# Patient Record
Sex: MALE | Race: WHITE
[De-identification: names, ages, dates, MRNs, and addresses within clinical notes are randomized per-mention and may not be internally consistent; named-entity substitution may affect disease eponyms.]

---

## 2017-08-21 ENCOUNTER — RX ONLY (OUTPATIENT)
Age: 77
Setting detail: RX ONLY
End: 2017-08-21

## 2017-08-21 PROBLEM — Z85.820 PERSONAL HISTORY OF MALIGNANT MELANOMA OF SKIN: Status: ACTIVE | Noted: 2017-08-21

## 2017-08-21 PROBLEM — D04.62 CARCINOMA IN SITU OF SKIN OF LEFT UPPER LIMB, INCLUDING SHOULDER: Status: ACTIVE | Noted: 2017-08-21

## 2017-08-21 PROBLEM — D22.61 MELANOCYTIC NEVI OF RIGHT UPPER LIMB, INCLUDING SHOULDER: Status: ACTIVE | Noted: 2017-08-21

## 2017-09-03 ENCOUNTER — APPOINTMENT (OUTPATIENT)
Dept: RADIOLOGY | Facility: MEDICAL CENTER | Age: 77
End: 2017-09-03
Attending: STUDENT IN AN ORGANIZED HEALTH CARE EDUCATION/TRAINING PROGRAM
Payer: MEDICARE

## 2017-09-03 ENCOUNTER — RESOLUTE PROFESSIONAL BILLING HOSPITAL PROF FEE (OUTPATIENT)
Dept: HOSPITALIST | Facility: MEDICAL CENTER | Age: 77
End: 2017-09-03
Payer: MEDICARE

## 2017-09-03 ENCOUNTER — HOSPITAL ENCOUNTER (OUTPATIENT)
Facility: MEDICAL CENTER | Age: 77
End: 2017-09-04
Attending: EMERGENCY MEDICINE | Admitting: INTERNAL MEDICINE
Payer: MEDICARE

## 2017-09-03 ENCOUNTER — APPOINTMENT (OUTPATIENT)
Dept: RADIOLOGY | Facility: MEDICAL CENTER | Age: 77
End: 2017-09-03
Attending: EMERGENCY MEDICINE
Payer: MEDICARE

## 2017-09-03 DIAGNOSIS — R07.9 CHEST PAIN, UNSPECIFIED TYPE: ICD-10-CM

## 2017-09-03 PROBLEM — R07.89 ATYPICAL CHEST PAIN: Status: ACTIVE | Noted: 2017-09-03

## 2017-09-03 PROBLEM — R71.8 ELEVATED HEMATOCRIT: Status: ACTIVE | Noted: 2017-09-03

## 2017-09-03 PROBLEM — E11.9 DIABETES (HCC): Status: ACTIVE | Noted: 2017-09-03

## 2017-09-03 PROBLEM — E78.5 DYSLIPIDEMIA: Status: ACTIVE | Noted: 2017-09-03

## 2017-09-03 LAB
ALBUMIN SERPL BCP-MCNC: 4.1 G/DL (ref 3.2–4.9)
ALBUMIN/GLOB SERPL: 1.2 G/DL
ALP SERPL-CCNC: 66 U/L (ref 30–99)
ALT SERPL-CCNC: 22 U/L (ref 2–50)
AMPHET UR QL SCN: NEGATIVE
ANION GAP SERPL CALC-SCNC: 7 MMOL/L (ref 0–11.9)
AST SERPL-CCNC: 19 U/L (ref 12–45)
BARBITURATES UR QL SCN: NEGATIVE
BASOPHILS # BLD AUTO: 0.9 % (ref 0–1.8)
BASOPHILS # BLD: 0.07 K/UL (ref 0–0.12)
BENZODIAZ UR QL SCN: NEGATIVE
BILIRUB SERPL-MCNC: 0.9 MG/DL (ref 0.1–1.5)
BNP SERPL-MCNC: 20 PG/ML (ref 0–100)
BUN SERPL-MCNC: 19 MG/DL (ref 8–22)
BZE UR QL SCN: NEGATIVE
CALCIUM SERPL-MCNC: 9.7 MG/DL (ref 8.5–10.5)
CANNABINOIDS UR QL SCN: NEGATIVE
CHLORIDE SERPL-SCNC: 101 MMOL/L (ref 96–112)
CO2 SERPL-SCNC: 25 MMOL/L (ref 20–33)
COMMENT 1642: NORMAL
CREAT SERPL-MCNC: 0.95 MG/DL (ref 0.5–1.4)
EKG IMPRESSION: NORMAL
EOSINOPHIL # BLD AUTO: 0.33 K/UL (ref 0–0.51)
EOSINOPHIL NFR BLD: 4 % (ref 0–6.9)
ERYTHROCYTE [DISTWIDTH] IN BLOOD BY AUTOMATED COUNT: 38.7 FL (ref 35.9–50)
FERRITIN SERPL-MCNC: 53.5 NG/ML (ref 22–322)
GFR SERPL CREATININE-BSD FRML MDRD: >60 ML/MIN/1.73 M 2
GLOBULIN SER CALC-MCNC: 3.4 G/DL (ref 1.9–3.5)
GLUCOSE BLD-MCNC: 156 MG/DL (ref 65–99)
GLUCOSE BLD-MCNC: 250 MG/DL (ref 65–99)
GLUCOSE SERPL-MCNC: 337 MG/DL (ref 65–99)
HCT VFR BLD AUTO: 52.8 % (ref 42–52)
HGB BLD-MCNC: 17.8 G/DL (ref 14–18)
IMM GRANULOCYTES # BLD AUTO: 0.13 K/UL (ref 0–0.11)
IMM GRANULOCYTES NFR BLD AUTO: 1.6 % (ref 0–0.9)
INR PPP: 0.88 (ref 0.87–1.13)
IRON SERPL-MCNC: 80 UG/DL (ref 50–180)
LIPASE SERPL-CCNC: 21 U/L (ref 11–82)
LYMPHOCYTES # BLD AUTO: 1.55 K/UL (ref 1–4.8)
LYMPHOCYTES NFR BLD: 19 % (ref 22–41)
MCH RBC QN AUTO: 28.5 PG (ref 27–33)
MCHC RBC AUTO-ENTMCNC: 33.7 G/DL (ref 33.7–35.3)
MCV RBC AUTO: 84.5 FL (ref 81.4–97.8)
METHADONE UR QL SCN: NEGATIVE
MONOCYTES # BLD AUTO: 0.83 K/UL (ref 0–0.85)
MONOCYTES NFR BLD AUTO: 10.2 % (ref 0–13.4)
MORPHOLOGY BLD-IMP: NORMAL
NEUTROPHILS # BLD AUTO: 5.24 K/UL (ref 1.82–7.42)
NEUTROPHILS NFR BLD: 64.3 % (ref 44–72)
NRBC # BLD AUTO: 0 K/UL
NRBC BLD AUTO-RTO: 0 /100 WBC
OPIATES UR QL SCN: NEGATIVE
OXYCODONE UR QL SCN: NEGATIVE
PCP UR QL SCN: NEGATIVE
PLATELET # BLD AUTO: 259 K/UL (ref 164–446)
PMV BLD AUTO: 11.2 FL (ref 9–12.9)
POTASSIUM SERPL-SCNC: 4.6 MMOL/L (ref 3.6–5.5)
PROPOXYPH UR QL SCN: NEGATIVE
PROT SERPL-MCNC: 7.5 G/DL (ref 6–8.2)
PROTHROMBIN TIME: 12.2 SEC (ref 12–14.6)
RBC # BLD AUTO: 6.25 M/UL (ref 4.7–6.1)
SODIUM SERPL-SCNC: 133 MMOL/L (ref 135–145)
TROPONIN I SERPL-MCNC: <0.01 NG/ML (ref 0–0.04)
TROPONIN I SERPL-MCNC: <0.01 NG/ML (ref 0–0.04)
TSH SERPL DL<=0.005 MIU/L-ACNC: 1.76 UIU/ML (ref 0.3–3.7)
WBC # BLD AUTO: 8.2 K/UL (ref 4.8–10.8)

## 2017-09-03 PROCEDURE — 99285 EMERGENCY DEPT VISIT HI MDM: CPT

## 2017-09-03 PROCEDURE — 36415 COLL VENOUS BLD VENIPUNCTURE: CPT

## 2017-09-03 PROCEDURE — 700111 HCHG RX REV CODE 636 W/ 250 OVERRIDE (IP): Performed by: STUDENT IN AN ORGANIZED HEALTH CARE EDUCATION/TRAINING PROGRAM

## 2017-09-03 PROCEDURE — 83690 ASSAY OF LIPASE: CPT

## 2017-09-03 PROCEDURE — 85025 COMPLETE CBC W/AUTO DIFF WBC: CPT | Mod: 91

## 2017-09-03 PROCEDURE — 80307 DRUG TEST PRSMV CHEM ANLYZR: CPT

## 2017-09-03 PROCEDURE — A9270 NON-COVERED ITEM OR SERVICE: HCPCS | Performed by: STUDENT IN AN ORGANIZED HEALTH CARE EDUCATION/TRAINING PROGRAM

## 2017-09-03 PROCEDURE — G0378 HOSPITAL OBSERVATION PER HR: HCPCS

## 2017-09-03 PROCEDURE — 83880 ASSAY OF NATRIURETIC PEPTIDE: CPT

## 2017-09-03 PROCEDURE — 80053 COMPREHEN METABOLIC PANEL: CPT | Mod: 91

## 2017-09-03 PROCEDURE — 93005 ELECTROCARDIOGRAM TRACING: CPT | Performed by: STUDENT IN AN ORGANIZED HEALTH CARE EDUCATION/TRAINING PROGRAM

## 2017-09-03 PROCEDURE — 83540 ASSAY OF IRON: CPT

## 2017-09-03 PROCEDURE — 82962 GLUCOSE BLOOD TEST: CPT

## 2017-09-03 PROCEDURE — 80061 LIPID PANEL: CPT

## 2017-09-03 PROCEDURE — 84484 ASSAY OF TROPONIN QUANT: CPT | Mod: 91

## 2017-09-03 PROCEDURE — 700111 HCHG RX REV CODE 636 W/ 250 OVERRIDE (IP): Performed by: INTERNAL MEDICINE

## 2017-09-03 PROCEDURE — 84443 ASSAY THYROID STIM HORMONE: CPT

## 2017-09-03 PROCEDURE — 94760 N-INVAS EAR/PLS OXIMETRY 1: CPT

## 2017-09-03 PROCEDURE — 93005 ELECTROCARDIOGRAM TRACING: CPT

## 2017-09-03 PROCEDURE — 93010 ELECTROCARDIOGRAM REPORT: CPT | Performed by: INTERNAL MEDICINE

## 2017-09-03 PROCEDURE — 93005 ELECTROCARDIOGRAM TRACING: CPT | Performed by: EMERGENCY MEDICINE

## 2017-09-03 PROCEDURE — 700102 HCHG RX REV CODE 250 W/ 637 OVERRIDE(OP): Performed by: STUDENT IN AN ORGANIZED HEALTH CARE EDUCATION/TRAINING PROGRAM

## 2017-09-03 PROCEDURE — 90471 IMMUNIZATION ADMIN: CPT

## 2017-09-03 PROCEDURE — 90662 IIV NO PRSV INCREASED AG IM: CPT | Performed by: INTERNAL MEDICINE

## 2017-09-03 PROCEDURE — 71010 DX-CHEST-PORTABLE (1 VIEW): CPT

## 2017-09-03 PROCEDURE — 85610 PROTHROMBIN TIME: CPT

## 2017-09-03 PROCEDURE — 82728 ASSAY OF FERRITIN: CPT

## 2017-09-03 PROCEDURE — 85379 FIBRIN DEGRADATION QUANT: CPT

## 2017-09-03 RX ORDER — ATORVASTATIN CALCIUM 80 MG/1
80 TABLET, FILM COATED ORAL
Status: COMPLETED | OUTPATIENT
Start: 2017-09-03 | End: 2017-09-03

## 2017-09-03 RX ORDER — OMEPRAZOLE 20 MG/1
20 CAPSULE, DELAYED RELEASE ORAL DAILY
Status: DISCONTINUED | OUTPATIENT
Start: 2017-09-03 | End: 2017-09-04 | Stop reason: HOSPADM

## 2017-09-03 RX ORDER — ATORVASTATIN CALCIUM 40 MG/1
40 TABLET, FILM COATED ORAL
Status: DISCONTINUED | OUTPATIENT
Start: 2017-09-04 | End: 2017-09-04 | Stop reason: HOSPADM

## 2017-09-03 RX ORDER — INSULIN GLARGINE 100 [IU]/ML
30 INJECTION, SOLUTION SUBCUTANEOUS EVERY EVENING
Status: DISCONTINUED | OUTPATIENT
Start: 2017-09-03 | End: 2017-09-04 | Stop reason: HOSPADM

## 2017-09-03 RX ORDER — ASPIRIN 81 MG/1
81 TABLET, CHEWABLE ORAL DAILY
Status: DISCONTINUED | OUTPATIENT
Start: 2017-09-04 | End: 2017-09-04 | Stop reason: HOSPADM

## 2017-09-03 RX ORDER — AMOXICILLIN 250 MG
2 CAPSULE ORAL 2 TIMES DAILY
Status: DISCONTINUED | OUTPATIENT
Start: 2017-09-03 | End: 2017-09-04 | Stop reason: HOSPADM

## 2017-09-03 RX ORDER — ASPIRIN 325 MG
325 TABLET ORAL ONCE
Status: COMPLETED | OUTPATIENT
Start: 2017-09-03 | End: 2017-09-03

## 2017-09-03 RX ORDER — METFORMIN HYDROCHLORIDE EXTENDED-RELEASE TABLETS 1000 MG/1
1 TABLET, FILM COATED, EXTENDED RELEASE ORAL 2 TIMES DAILY
COMMUNITY
End: 2017-10-09 | Stop reason: SDUPTHER

## 2017-09-03 RX ORDER — MORPHINE SULFATE 4 MG/ML
4 INJECTION, SOLUTION INTRAMUSCULAR; INTRAVENOUS ONCE
Status: DISCONTINUED | OUTPATIENT
Start: 2017-09-03 | End: 2017-09-04

## 2017-09-03 RX ORDER — BISACODYL 10 MG
10 SUPPOSITORY, RECTAL RECTAL
Status: DISCONTINUED | OUTPATIENT
Start: 2017-09-03 | End: 2017-09-04 | Stop reason: HOSPADM

## 2017-09-03 RX ORDER — ONDANSETRON 2 MG/ML
4 INJECTION INTRAMUSCULAR; INTRAVENOUS ONCE
Status: ACTIVE | OUTPATIENT
Start: 2017-09-03 | End: 2017-09-04

## 2017-09-03 RX ORDER — LABETALOL HYDROCHLORIDE 5 MG/ML
10 INJECTION, SOLUTION INTRAVENOUS EVERY 4 HOURS PRN
Status: DISCONTINUED | OUTPATIENT
Start: 2017-09-03 | End: 2017-09-04 | Stop reason: HOSPADM

## 2017-09-03 RX ORDER — DEXTROSE MONOHYDRATE 25 G/50ML
25 INJECTION, SOLUTION INTRAVENOUS
Status: DISCONTINUED | OUTPATIENT
Start: 2017-09-03 | End: 2017-09-04 | Stop reason: HOSPADM

## 2017-09-03 RX ORDER — LISINOPRIL 10 MG/1
5 TABLET ORAL
Status: DISCONTINUED | OUTPATIENT
Start: 2017-09-03 | End: 2017-09-04 | Stop reason: HOSPADM

## 2017-09-03 RX ORDER — POLYETHYLENE GLYCOL 3350 17 G/17G
1 POWDER, FOR SOLUTION ORAL
Status: DISCONTINUED | OUTPATIENT
Start: 2017-09-03 | End: 2017-09-04 | Stop reason: HOSPADM

## 2017-09-03 RX ORDER — ASPIRIN 325 MG
325 TABLET ORAL DAILY
Status: DISCONTINUED | OUTPATIENT
Start: 2017-09-03 | End: 2017-09-03

## 2017-09-03 RX ADMIN — INFLUENZA A VIRUSA/MICHIGAN/45/2015 X-275 (H1N1) ANTIGEN (FORMALDEHYDE INACTIVATED), INFLUENZA A VIRUS A/HONG KONG/4801/2014 X-263B (H3N2) ANTIGEN (FORMALDEHYDE INACTIVATED), AND INFLUENZA B VIRUS B/BRISBANE/60/2008 ANTIGEN (FORMALDEHYDE INACTIVATED) 0.5 ML: 60; 60; 60 INJECTION, SUSPENSION INTRAMUSCULAR at 17:31

## 2017-09-03 RX ADMIN — ATORVASTATIN CALCIUM 80 MG: 80 TABLET, FILM COATED ORAL at 18:35

## 2017-09-03 RX ADMIN — ENOXAPARIN SODIUM 40 MG: 100 INJECTION SUBCUTANEOUS at 14:53

## 2017-09-03 RX ADMIN — INSULIN LISPRO 2 UNITS: 100 INJECTION, SOLUTION INTRAVENOUS; SUBCUTANEOUS at 20:52

## 2017-09-03 RX ADMIN — ASPIRIN 325 MG: 325 TABLET, COATED ORAL at 14:52

## 2017-09-03 RX ADMIN — OMEPRAZOLE 20 MG: 20 CAPSULE, DELAYED RELEASE ORAL at 14:52

## 2017-09-03 RX ADMIN — INSULIN LISPRO 3 UNITS: 100 INJECTION, SOLUTION INTRAVENOUS; SUBCUTANEOUS at 17:14

## 2017-09-03 RX ADMIN — LISINOPRIL 5 MG: 10 TABLET ORAL at 17:30

## 2017-09-03 ASSESSMENT — ENCOUNTER SYMPTOMS
WEAKNESS: 0
PALPITATIONS: 0
SORE THROAT: 0
CHILLS: 0
BACK PAIN: 1
FEVER: 0
BLURRED VISION: 0
NAUSEA: 0
DEPRESSION: 0
ABDOMINAL PAIN: 0
HEADACHES: 1
DIZZINESS: 0
DIAPHORESIS: 0
BRUISES/BLEEDS EASILY: 0
SHORTNESS OF BREATH: 0
BACK PAIN: 0
VOMITING: 0
COUGH: 0
HEADACHES: 0
DOUBLE VISION: 0
CLAUDICATION: 0

## 2017-09-03 ASSESSMENT — COGNITIVE AND FUNCTIONAL STATUS - GENERAL
SUGGESTED CMS G CODE MODIFIER DAILY ACTIVITY: CH
MOBILITY SCORE: 24
DAILY ACTIVITIY SCORE: 24
SUGGESTED CMS G CODE MODIFIER MOBILITY: CH

## 2017-09-03 ASSESSMENT — PAIN SCALES - GENERAL
PAINLEVEL_OUTOF10: 0

## 2017-09-03 ASSESSMENT — LIFESTYLE VARIABLES
EVER_SMOKED: NEVER
EVER_SMOKED: NEVER
ALCOHOL_USE: NO

## 2017-09-03 ASSESSMENT — COPD QUESTIONNAIRES
HAVE YOU SMOKED AT LEAST 100 CIGARETTES IN YOUR ENTIRE LIFE: NO/DON'T KNOW
DURING THE PAST 4 WEEKS HOW MUCH DID YOU FEEL SHORT OF BREATH: NONE/LITTLE OF THE TIME
COPD SCREENING SCORE: 2
DO YOU EVER COUGH UP ANY MUCUS OR PHLEGM?: NO/ONLY WITH OCCASIONAL COLDS OR INFECTIONS

## 2017-09-03 NOTE — NON-PROVIDER
"      Internal Medicine Medical Student Admitting History and Physical    Name Manolo Puri     1940   Age/Sex 76 y.o. male   MRN [id number]   Code Status Full     After 5PM or if no immediate response to page, please call for cross-coverage  Attending/Team: Dr. Cole See Patient List for primary contact information  Call [phone] to page after hours   1st Call - Day Intern (R1):   Dr. Haro  2nd Call - Day Sr. Resident (R2/R3):   Dr. Santos        Chief Complaint:  Chest pain     HPI: pt c/o 3 day h/o right sided chest pain, which he describes as 3/10 sharp \"pulsing pain\" that lasts for a few seconds but occurs about every 15-30 seconds. He states it is just beneath his right breast, and does not radiate anywhere. He denies any associated symptoms, including weakness, nausea, SOB. It is not aggravated by anything, including exertion, sitting forward, deep inspiration, movement of the arm/shoulder, or pressing on the area. He has tried rest and hot baths to relieve the pain, and believes the hot baths helped a little. He has a remote history of pericarditis but states this is nothing like that           Review of Systems   Constitutional: Negative for chills, fever and malaise/fatigue.   HENT: Negative for sore throat.    Eyes: Negative for blurred vision and double vision.   Respiratory: Negative for cough and shortness of breath.    Cardiovascular: Positive for chest pain. Negative for palpitations, claudication and leg swelling.        See HPI   Gastrointestinal: Negative for abdominal pain, nausea and vomiting.   Musculoskeletal: Negative for back pain.   Neurological: Negative for weakness and headaches.             Past Medical History  And current medications (link outpatient medications  with diagnosis)    No current facility-administered medications on file prior to encounter.      No current outpatient prescriptions on file prior to encounter.   Melanoma  Squamous cell " carcinoma  Diabetes mellitus poorly controlled    Home meds:  Metformin 1000mg qd  Levimir 36U qd     Past Surgical History:  No past surgical history on file.  Melanoma removed from right upper back  SCC removed from left FA    Medication Allergy/Sensitivities:  No Known Allergies      Family History:  Father - DM    Social History:  Smoking: Never  Alcohol: Denies  Illictis: Denies  Living situation: Lives in [place] at home with wife. Travelled to [place] by car 1 month ago, otherwise no travel or sick contact   Has not had shingles or pneumonia vaccine           Physical Exam     Vitals:    09/03/17 1007 09/03/17 1016   BP: 131/63    Pulse: 96    Resp: 18    Temp: 36.6 °C (97.8 °F)    SpO2: 96%    Weight:  93.8 kg (206 lb 12.7 oz)   Height: 1.829 m (6')      Body mass index is 28.05 kg/m².  /63   Pulse 96   Temp 36.6 °C (97.8 °F)   Resp 18   Ht 1.829 m (6')   Wt 93.8 kg (206 lb 12.7 oz)   SpO2 96%   BMI 28.05 kg/m²   O2 therapy: Pulse Oximetry: 96 %, O2 Delivery: None (Room Air)    Physical Exam   Constitutional: He is oriented to person, place, and time and well-developed, well-nourished, and in no distress.   HENT:   Head: Normocephalic and atraumatic.   Eyes: EOM are normal. Pupils are equal, round, and reactive to light.   Cardiovascular: Normal rate, regular rhythm and normal heart sounds.  Exam reveals no gallop and no friction rub.    No murmur heard.  Pulmonary/Chest: Effort normal and breath sounds normal.   Abdominal: Soft. He exhibits no distension. There is no tenderness. There is negative Stinson's sign.   Neurological: He is alert and oriented to person, place, and time.   Skin: Skin is warm and dry.   Healing scar on dorsal left forearm pt states is from squamous cell skin cancer removal by dermatologist    Psychiatric: Mood and affect normal.     Labs: trop I <0.01, BNP 20.   CXR  Heart size is within normal limits.  No focal infiltrates or consolidations are identified in the lungs.  No  "pleural fluid collections are identified.  No pneumothorax is appreciated.    EKG suggests 1st degree heard     Assessment/Plan     76 year old male presenting with 3 day history of sharp right lower chest pain     1. Chest pain   - most likely MSK in nature given normal cardiac enzymes and exam, though this is an atypical presentation  - will monitor, likely to resolve on its own  - pt has uncontrolled DM, elevated Hct and high normal Hgb with no obvious cause of hypoxia - possible an underlying hypoxic process such as TEJAL that could contribute to a cardiac process, also diabetic patients need a high threshold of suspicion for cardiac issues given their atypical presentation  - admit to hospital, tele, repeat EKG and trops, give 325mg ASA, start atorvastatin 80mg 1 po qd, start lisinopril 5mg 1 po qd given his diabetes, will hold beta blocker at this time 2/2 normal rate and 1st degree heart block   - must monitor for cardiac etiology    2. Diabetes mellitus   - glucose on admission 337  - pt states his glucose is poorly controlled at home on levimir and metformin, states he checks it \"a few times a week\" and it is in the high 100s-mid 200s  - cont home meds and monitor glucose, pt is counseled on the link between diabetes and heart disease   "

## 2017-09-03 NOTE — ASSESSMENT & PLAN NOTE
Non-exertional 15 sec episodes of sharp right sided non-radiating 2/10 chest pain without associated symptoms, not consistent with ACS. No SOB or acute back pain, vitals stable, appears clinically well. History of uncontrolled DM, untreated HLD. Troponin negative, no acute ischemic changes on EKG. No acute processes on CXR. Wells score 1.0 (squamous cell removed recently). Due to patient's age and risk factors for ACS, continued inpatient workup is warranted to more definitively exclude ACS etiology.  - Trend troponin  - Serial EKGs  - NPO at midnight for possible stress test  - ASA, statin

## 2017-09-03 NOTE — PROGRESS NOTES
Pt admitted to T836-2.  Tele box on and monitor room notified.  Pt walked to bed from [facility].  No complaints of pain at this time.  Updated about plan of care.  Educated on how to use call light.

## 2017-09-03 NOTE — ASSESSMENT & PLAN NOTE
Endorses being a never smoker, no obvious causes of chronic hypoxia. Hct 52.8.   - Trend CBC  - Iron studies

## 2017-09-03 NOTE — H&P
Internal Medicine Admitting History and Physical    Name Manolo Puri     1940   Age/Sex 76 y.o. male   MRN [id number]   Code Status FULL CODE     After 5PM or if no immediate response to page, please call for cross-coverage  Attending/Team: Kelsey/Paco See Patient List for primary contact information  Call [phone] to page    1st Call - Day Intern (R1):   Garry Haro 2nd Call - Day Sr. Resident (R2/R3):   Humberto Santos       Chief Complaint:  Chest pain    HPI:  Mr. 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 The patient states he has 15-20 second pulses of right sided sharp 2/10 chest pain constantly throughout the day that is non-radiating. Nothing makes the pain better or worse, it is non-exertional. He states he has never had this type of pain before. No medications taken for pain, but did take aspirin for a headache. Drove to [place] 1 month ago. Denies diaphoresis, dizziness, LOC, SOB, arm/neck/jaw pain, chest tightness, recent illness, fever, chills, acute back pain, nausea, or vomiting.    Review of Systems   Constitutional: Negative for diaphoresis and malaise/fatigue.   Eyes: Negative for blurred vision and double vision.   Respiratory: Negative for shortness of breath.    Cardiovascular: Positive for chest pain.   Gastrointestinal: Negative for abdominal pain, nausea and vomiting.   Genitourinary: Negative for dysuria, frequency and urgency.   Musculoskeletal: Positive for back pain.   Neurological: Positive for headaches. Negative for dizziness.   Endo/Heme/Allergies: Does not bruise/bleed easily.   Psychiatric/Behavioral: Negative for depression and suicidal ideas.             Past Medical History:   Past Medical History:   Diagnosis Date   • Arthritis     • Diabetes (CMS-HCC)    • Squamous cell cancer of skin of forearm        Past Surgical History:  Past Surgical History:   Procedure Laterality Date   • OTHER      Derm removal of SCC       Current Outpatient Medications:  Home Medications     Reviewed by Aneudy Silvestre (Pharmacy Tech) on 09/03/17 at 1253  Med List Status: Complete   Medication Last Dose Status   insulin detemir (LEVEMIR FLEXTOUCH) 100 UNIT/ML Solution Pen-injector injection 9/2/2017 Active   Metformin HCl 1000 MG TABLET SR 24 HR 1week ago Active                Medication Allergy/Sensitivities:  No Known Allergies      Family History:  Family History   Problem Relation Age of Onset   • Diabetes Father        Social History:  Social History     Social History   • Marital status: Unknown     Spouse name: N/A   • Number of children: N/A   • Years of education: N/A     Occupational History   • Not on file.     Social History Main Topics   • Smoking status: Never Smoker   • Smokeless tobacco: Never Used   • Alcohol use No   • Drug use: No   • Sexual activity: Not on file     Other Topics Concern   • Not on file     Social History Narrative   • No narrative on file       Physical Exam     Vitals:    09/03/17 1007 09/03/17 1016   BP: 131/63    Pulse: 96    Resp: 18    Temp: 36.6 °C (97.8 °F)    SpO2: 96%    Weight:  93.8 kg (206 lb 12.7 oz)   Height: 1.829 m (6')      Body mass index is 28.05 kg/m².  /63   Pulse 96   Temp 36.6 °C (97.8 °F)   Resp 18   Ht 1.829 m (6')   Wt 93.8 kg (206 lb 12.7 oz)   SpO2 96%   BMI 28.05 kg/m²   O2 therapy: Pulse Oximetry: 96 %, O2 Delivery: None (Room Air)    Physical Exam   Constitutional: He is oriented to person, place, and time and well-developed, well-nourished, and in no distress. No distress.   HENT:   Head: Normocephalic and atraumatic.   Eyes: EOM are normal. Pupils are equal, round, and reactive to light.   Neck: Normal range of motion. Neck supple. No JVD present. No tracheal deviation present.    Cardiovascular: Normal rate, regular rhythm, normal heart sounds and intact distal pulses.  Exam reveals no gallop and no friction rub.    No murmur heard.  Pulmonary/Chest: Effort normal and breath sounds normal. No stridor. No respiratory distress. He has no wheezes. He has no rales. He exhibits no tenderness.   Abdominal: Soft. Bowel sounds are normal. He exhibits no distension. There is no tenderness. There is no rebound and no guarding.   Musculoskeletal: He exhibits no edema.   Neurological: He is alert and oriented to person, place, and time.   Skin: Skin is warm and dry. No rash noted. He is not diaphoretic. No erythema.   Psychiatric: Mood, memory, affect and judgment normal.   Nursing note and vitals reviewed.            Data Review       Old Records Request:   Completed  Current Records review and summary: Completed    Lab Data Review:  Recent Results (from the past 24 hour(s))   EKG (NOW)    Collection Time: 17 10:11 AM   Result Value Ref Range    Report       [facility] Emergency Dept.    Test Date:  2017  Pt Name:    INDIRA FLORES              Department: ER  MRN:        [id number]                      Room:  Gender:     M                            Technician: EDSFHR  :        1940                   Requested By:ER TRIAGE PROTOCOL  Order #:    [id number]                    Reading MD:    Measurements  Intervals                                Axis  Rate:       87                           P:          120  GA:         224                          QRS:        -4  QRSD:       90                           T:          111  QT:         372  QTc:        448    Interpretive Statements  SINUS RHYTHM  FIRST DEGREE AV BLOCK  LOW VOLTAGE IN FRONTAL LEADS  NONSPECIFIC T ABNORMALITIES, LATERAL LEADS  No previous ECG available for comparison     CBC with Differential    Collection Time: 17 11:30 AM   Result Value Ref Range    WBC 8.2 4.8 - 10.8 K/uL    RBC 6.25 (H) 4.70  - 6.10 M/uL    Hemoglobin 17.8 14.0 - 18.0 g/dL    Hematocrit 52.8 (H) 42.0 - 52.0 %    MCV 84.5 81.4 - 97.8 fL    MCH 28.5 27.0 - 33.0 pg    MCHC 33.7 33.7 - 35.3 g/dL    RDW 38.7 35.9 - 50.0 fL    Platelet Count 259 164 - 446 K/uL    MPV 11.2 9.0 - 12.9 fL    Neutrophils-Polys 64.30 44.00 - 72.00 %    Lymphocytes 19.00 (L) 22.00 - 41.00 %    Monocytes 10.20 0.00 - 13.40 %    Eosinophils 4.00 0.00 - 6.90 %    Basophils 0.90 0.00 - 1.80 %    Immature Granulocytes 1.60 (H) 0.00 - 0.90 %    Nucleated RBC 0.00 /100 WBC    Neutrophils (Absolute) 5.24 1.82 - 7.42 K/uL    Lymphs (Absolute) 1.55 1.00 - 4.80 K/uL    Monos (Absolute) 0.83 0.00 - 0.85 K/uL    Eos (Absolute) 0.33 0.00 - 0.51 K/uL    Baso (Absolute) 0.07 0.00 - 0.12 K/uL    Immature Granulocytes (abs) 0.13 (H) 0.00 - 0.11 K/uL    NRBC (Absolute) 0.00 K/uL   Complete Metabolic Panel (CMP)    Collection Time: 09/03/17 11:30 AM   Result Value Ref Range    Sodium 133 (L) 135 - 145 mmol/L    Potassium 4.6 3.6 - 5.5 mmol/L    Chloride 101 96 - 112 mmol/L    Co2 25 20 - 33 mmol/L    Anion Gap 7.0 0.0 - 11.9    Glucose 337 (H) 65 - 99 mg/dL    Bun 19 8 - 22 mg/dL    Creatinine 0.95 0.50 - 1.40 mg/dL    Calcium 9.7 8.5 - 10.5 mg/dL    AST(SGOT) 19 12 - 45 U/L    ALT(SGPT) 22 2 - 50 U/L    Alkaline Phosphatase 66 30 - 99 U/L    Total Bilirubin 0.9 0.1 - 1.5 mg/dL    Albumin 4.1 3.2 - 4.9 g/dL    Total Protein 7.5 6.0 - 8.2 g/dL    Globulin 3.4 1.9 - 3.5 g/dL    A-G Ratio 1.2 g/dL   Btype Natriuretic Peptide (BNP)    Collection Time: 09/03/17 11:30 AM   Result Value Ref Range    B Natriuretic Peptide 20 0 - 100 pg/mL   Prothrombin Time (PT/INR)    Collection Time: 09/03/17 11:30 AM   Result Value Ref Range    PT 12.2 12.0 - 14.6 sec    INR 0.88 0.87 - 1.13   Lipase    Collection Time: 09/03/17 11:30 AM   Result Value Ref Range    Lipase 21 11 - 82 U/L   Troponin STAT    Collection Time: 09/03/17 11:30 AM   Result Value Ref Range    Troponin I <0.01 0.00 - 0.04 ng/mL    ESTIMATED GFR    Collection Time: 09/03/17 11:30 AM   Result Value Ref Range    GFR If African American >60 >60 mL/min/1.73 m 2    GFR If Non African American >60 >60 mL/min/1.73 m 2   PERIPHERAL SMEAR REVIEW    Collection Time: 09/03/17 11:30 AM   Result Value Ref Range    Peripheral Smear Review see below    DIFFERENTIAL COMMENT    Collection Time: 09/03/17 11:30 AM   Result Value Ref Range    Comments-Diff see below        Imaging/Procedures Review:    ndependant Imaging Review: Completed  DX-CHEST-PORTABLE (1 VIEW)   Final Result         No acute cardiac or pulmonary abnormality is identified.           EKG:   EKG Independant Review: Completed  QTc:440, HR: 87, Normal Sinus Rhythm, no acute ST/T changes    Records reviewed and summarized in current documentation              Assessment/Plan     * Atypical chest pain- (present on admission)   Assessment & Plan    Non-exertional 15 sec episodes of sharp right sided non-radiating 2/10 chest pain without associated symptoms, not consistent with ACS. No SOB or acute back pain, vitals stable, appears clinically well. History of uncontrolled DM, untreated HLD. Troponin negative, no acute ischemic changes on EKG. No acute processes on CXR. Wells score 1.0 (squamous cell removed recently). Due to patient's age and risk factors for ACS, continued inpatient workup is warranted to more definitively exclude ACS etiology.  - Trend troponin  - Serial EKGs  - NPO at midnight for possible stress test  - ASA, statin        Dyslipidemia- (present on admission)   Assessment & Plan    7/16 HDL 35, total 194. Not on statin or other med.  - Atorvastatin 40 mg daily  - Lipid panel        Uncontrolled diabetes mellitus (CMS-HCC)- (present on admission)   Assessment & Plan    HgA1c 12.4 7/16. Home -250, checks twice a week. On metformin 1000 mg BID and Levemir 36 units daily at home. Prior UA with 50+ protein, BUN/Cr are baseline.  - Diabetic diet  - Basal 30 units with  prandial  - ISS, hypoglycemia protocol  - Lisinopril 5 mg  - HgA1c        Elevated hematocrit- (present on admission)   Assessment & Plan    Endorses being a never smoker, no obvious causes of chronic hypoxia. Hct 52.8.   - Trend CBC  - Iron studies            Anticipated Hospital stay: Observation admit        Quality Measures    Reviewed items::  Labs reviewed, Medications reviewed, Radiology images reviewed and EKG reviewed  Cochran catheter::  No Cochran  DVT prophylaxis pharmacological::  Enoxaparin (Lovenox)  Ulcer Prophylaxis::  Not indicated

## 2017-09-03 NOTE — ASSESSMENT & PLAN NOTE
HgA1c 12.4 7/16. Home -250, checks twice a week. On metformin 1000 mg BID and Levemir 36 units daily at home. Prior UA with 50+ protein, BUN/Cr are baseline.  - Diabetic diet  - Basal 30 units with prandial  - ISS, hypoglycemia protocol  - Lisinopril 5 mg  - HgA1c

## 2017-09-03 NOTE — CARE PLAN
Problem: Communication  Goal: The ability to communicate needs accurately and effectively will improve  Pt able to communicate all needs.    Problem: Infection  Goal: Will remain free from infection  No signs of infection at this time.

## 2017-09-03 NOTE — SENIOR ADMIT NOTE
Atypical chest pain: Rule out ACS    Uncontrolled diabetes mellitus  Untreated dyslipidemia/hypertriglyceridemia  Undiagnosed polycythemia?        76-year-old gentleman with past medical history is significant for uncontrolled diabetes mellitus on Levemir 36 units and metformin, nonsmoker, no hypertension, un treated dyslipidemia came in with 2-3 days history of episodic intermittent sharp right-sided chest pain and substernal pain. Pain is nonexertional and would last only for seconds for the most time but it lasted for a minute or so couple of times. The pain is nonradiating and is not associated with any shortness of breath. He denied any palpitation dizziness sweating nausea vomiting. There is no ankle swelling. He did not try nitroglycerin. He took aspirin with the last one week for his headache. He denies any family history of ischemic heart disease. His diabetes is not well controlled.     He is in normal sinus rhythm on examination   no murmur or gallop  WBC 8.2, hemoglobin 17.8, hematocrit 52.8: He tends to have high hematocrit without evidence of hypoxia  Sodium 133, potassium 4.6 glucose 337, BUN 19, creatinine 0.95, liver enzymes within normal limit, PT 12.2, INR 0.88, BNP 20  Troponin less than 0.01, lipase 21     His last lipid panel showed HDL 35, triglyceride 355, LDL 88 in 2016  His last glycohemoglobin was in 2016 which was 12.4 and previously it was 14    His EKG showed mild left left axis deviation, normal sinus rhythm, WI interval 224, no acute ST segment T wave changes,   Chest x-ray negative for any acute pathology      Assessment and plan  Admit on observation status for chest pain rule out given history of uncontrolled diabetes, age and use of aspirin in last one week. He has untreated dyslipidemia with low HDL. He tends to have high hematocrit without evidence of hypoxia.    Lipitor 80, aspirin 325, hold off on to beta blockers and lisinopril for now given decent rate and blood  pressure  Will check glycohemoglobin, TSH, lipid panel  Hold metformin, Lantus 30, sliding-scale hypOrglycemia protocol and diabetic diet  Replete electrolytes  Serial EKGs and troponins  Nothing by mouth at midnight for likely stress test in the morning  Get serum iron and ferritin given his high hematocrit which is unexplained

## 2017-09-03 NOTE — ED PROVIDER NOTES
"ED Provider Note    CHIEF COMPLAINT  Chief Complaint   Patient presents with   • Chest Pain       HPI  Manolo Puri is a 76 y.o. male Here for evaluation of right-sided and substernal chest pain. The patient states his chest pain is intermittent, and \"comes and goes.\" He states that last for a minute or so and then is completely gone. It does not radiate to the back, and is not associated with shortness of breath or nausea. He denies any history of the same, but does state that he had a history of pericarditis was in his 60s.    PAST MEDICAL HISTORY   has a past medical history of Diabetes (CMS-[facility]).    SOCIAL HISTORY  Social History     Social History Main Topics   • Smoking status: Never Smoker   • Smokeless tobacco: Never Used   • Alcohol use No   • Drug use: No   • Sexual activity: Not on file       SURGICAL HISTORY  patient denies any surgical history    CURRENT MEDICATIONS  Home Medications     Reviewed by Cintia Bonner R.N. (Registered Nurse) on 09/03/17 at 1128  Med List Status: Partial   Medication Last Dose Status   metformin (GLUCOPHAGE) 1000 MG tablet 8/31/2017 Active                ALLERGIES  No Known Allergies    REVIEW OF SYSTEMS  See HPI for further details. Review of systems as above, otherwise all other systems are negative.     PHYSICAL EXAM  VITAL SIGNS: /63   Pulse 96   Temp 36.6 °C (97.8 °F)   Resp 18   Ht 1.829 m (6')   Wt 93.8 kg (206 lb 12.7 oz)   SpO2 96%   BMI 28.05 kg/m²     Constitutional: Well appearing patient.  Not toxic, nor ill in appearance.  HEENT: NC/AT.  Extra Ocular Muscles Intact. Posterior pharynx clear, and without exudate. No uvula edema.  Neck: Full range of motion; non tender.   Cardiovascular: Regular heart rate and rhythm.  No murmurs, rubs, nor gallop appreciated.   Back:  Non tender midline.  No obvious step off or deformity.  Thorax & Lungs: Lungs are clear to auscultation with good air movement bilaterally.  No wheeze, rhonchi, nor rales. "   Abdomen: Soft, with no tenderness, rebound nor guarding.  No mass, pulsatile mass, nor hepatosplenomegaly appreciated.  Skin: No purpura nor petechia noted.  No rash.  Extremities/Musculoskeletal: No sign of trauma.    Musculoskeletal: Range of motion is intact in all major joints.  Neurologic: Alert & oriented x 3.  CN II-XII grossly intact.  Clear speech, appropriate, cooperative.  Psychiatric: Normal affect appropriate for the clinical situation.    Results for orders placed or performed during the hospital encounter of 09/03/17   CBC with Differential   Result Value Ref Range    WBC 8.2 4.8 - 10.8 K/uL    RBC 6.25 (H) 4.70 - 6.10 M/uL    Hemoglobin 17.8 14.0 - 18.0 g/dL    Hematocrit 52.8 (H) 42.0 - 52.0 %    MCV 84.5 81.4 - 97.8 fL    MCH 28.5 27.0 - 33.0 pg    MCHC 33.7 33.7 - 35.3 g/dL    RDW 38.7 35.9 - 50.0 fL    Platelet Count 259 164 - 446 K/uL    MPV 11.2 9.0 - 12.9 fL    Neutrophils-Polys 64.30 44.00 - 72.00 %    Lymphocytes 19.00 (L) 22.00 - 41.00 %    Monocytes 10.20 0.00 - 13.40 %    Eosinophils 4.00 0.00 - 6.90 %    Basophils 0.90 0.00 - 1.80 %    Immature Granulocytes 1.60 (H) 0.00 - 0.90 %    Nucleated RBC 0.00 /100 WBC    Neutrophils (Absolute) 5.24 1.82 - 7.42 K/uL    Lymphs (Absolute) 1.55 1.00 - 4.80 K/uL    Monos (Absolute) 0.83 0.00 - 0.85 K/uL    Eos (Absolute) 0.33 0.00 - 0.51 K/uL    Baso (Absolute) 0.07 0.00 - 0.12 K/uL    Immature Granulocytes (abs) 0.13 (H) 0.00 - 0.11 K/uL    NRBC (Absolute) 0.00 K/uL   Complete Metabolic Panel (CMP)   Result Value Ref Range    Sodium 133 (L) 135 - 145 mmol/L    Potassium 4.6 3.6 - 5.5 mmol/L    Chloride 101 96 - 112 mmol/L    Co2 25 20 - 33 mmol/L    Anion Gap 7.0 0.0 - 11.9    Glucose 337 (H) 65 - 99 mg/dL    Bun 19 8 - 22 mg/dL    Creatinine 0.95 0.50 - 1.40 mg/dL    Calcium 9.7 8.5 - 10.5 mg/dL    AST(SGOT) 19 12 - 45 U/L    ALT(SGPT) 22 2 - 50 U/L    Alkaline Phosphatase 66 30 - 99 U/L    Total Bilirubin 0.9 0.1 - 1.5 mg/dL    Albumin 4.1 3.2 -  4.9 g/dL    Total Protein 7.5 6.0 - 8.2 g/dL    Globulin 3.4 1.9 - 3.5 g/dL    A-G Ratio 1.2 g/dL   Btype Natriuretic Peptide (BNP)   Result Value Ref Range    B Natriuretic Peptide 20 0 - 100 pg/mL   Prothrombin Time (PT/INR)   Result Value Ref Range    PT 12.2 12.0 - 14.6 sec    INR 0.88 0.87 - 1.13   Lipase   Result Value Ref Range    Lipase 21 11 - 82 U/L   Troponin STAT   Result Value Ref Range    Troponin I <0.01 0.00 - 0.04 ng/mL   ESTIMATED GFR   Result Value Ref Range    GFR If African American >60 >60 mL/min/1.73 m 2    GFR If Non African American >60 >60 mL/min/1.73 m 2   PERIPHERAL SMEAR REVIEW   Result Value Ref Range    Peripheral Smear Review see below    DIFFERENTIAL COMMENT   Result Value Ref Range    Comments-Diff see below    EKG (NOW)   Result Value Ref Range    Report       [facility] Emergency Dept.    Test Date:  2017  Pt Name:    INDIRA FLORES              Department: ER  MRN:        [id number]                      Room:  Gender:     M                            Technician: [id number]  :        [id number]                   Requested By:ER TRIAGE PROTOCOL  Order #:    [id number]                    Reading MD:    Measurements  Intervals                                Axis  Rate:       87                           P:          120  LA:         224                          QRS:        -4  QRSD:       90                           T:          111  QT:         372  QTc:        448    Interpretive Statements  SINUS RHYTHM  FIRST DEGREE AV BLOCK  LOW VOLTAGE IN FRONTAL LEADS  NONSPECIFIC T ABNORMALITIES, LATERAL LEADS  No previous ECG available for comparison     .  DX-CHEST-PORTABLE (1 VIEW)   Final Result         No acute cardiac or pulmonary abnormality is identified.            12:39 PM  The pt will be admitted by unr.      PROCEDURES     MEDICAL RECORD  I have reviewed patient's medical record and pertinent results are listed above.    COURSE & MEDICAL DECISION MAKING  I  have reviewed any medical record information, laboratory studies and radiographic results as noted above.    The pt is comfortable, nontoxic appearing, and has no current pain.       FINAL IMPRESSION  1. Chest pain, unspecified type      Electronically signed by: Magan Chavez, 9/3/2017 12:35 PM

## 2017-09-03 NOTE — ED NOTES
Pt to triage c/o non radiating right sided chest pain x 2 days with worsening this morning. (+) Nausea. (-) SOB.  Pain comes and goes.  Pt advised to return to triage nurse for any changes or concerns.

## 2017-09-04 ENCOUNTER — APPOINTMENT (OUTPATIENT)
Dept: RADIOLOGY | Facility: MEDICAL CENTER | Age: 77
End: 2017-09-04
Attending: STUDENT IN AN ORGANIZED HEALTH CARE EDUCATION/TRAINING PROGRAM
Payer: MEDICARE

## 2017-09-04 VITALS
HEIGHT: 72 IN | RESPIRATION RATE: 16 BRPM | DIASTOLIC BLOOD PRESSURE: 75 MMHG | TEMPERATURE: 96.9 F | BODY MASS INDEX: 27.98 KG/M2 | HEART RATE: 85 BPM | SYSTOLIC BLOOD PRESSURE: 110 MMHG | OXYGEN SATURATION: 93 % | WEIGHT: 206.57 LBS

## 2017-09-04 DIAGNOSIS — R07.9 CHEST PAIN, UNSPECIFIED TYPE: ICD-10-CM

## 2017-09-04 PROBLEM — D49.2 NEOPLASM OF UNSPECIFIED BEHAVIOR OF BONE, SOFT TISSUE, AND SKIN: Status: RESOLVED | Noted: 2017-08-21 | Resolved: 2017-09-04

## 2017-09-04 LAB
ALBUMIN SERPL BCP-MCNC: 3.7 G/DL (ref 3.2–4.9)
ALBUMIN SERPL BCP-MCNC: 4.1 G/DL (ref 3.2–4.9)
ALBUMIN/GLOB SERPL: 1.2 G/DL
ALBUMIN/GLOB SERPL: 1.3 G/DL
ALP SERPL-CCNC: 52 U/L (ref 30–99)
ALP SERPL-CCNC: 55 U/L (ref 30–99)
ALT SERPL-CCNC: 18 U/L (ref 2–50)
ALT SERPL-CCNC: 20 U/L (ref 2–50)
ANION GAP SERPL CALC-SCNC: 7 MMOL/L (ref 0–11.9)
ANION GAP SERPL CALC-SCNC: 8 MMOL/L (ref 0–11.9)
AST SERPL-CCNC: 15 U/L (ref 12–45)
AST SERPL-CCNC: 17 U/L (ref 12–45)
BASOPHILS # BLD AUTO: 0.8 % (ref 0–1.8)
BASOPHILS # BLD AUTO: 1.1 % (ref 0–1.8)
BASOPHILS # BLD: 0.09 K/UL (ref 0–0.12)
BASOPHILS # BLD: 0.12 K/UL (ref 0–0.12)
BILIRUB SERPL-MCNC: 0.6 MG/DL (ref 0.1–1.5)
BILIRUB SERPL-MCNC: 1.2 MG/DL (ref 0.1–1.5)
BUN SERPL-MCNC: 19 MG/DL (ref 8–22)
BUN SERPL-MCNC: 19 MG/DL (ref 8–22)
CALCIUM SERPL-MCNC: 9.4 MG/DL (ref 8.5–10.5)
CALCIUM SERPL-MCNC: 9.7 MG/DL (ref 8.5–10.5)
CHLORIDE SERPL-SCNC: 102 MMOL/L (ref 96–112)
CHLORIDE SERPL-SCNC: 105 MMOL/L (ref 96–112)
CHOLEST SERPL-MCNC: 159 MG/DL (ref 100–199)
CO2 SERPL-SCNC: 26 MMOL/L (ref 20–33)
CO2 SERPL-SCNC: 26 MMOL/L (ref 20–33)
CREAT SERPL-MCNC: 1.05 MG/DL (ref 0.5–1.4)
CREAT SERPL-MCNC: 1.12 MG/DL (ref 0.5–1.4)
DEPRECATED D DIMER PPP IA-ACNC: <200 NG/ML(D-DU)
EKG IMPRESSION: NORMAL
EOSINOPHIL # BLD AUTO: 0.36 K/UL (ref 0–0.51)
EOSINOPHIL # BLD AUTO: 0.62 K/UL (ref 0–0.51)
EOSINOPHIL NFR BLD: 3.3 % (ref 0–6.9)
EOSINOPHIL NFR BLD: 5.7 % (ref 0–6.9)
ERYTHROCYTE [DISTWIDTH] IN BLOOD BY AUTOMATED COUNT: 38.3 FL (ref 35.9–50)
ERYTHROCYTE [DISTWIDTH] IN BLOOD BY AUTOMATED COUNT: 39.5 FL (ref 35.9–50)
EST. AVERAGE GLUCOSE BLD GHB EST-MCNC: 298 MG/DL
GFR SERPL CREATININE-BSD FRML MDRD: >60 ML/MIN/1.73 M 2
GFR SERPL CREATININE-BSD FRML MDRD: >60 ML/MIN/1.73 M 2
GLOBULIN SER CALC-MCNC: 2.9 G/DL (ref 1.9–3.5)
GLOBULIN SER CALC-MCNC: 3.4 G/DL (ref 1.9–3.5)
GLUCOSE BLD-MCNC: 203 MG/DL (ref 65–99)
GLUCOSE BLD-MCNC: 214 MG/DL (ref 65–99)
GLUCOSE SERPL-MCNC: 137 MG/DL (ref 65–99)
GLUCOSE SERPL-MCNC: 236 MG/DL (ref 65–99)
HBA1C MFR BLD: 12 % (ref 0–5.6)
HCT VFR BLD AUTO: 50.2 % (ref 42–52)
HCT VFR BLD AUTO: 53.3 % (ref 42–52)
HDLC SERPL-MCNC: 30 MG/DL
HGB BLD-MCNC: 16.7 G/DL (ref 14–18)
HGB BLD-MCNC: 17.5 G/DL (ref 14–18)
IMM GRANULOCYTES # BLD AUTO: 0.14 K/UL (ref 0–0.11)
IMM GRANULOCYTES # BLD AUTO: 0.17 K/UL (ref 0–0.11)
IMM GRANULOCYTES NFR BLD AUTO: 1.3 % (ref 0–0.9)
IMM GRANULOCYTES NFR BLD AUTO: 1.5 % (ref 0–0.9)
LDLC SERPL CALC-MCNC: 86 MG/DL
LV EJECT FRACT MOD 2C 99903: 64.75
LV EJECT FRACT MOD 4C 99902: 67.95
LV EJECT FRACT MOD BP 99901: 66.84
LYMPHOCYTES # BLD AUTO: 1.82 K/UL (ref 1–4.8)
LYMPHOCYTES # BLD AUTO: 2.86 K/UL (ref 1–4.8)
LYMPHOCYTES NFR BLD: 16.6 % (ref 22–41)
LYMPHOCYTES NFR BLD: 26.5 % (ref 22–41)
MAGNESIUM SERPL-MCNC: 2.1 MG/DL (ref 1.5–2.5)
MCH RBC QN AUTO: 27.9 PG (ref 27–33)
MCH RBC QN AUTO: 28 PG (ref 27–33)
MCHC RBC AUTO-ENTMCNC: 32.8 G/DL (ref 33.7–35.3)
MCHC RBC AUTO-ENTMCNC: 33.3 G/DL (ref 33.7–35.3)
MCV RBC AUTO: 83.9 FL (ref 81.4–97.8)
MCV RBC AUTO: 85.4 FL (ref 81.4–97.8)
MONOCYTES # BLD AUTO: 0.88 K/UL (ref 0–0.85)
MONOCYTES # BLD AUTO: 1.09 K/UL (ref 0–0.85)
MONOCYTES NFR BLD AUTO: 10.1 % (ref 0–13.4)
MONOCYTES NFR BLD AUTO: 8 % (ref 0–13.4)
NEUTROPHILS # BLD AUTO: 5.96 K/UL (ref 1.82–7.42)
NEUTROPHILS # BLD AUTO: 7.66 K/UL (ref 1.82–7.42)
NEUTROPHILS NFR BLD: 55.3 % (ref 44–72)
NEUTROPHILS NFR BLD: 69.8 % (ref 44–72)
NRBC # BLD AUTO: 0 K/UL
NRBC # BLD AUTO: 0 K/UL
NRBC BLD AUTO-RTO: 0 /100 WBC
NRBC BLD AUTO-RTO: 0 /100 WBC
PLATELET # BLD AUTO: 264 K/UL (ref 164–446)
PLATELET # BLD AUTO: 283 K/UL (ref 164–446)
PMV BLD AUTO: 11 FL (ref 9–12.9)
PMV BLD AUTO: 11.4 FL (ref 9–12.9)
POTASSIUM SERPL-SCNC: 3.6 MMOL/L (ref 3.6–5.5)
POTASSIUM SERPL-SCNC: 3.8 MMOL/L (ref 3.6–5.5)
PROT SERPL-MCNC: 6.6 G/DL (ref 6–8.2)
PROT SERPL-MCNC: 7.5 G/DL (ref 6–8.2)
RBC # BLD AUTO: 5.98 M/UL (ref 4.7–6.1)
RBC # BLD AUTO: 6.24 M/UL (ref 4.7–6.1)
SODIUM SERPL-SCNC: 136 MMOL/L (ref 135–145)
SODIUM SERPL-SCNC: 138 MMOL/L (ref 135–145)
TRIGL SERPL-MCNC: 216 MG/DL (ref 0–149)
TROPONIN I SERPL-MCNC: <0.01 NG/ML (ref 0–0.04)
WBC # BLD AUTO: 10.8 K/UL (ref 4.8–10.8)
WBC # BLD AUTO: 11 K/UL (ref 4.8–10.8)

## 2017-09-04 PROCEDURE — 71111 X-RAY EXAM RIBS/CHEST4/> VWS: CPT

## 2017-09-04 PROCEDURE — G0378 HOSPITAL OBSERVATION PER HR: HCPCS

## 2017-09-04 PROCEDURE — 83036 HEMOGLOBIN GLYCOSYLATED A1C: CPT

## 2017-09-04 PROCEDURE — 99220 PR INITIAL OBSERVATION CARE,LEVL III: CPT | Mod: GC | Performed by: INTERNAL MEDICINE

## 2017-09-04 PROCEDURE — 80053 COMPREHEN METABOLIC PANEL: CPT

## 2017-09-04 PROCEDURE — A9270 NON-COVERED ITEM OR SERVICE: HCPCS | Performed by: STUDENT IN AN ORGANIZED HEALTH CARE EDUCATION/TRAINING PROGRAM

## 2017-09-04 PROCEDURE — 82962 GLUCOSE BLOOD TEST: CPT

## 2017-09-04 PROCEDURE — 93010 ELECTROCARDIOGRAM REPORT: CPT | Performed by: INTERNAL MEDICINE

## 2017-09-04 PROCEDURE — 36415 COLL VENOUS BLD VENIPUNCTURE: CPT

## 2017-09-04 PROCEDURE — 700111 HCHG RX REV CODE 636 W/ 250 OVERRIDE (IP): Performed by: INTERNAL MEDICINE

## 2017-09-04 PROCEDURE — 83735 ASSAY OF MAGNESIUM: CPT

## 2017-09-04 PROCEDURE — 700102 HCHG RX REV CODE 250 W/ 637 OVERRIDE(OP): Performed by: STUDENT IN AN ORGANIZED HEALTH CARE EDUCATION/TRAINING PROGRAM

## 2017-09-04 PROCEDURE — 93306 TTE W/DOPPLER COMPLETE: CPT

## 2017-09-04 PROCEDURE — 90471 IMMUNIZATION ADMIN: CPT

## 2017-09-04 PROCEDURE — 93306 TTE W/DOPPLER COMPLETE: CPT | Mod: 26 | Performed by: INTERNAL MEDICINE

## 2017-09-04 PROCEDURE — 93005 ELECTROCARDIOGRAM TRACING: CPT | Performed by: INTERNAL MEDICINE

## 2017-09-04 PROCEDURE — 700111 HCHG RX REV CODE 636 W/ 250 OVERRIDE (IP): Performed by: STUDENT IN AN ORGANIZED HEALTH CARE EDUCATION/TRAINING PROGRAM

## 2017-09-04 PROCEDURE — 90670 PCV13 VACCINE IM: CPT | Performed by: INTERNAL MEDICINE

## 2017-09-04 PROCEDURE — 85025 COMPLETE CBC W/AUTO DIFF WBC: CPT

## 2017-09-04 RX ORDER — GABAPENTIN 100 MG/1
100 CAPSULE ORAL EVERY EVENING
Qty: 30 CAP | Refills: 1 | Status: SHIPPED | OUTPATIENT
Start: 2017-09-04 | End: 2017-10-09

## 2017-09-04 RX ORDER — IBUPROFEN 200 MG
200 TABLET ORAL EVERY 8 HOURS PRN
Qty: 15 TAB | Refills: 0 | Status: SHIPPED | OUTPATIENT
Start: 2017-09-04 | End: 2017-10-09

## 2017-09-04 RX ORDER — ATORVASTATIN CALCIUM 40 MG/1
40 TABLET, FILM COATED ORAL
Qty: 30 TAB | Refills: 1 | Status: SHIPPED | OUTPATIENT
Start: 2017-09-04 | End: 2017-10-09 | Stop reason: SDUPTHER

## 2017-09-04 RX ORDER — ASPIRIN 81 MG/1
81 TABLET, CHEWABLE ORAL DAILY
Qty: 100 TAB | Refills: 0 | Status: SHIPPED | OUTPATIENT
Start: 2017-09-04 | End: 2017-10-09

## 2017-09-04 RX ORDER — GABAPENTIN 100 MG/1
100 CAPSULE ORAL EVERY EVENING
Status: DISCONTINUED | OUTPATIENT
Start: 2017-09-04 | End: 2017-09-04 | Stop reason: HOSPADM

## 2017-09-04 RX ORDER — IBUPROFEN 200 MG
200 TABLET ORAL EVERY 6 HOURS PRN
Status: DISCONTINUED | OUTPATIENT
Start: 2017-09-04 | End: 2017-09-04 | Stop reason: HOSPADM

## 2017-09-04 RX ADMIN — ASPIRIN 81 MG: 81 TABLET, CHEWABLE ORAL at 09:18

## 2017-09-04 RX ADMIN — LISINOPRIL 5 MG: 10 TABLET ORAL at 09:18

## 2017-09-04 RX ADMIN — IBUPROFEN 200 MG: 200 TABLET, FILM COATED ORAL at 10:47

## 2017-09-04 RX ADMIN — PNEUMOCOCCAL 13-VALENT CONJUGATE VACCINE 0.5 ML: 2.2; 2.2; 2.2; 2.2; 2.2; 4.4; 2.2; 2.2; 2.2; 2.2; 2.2; 2.2; 2.2 INJECTION, SUSPENSION INTRAMUSCULAR at 09:19

## 2017-09-04 RX ADMIN — INSULIN LISPRO 3 UNITS: 100 INJECTION, SOLUTION INTRAVENOUS; SUBCUTANEOUS at 06:06

## 2017-09-04 RX ADMIN — INSULIN LISPRO 3 UNITS: 100 INJECTION, SOLUTION INTRAVENOUS; SUBCUTANEOUS at 12:23

## 2017-09-04 RX ADMIN — OMEPRAZOLE 20 MG: 20 CAPSULE, DELAYED RELEASE ORAL at 09:18

## 2017-09-04 ASSESSMENT — ENCOUNTER SYMPTOMS
DIZZINESS: 0
TINGLING: 0
PALPITATIONS: 0
HEADACHES: 0
COUGH: 0
FEVER: 0
BLURRED VISION: 0
SORE THROAT: 0
FOCAL WEAKNESS: 0
SHORTNESS OF BREATH: 0
VOMITING: 0
DOUBLE VISION: 0
HEARTBURN: 0
NAUSEA: 0
CHILLS: 0
ABDOMINAL PAIN: 0
MYALGIAS: 0

## 2017-09-04 ASSESSMENT — PAIN SCALES - GENERAL
PAINLEVEL_OUTOF10: 2
PAINLEVEL_OUTOF10: 0
PAINLEVEL_OUTOF10: 0

## 2017-09-04 NOTE — PROGRESS NOTES
Assumed care of pt.  Assessment complete.  No signs of distress.  Pt denies any pain.  Discussed plan of care.  Call light within reach.  Bed alarm off.  Treaded socks on pt.  Will continue to monitor.

## 2017-09-04 NOTE — DISCHARGE INSTRUCTIONS
Discharge Instructions    Discharged to home by car with relative. Discharged via wheelchair, hospital escort: Yes.  Special equipment needed: Not Applicable    Be sure to schedule a follow-up appointment with your primary care doctor or any specialists as instructed.     Discharge Plan:   Diet Plan: Discussed  Activity Level: Discussed  Confirmed Follow up Appointment: Appointment Scheduled  Confirmed Symptoms Management: Discussed  Medication Reconciliation Updated: Yes  Pneumococcal Vaccine Given - only chart on this line when given: Given (See MAR)  Influenza Vaccine Indication: Indicated: 65 years and older  Influenza Vaccine Given - only chart on this line when given: Influenza Vaccine Given (See MAR)    I understand that a diet low in cholesterol, fat, and sodium is recommended for good health. Unless I have been given specific instructions below for another diet, I accept this instruction as my diet prescription.   Other diet: Cardiac    Special Instructions: None    · Is patient discharged on Warfarin / Coumadin?   No     · Is patient Post Blood Transfusion?  No    Depression / Suicide Risk    As you are discharged from this [facility] facility, it is important to learn how to keep safe from harming yourself.    Recognize the warning signs:  · Abrupt changes in personality, positive or negative- including increase in energy   · Giving away possessions  · Change in eating patterns- significant weight changes-  positive or negative  · Change in sleeping patterns- unable to sleep or sleeping all the time   · Unwillingness or inability to communicate  · Depression  · Unusual sadness, discouragement and loneliness  · Talk of wanting to die  · Neglect of personal appearance   · Rebelliousness- reckless behavior  · Withdrawal from people/activities they love  · Confusion- inability to concentrate     If you or a loved one observes any of these behaviors or has concerns about self-harm, here's what you can  do:  · Talk about it- your feelings and reasons for harming yourself  · Remove any means that you might use to hurt yourself (examples: pills, rope, extension cords, firearm)  · Get professional help from the community (Mental Health, Substance Abuse, psychological counseling)  · Do not be alone:Call your Safe Contact- someone whom you trust who will be there for you.  · Call your local CRISIS HOTLINE 207-6696 or 221-986-9863  · Call your local Children's Mobile Crisis Response Team [place] (892) 676-5290 or wwwOpicos  · Call the toll free National Suicide Prevention Hotlines   · National Suicide Prevention Lifeline 883-572-JXPZ (0004)  · National Hope Line Network 800-SUICIDE (939-1213)          Chest Pain, Nonspecific  It is often hard to give a specific diagnosis for the cause of chest pain. There is always a chance that your pain could be related to something serious, like a heart attack or a blood clot in the lungs. You need to follow up with your caregiver for further evaluation. More lab tests or other studies such as X-rays, electrocardiography, stress testing, or cardiac imaging may be needed to find the cause of your pain.  Most of the time, nonspecific chest pain improves within 2 to 3 days with rest and mild pain medicine. For the next few days, avoid physical exertion or activities that bring on pain. Do not smoke. Avoid drinking alcohol. Call your caregiver for routine follow-up as advised.   SEEK IMMEDIATE MEDICAL CARE IF:  · You develop increased chest pain or pain that radiates to the arm, neck, jaw, back, or abdomen.   · You develop shortness of breath, increased coughing, or you start coughing up blood.   · You have severe back or abdominal pain, nausea, or vomiting.   · You develop severe weakness, fainting, fever, or chills.   Document Released: 12/18/2006 Document Revised: 03/11/2013 Document Reviewed: 06/06/2008  ExitCare® Patient Information ©2013 [employer], [employer].      Exercise  Stress Electrocardiogram  An exercise stress electrocardiogram is a test that is done to evaluate the blood supply to your heart. This test may also be called exercise stress electrocardiography. The test is done while you are walking on a treadmill. The goal of this test is to raise your heart rate. This test is done to find areas of poor blood flow to the heart by determining the extent of coronary artery disease (CAD).    CAD is defined as narrowing in one or more heart (coronary) arteries of more than 70%. If you have an abnormal test result, this may mean that you are not getting adequate blood flow to your heart during exercise. Additional testing may be needed to understand why your test was abnormal.  LET YOUR HEALTH CARE PROVIDER KNOW ABOUT:   · Any allergies you have.  · All medicines you are taking, including vitamins, herbs, eye drops, creams, and over-the-counter medicines.  · Previous problems you or members of your family have had with the use of anesthetics.  · Any blood disorders you have.  · Previous surgeries you have had.  · Medical conditions you have.  · Possibility of pregnancy, if this applies.  RISKS AND COMPLICATIONS  Generally, this is a safe procedure. However, as with any procedure, complications can occur. Possible complications can include:  · Pain or pressure in the following areas:  ¨ Chest.  ¨ Jaw or neck.  ¨ Between your shoulder blades.  ¨ Radiating down your left arm.  · Dizziness or light-headedness.  · Shortness of breath.  · Increased or irregular heartbeats.  · Nausea or vomiting.  · Heart attack (rare).  BEFORE THE PROCEDURE  · Avoid all forms of caffeine 24 hours before your test or as directed by your health care provider. This includes coffee, tea (even decaffeinated tea), caffeinated sodas, chocolate, cocoa, and certain pain medicines.  · Follow your health care provider's instructions regarding eating and drinking before the test.  · Take your medicines as directed at  regular times with water unless instructed otherwise. Exceptions may include:  ¨ If you have diabetes, ask how you are to take your insulin or pills. It is common to adjust insulin dosing the morning of the test.  ¨ If you are taking beta-blocker medicines, it is important to talk to your health care provider about these medicines well before the date of your test. Taking beta-blocker medicines may interfere with the test. In some cases, these medicines need to be changed or stopped 24 hours or more before the test.  ¨ If you wear a nitroglycerin patch, it may need to be removed prior to the test. Ask your health care provider if the patch should be removed before the test.  · If you use an inhaler for any breathing condition, bring it with you to the test.  · If you are an outpatient, bring a snack so you can eat right after the stress phase of the test.  · Do not smoke for 4 hours prior to the test or as directed by your health care provider.  · Do not apply lotions, powders, creams, or oils on your chest prior to the test.  · Wear loose-fitting clothes and comfortable shoes for the test. This test involves walking on a treadmill.  PROCEDURE  · Multiple patches (electrodes) will be put on your chest. If needed, small areas of your chest may have to be shaved to get better contact with the electrodes. Once the electrodes are attached to your body, multiple wires will be attached to the electrodes and your heart rate will be monitored.  · Your heart will be monitored both at rest and while exercising.  · You will walk on a treadmill. The treadmill will be started at a slow pace. The treadmill speed and incline will gradually be increased to raise your heart rate.  AFTER THE PROCEDURE  · Your heart rate and blood pressure will be monitored after the test.  · You may return to your normal schedule including diet, activities, and medicines, unless your health care provider tells you otherwise.     This information is  not intended to replace advice given to you by your health care provider. Make sure you discuss any questions you have with your health care provider.     Document Released: 12/15/2001 Document Revised: 12/23/2014 Document Reviewed: 08/25/2014  [employer]vier Interactive Patient Education ©2016 [employer]

## 2017-09-04 NOTE — DISCHARGE SUMMARY
[facility] Internal Medicine Discharge Summary      Admit Date:  9/3/2017       Discharge Date:  09/04/2017    Service:   [facility] Internal Medicine Red Team  Attending Physician(s):   MD Kelsey      Senior Resident(s):   Danielle  Trever Resident(s):   Shiraz      Primary Diagnosis:     Atypical chest pain: Neuropathic versus musculoskeletal: Negative EKG, troponin, echocardiogram: Outpatient stress test scheduled      Secondary Diagnoses:                  Uncontrolled diabetes mellitus: Levimer 36 and metformin: Glycohemoglobin 12  Untreated dyslipidemia/hypertriglyceridemia  Undiagnosed polycythemia?       Hospital Summary (Brief Narrative):         76-year-old gentleman with past medical history is significant for uncontrolled diabetes mellitus on Levemir 36 units and metformin was admitted with atypical right-sided chest pain which was reproducible at times and also had some dermatomal distribution pain which corresponded to his previous shingles. However given his history of uncontrolled diabetes, age, untreated dyslipidemia he was admitted for ACS rule out. He received aspirin 325, lisinopril 5, Lipitor 80 and was monitored on telemetry. His EKG and troponins remained normal. Echocardiogram did not show any wall motion abnormalities with normal ejection fraction. He was offered a in-hospital stress test but he preferred outpatient which was scheduled. He was started on gabapentin 100 mg for neuropathic pain. Given his risk factors, he was started on aspirin 81 mg and Lipitor 40 mg daily on discharge. Due to intermittent reproducible pain, x-ray with rib series was done which did not show any rib fracture. His electrolyte of probabilities were corrected. His glycohemoglobin was 12. Levemir 36 and metformin was resumed on discharge.      He will require follow-up with PCP for annual eye examination, diabetic neuropathy and consideration for starting ace inhibitors based on his blood pressure  He will require  outpatient stress test which is scheduled  Outpatient follow-up was scheduled within one week and then he will establish with a [facility] doctor on 10/02.          Patient /Hospital Summary (Details -- Problem Oriented) :          Atypical chest pain: Neuropathic versus musculoskeletal: Negative EKG, troponin, echocardiogram: Outpatient stress test scheduled     Presented with atypical chest pain which is in dermatomal distribution and at times reproducible  EKG, troponin and echocardiogram unremarkable  Patient declined for inpatient stress test and is scheduled for outpatient stress test  Started on gabapentin 100 mg, aspirin and statin on discharge, continue metformin and Levemir 36 follow-up with PCP  Rib series negative for any rib fracture            Uncontrolled diabetes mellitus: Levimer 36 and metformin: Glycohemoglobin 12  On Levemir 36 and metformin, glycohemoglobin 12  Patient has supplies to check sugar at home: He does not have a doctor  He will require follow-up with PCP for annual eye examination, diabetic neuropathy and consideration for starting ace inhibitors based on his blood pressure  Outpatient follow-up was established in post-discharge clinic in one week and then to establish with a [facility] doctor on 10/02  Continue current regime on discharge as he was not taking the meds: and reassess/adjsut by PCP      Untreated dyslipidemia/hypertriglyceridemia  Started on aspirin 81, Lipitor 40  Follow-up with PCP    Undiagnosed polycythemia?/Elevated hematocrit  He has been having high hematocrit 52s in multiple CBCs  No evidence of hypoxia  Serum iron level normal, ferritin 53 within normal limits, TSH 1.76  Follow-up with PCP with subsequent CBCs as an outpatient      Consultants:     None    Procedures:          WBC 8.2, hemoglobin 17.8, hematocrit 52.8: He tends to have high hematocrit without evidence of hypoxia  Sodium 133, potassium 4.6 glucose 337, BUN 19, creatinine 0.95, liver enzymes within normal  limit, PT 12.2, INR 0.88, BNP 20  Troponin less than 0.01 x3, lipase 21   Lipid panel cholesterol 159, triglycerides 216, HDL 30, LDL 86,  Glycohemoglobin 12      Imaging/ Testing:        CONCLUSIONS  No prior study is available for comparison.   Left ventricular ejection fraction is visually estimated to be 65%.   Normal regional wall motion. Normal diastolic function.  No evidence of valvular abnormality based on Doppler evaluation.   Right ventricular systolic pressure is estimated to be 20 mmHg.    FINDINGS:  No fractures or acute bony changes are noted.  There is no evidence of a hemo or pneumothorax.    Discharge Medications:         Medication Reconciliation: @[email or web address]@       Medication List      START taking these medications      Instructions   aspirin 81 MG Chew chewable tablet  Commonly known as:  ASA   Take 1 Tab by mouth every day.  Dose:  81 mg     atorvastatin 40 MG Tabs  Commonly known as:  LIPITOR   Take 1 Tab by mouth every bedtime.  Dose:  40 mg     gabapentin 100 MG Caps  Commonly known as:  NEURONTIN   Take 1 Cap by mouth every evening.  Dose:  100 mg     ibuprofen 200 MG Tabs  Commonly known as:  MOTRIN   Take 1 Tab by mouth every 8 hours as needed.  Dose:  200 mg        CONTINUE taking these medications      Instructions   LEVEMIR FLEXTOUCH 100 UNIT/ML Sopn injection  Generic drug:  insulin detemir   Inject 36 Units as instructed every evening.  Dose:  36 Units     Metformin HCl 1000 MG Tb24   Take 1 Tab by mouth 2 Times a Day.  Dose:  1 Tab          Disposition:Home    Diet: Diabetic diet    Activity: As tolerated    Instructions:          Primary Care Provider:    Establish with a new PCP on 10/02: Dr. Mariano Weldon  Discharge summary faxed to primary care provider:  @[email or web address]  Copy of discharge summary given to the patient: @[email or web address]@    Follow up appointment details :        He will require follow-up with PCP for annual eye examination, diabetic neuropathy and consideration  for starting ace inhibitors based on his blood pressure  He will require outpatient stress test which is scheduled  Outpatient follow-up was scheduled within one week and then he will establish with a [facility] doctor on 10/02.       Pending Studies:        None    Time spent on discharge day patient visit, preparing discharge paperwork and arranging for patient follow up.    Summary of follow up issues:     He will require follow-up with PCP for annual eye examination, diabetic neuropathy and consideration for starting ace inhibitors based on his blood pressure  He will require outpatient stress test which is scheduled  Outpatient follow-up was scheduled within one week and then he will establish with a [facility] doctor on 10/02.       Discharge Time (Minutes) :  45 minutes      Condition on Discharge      ______________________________________________________________________    Interval history/exam for day of discharge:      General: Feels better  GI: No nausea, no vomiting, diarrhea or black-colored stool  Genitourinary: No dysuria or hematuria or flank pain  Musculoskeletal: No joint pain or tenderness: Gets intermittent chest discomfort in dermatomal distribution on the right side  Neuro: No numbness or tingling  Psychiatric: No suicidal or homicidal ideation  Lungs: No hemoptysis, no cough   Cardiac: No exertional dyspnea orthopnea or PND      Vitals:    09/03/17 2344 09/04/17 0356 09/04/17 0722 09/04/17 1213   BP: 112/79 118/78 104/75 110/75   Pulse: 65 77 71 85   Resp: 16 16 16 16   Temp: 36.7 °C (98.1 °F) 36.6 °C (97.9 °F) 36.7 °C (98 °F) 36.1 °C (96.9 °F)   SpO2: 94% 92% 97% 93%   Weight:       Height:         Weight/BMI: Body mass index is 28.02 kg/m².  Pulse Oximetry: 93 %, O2 (LPM): 0, O2 Delivery: None (Room Air)    General: Afebrile  GI: No abdominal tenderness, bowel sounds audible, abdomen soft  Genitourinary: No CVA tenderness, no suprapubic tenderness  Musculoskeletal: No swelling  Neuro: Sensory no sensory  or motor deficit  Psychiatric: No delusion or hallucination  Lungs: No crackles,or wheez  Cardiac: Normal heart sounds, no murmur or gallop    Most Recent Labs:    Lab Results   Component Value Date/Time    WBC 11.0 (H) 09/04/2017 08:58 AM    RBC 6.24 (H) 09/04/2017 08:58 AM    HEMOGLOBIN 17.5 09/04/2017 08:58 AM    HEMATOCRIT 53.3 (H) 09/04/2017 08:58 AM    MCV 85.4 09/04/2017 08:58 AM    MCH 28.0 09/04/2017 08:58 AM    MCHC 32.8 (L) 09/04/2017 08:58 AM    MPV 11.4 09/04/2017 08:58 AM    NEUTSPOLYS 69.80 09/04/2017 08:58 AM    LYMPHOCYTES 16.60 (L) 09/04/2017 08:58 AM    MONOCYTES 8.00 09/04/2017 08:58 AM    EOSINOPHILS 3.30 09/04/2017 08:58 AM    BASOPHILS 0.80 09/04/2017 08:58 AM      Lab Results   Component Value Date/Time    SODIUM 136 09/04/2017 08:58 AM    POTASSIUM 3.6 09/04/2017 08:58 AM    CHLORIDE 102 09/04/2017 08:58 AM    CO2 26 09/04/2017 08:58 AM    GLUCOSE 236 (H) 09/04/2017 08:58 AM    BUN 19 09/04/2017 08:58 AM    CREATININE 1.12 09/04/2017 08:58 AM      Lab Results   Component Value Date/Time    ALTSGPT 20 09/04/2017 08:58 AM    ASTSGOT 15 09/04/2017 08:58 AM    ALKPHOSPHAT 52 09/04/2017 08:58 AM    TBILIRUBIN 1.2 09/04/2017 08:58 AM    LIPASE 21 09/03/2017 11:30 AM    ALBUMIN 4.1 09/04/2017 08:58 AM    GLOBULIN 3.4 09/04/2017 08:58 AM    INR 0.88 09/03/2017 11:30 AM     Lab Results   Component Value Date/Time    PROTHROMBTM 12.2 09/03/2017 11:30 AM    INR 0.88 09/03/2017 11:30 AM

## 2017-09-04 NOTE — CARE PLAN
Problem: Safety  Goal: Will remain free from falls  Appropriate fall interventions in place.    Problem: Discharge Barriers/Planning  Goal: Patient's continuum of care needs will be met  Pt discharging home today.  Follow up appt scheduled along with stress test and pt agrees to plan.

## 2017-09-04 NOTE — CARE PLAN
Xiomara Bourgeois Fall Risk Assessment:     Last Known Fall: No falls  Mobility: No limitations  Medications: Cardiovascular or central nervous system meds  Mental Status/LOC/Awareness: Awake, alert, and oriented to date, place, and person  Toileting Needs: No needs  Volume/Electrolyte Status: Low blood sugar/electrolyte imbalances  Communication/Sensory: Visual (Glasses)/hearing deficit  Behavior: Appropriate behavior  Xiomara Bourgeois Fall Risk Total: 9  Fall Risk Level: LOW RISK    Universal Fall Precautions:  call light/belongings in reach, bed in low position and locked, wheelchairs and assistive devices out of sight, use non-slip footwear, siderails up x 2, adequate lighting, clutter free and spill free environment, educate on level of risk, educate to call for assistance    Fall Risk Level Interventions:          Patient Specific Interventions:     Medication: review medications with patient and family  Mental Status/LOC/Awareness: not applicable  Toileting: not applicable  Volume/Electrolyte Status: monitor blood sugars and maintain appropriate blood sugar levels if diabetic  Communication/Sensory: update plan of care on whiteboard  Behavioral: not applicable  Mobility: ensure bed is locked and in lowest position

## 2017-09-04 NOTE — NON-PROVIDER
"       Internal Medicine Medical Student Note    Name Manolo Puri     1940   Age/Sex 76 y.o. male   MRN [id number]   Code Status Full     After 5PM or if no immediate response to page, please call for cross-coverage  Attending/Team: Dr. Cole See Patient List for primary contact information  Call [phone] to page after hours   1st Call - Day Intern (R1):   Dr. Fernandez  2nd Call - Day Sr. Resident (R2/R3):   Dr. Santos         Reason for interval visit  (Principal Problem)   Atypical chest pain    Interval Problem Daily Status Update  (24 hours)   Doing well overnight. States he has noticed that pain is triggered by certain arm/shoulder movements, and if he presses on a certain spot on his chest wall. States the pain is unchanged since yesterday. Denies any new symptoms.    Review of Systems   Constitutional: Negative for chills and fever.   HENT: Negative for ear pain and sore throat.    Eyes: Negative for blurred vision and double vision.   Respiratory: Negative for cough and shortness of breath.    Cardiovascular: Positive for chest pain. Negative for palpitations and leg swelling.        3/10 sharp \"pulsing pain\" that lasts for a few seconds but occurs about every 15-30 seconds just beneath his right breast   Gastrointestinal: Negative for abdominal pain, heartburn, nausea and vomiting.   Genitourinary: Negative for dysuria.   Musculoskeletal: Negative for myalgias.   Neurological: Negative for dizziness, tingling, focal weakness and headaches.               Physical Exam       Vitals:    17 1957 17 2344 17 0356 17 0722   BP: 118/84 112/79 118/78 104/75   Pulse: 90 65 77 71   Resp:    Temp: 36.8 °C (98.2 °F) 36.7 °C (98.1 °F) 36.6 °C (97.9 °F) 36.7 °C (98 °F)   SpO2: 92% 94% 92% 97%   Weight: 93.7 kg (206 lb 9.1 oz)      Height:         Body mass index is 28.02 kg/m². Weight: 93.7 kg (206 lb 9.1 oz)  Oxygen Therapy:  Pulse Oximetry: 97 %, O2 (LPM): 0, O2 " Delivery: None (Room Air)    Physical Exam   Constitutional: He is oriented to person, place, and time and well-developed, well-nourished, and in no distress.   HENT:   Head: Normocephalic and atraumatic.   Eyes: EOM are normal.   Neck: No JVD present. No tracheal deviation present.   Cardiovascular: Normal rate, regular rhythm and normal heart sounds.  Exam reveals no gallop and no friction rub.    No murmur heard.  Pulmonary/Chest: Effort normal and breath sounds normal.   Abdominal: Soft. He exhibits no distension. There is no tenderness.   Musculoskeletal: He exhibits no edema or tenderness.   Neurological: He is alert and oriented to person, place, and time.   Skin: Skin is warm and dry.   Psychiatric: Mood and affect normal.       POC glucose 203  Trop < 0.01 BNP 20  Chol 159    HDL 30  LDL 86      Assessment/Plan     76 year old male presenting with 4 day history of sharp right lower chest pain     1. Atypical Chest Pain  - most likely postherpetic neuralgia given normal cardiac enzymes, non-changing EKG, dermatone distribution in the same area affected by previous shingles outbreak, and character of the pain   - likely to resolve, may have recurrence, HEART score currently 3, with low risk of MACE   - pt has uncontrolled DM, elevated Hct and high normal Hgb with no obvious cause of hypoxia - possible an underlying hypoxic process such as TEJAL that could contribute to a cardiac process, also diabetic patients need a high threshold of suspicion for cardiac issues given their atypical presentation, must also consider MSK origin given possible aggravation with movement and palpation  - start gabapentin for pain 300mg 1 po qhs and titrate up to 300mg tid until symptoms resolution, order right rib x-ray to assess point tenderness, pt refuses a cardiac stress test at this time, pt encouraged to set up with PCP for further workup, discharge today, send home with ASA 81mg qd    - if pain does not resolve with  "treatment must reevaluate for different etiology     2. Diabetes mellitus   - glucose on admission 337, currently 203  - pt states his glucose is poorly controlled at home on levimir and metformin, states he checks it \"a few times a week\" and it is in the high 100s-mid 200s  - cont home meds and monitor glucose, pt is counseled on the link between diabetes and heart disease, pt encouraged to f/u with PCP to adjust regimen for greater glucose control      3. Dyslipidemia   - total chol 159, , HDL 30, LDL 86 on Lipitor 40 qhs  - ASCVD at 20.4% on current regimen   - pt counseled on diet change, already on high potency statin - continue at home, f/u with PCP   "

## 2017-09-12 ENCOUNTER — APPOINTMENT (OUTPATIENT)
Dept: RADIOLOGY | Facility: MEDICAL CENTER | Age: 77
End: 2017-09-12
Payer: COMMERCIAL

## 2017-09-13 ENCOUNTER — HOSPITAL ENCOUNTER (EMERGENCY)
Facility: MEDICAL CENTER | Age: 77
End: 2017-09-13
Attending: EMERGENCY MEDICINE
Payer: MEDICARE

## 2017-09-13 VITALS
HEIGHT: 72 IN | BODY MASS INDEX: 27.8 KG/M2 | SYSTOLIC BLOOD PRESSURE: 120 MMHG | DIASTOLIC BLOOD PRESSURE: 76 MMHG | WEIGHT: 205.25 LBS | TEMPERATURE: 97.3 F | RESPIRATION RATE: 18 BRPM | OXYGEN SATURATION: 95 % | HEART RATE: 85 BPM

## 2017-09-13 DIAGNOSIS — B02.9 HERPES ZOSTER WITHOUT COMPLICATION: ICD-10-CM

## 2017-09-13 PROCEDURE — 99283 EMERGENCY DEPT VISIT LOW MDM: CPT

## 2017-09-13 RX ORDER — VALACYCLOVIR HYDROCHLORIDE 1 G/1
1000 TABLET, FILM COATED ORAL 2 TIMES DAILY
Qty: 20 TAB | Refills: 0 | Status: SHIPPED | OUTPATIENT
Start: 2017-09-13 | End: 2017-10-09

## 2017-09-13 RX ORDER — HYDROCODONE BITARTRATE AND ACETAMINOPHEN 5; 325 MG/1; MG/1
1-2 TABLET ORAL EVERY 4 HOURS PRN
Qty: 20 TAB | Refills: 0 | Status: SHIPPED | OUTPATIENT
Start: 2017-09-13 | End: 2017-10-09

## 2017-09-13 ASSESSMENT — LIFESTYLE VARIABLES: DO YOU DRINK ALCOHOL: NO

## 2017-09-13 NOTE — ED NOTES
Ambulates to triage  Chief Complaint   Patient presents with   • Herpes Zoster     shingles to [facility] that he noticed yesterday morning

## 2017-09-13 NOTE — ED PROVIDER NOTES
ED Provider Note    CHIEF COMPLAINT  Chief Complaint   Patient presents with   • Herpes Zoster     shingles to [facility] that he noticed yesterday morning       HPI  Manolo Puri is a 76 y.o. male who presentsFor evaluation of painful blistering rash on the right lateral and anterior flank. Patient has a history of diabetes. He thought he was having chest pain last week and came in for evaluation and all of his cardiac workup was negative. He then subsequently developed a painful blistering rash. He has never had shingles before but did have chickenpox as a child. No other symptoms reported. Pain is currently 7 of 10 worse in by his shirt rubbing against that nothing seems to make it better.     REVIEW OF SYSTEMS  See HPI for further details. No numbness tingling weakness fevers or chills All other systems are negative.     PAST MEDICAL HISTORY  Past Medical History:   Diagnosis Date   • Arthritis    • Diabetes (CMS-HCC)    • Squamous cell cancer of skin of forearm        FAMILY HISTORY  No history of bleeding disorder    SOCIAL HISTORY  Social History     Social History   • Marital status: Unknown     Spouse name: N/A   • Number of children: N/A   • Years of education: N/A     Social History Main Topics   • Smoking status: Never Smoker   • Smokeless tobacco: Never Used   • Alcohol use No   • Drug use: No   • Sexual activity: Not on file     Other Topics Concern   • Not on file     Social History Narrative   • No narrative on file   No drugs or alcohol    SURGICAL HISTORY  Past Surgical History:   Procedure Laterality Date   • OTHER      Derm removal of SCC       CURRENT MEDICATIONS  No current facility-administered medications for this encounter.     Current Outpatient Prescriptions:   •  valacyclovir (VALTREX) 1 GM Tab, Take 1 Tab by mouth 2 times a day., Disp: 20 Tab, Rfl: 0  •  hydrocodone-acetaminophen (NORCO) 5-325 MG Tab per tablet, Take 1-2 Tabs by mouth every four hours as needed., Disp: 20 Tab, Rfl: 0  •   aspirin (ASA) 81 MG Chew Tab chewable tablet, Take 1 Tab by mouth every day., Disp: 100 Tab, Rfl: 0  •  gabapentin (NEURONTIN) 100 MG Cap, Take 1 Cap by mouth every evening., Disp: 30 Cap, Rfl: 1  •  atorvastatin (LIPITOR) 40 MG Tab, Take 1 Tab by mouth every bedtime., Disp: 30 Tab, Rfl: 1  •  ibuprofen (MOTRIN) 200 MG Tab, Take 1 Tab by mouth every 8 hours as needed., Disp: 15 Tab, Rfl: 0  •  Metformin HCl 1000 MG TABLET SR 24 HR, Take 1 Tab by mouth 2 Times a Day., Disp: , Rfl:   •  insulin detemir (LEVEMIR FLEXTOUCH) 100 UNIT/ML Solution Pen-injector injection, Inject 36 Units as instructed every evening., Disp: , Rfl:       ALLERGIES  No Known Allergies    PHYSICAL EXAM  VITAL SIGNS: /74   Pulse 91   Temp 36.3 °C (97.3 °F)   Resp 18   Ht 1.829 m (6')   Wt 93.1 kg (205 lb 4 oz)   SpO2 95%   BMI 27.84 kg/m²       Constitutional: Well developed, Well nourished, No acute distress, Non-toxic appearance.   HENT: Normocephalic, Atraumatic, Bilateral external ears normal, Oropharynx moist, No oral exudates, Nose normal.   Eyes: PERRLA, EOMI, Conjunctiva normal, No discharge.   Neck: Normal range of motion, No tenderness, Supple, No stridor.   Cardiovascular: Normal heart rate, Normal rhythm, No murmurs, No rubs, No gallops.   Thorax & Lungs: Normal breath sounds, No respiratory distress, No wheezing, No chest tenderness.   Abdomen: Bowel sounds normal, Soft, No tenderness, No masses, No pulsatile masses.   Skin: There is a vesicular rash noted at approximately T6 dermatome on the right anterolateral chest wall no evidence of bacterial superinfection  Extremities: Intact distal pulses, No edema, No tenderness, No cyanosis, No clubbing.   Musculoskeletal: Good range of motion in all major joints. No tenderness to palpation or major deformities noted.   Neurologic: Alert & oriented x 3, Normal motor function, Normal sensory function, No focal deficits noted.   Psychiatric: Anxious      COURSE & MEDICAL  DECISION MAKING  Pertinent Labs & Imaging studies reviewed. (See chart for details)  Patient was actually requesting shingles vaccine. I consulted her pharmacist indicated that during outbreak is actually contraindicated. We'll start him on high-dose Valtrex and I will provide a prescription of Norco for breakthrough pain. Follow up with PCP once outbreak resolves for shingles vaccine    FINAL IMPRESSION  1.   1. Herpes zoster without complication               Electronically signed by: Roman Richmond, 9/13/2017 9:47 AM

## 2017-09-13 NOTE — DISCHARGE INSTRUCTIONS
Shingles  Shingles is an infection that causes a painful skin rash and fluid-filled blisters. Shingles is caused by the same virus that causes chickenpox.  Shingles only develops in people who:  · Have had chickenpox.  · Have gotten the chickenpox vaccine. (This is rare.)  The first symptoms of shingles may be itching, tingling, or pain in an area on your skin. A rash will follow in a few days or weeks. The rash is usually on one side of the body in a bandlike or beltlike pattern. Over time, the rash turns into fluid-filled blisters that break open, scab over, and dry up. Medicines may:  · Help you manage pain.  · Help you recover more quickly.  · Help to prevent long-term problems.  HOME CARE  Medicines   · Take medicines only as told by your doctor.  · Apply an anti-itch or numbing cream to the affected area as told by your doctor.  Blister and Rash Care   · Take a cool bath or put cool compresses on the area of the rash or blisters as told by your doctor. This may help with pain and itching.  · Keep your rash covered with a loose bandage (dressing). Wear loose-fitting clothing.  · Keep your rash and blisters clean with mild soap and cool water or as told by your doctor.  · Check your rash every day for signs of infection. These include redness, swelling, and pain that lasts or gets worse.  · Do not pick your blisters.  · Do not scratch your rash.  General Instructions   · Rest as told by your doctor.  · Keep all follow-up visits as told by your doctor. This is important.  · Until your blisters scab over, your infection can cause chickenpox in people who have never had it or been vaccinated against it. To prevent this from happening, avoid touching other people or being around other people, especially:  ¨ Babies.  ¨ Pregnant women.  ¨ Children who have eczema.  ¨ Elderly people who have transplants.  ¨ People who have chronic illnesses, such as leukemia or AIDS.  GET HELP IF:  · Your pain does not get better with  medicine.  · Your pain does not get better after the rash heals.  · Your rash looks infected. Signs of infection include:  ¨ Redness.  ¨ Swelling.  ¨ Pain that lasts or gets worse.  GET HELP RIGHT AWAY IF:  · The rash is on your face or nose.  · You have pain in your face, pain around your eye area, or loss of feeling on one side of your face.  · You have ear pain or you have ringing in your ear.  · You have loss of taste.  · Your condition gets worse.     This information is not intended to replace advice given to you by your health care provider. Make sure you discuss any questions you have with your health care provider.     Document Released: 06/05/2009 Document Revised: 01/08/2016 Document Reviewed: 09/29/2015  [employer] Interactive Patient Education ©2016 [employer]

## 2017-10-09 ENCOUNTER — OFFICE VISIT (OUTPATIENT)
Dept: INTERNAL MEDICINE | Facility: MEDICAL CENTER | Age: 77
End: 2017-10-09
Payer: MEDICARE

## 2017-10-09 VITALS
BODY MASS INDEX: 27.5 KG/M2 | SYSTOLIC BLOOD PRESSURE: 124 MMHG | TEMPERATURE: 98 F | DIASTOLIC BLOOD PRESSURE: 85 MMHG | HEART RATE: 94 BPM | WEIGHT: 203 LBS | HEIGHT: 72 IN | OXYGEN SATURATION: 99 %

## 2017-10-09 DIAGNOSIS — Z12.11 ENCOUNTER FOR SCREENING COLONOSCOPY: ICD-10-CM

## 2017-10-09 DIAGNOSIS — J20.9 ACUTE BRONCHITIS, UNSPECIFIED ORGANISM: ICD-10-CM

## 2017-10-09 DIAGNOSIS — E66.3 OVERWEIGHT (BMI 25.0-29.9): ICD-10-CM

## 2017-10-09 DIAGNOSIS — Z00.00 ENCOUNTER FOR PREVENTIVE HEALTH EXAMINATION: ICD-10-CM

## 2017-10-09 PROCEDURE — 99204 OFFICE O/P NEW MOD 45 MIN: CPT | Mod: GC | Performed by: INTERNAL MEDICINE

## 2017-10-09 RX ORDER — AZITHROMYCIN 250 MG/1
TABLET, FILM COATED ORAL
Qty: 6 TAB | Refills: 0 | Status: SHIPPED | OUTPATIENT
Start: 2017-10-09 | End: 2019-10-17

## 2017-10-09 RX ORDER — GUAIFENESIN 600 MG/1
600 TABLET, EXTENDED RELEASE ORAL EVERY 12 HOURS
Qty: 10 TAB | Refills: 0 | Status: SHIPPED | OUTPATIENT
Start: 2017-10-09 | End: 2019-10-17

## 2017-10-09 RX ORDER — ATORVASTATIN CALCIUM 40 MG/1
40 TABLET, FILM COATED ORAL
Qty: 30 TAB | Refills: 5 | Status: SHIPPED | OUTPATIENT
Start: 2017-10-09 | End: 2019-10-17

## 2017-10-09 RX ORDER — METFORMIN HYDROCHLORIDE EXTENDED-RELEASE TABLETS 1000 MG/1
1 TABLET, FILM COATED, EXTENDED RELEASE ORAL 2 TIMES DAILY
Qty: 30 TAB | Refills: 6 | Status: SHIPPED | OUTPATIENT
Start: 2017-10-09 | End: 2020-04-01 | Stop reason: SDUPTHER

## 2017-10-09 ASSESSMENT — PATIENT HEALTH QUESTIONNAIRE - PHQ9: CLINICAL INTERPRETATION OF PHQ2 SCORE: 0

## 2017-10-09 NOTE — PROGRESS NOTES
New Patient to Establish    Reason to establish: medication refills and his PCP moved out of town.    CC: cough with congestion    HPI: 77 yr old male patient with PMHx of type 2 diabetes uncontrolled comes in to establish care and evaluation of ongoing symptoms of cough and congestion.    Cough and congestion : Patient states he caught some infection from his grand children who have similar symptoms. He started with runny nose,sneezing,watery eyes around 6 days back and subsequently associated with cough productive with yellow colored sputum. For the past few days he noticed there is chest heaviness and SOB whenever he coughs. Denies malaise, fever ,chills, SOB on exertion. He is trying OTC medications like Nyquil and cough medications. Symptoms have been improving according to the patient. But notices loss of apetite since 2 days.    Uncontrolled diabetes : Patient was diagnosed with type 2 diabetes almost more than 10 years ago and was on insulin detemir and metformin but his PCP moved out of town around 3 months back and he ran out of medications -insulin,metformin,statin.Reports of increased urination and increased thirst. Denies tingling,numbness, visual disturbances, early satiety. Patient states he stopped taking aspirin last year it caused abdominal discomfort with upset. He also was taking statin until 3 months back when he ran out of medications and he did not have PCP to refill his medications. His last HbA1c is 12.0 in September 2017 when he went to ED for herpes zoster infection (shingles ).     Recent shingles : Patient was seen in ED in September first week for herpes zoster infection and was prescribed valacyclovir. Patient completed the course of medications.He denies related pain today but complains of scar due to infections.    Denies fever/chills/chest pain/abdominal pain/numbness/tingling/diarrhea/constipation/headache/loss of consciousness.    Patient Active Problem List    Diagnosis Date  Noted   • Atypical chest pain 09/03/2017     Priority: High   • Uncontrolled diabetes mellitus (CMS-HCC) 09/03/2017     Priority: Medium   • Dyslipidemia 09/03/2017     Priority: Medium   • Elevated hematocrit 09/03/2017     Priority: Low       Past Medical History:   Diagnosis Date   • Arthritis    • Diabetes (CMS-[facility])    • Squamous cell cancer of skin of forearm        Current Outpatient Prescriptions   Medication Sig Dispense Refill   • atorvastatin (LIPITOR) 40 MG Tab Take 1 Tab by mouth every bedtime. 30 Tab 5   • insulin detemir (LEVEMIR FLEXTOUCH) 100 UNIT/ML Solution Pen-injector injection Inject 40 Units as instructed every evening. 1 PEN 6   • Metformin HCl 1000 MG TABLET SR 24 HR Take 1 Tab by mouth 2 Times a Day. 30 Tab 6   • guaifenesin LA (MUCINEX) 600 MG TABLET SR 12 HR Take 1 Tab by mouth every 12 hours. 10 Tab 0   • azithromycin (ZITHROMAX) 250 MG Tab Take 2 tablets on day 1 and one tablet rest 4 days.total duration of 5 days. 6 Tab 0     No current facility-administered medications for this visit.        Allergies as of 10/09/2017   • (No Known Allergies)       Social History     Social History   • Marital status: Single     Spouse name: N/A   • Number of children: N/A   • Years of education: N/A     Occupational History   • Not on file.     Social History Main Topics   • Smoking status: Never Smoker   • Smokeless tobacco: Never Used   • Alcohol use No   • Drug use: No   • Sexual activity: Not on file     Other Topics Concern   • Not on file     Social History Narrative   • No narrative on file       Family History   Problem Relation Age of Onset   • Diabetes Father    • Diabetes Maternal Grandfather        Past Surgical History:   Procedure Laterality Date   • OTHER      Derm removal of SCC       ROS: As per HPI. Additional pertinent symptoms as noted below.    Constitutional: Denies fever/chills/weight changes.   Eyes: Denies changes/pain in vision  ENT: Denies sore throat/ear ache. Positive for  congestion.  Cardiovascular: Denies chest pain /edema/palpitations.   Respiratory: Denies SOB/PND/orthopnea. Positive for cough with yellow colored sputum.  Abdomen: Denies nausea/vomiting/abdominal pain/ difficulty swallowing/diarrhea/constipation/dark stools.  Genitourinary: Normal urinary habits.   Musculo-skeletal: normal ambulation.Denies joint or muscle pain.  Skin: Denies rash/lesions.  Neurological: Denies weakness/tingling/numbness.   Psychological: good mood and cooperative. Denies depression/anxiety    /85   Pulse 94   Temp 36.7 °C (98 °F)   Ht 1.829 m (6')   Wt 92.1 kg (203 lb)   SpO2 99%   BMI 27.53 kg/m²     Physical Exam  General:  Alert and oriented, No apparent distress.    Eyes: Pupils equal and reactive. No scleral icterus.    Throat: Clear no erythema or exudates noted.    Neck: Supple. No lymphadenopathy noted. Thyroid not enlarged.    Lungs: Bilateral coarse bronchial breath sounds with diffuse rhonchi noted.     Cardiovascular: Regular rate and rhythm. No murmurs, rubs or gallops.    Abdomen:  Benign. No rebound or guarding noted.    Extremities: No clubbing, cyanosis, edema.    Skin: Clear. No rash or suspicious skin lesions noted.    Neurological: Oriented to time, place, and person .Cranial nerves intact. No motor/sensory deficits.Reflexes were normal and symmetrical in both upper and lower extremities     Musculoskeletal : NROM of all extremities. No tenderness or deformity noted.    Assessment and Plan    1. Uncontrolled diabetes mellitus type 2 without complications, unspecified long term insulin use status (CMS-HCC)  - diagnosed many years ago and was on insulin detemir 40 units daily at night and metformin 1000 BID  - patient is out of medications 3 months ago and his PCP moved out of town so he didn't get any refills and not taking medications. Last glycohemoglobin is 12.0 in sept 2017.  - refilled detemir and metformin today  - advised to continue detemir at current  dosage and metformin. Encouraged to maintain log of recorded blood glucose levels for this week and will adjust the dosage in 2 weeks  - patient states he gets abdominal discomfort and upset if he takes aspirin 81mg so stopped almost a yr ago  - patient is on statin and not taking for the past 3 months because he ran out of medications 3 months ago. Last lipid profile in sept showed LDL 68 and refilled his lipitor 40 mg PO once daily.  - will continue to follow up in 8 weeks with repeat CMP,lipid profile, HbA1c    2. Acute bronchitis, unspecified organism  - patient states his grand children were sick and he sustained from them   - symptoms have been improving  - ordered Z jessica and advised to take if symptoms worsen/does not improve in 2 more weeks.  - advised to take mucinex to help with his symptoms and encouraged to use humidifier.    3. Overweight (BMI 25.0-29.9)  -advised exercise regularly and eat healthy DASH diet    4. Encounter for preventive health examination  - patient had flu vaccination on 9/3/17  and pneumococcal vaccination -prevnar/PCV 13 on 9/4/17  - patient is referred to GI for screening colonoscopy as he never had colonoscopy in the past  - patient never smoked in the past    5. Encounter for screening colonoscopy  - patient never had screening colonoscopy in the past  - ordered referral to gastroenterologist for screening colonoscopy    Risk Assessment (discuss potential complications a function of chronic problems): spent 35 min's discussing and educating patient regarding his current condition and related complications    Complexity (discuss number of co-morbidities): discussed overweight,diabetes, acute bronchitis    Signed by: La Nena Walls M.D.

## 2017-10-09 NOTE — PATIENT INSTRUCTIONS
Exercising to Lose Weight  Exercising can help you to lose weight. In order to lose weight through exercise, you need to do vigorous-intensity exercise. You can tell that you are exercising with vigorous intensity if you are breathing very hard and fast and cannot hold a conversation while exercising.  Moderate-intensity exercise helps to maintain your current weight. You can tell that you are exercising at a moderate level if you have a higher heart rate and faster breathing, but you are still able to hold a conversation.  HOW OFTEN SHOULD I EXERCISE?  Choose an activity that you enjoy and set realistic goals. Your health care provider can help you to make an activity plan that works for you. Exercise regularly as directed by your health care provider. This may include:  · Doing resistance training twice each week, such as:  ¨ Push-ups.  ¨ Sit-ups.  ¨ Lifting weights.  ¨ Using resistance bands.  · Doing a given intensity of exercise for a given amount of time. Choose from these options:  ¨ 150 minutes of moderate-intensity exercise every week.  ¨ 75 minutes of vigorous-intensity exercise every week.  ¨ A mix of moderate-intensity and vigorous-intensity exercise every week.  Children, pregnant women, people who are out of shape, people who are overweight, and older adults may need to consult a health care provider for individual recommendations. If you have any sort of medical condition, be sure to consult your health care provider before starting a new exercise program.  WHAT ARE SOME ACTIVITIES THAT CAN HELP ME TO LOSE WEIGHT?   · Walking at a rate of at least 4.5 miles an hour.  · Jogging or running at a rate of 5 miles per hour.  · Biking at a rate of at least 10 miles per hour.  · Lap swimming.  · Roller-skating or in-line skating.  · Cross-country skiing.  · Vigorous competitive sports, such as football, basketball, and soccer.  · Jumping rope.  · Aerobic dancing.  HOW CAN I BE MORE ACTIVE IN MY DAY-TO-DAY  ACTIVITIES?  · Use the stairs instead of the elevator.  · Take a walk during your lunch break.  · If you drive, park your car farther away from work or school.  · If you take public transportation, get off one stop early and walk the rest of the way.  · Make all of your phone calls while standing up and walking around.  · Get up, stretch, and walk around every 30 minutes throughout the day.  WHAT GUIDELINES SHOULD I FOLLOW WHILE EXERCISING?  · Do not exercise so much that you hurt yourself, feel dizzy, or get very short of breath.  · Consult your health care provider prior to starting a new exercise program.  · Wear comfortable clothes and shoes with good support.  · Drink plenty of water while you exercise to prevent dehydration or heat stroke. Body water is lost during exercise and must be replaced.  · Work out until you breathe faster and your heart beats faster.     This information is not intended to replace advice given to you by your health care provider. Make sure you discuss any questions you have with your health care provider.     Document Released: 01/20/2012 Document Revised: 01/08/2016 Document Reviewed: 05/21/2015  [employer] Interactive Patient Education ©2016 [employer]  Insulin Treatment for Diabetes  Diabetes is a disease that does not go away (chronic). It occurs when the body does not properly use the sugar (glucose) that is released from food after it is digested. Glucose levels are controlled by a hormone called insulin, which is made by your pancreas. Depending on the type of diabetes you have, either of the following will apply:   · The pancreas does not make any insulin (type 1 diabetes).  · The pancreas makes too little insulin, and the body cannot respond normally to the insulin that is made (type 2 diabetes).  Without insulin, death can occur. However, with the addition of insulin, blood sugar monitoring, and treatment, someone with diabetes can live a full and productive life. This  document will discuss the role of insulin in your treatment and provide information about its use.   HOW IS INSULIN GIVEN?  Insulin is a medicine that can only be given by injection. Taking it by mouth makes it inactive because of the acid in your stomach. Insulin is injected under the skin by a syringe and needle, an insulin pen, a pump, or a jet injector. Your dose will be determined by your health care provider based on your individual needs. You will also be given guidance on which method of giving insulin is right for you. Remember that if you give insulin with a needle and syringe, you must do so using only a special insulin syringe made for this purpose.  WHERE ON THE BODY SHOULD INSULIN BE INJECTED?  Insulin is injected into the fatty layer of tissue just under your skin. Good places to inject insulin include the upper arm, the front and outer area of the thigh, the hips, and the abdomen. Giving your insulin in the abdomen is preferred because this provides the most rapid and consistent absorption. Avoid the area 2 inches (5 cm) around the navel and avoid injecting into areas on your body with scar tissue. In addition, it is important to rotate your injection sites with every shot to prevent irritation and improve absorption.   WHAT ARE THE DIFFERENT TYPES OF INSULIN?   If you have type 1 diabetes, you must take insulin to stay alive. Your body does not produce it. If you have type 2 diabetes, you might require insulin in addition to, or instead of, other medicines. In either case, proper use of insulin is critical to control your diabetes.   There are a number of different types of insulin. Usually, you will give yourself injections, though others can be trained to give them to you. Some people have an insulin pump that delivers insulin continuously through a tube (cannula) that is placed under the skin.  Using insulin requires that you check your blood sugar several times a day. The exact number of times  and time of day to check will vary depending on your type of diabetes, your type of insulin, and treatment goals. Your health care provider will direct you.   Generally, different insulins have different properties. The following is a general guide. Specifics will vary by product, and new products are introduced periodically.   · Rapid-acting insulin starts working quickly (in as little as 5 minutes) and wears off in 4 to 6 hours (sometimes longer). This type of insulin works well when taken just before a meal to bring your blood sugar quickly back to normal.    · Short-acting insulin starts working in about 30 minutes and can last 6 to 10 hours. This type of insulin should be taken about 30 minutes before you start eating a meal.  · Intermediate-acting insulin starts working in 1-2 hours and wears off after about 10 to 18 hours. This insulin will lower your blood sugar for a longer period of time, but it will not be as effective in lowering your blood sugar right after a meal.    · Long-acting insulin mimics the small amount of insulin that your pancreas usually produces throughout the day. You need to have some insulin present at all times. It is crucial to the metabolism of brain cells and other cells. Long-acting insulin is meant to be used either once or twice a day. It is usually used in combination with other types of insulin, or in combination with other diabetes medicines.    Discuss the type of insulin you are taking with your health care provider or pharmacist. You will then be aware of when the insulin can be expected to peak and when it will wear off. This is important to know so you can plan for meal times and periods of exercise.   Your health care provider will usually have a strategy in mind when treating you with insulin. This will vary with your type of diabetes, your diabetes treatment goals, and your health history. It is important that you understand this strategy so you can be an active  partner in treating your diabetes. Here are some terms you might hear:   · Basal insulin. This refers to the small amount of insulin that needs to be present in your blood at all times. Sometimes oral medicines will be enough. For other people, and especially for people with type 1 diabetes, insulin is needed. Usually, intermediate-acting or long-acting insulin is used once or twice a day to accomplish this.    · Prandial (meal-related) insulin. Your blood sugar will rise rapidly after a meal. Rapid-acting or short-acting insulin can be used right before the meal to bring your blood sugar back to normal quickly. You might be instructed to adjust the amount of insulin depending on how much carbohydrate (starch) is in your meal.    · Corrective insulin. You might be instructed to check your blood sugar at certain times of the day. You then might use a small amount of rapid-acting or short-acting insulin to bring the blood sugar down to normal if it is elevated.    · Tight control (also called intensive therapy). Tight control means keeping your blood sugar as close to your target as possible and keeping it from going too high after meals. People with tight control of their diabetes are shown to have fewer long-term problems from their diabetes.    · Glycohemoglobin (also called glyco, glycosylated hemoglobin, hemoglobin A1c, or A1c) level. This measures how well your blood sugar has been controlled during the past 1 to 3 months. It helps your health care provider see how effective your treatment is and decide if any changes are needed. Your health care provider will discuss your target glycohemoglobin level with you.    Insulin treatment requires your careful attention. While you are being treated with insulin, you should check your blood glucose at least two times each day. Treatment plans will be different for different people. Some people do well with a simple program. Others require more complicated programs, with  multiple insulin injections daily. You will work with your health care provider to develop the best program for you. Regardless of your insulin treatment plan, you must also do your best on weight control, diet and food choices, exercise, blood pressure control, cholesterol control, and stress levels.   WHAT ARE THE SIDE EFFECTS OF INSULIN?  Although insulin treatment is important, it does have some side effects, such as:   · Insulin can cause your blood sugar to go too low (hypoglycemia).    · Weight gain can occur.    · Improper injection technique can cause hypoglycemia, blood sugar to go too high (hyperglycemia), skin injury or irritation, or other problems. You must learn to inject insulin properly.     This information is not intended to replace advice given to you by your health care provider. Make sure you discuss any questions you have with your health care provider.     Document Released: 03/16/2010 Document Revised: 01/08/2016 Document Reviewed: 06/01/2014  [employer] Interactive Patient Education ©2016 [employer]

## 2017-10-11 ENCOUNTER — TELEPHONE (OUTPATIENT)
Dept: INTERNAL MEDICINE | Facility: MEDICAL CENTER | Age: 77
End: 2017-10-11

## 2017-10-11 NOTE — TELEPHONE ENCOUNTER
1. Caller Name: Manolo F Swanson      Call Back Number: [phone] (home)         Patient approves a detailed voicemail message: N\A    Medication menagement    Patient called and left message stating if we can please give him a different medication for the Levemir.  He stated he has to pay more than 300 for the prescription and cannot afford that.  Please send something else into the [facility] on [address].    Patient also stated if we don't send in the new medication he will not be coming into his appointment that he has tomorrow with the cardiologist.

## 2017-10-24 RX ORDER — INSULIN GLARGINE 100 [IU]/ML
40 INJECTION, SOLUTION SUBCUTANEOUS EVERY EVENING
Qty: 10 ML | Refills: 6 | Status: SHIPPED | OUTPATIENT
Start: 2017-10-24 | End: 2019-10-17

## 2017-10-24 NOTE — TELEPHONE ENCOUNTER
Called the patient spoke to him and discontinued levemir as it is costing patient around 400 dollars.   Started on lantus every evening 40 units once daily sc. Refilled metformin.  Will follow up with patient as scheduled

## 2017-12-07 ENCOUNTER — HOSPITAL ENCOUNTER (OUTPATIENT)
Dept: RADIOLOGY | Facility: MEDICAL CENTER | Age: 77
End: 2017-12-07
Attending: PHYSICIAN ASSISTANT
Payer: MEDICARE

## 2017-12-07 DIAGNOSIS — B02.29 POST HERPETIC NEURALGIA: ICD-10-CM

## 2017-12-07 DIAGNOSIS — R10.11 ABDOMINAL PAIN, RIGHT UPPER QUADRANT: ICD-10-CM

## 2017-12-07 DIAGNOSIS — Z12.11 SPECIAL SCREENING FOR MALIGNANT NEOPLASMS, COLON: ICD-10-CM

## 2017-12-07 PROCEDURE — 76705 ECHO EXAM OF ABDOMEN: CPT

## 2018-05-17 ENCOUNTER — APPOINTMENT (RX ONLY)
Dept: URBAN - METROPOLITAN AREA CLINIC 20 | Facility: CLINIC | Age: 78
Setting detail: DERMATOLOGY
End: 2018-05-17

## 2018-05-17 DIAGNOSIS — L81.4 OTHER MELANIN HYPERPIGMENTATION: ICD-10-CM

## 2018-05-17 DIAGNOSIS — D485 NEOPLASM OF UNCERTAIN BEHAVIOR OF SKIN: ICD-10-CM

## 2018-05-17 DIAGNOSIS — Z85.820 PERSONAL HISTORY OF MALIGNANT MELANOMA OF SKIN: ICD-10-CM

## 2018-05-17 DIAGNOSIS — Z85.828 PERSONAL HISTORY OF OTHER MALIGNANT NEOPLASM OF SKIN: ICD-10-CM

## 2018-05-17 DIAGNOSIS — Z86.007 PERSONAL HISTORY OF IN-SITU NEOPLASM OF SKIN: ICD-10-CM

## 2018-05-17 DIAGNOSIS — Z87.2 PERSONAL HISTORY OF DISEASES OF THE SKIN AND SUBCUTANEOUS TISSUE: ICD-10-CM

## 2018-05-17 DIAGNOSIS — L57.0 ACTINIC KERATOSIS: ICD-10-CM

## 2018-05-17 DIAGNOSIS — L82.1 OTHER SEBORRHEIC KERATOSIS: ICD-10-CM

## 2018-05-17 DIAGNOSIS — L82.0 INFLAMED SEBORRHEIC KERATOSIS: ICD-10-CM

## 2018-05-17 DIAGNOSIS — Z12.83 ENCOUNTER FOR SCREENING FOR MALIGNANT NEOPLASM OF SKIN: ICD-10-CM

## 2018-05-17 PROBLEM — D48.5 NEOPLASM OF UNCERTAIN BEHAVIOR OF SKIN: Status: ACTIVE | Noted: 2018-05-17

## 2018-05-17 PROCEDURE — ? LIQUID NITROGEN

## 2018-05-17 PROCEDURE — 11100: CPT | Mod: 59

## 2018-05-17 PROCEDURE — ? COUNSELING

## 2018-05-17 PROCEDURE — 99214 OFFICE O/P EST MOD 30 MIN: CPT | Mod: 25

## 2018-05-17 PROCEDURE — ? BIOPSY BY SHAVE METHOD

## 2018-05-17 PROCEDURE — 17004 DESTROY PREMAL LESIONS 15/>: CPT

## 2018-05-17 PROCEDURE — 11101: CPT

## 2018-05-17 ASSESSMENT — LOCATION ZONE DERM
LOCATION ZONE: FACE
LOCATION ZONE: LEG
LOCATION ZONE: SCALP
LOCATION ZONE: EAR
LOCATION ZONE: TRUNK
LOCATION ZONE: ARM

## 2018-05-17 ASSESSMENT — LOCATION SIMPLE DESCRIPTION DERM
LOCATION SIMPLE: RIGHT POSTERIOR THIGH
LOCATION SIMPLE: LEFT UPPER BACK
LOCATION SIMPLE: LEFT SHOULDER
LOCATION SIMPLE: LEFT FOREHEAD
LOCATION SIMPLE: CHEST
LOCATION SIMPLE: LEFT CLAVICULAR SKIN
LOCATION SIMPLE: RIGHT FOREHEAD
LOCATION SIMPLE: POSTERIOR SCALP
LOCATION SIMPLE: RIGHT CHEEK
LOCATION SIMPLE: LEFT CHEEK
LOCATION SIMPLE: LEFT TEMPLE
LOCATION SIMPLE: LOWER BACK
LOCATION SIMPLE: LEFT FOREARM
LOCATION SIMPLE: RIGHT EAR

## 2018-05-17 ASSESSMENT — LOCATION DETAILED DESCRIPTION DERM
LOCATION DETAILED: LEFT INFERIOR LATERAL FOREHEAD
LOCATION DETAILED: INFERIOR LUMBAR SPINE
LOCATION DETAILED: RIGHT INFERIOR CENTRAL MALAR CHEEK
LOCATION DETAILED: LEFT SUPERIOR POSTERIOR PARIETAL SCALP
LOCATION DETAILED: RIGHT SUPERIOR POSTERIOR PARIETAL SCALP
LOCATION DETAILED: LEFT SUPERIOR MEDIAL FOREHEAD
LOCATION DETAILED: POSTERIOR MID-PARIETAL SCALP
LOCATION DETAILED: LEFT INFERIOR FOREHEAD
LOCATION DETAILED: RIGHT PROXIMAL POSTERIOR THIGH
LOCATION DETAILED: LEFT PROXIMAL DORSAL FOREARM
LOCATION DETAILED: LEFT CENTRAL MALAR CHEEK
LOCATION DETAILED: MID-OCCIPITAL SCALP
LOCATION DETAILED: LEFT POSTERIOR SHOULDER
LOCATION DETAILED: RIGHT SUPERIOR CRUS OF ANTIHELIX
LOCATION DETAILED: STERNUM
LOCATION DETAILED: RIGHT FOREHEAD
LOCATION DETAILED: LEFT CLAVICULAR SKIN
LOCATION DETAILED: LEFT FOREHEAD
LOCATION DETAILED: LEFT INFERIOR MEDIAL FOREHEAD
LOCATION DETAILED: RIGHT SUPERIOR HELIX
LOCATION DETAILED: RIGHT MEDIAL FOREHEAD
LOCATION DETAILED: LEFT SUPERIOR UPPER BACK
LOCATION DETAILED: RIGHT INFERIOR FOREHEAD
LOCATION DETAILED: LEFT MID TEMPLE

## 2018-05-17 NOTE — PROCEDURE: BIOPSY BY SHAVE METHOD
Additional Anesthesia Volume In Cc (Will Not Render If 0): 0
Bill [id number] For Specimen Handling/Conveyance To Laboratory?: no
Lab Facility: 
Post-Care Instructions: Keep the biopsy site dry overnight, then keep the site clean by washing with soap and water twice daily then covering with Vaseline/Aquaphor and a Band-Aid until healed.
Dressing: Band-Aid
Electrodesiccation And Curettage Text: The wound bed was treated with electrodesiccation and curettage after the biopsy was performed.
Type Of Destruction Used: Curettage
Render Post-Care Instructions In Note?: yes
Size Of Lesion In Cm: 1.1
Biopsy Type: H and E
Anesthesia Type: 1% lidocaine with 1:200,000 epinephrine and a 1:10 solution of 8.4% sodium bicarbonate and 408mcg clindamycin/ml
Lab: [id number]
Billing Type: Third-Party Bill
Silver Nitrate Text: The wound bed was treated with silver nitrate after the biopsy was performed.
Notification Instructions: Patient will be notified of biopsy results; however, patient is instructed to call the office if not contacted within 2 weeks.
X Size Of Lesion In Cm: 1
Curettage Text: The wound bed was treated with curettage after the biopsy was performed.
Electrodesiccation Text: The wound bed was treated with electrodesiccation after the biopsy was performed.
Cryotherapy Text: The wound bed was treated with cryotherapy after the biopsy was performed.
Wound Care: Aquaphor
Consent: Written and verbal consent were obtained and risks were reviewed including but not limited to scarring, infection, bleeding, scabbing, incomplete removal, nerve damage and allergy to anesthesia.
Hemostasis: Drysol and Electrocautery
Detail Level: Detailed
Anesthesia Volume In Cc: 0.5
Biopsy Method: Personna blade
X Size Of Lesion In Cm: 0.4

## 2018-05-17 NOTE — PROCEDURE: LIQUID NITROGEN
Duration Of Freeze Thaw-Cycle (Seconds): 0
Render Post-Care Instructions In Note?: yes
Post-Care Instructions: I reviewed with the patient in detail post-care instructions. Patient is to wear sun protection and avoid picking at any of the treated lesions. Pt may apply Vaseline to crusted or scabbing areas.
Consent: The patient's consent was obtained including but not limited to risks of crusting, scabbing, blistering, scarring, darker or lighter pigmentary change, recurrence, incomplete removal and infection.
Detail Level: Detailed

## 2018-05-21 ENCOUNTER — HOSPITAL ENCOUNTER (OUTPATIENT)
Dept: RADIOLOGY | Facility: MEDICAL CENTER | Age: 78
End: 2018-05-21
Attending: FAMILY MEDICINE
Payer: MEDICARE

## 2018-05-21 DIAGNOSIS — M25.552 LEFT HIP PAIN: ICD-10-CM

## 2018-05-21 PROCEDURE — 73502 X-RAY EXAM HIP UNI 2-3 VIEWS: CPT | Mod: LT

## 2018-05-23 ENCOUNTER — HOSPITAL ENCOUNTER (OUTPATIENT)
Dept: LAB | Facility: MEDICAL CENTER | Age: 78
End: 2018-05-23
Attending: FAMILY MEDICINE
Payer: MEDICARE

## 2018-05-23 LAB
ALBUMIN SERPL BCP-MCNC: 4 G/DL (ref 3.2–4.9)
ALBUMIN/GLOB SERPL: 1.2 G/DL
ALP SERPL-CCNC: 73 U/L (ref 30–99)
ALT SERPL-CCNC: 22 U/L (ref 2–50)
ANION GAP SERPL CALC-SCNC: 10 MMOL/L (ref 0–11.9)
AST SERPL-CCNC: 16 U/L (ref 12–45)
BASOPHILS # BLD AUTO: 1 % (ref 0–1.8)
BASOPHILS # BLD: 0.1 K/UL (ref 0–0.12)
BILIRUB SERPL-MCNC: 1.1 MG/DL (ref 0.1–1.5)
BUN SERPL-MCNC: 18 MG/DL (ref 8–22)
CALCIUM SERPL-MCNC: 9.3 MG/DL (ref 8.5–10.5)
CHLORIDE SERPL-SCNC: 101 MMOL/L (ref 96–112)
CHOLEST SERPL-MCNC: 196 MG/DL (ref 100–199)
CO2 SERPL-SCNC: 22 MMOL/L (ref 20–33)
CREAT SERPL-MCNC: 1 MG/DL (ref 0.5–1.4)
CREAT UR-MCNC: 69.5 MG/DL
EOSINOPHIL # BLD AUTO: 0.19 K/UL (ref 0–0.51)
EOSINOPHIL NFR BLD: 1.8 % (ref 0–6.9)
ERYTHROCYTE [DISTWIDTH] IN BLOOD BY AUTOMATED COUNT: 39.1 FL (ref 35.9–50)
EST. AVERAGE GLUCOSE BLD GHB EST-MCNC: 361 MG/DL
GLOBULIN SER CALC-MCNC: 3.3 G/DL (ref 1.9–3.5)
GLUCOSE SERPL-MCNC: 389 MG/DL (ref 65–99)
HBA1C MFR BLD: 14.2 % (ref 0–5.6)
HCT VFR BLD AUTO: 52.6 % (ref 42–52)
HDLC SERPL-MCNC: 40 MG/DL
HGB BLD-MCNC: 17.1 G/DL (ref 14–18)
IMM GRANULOCYTES # BLD AUTO: 0.15 K/UL (ref 0–0.11)
IMM GRANULOCYTES NFR BLD AUTO: 1.5 % (ref 0–0.9)
LDLC SERPL CALC-MCNC: 126 MG/DL
LYMPHOCYTES # BLD AUTO: 1.64 K/UL (ref 1–4.8)
LYMPHOCYTES NFR BLD: 15.9 % (ref 22–41)
MCH RBC QN AUTO: 28.1 PG (ref 27–33)
MCHC RBC AUTO-ENTMCNC: 32.5 G/DL (ref 33.7–35.3)
MCV RBC AUTO: 86.5 FL (ref 81.4–97.8)
MICROALBUMIN UR-MCNC: 5.6 MG/DL
MICROALBUMIN/CREAT UR: 81 MG/G (ref 0–30)
MONOCYTES # BLD AUTO: 0.94 K/UL (ref 0–0.85)
MONOCYTES NFR BLD AUTO: 9.1 % (ref 0–13.4)
NEUTROPHILS # BLD AUTO: 7.3 K/UL (ref 1.82–7.42)
NEUTROPHILS NFR BLD: 70.7 % (ref 44–72)
NRBC # BLD AUTO: 0 K/UL
NRBC BLD-RTO: 0 /100 WBC
PLATELET # BLD AUTO: 276 K/UL (ref 164–446)
PMV BLD AUTO: 11.6 FL (ref 9–12.9)
POTASSIUM SERPL-SCNC: 4.3 MMOL/L (ref 3.6–5.5)
PROT SERPL-MCNC: 7.3 G/DL (ref 6–8.2)
PSA SERPL-MCNC: 0.66 NG/ML (ref 0–4)
RBC # BLD AUTO: 6.08 M/UL (ref 4.7–6.1)
SODIUM SERPL-SCNC: 133 MMOL/L (ref 135–145)
TRIGL SERPL-MCNC: 148 MG/DL (ref 0–149)
WBC # BLD AUTO: 10.3 K/UL (ref 4.8–10.8)

## 2018-05-23 PROCEDURE — 82043 UR ALBUMIN QUANTITATIVE: CPT

## 2018-05-23 PROCEDURE — 36415 COLL VENOUS BLD VENIPUNCTURE: CPT

## 2018-05-23 PROCEDURE — 80053 COMPREHEN METABOLIC PANEL: CPT

## 2018-05-23 PROCEDURE — 85025 COMPLETE CBC W/AUTO DIFF WBC: CPT

## 2018-05-23 PROCEDURE — 80061 LIPID PANEL: CPT

## 2018-05-23 PROCEDURE — 84153 ASSAY OF PSA TOTAL: CPT | Mod: GA

## 2018-05-23 PROCEDURE — 83036 HEMOGLOBIN GLYCOSYLATED A1C: CPT | Mod: GA

## 2018-05-23 PROCEDURE — 82570 ASSAY OF URINE CREATININE: CPT

## 2018-08-04 ENCOUNTER — PATIENT OUTREACH (OUTPATIENT)
Dept: HEALTH INFORMATION MANAGEMENT | Facility: OTHER | Age: 78
End: 2018-08-04

## 2018-08-04 NOTE — PROGRESS NOTES
Outcome: Left Message    Please transfer to Patient Outreach Team at [phone] when patient returns call.    WebIZ Checked & Epic Updated:  yes    Attempt # 1

## 2018-09-03 NOTE — PROGRESS NOTES
Outcome: Left Message    Please transfer to Patient Outreach Team at [phone] when patient returns call.    Attempt # 2

## 2018-09-20 NOTE — PROGRESS NOTES
Outcome: Left Message    Please transfer to Patient Outreach Team at [phone] when patient returns call.    Attempt # 4

## 2018-09-25 NOTE — PROGRESS NOTES
Outcome: Left Message    Please transfer to Patient Outreach Team at [phone] when patient returns call.    Attempt # 5

## 2018-10-09 ENCOUNTER — OFFICE VISIT (OUTPATIENT)
Dept: NEUROLOGY | Facility: MEDICAL CENTER | Age: 78
End: 2018-10-09
Payer: MEDICARE

## 2018-10-09 VITALS
SYSTOLIC BLOOD PRESSURE: 104 MMHG | WEIGHT: 204 LBS | HEIGHT: 72 IN | OXYGEN SATURATION: 95 % | HEART RATE: 106 BPM | BODY MASS INDEX: 27.63 KG/M2 | DIASTOLIC BLOOD PRESSURE: 60 MMHG | RESPIRATION RATE: 16 BRPM | TEMPERATURE: 97.6 F

## 2018-10-09 DIAGNOSIS — E11.65 UNCONTROLLED TYPE 2 DIABETES MELLITUS WITH HYPERGLYCEMIA (HCC): ICD-10-CM

## 2018-10-09 DIAGNOSIS — E56.9 VITAMIN DEFICIENCY: ICD-10-CM

## 2018-10-09 DIAGNOSIS — R26.81 UNSTEADY GAIT: ICD-10-CM

## 2018-10-09 PROCEDURE — 99204 OFFICE O/P NEW MOD 45 MIN: CPT | Performed by: PSYCHIATRY & NEUROLOGY

## 2018-10-09 ASSESSMENT — PATIENT HEALTH QUESTIONNAIRE - PHQ9: CLINICAL INTERPRETATION OF PHQ2 SCORE: 0

## 2018-10-09 NOTE — PATIENT INSTRUCTIONS
"    IMPRESSION:    1. Unsteadiness since 2017  2. Hx of DM, Hyperlipidemia, Weight Loss-- around 10 lb based on his memory    PLAN/RECOMMENDATIONS:      We will offer MRI of brain , Blood Tests to look for reversible cause of balance issues        Explain to the patient , the causes of unsteady gait in his case could be one of the following or mixed factors   A. Small stroke, Frontal gait disorder   B. Hydrocephalus   C. Degeneration-- early dementia or parkinson   D. Cervical spine    E .Neuromuscular weakness   F. Joint problems, Vision Problems, Vestibular Problems   G. Mixed of the above    Among these the following are more likely because of some bradykinesia, imperfect pursuit, Rombersign(+)  1. Brain-- such as hydrocephalus, small strokes, degeneration ( beginning of parkinson)  2. Spine Degeneration, mechanical or medical ( vitamin deficiency- Vit B12)  3. Peripheral nerve and muscle problem-- like DM neuropathy-- and or vit B12 deficiency    Advise the following  Better sugar control-- daily blood sugar  Could contact us one week after the MRI and blood tests  After blood tests advise the following        It is fine to try the following nutritional supplementation-- no guarantee these would help though  However, it is worth to try   ________________________________________________________________________    Fish Oil -- Omega 3 1000mg 3# daily  ________________________________________________________________________  ________________________________________________________________________    Magnesium -L-Threonate Capsules (\"Magtein\") 2000mg daily  Vit B3 500mg daily  Vitamin B1( thiamine) 250mg daily  Multiple Vitamin Daily  ________________________________________________________________________    Exercise muscle and exercise brain is important            SIGNATURE:  Della Little    CC:  Praneeth Cornell M.D.    "

## 2018-10-09 NOTE — PROGRESS NOTES
NEUROLOGY NOTE    Referring Physician  Praneeth Cornell M.D.      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     Social History Main Topics   • Smoking status: Never Smoker   • Smokeless tobacco: Never Used   • Alcohol use No   • Drug use: No   • Sexual activity: Not on file     Other Topics Concern   • Not on file     Social History Narrative   • No narrative on file     ALLERGIES:  No Known Allergies  TOBHX  History   Smoking Status   • Never Smoker   Smokeless Tobacco   • Never Used     ALCHX  History   Alcohol Use No     DRUGHX  History   Drug Use No           MEDICATIONS:  Current Outpatient Prescriptions   Medication   • SITagliptin (JANUVIA) 100 MG Tab   • Metformin HCl 1000 MG TABLET SR 24 HR   • insulin glargine (LANTUS) 100 UNIT/ML Solution   • atorvastatin (LIPITOR) 40 MG Tab   • guaifenesin LA (MUCINEX) 600 MG TABLET SR 12 HR   • azithromycin (ZITHROMAX) 250 MG Tab     No current facility-administered medications for this visit.        REVIEW OF SYSTEM:    Constitutional: Denies fevers, Denies weight changes    Eyes: Denies changes in vision, no eye pain   Ears/Nose/Throat/Mouth: Denies nasal congestion or sore throat   Cardiovascular: Denies chest pain or palpitations   Respiratory: Denies SOB.   Gastrointestinal/Hepatic: Denies abdominal pain, nausea, vomiting, diarrhea, constipation or GI bleeding   Genitourinary: Denies bladder dysfunction, dysuria or frequency   Musculoskeletal/Rheum: Denies joint pain and swelling   Skin/Breast: Denies rash, denies breast lumps or discharge   Neurological: balance issues , urgency in the night  Psychiatric: denies mood disorder   Endocrine: denies hx of diabetes or thyroid dysfunction   Heme/Oncology/Lymph Nodes: Denies enlarged lymph nodes, denies brusing or known bleeding disorder   Allergic/Immunologic: Denies hx of allergies         PHYSICAL AND NEUROLOGICAL EXMAINATIONS:  VITAL SIGNS: /60 (BP Location: Right arm, Patient Position: Sitting, BP Cuff Size: Adult)   Pulse (!) 106   Temp 36.4 °C (97.6 °F) (Temporal)   Resp 16   Ht 1.829 m (6')   Wt 92.5 kg (204 lb)   SpO2 95%   BMI 27.67 kg/m²   CURRENT WEIGHT:   BMI: Body mass index is 27.67 kg/m².  PREVIOUS WEIGHTS:  Wt Readings from Last 25 Encounters:   10/09/18 92.5 kg (204 lb)   10/09/17 92.1 kg (203 lb)   09/13/17 93.1 kg (205 lb 4 oz)   09/03/17 93.7 kg (206 lb 9.1 oz)       General appearance of patient: WDWN(+) NAD(+)    EYES  o Fundus : Papilledem(-) Exudates(-) Hemorrhage(-)  Nervous System  Orientation to time, place and person(+)  Memory normal(-)  Language: aphasia(-)  Knowledge: past(+) Current(+)  Attention(+)  Cranial Nerves  • Nerve 2: intact  • Nerve 3,4,6: intact  • Nerve 5 : intact  • Nerve 7: intact  • Nerve 8: intact  • Nerve 9 & 10: intact  • Nerve 11: intact  • Nerve 12: intact  Muscle Power and muscle tone: symmetric, normal in upper and lower  Sensory System: Pin sensation intact(+)  Reflexes: symmetric throughout  Cerebellar Function FNP normal   Gait : Steady(-) TandemGait steady(-)  Heart  "and Vascular  Peripheral Vasucular system : Edema (-) Swelling(-)  RHB, Breathing sound clear  abdomen bowel sound normoactive  Extremities freely moveable  Joints no contracture       NEUROIMAGING:       LAB:            IMPRESSION:    1. Unsteadiness since 2017  2. Hx of DM, Hyperlipidemia, Weight Loss-- around 10 lb based on his memory    PLAN/RECOMMENDATIONS:      We will offer MRI of brain , Blood Tests to look for reversible cause of balance issues        Explain to the patient , the causes of unsteady gait in his case could be one of the following or mixed factors   A. Small stroke, Frontal gait disorder   B. Hydrocephalus   C. Degeneration-- early dementia or parkinson   D. Cervical spine    E .Neuromuscular weakness   F. Joint problems, Vision Problems, Vestibular Problems   G. Mixed of the above    Among these the following are more likely because of some bradykinesia, imperfect pursuit, Rombersign(+)  1. Brain-- such as hydrocephalus, small strokes, degeneration ( beginning of parkinson)  2. Spine Degeneration, mechanical or medical ( vitamin deficiency- Vit B12)  3. Peripheral nerve and muscle problem-- like DM neuropathy-- and or vit B12 deficiency    Advise the following  Better sugar control-- daily blood sugar  Could contact us one week after the MRI and blood tests  After blood tests advise the following        It is fine to try the following nutritional supplementation-- no guarantee these would help though  However, it is worth to try   ________________________________________________________________________    Fish Oil -- Omega 3 1000mg 3# daily  ________________________________________________________________________  ________________________________________________________________________    Magnesium -L-Threonate Capsules (\"Magtein\") 2000mg daily  Vit B3 500mg daily  Vitamin B1( thiamine) 250mg daily  Multiple Vitamin " Daily  ________________________________________________________________________    Exercise muscle and exercise brain is important            SIGNATURE:  Della Little    CC:  Praneeth Cornell M.D.

## 2018-10-16 ENCOUNTER — HOSPITAL ENCOUNTER (OUTPATIENT)
Dept: RADIOLOGY | Facility: MEDICAL CENTER | Age: 78
End: 2018-10-16
Attending: PSYCHIATRY & NEUROLOGY
Payer: MEDICARE

## 2018-10-16 DIAGNOSIS — E11.65 UNCONTROLLED TYPE 2 DIABETES MELLITUS WITH HYPERGLYCEMIA (HCC): ICD-10-CM

## 2018-10-16 DIAGNOSIS — R26.81 UNSTEADY GAIT: ICD-10-CM

## 2018-10-16 DIAGNOSIS — E56.9 VITAMIN DEFICIENCY: ICD-10-CM

## 2018-10-16 PROCEDURE — 70551 MRI BRAIN STEM W/O DYE: CPT

## 2018-10-18 ENCOUNTER — TELEPHONE (OUTPATIENT)
Dept: NEUROLOGY | Facility: MEDICAL CENTER | Age: 78
End: 2018-10-18

## 2018-10-18 NOTE — TELEPHONE ENCOUNTER
MRI Brain : brain atrophy but no lesions needs surgical internvention    We could discuss more in the next visit

## 2018-10-19 NOTE — TELEPHONE ENCOUNTER
MRI Brain : brain atrophy but no lesions needs surgical internvention     We could discuss more in the next visit    Spoke to patient made appointment for 11/1/2018.

## 2018-11-01 ENCOUNTER — HOSPITAL ENCOUNTER (OUTPATIENT)
Dept: LAB | Facility: MEDICAL CENTER | Age: 78
End: 2018-11-01
Attending: PSYCHIATRY & NEUROLOGY
Payer: MEDICARE

## 2018-11-01 ENCOUNTER — OFFICE VISIT (OUTPATIENT)
Dept: NEUROLOGY | Facility: MEDICAL CENTER | Age: 78
End: 2018-11-01
Payer: MEDICARE

## 2018-11-01 VITALS
HEART RATE: 93 BPM | RESPIRATION RATE: 16 BRPM | SYSTOLIC BLOOD PRESSURE: 118 MMHG | HEIGHT: 72 IN | OXYGEN SATURATION: 97 % | TEMPERATURE: 97.5 F | WEIGHT: 202 LBS | BODY MASS INDEX: 27.36 KG/M2 | DIASTOLIC BLOOD PRESSURE: 74 MMHG

## 2018-11-01 DIAGNOSIS — E11.65 UNCONTROLLED TYPE 2 DIABETES MELLITUS WITH HYPERGLYCEMIA (HCC): ICD-10-CM

## 2018-11-01 DIAGNOSIS — E56.9 VITAMIN DEFICIENCY: ICD-10-CM

## 2018-11-01 DIAGNOSIS — R26.81 UNSTEADY GAIT: ICD-10-CM

## 2018-11-01 LAB
EST. AVERAGE GLUCOSE BLD GHB EST-MCNC: 346 MG/DL
HBA1C MFR BLD: 13.7 % (ref 0–5.6)
LACTATE BLD-SCNC: 1.6 MMOL/L (ref 0.5–2)
MAGNESIUM SERPL-MCNC: 2.1 MG/DL (ref 1.5–2.5)
VIT B12 SERPL-MCNC: 1189 PG/ML (ref 211–911)

## 2018-11-01 PROCEDURE — 36415 COLL VENOUS BLD VENIPUNCTURE: CPT

## 2018-11-01 PROCEDURE — 99214 OFFICE O/P EST MOD 30 MIN: CPT | Performed by: PSYCHIATRY & NEUROLOGY

## 2018-11-01 PROCEDURE — 83036 HEMOGLOBIN GLYCOSYLATED A1C: CPT | Mod: GA

## 2018-11-01 PROCEDURE — 86235 NUCLEAR ANTIGEN ANTIBODY: CPT | Mod: 91

## 2018-11-01 PROCEDURE — 83605 ASSAY OF LACTIC ACID: CPT

## 2018-11-01 PROCEDURE — 84207 ASSAY OF VITAMIN B-6: CPT

## 2018-11-01 PROCEDURE — 82607 VITAMIN B-12: CPT

## 2018-11-01 PROCEDURE — 86038 ANTINUCLEAR ANTIBODIES: CPT

## 2018-11-01 PROCEDURE — 83735 ASSAY OF MAGNESIUM: CPT

## 2018-11-01 PROCEDURE — 84425 ASSAY OF VITAMIN B-1: CPT

## 2018-11-01 NOTE — PROGRESS NOTES
NEUROLOGY NOTE    Referring Physician  Praneeth Cornell M.D.      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     Social History Main Topics   • Smoking status: Never Smoker   • Smokeless tobacco: Never Used   • Alcohol use No   • Drug use: No   • Sexual activity: Not on file     Other Topics Concern   • Not on file     Social History Narrative   • No narrative on file     ALLERGIES:  No Known Allergies  TOBHX  History   Smoking Status   • Never Smoker   Smokeless Tobacco   • Never Used     ALCHX  History   Alcohol Use No     DRUGHX  History   Drug Use No           MEDICATIONS:  Current Outpatient Prescriptions   Medication   • SITagliptin (JANUVIA) 100 MG Tab   • Metformin HCl 1000 MG TABLET SR 24 HR   • insulin glargine (LANTUS) 100 UNIT/ML Solution   • atorvastatin (LIPITOR) 40 MG Tab   • guaifenesin LA (MUCINEX) 600 MG TABLET SR 12 HR   • azithromycin (ZITHROMAX) 250 MG Tab     No current facility-administered medications for this visit.        REVIEW OF SYSTEM:    Constitutional: Denies fevers, Denies weight  "changes   Eyes: Denies changes in vision, no eye pain   Ears/Nose/Throat/Mouth: Denies nasal congestion or sore throat   Cardiovascular: Denies chest pain or palpitations   Respiratory: Denies SOB.   Gastrointestinal/Hepatic: Denies abdominal pain, nausea, vomiting, diarrhea, constipation or GI bleeding   Genitourinary: Denies bladder dysfunction, dysuria or frequency   Musculoskeletal/Rheum: Denies joint pain and swelling   Skin/Breast: Denies rash, denies breast lumps or discharge   Neurological: balance issues , urgency in the night  Psychiatric: denies mood disorder   Endocrine: denies hx of diabetes or thyroid dysfunction   Heme/Oncology/Lymph Nodes: Denies enlarged lymph nodes, denies brusing or known bleeding disorder   Allergic/Immunologic: Denies hx of allergies         PHYSICAL AND NEUROLOGICAL EXMAINATIONS:  VITAL SIGNS: /74 (BP Location: Right arm, Patient Position: Sitting, BP Cuff Size: Adult)   Pulse 93   Temp 36.4 °C (97.5 °F) (Temporal)   Resp 16   Ht 1.829 m (6' 0.01\")   Wt 91.6 kg (202 lb)   SpO2 97%   BMI 27.39 kg/m²   CURRENT WEIGHT:   BMI: Body mass index is 27.39 kg/m².  PREVIOUS WEIGHTS:  Wt Readings from Last 25 Encounters:   11/01/18 91.6 kg (202 lb)   10/09/18 92.5 kg (204 lb)   10/09/17 92.1 kg (203 lb)   09/13/17 93.1 kg (205 lb 4 oz)   09/03/17 93.7 kg (206 lb 9.1 oz)       General appearance of patient: WDWN(+) NAD(+)    EYES  o Fundus : Papilledem(-) Exudates(-) Hemorrhage(-)  Nervous System  Orientation to time, place and person(+)  Memory normal(-)  Language: aphasia(-)  Knowledge: past(+) Current(+)  Attention(+)  Cranial Nerves  • Nerve 2: intact  • Nerve 3,4,6: intact  • Nerve 5 : intact  • Nerve 7: intact  • Nerve 8: intact  • Nerve 9 & 10: intact  • Nerve 11: intact  • Nerve 12: intact  Muscle Power and muscle tone: symmetric, normal in upper and lower  Sensory System: Pin sensation intact(+)  Reflexes: symmetric throughout  Cerebellar Function FNP normal   Gait : " "Steady(-) TandemGait steady(-)  Heart and Vascular  Peripheral Vasucular system : Edema (-) Swelling(-)  RHB, Breathing sound clear  abdomen bowel sound normoactive  Extremities freely moveable  Joints no contracture       NEUROIMAGING:       LAB:            IMPRESSION:    1. Unsteadiness since 2017  2. Hx of DM, Hyperlipidemia, Weight Loss-- around 10 lb based on his memory    PLAN/RECOMMENDATIONS:      We will offer MRI of brain , Blood Tests to look for reversible cause of balance issues        Explain to the patient , the causes of unsteady gait in his case could be one of the following or mixed factors   A. Small stroke, X  Frontal gait disorder   B. Hydrocephalus X   C. Degeneration-- early dementia or parkinson-- right limbs mild bradykinesia   D. Cervical spine  X   E .Neuromuscular weakness X--   F. Joint problems, Vision Problems, Vestibular Problems X   G. Mixed of the above    Among these the following are more likely because of some bradykinesia, imperfect pursuit, Rombersign(+)  1. Brain-- such as hydrocephalus, small strokes, degeneration ( beginning of parkinson)  2. Spine Degeneration, mechanical or medical ( vitamin deficiency- Vit B12)  3. Peripheral nerve and muscle problem-- like DM neuropathy-- and or vit B12 deficiency    Advise the following  Better sugar control-- daily blood sugar    After blood tests       It is fine to try the following nutritional supplementation-- no guarantee these would help though  However, it is worth to try   ________________________________________________________________________    Fish Oil -- Omega 3 1000mg 3# daily  ________________________________________________________________________  ________________________________________________________________________    Magnesium -L-Threonate Capsules (\"Magtein\") 2000mg daily  Vit B3 500mg daily  Vitamin B1( thiamine) 250mg daily  Multiple Vitamin " Daily  ________________________________________________________________________    Exercise muscle and exercise brain is important            SIGNATURE:  Della Little    CC:  Praneeth Cornell M.D.

## 2018-11-01 NOTE — PATIENT INSTRUCTIONS
"  IMPRESSION:    1. Unsteadiness since 2017  2. Hx of DM, Hyperlipidemia, Weight Loss-- around 10 lb based on his memory    PLAN/RECOMMENDATIONS:      We will offer MRI of brain , Blood Tests to look for reversible cause of balance issues        Explain to the patient , the causes of unsteady gait in his case could be one of the following or mixed factors   A. Small stroke, X  Frontal gait disorder   B. Hydrocephalus X   C. Degeneration-- early dementia or parkinson-- right limbs mild bradykinesia   D. Cervical spine  X   E .Neuromuscular weakness X--   F. Joint problems, Vision Problems, Vestibular Problems X   G. Mixed of the above    Among these the following are more likely because of some bradykinesia, imperfect pursuit, Rombersign(+)  1. Brain-- such as hydrocephalus, small strokes, degeneration ( beginning of parkinson)  2. Spine Degeneration, mechanical or medical ( vitamin deficiency- Vit B12)  3. Peripheral nerve and muscle problem-- like DM neuropathy-- and or vit B12 deficiency    Advise the following  Better sugar control-- daily blood sugar    After blood tests       It is fine to try the following nutritional supplementation-- no guarantee these would help though  However, it is worth to try   ________________________________________________________________________    Fish Oil -- Omega 3 1000mg 3# daily  ________________________________________________________________________  ________________________________________________________________________    Magnesium -L-Threonate Capsules (\"Magtein\") 2000mg daily  Vit B3 500mg daily  Vitamin B1( thiamine) 250mg daily  Multiple Vitamin Daily  ________________________________________________________________________    Exercise muscle and exercise brain is important    BRAIN MRI -- atrophy             "

## 2018-11-03 LAB — NUCLEAR IGG SER QL IA: NORMAL

## 2018-11-04 LAB
ENA SS-B IGG SER IA-ACNC: 0 AU/ML (ref 0–40)
SSA52 R0ENA AB IGG Q0420: 3 AU/ML (ref 0–40)
SSA60 R0ENA AB IGG Q0419: 4 AU/ML (ref 0–40)

## 2018-11-05 LAB — VIT B6 SERPL-MCNC: 51.2 NMOL/L (ref 20–125)

## 2018-11-06 ENCOUNTER — TELEPHONE (OUTPATIENT)
Dept: NEUROLOGY | Facility: MEDICAL CENTER | Age: 78
End: 2018-11-06

## 2018-11-06 LAB — VIT B1 BLD-MCNC: 153 NMOL/L (ref 70–180)

## 2018-11-06 NOTE — TELEPHONE ENCOUNTER
Results for INDIRA FLORES (MRN [id number]) as of 11/6/2018 15:05   Ref. Range 9/4/2017 08:58 5/23/2018 07:32 11/1/2018 11:16   Glycohemoglobin Latest Ref Range: 0.0 - 5.6 % 12.0 (H) 14.2 (H) 13.7 (H)       Sugar control remains suboptimal  Please contact PCP for further DM management plan    High blood sugar would make the patient nerves damaging.....

## 2018-11-07 NOTE — TELEPHONE ENCOUNTER
Sugar control remains suboptimal  Please contact PCP for further DM management plan     High blood sugar would make the patient nerves damaging.....              Spoke to patient gave above information.He verbalized understanding.

## 2019-05-07 ENCOUNTER — APPOINTMENT (RX ONLY)
Dept: URBAN - METROPOLITAN AREA CLINIC 4 | Facility: CLINIC | Age: 79
Setting detail: DERMATOLOGY
End: 2019-05-07

## 2019-05-07 DIAGNOSIS — L81.4 OTHER MELANIN HYPERPIGMENTATION: ICD-10-CM

## 2019-05-07 DIAGNOSIS — L30.0 NUMMULAR DERMATITIS: ICD-10-CM

## 2019-05-07 DIAGNOSIS — Z85.820 PERSONAL HISTORY OF MALIGNANT MELANOMA OF SKIN: ICD-10-CM

## 2019-05-07 DIAGNOSIS — D18.0 HEMANGIOMA: ICD-10-CM

## 2019-05-07 DIAGNOSIS — L57.0 ACTINIC KERATOSIS: ICD-10-CM

## 2019-05-07 DIAGNOSIS — D22 MELANOCYTIC NEVI: ICD-10-CM

## 2019-05-07 DIAGNOSIS — L82.1 OTHER SEBORRHEIC KERATOSIS: ICD-10-CM

## 2019-05-07 PROBLEM — D22.5 MELANOCYTIC NEVI OF TRUNK: Status: ACTIVE | Noted: 2019-05-07

## 2019-05-07 PROBLEM — D22.72 MELANOCYTIC NEVI OF LEFT LOWER LIMB, INCLUDING HIP: Status: ACTIVE | Noted: 2019-05-07

## 2019-05-07 PROBLEM — D22.61 MELANOCYTIC NEVI OF RIGHT UPPER LIMB, INCLUDING SHOULDER: Status: ACTIVE | Noted: 2019-05-07

## 2019-05-07 PROBLEM — E13.9 OTHER SPECIFIED DIABETES MELLITUS WITHOUT COMPLICATIONS: Status: ACTIVE | Noted: 2019-05-07

## 2019-05-07 PROBLEM — D18.01 HEMANGIOMA OF SKIN AND SUBCUTANEOUS TISSUE: Status: ACTIVE | Noted: 2019-05-07

## 2019-05-07 PROBLEM — D22.71 MELANOCYTIC NEVI OF RIGHT LOWER LIMB, INCLUDING HIP: Status: ACTIVE | Noted: 2019-05-07

## 2019-05-07 PROBLEM — D22.62 MELANOCYTIC NEVI OF LEFT UPPER LIMB, INCLUDING SHOULDER: Status: ACTIVE | Noted: 2019-05-07

## 2019-05-07 PROCEDURE — ? COUNSELING

## 2019-05-07 PROCEDURE — 99214 OFFICE O/P EST MOD 30 MIN: CPT | Mod: 25

## 2019-05-07 PROCEDURE — 17004 DESTROY PREMAL LESIONS 15/>: CPT

## 2019-05-07 PROCEDURE — ? SUNSCREEN RECOMMENDATIONS

## 2019-05-07 PROCEDURE — ? PRESCRIPTION

## 2019-05-07 PROCEDURE — ? LIQUID NITROGEN

## 2019-05-07 RX ORDER — TRIAMCINOLONE ACETONIDE 1 MG/G
1 CREAM TOPICAL BID
Qty: 1 | Refills: 3 | Status: ERX | COMMUNITY
Start: 2019-05-07

## 2019-05-07 RX ADMIN — TRIAMCINOLONE ACETONIDE 1: 1 CREAM TOPICAL at 23:15

## 2019-05-07 ASSESSMENT — LOCATION DETAILED DESCRIPTION DERM
LOCATION DETAILED: RIGHT CENTRAL ZYGOMA
LOCATION DETAILED: LEFT POSTERIOR SHOULDER
LOCATION DETAILED: RIGHT SUPERIOR FOREHEAD
LOCATION DETAILED: LEFT INFERIOR ANTERIOR NECK
LOCATION DETAILED: RIGHT DISTAL POSTERIOR UPPER ARM
LOCATION DETAILED: RIGHT RADIAL DORSAL HAND
LOCATION DETAILED: LEFT SUPERIOR MEDIAL UPPER BACK
LOCATION DETAILED: SUPERIOR THORACIC SPINE
LOCATION DETAILED: RIGHT OCCIPITAL SCALP
LOCATION DETAILED: RIGHT MEDIAL DISTAL PRETIBIAL REGION
LOCATION DETAILED: LEFT INFERIOR LATERAL FOREHEAD
LOCATION DETAILED: PERIUMBILICAL SKIN
LOCATION DETAILED: SUPERIOR MID FOREHEAD
LOCATION DETAILED: RIGHT ANTERIOR PROXIMAL THIGH
LOCATION DETAILED: LEFT CENTRAL MALAR CHEEK
LOCATION DETAILED: LEFT ANTERIOR PROXIMAL THIGH
LOCATION DETAILED: MID-OCCIPITAL SCALP
LOCATION DETAILED: LEFT OCCIPITAL SCALP
LOCATION DETAILED: RIGHT MEDIAL FOREHEAD
LOCATION DETAILED: LEFT PROXIMAL POSTERIOR UPPER ARM
LOCATION DETAILED: RIGHT PROXIMAL DORSAL FOREARM
LOCATION DETAILED: LEFT INFERIOR CENTRAL MALAR CHEEK
LOCATION DETAILED: RIGHT CENTRAL MALAR CHEEK
LOCATION DETAILED: RIGHT SUPERIOR MEDIAL MIDBACK
LOCATION DETAILED: LEFT ULNAR DORSAL HAND
LOCATION DETAILED: LEFT INFERIOR FOREHEAD
LOCATION DETAILED: LEFT PROXIMAL DORSAL FOREARM
LOCATION DETAILED: INFERIOR MID FOREHEAD
LOCATION DETAILED: RIGHT INFERIOR FOREHEAD
LOCATION DETAILED: RIGHT RIB CAGE
LOCATION DETAILED: LEFT CENTRAL ZYGOMA
LOCATION DETAILED: LEFT LATERAL ZYGOMA

## 2019-05-07 ASSESSMENT — LOCATION ZONE DERM
LOCATION ZONE: LEG
LOCATION ZONE: NECK
LOCATION ZONE: TRUNK
LOCATION ZONE: FACE
LOCATION ZONE: ARM
LOCATION ZONE: SCALP
LOCATION ZONE: HAND

## 2019-05-07 ASSESSMENT — LOCATION SIMPLE DESCRIPTION DERM
LOCATION SIMPLE: LEFT HAND
LOCATION SIMPLE: LEFT SHOULDER
LOCATION SIMPLE: UPPER BACK
LOCATION SIMPLE: ABDOMEN
LOCATION SIMPLE: RIGHT FOREHEAD
LOCATION SIMPLE: RIGHT THIGH
LOCATION SIMPLE: POSTERIOR SCALP
LOCATION SIMPLE: INFERIOR FOREHEAD
LOCATION SIMPLE: RIGHT HAND
LOCATION SIMPLE: RIGHT FOREARM
LOCATION SIMPLE: RIGHT PRETIBIAL REGION
LOCATION SIMPLE: LEFT THIGH
LOCATION SIMPLE: LEFT FOREARM
LOCATION SIMPLE: SUPERIOR FOREHEAD
LOCATION SIMPLE: RIGHT ZYGOMA
LOCATION SIMPLE: LEFT ANTERIOR NECK
LOCATION SIMPLE: LEFT CHEEK
LOCATION SIMPLE: LEFT ZYGOMA
LOCATION SIMPLE: LEFT POSTERIOR UPPER ARM
LOCATION SIMPLE: LEFT FOREHEAD
LOCATION SIMPLE: RIGHT POSTERIOR UPPER ARM
LOCATION SIMPLE: RIGHT CHEEK
LOCATION SIMPLE: RIGHT LOWER BACK
LOCATION SIMPLE: LEFT UPPER BACK

## 2019-05-07 NOTE — PROCEDURE: COUNSELING
Detail Level: Zone
Quality 137: Melanoma: Continuity Of Care - Recall System: Recall system not utilized, reason not otherwise specified
When Should The Patient Follow-Up For Their Next Full-Body Skin Exam?: 6 Months
Detail Level: Detailed
Patient Specific Counseling (Will Not Stick From Patient To Patient): Discussed red light PDT. Patient will have this done in the fall.

## 2019-05-07 NOTE — HPI: FULL BODY SKIN EXAMINATION
How Severe Are Your Spot(S)?: moderate
What Type Of Note Output Would You Prefer (Optional)?: Standard Output
What Is The Reason For Today's Visit?: Full Body Skin Examination
What Is The Reason For Today's Visit? (Being Monitored For X): concerning skin lesions on an annual basis
Additional History: Lesion on right lower leg for two months. FBE.

## 2019-05-07 NOTE — PROCEDURE: LIQUID NITROGEN
Duration Of Freeze Thaw-Cycle (Seconds): 3
Consent: The patient's consent was obtained including but not limited to risks of crusting, scabbing, blistering, scarring, darker or lighter pigmentary change, recurrence, incomplete removal and infection.
Detail Level: Simple
Number Of Freeze-Thaw Cycles: 2 freeze-thaw cycles
Render Note In Bullet Format When Appropriate: No
Post-Care Instructions: I reviewed with the patient in detail post-care instructions. Patient is to wear sunprotection, and avoid picking at any of the treated lesions. Pt may apply Vaseline to crusted or scabbing areas.

## 2019-08-26 ENCOUNTER — HOSPITAL ENCOUNTER (OUTPATIENT)
Dept: RADIOLOGY | Facility: MEDICAL CENTER | Age: 79
End: 2019-08-26
Attending: PSYCHIATRY & NEUROLOGY
Payer: MEDICARE

## 2019-08-26 DIAGNOSIS — R26.81 UNSTEADINESS ON FEET: ICD-10-CM

## 2019-08-26 DIAGNOSIS — H49.22 SIXTH NERVE PALSY OF LEFT EYE: ICD-10-CM

## 2019-08-26 DIAGNOSIS — E11.49 DIABETES MELLITUS TYPE 2 WITH NEUROLOGICAL MANIFESTATIONS (HCC): ICD-10-CM

## 2019-08-26 PROCEDURE — 70551 MRI BRAIN STEM W/O DYE: CPT

## 2019-10-17 ENCOUNTER — APPOINTMENT (OUTPATIENT)
Dept: RADIOLOGY | Facility: MEDICAL CENTER | Age: 79
End: 2019-10-17
Attending: EMERGENCY MEDICINE
Payer: MEDICARE

## 2019-10-17 ENCOUNTER — HOSPITAL ENCOUNTER (EMERGENCY)
Facility: MEDICAL CENTER | Age: 79
End: 2019-10-17
Attending: EMERGENCY MEDICINE
Payer: MEDICARE

## 2019-10-17 VITALS
HEART RATE: 94 BPM | WEIGHT: 224.21 LBS | TEMPERATURE: 97.7 F | BODY MASS INDEX: 30.37 KG/M2 | SYSTOLIC BLOOD PRESSURE: 115 MMHG | RESPIRATION RATE: 17 BRPM | HEIGHT: 72 IN | DIASTOLIC BLOOD PRESSURE: 72 MMHG | OXYGEN SATURATION: 95 %

## 2019-10-17 DIAGNOSIS — Z79.4 DIABETES MELLITUS DUE TO UNDERLYING CONDITION WITH HYPERGLYCEMIA, WITH LONG-TERM CURRENT USE OF INSULIN (HCC): ICD-10-CM

## 2019-10-17 DIAGNOSIS — E08.65 DIABETES MELLITUS DUE TO UNDERLYING CONDITION WITH HYPERGLYCEMIA, WITH LONG-TERM CURRENT USE OF INSULIN (HCC): ICD-10-CM

## 2019-10-17 DIAGNOSIS — R07.89 CHEST WALL PAIN: ICD-10-CM

## 2019-10-17 LAB
ALBUMIN SERPL BCP-MCNC: 4.1 G/DL (ref 3.2–4.9)
ALBUMIN/GLOB SERPL: 1.2 G/DL
ALP SERPL-CCNC: 57 U/L (ref 30–99)
ALT SERPL-CCNC: 24 U/L (ref 2–50)
ANION GAP SERPL CALC-SCNC: 11 MMOL/L (ref 0–11.9)
APTT PPP: 30.7 SEC (ref 24.7–36)
AST SERPL-CCNC: 16 U/L (ref 12–45)
BASOPHILS # BLD AUTO: 1.4 % (ref 0–1.8)
BASOPHILS # BLD: 0.15 K/UL (ref 0–0.12)
BILIRUB SERPL-MCNC: 0.4 MG/DL (ref 0.1–1.5)
BUN SERPL-MCNC: 24 MG/DL (ref 8–22)
CALCIUM SERPL-MCNC: 9.5 MG/DL (ref 8.5–10.5)
CHLORIDE SERPL-SCNC: 102 MMOL/L (ref 96–112)
CO2 SERPL-SCNC: 21 MMOL/L (ref 20–33)
CREAT SERPL-MCNC: 1 MG/DL (ref 0.5–1.4)
EKG IMPRESSION: NORMAL
EOSINOPHIL # BLD AUTO: 1.01 K/UL (ref 0–0.51)
EOSINOPHIL NFR BLD: 9.1 % (ref 0–6.9)
ERYTHROCYTE [DISTWIDTH] IN BLOOD BY AUTOMATED COUNT: 42.5 FL (ref 35.9–50)
GLOBULIN SER CALC-MCNC: 3.5 G/DL (ref 1.9–3.5)
GLUCOSE SERPL-MCNC: 222 MG/DL (ref 65–99)
HCT VFR BLD AUTO: 51.1 % (ref 42–52)
HGB BLD-MCNC: 16.3 G/DL (ref 14–18)
IMM GRANULOCYTES # BLD AUTO: 0.22 K/UL (ref 0–0.11)
IMM GRANULOCYTES NFR BLD AUTO: 2 % (ref 0–0.9)
INR PPP: 0.87 (ref 0.87–1.13)
LIPASE SERPL-CCNC: 33 U/L (ref 11–82)
LYMPHOCYTES # BLD AUTO: 2.31 K/UL (ref 1–4.8)
LYMPHOCYTES NFR BLD: 20.9 % (ref 22–41)
MCH RBC QN AUTO: 27.6 PG (ref 27–33)
MCHC RBC AUTO-ENTMCNC: 31.9 G/DL (ref 33.7–35.3)
MCV RBC AUTO: 86.5 FL (ref 81.4–97.8)
MONOCYTES # BLD AUTO: 1.3 K/UL (ref 0–0.85)
MONOCYTES NFR BLD AUTO: 11.8 % (ref 0–13.4)
NEUTROPHILS # BLD AUTO: 6.05 K/UL (ref 1.82–7.42)
NEUTROPHILS NFR BLD: 54.8 % (ref 44–72)
NRBC # BLD AUTO: 0 K/UL
NRBC BLD-RTO: 0 /100 WBC
PLATELET # BLD AUTO: 270 K/UL (ref 164–446)
PMV BLD AUTO: 10.6 FL (ref 9–12.9)
POTASSIUM SERPL-SCNC: 4.3 MMOL/L (ref 3.6–5.5)
PROT SERPL-MCNC: 7.6 G/DL (ref 6–8.2)
PROTHROMBIN TIME: 12 SEC (ref 12–14.6)
RBC # BLD AUTO: 5.91 M/UL (ref 4.7–6.1)
SODIUM SERPL-SCNC: 134 MMOL/L (ref 135–145)
TROPONIN T SERPL-MCNC: 18 NG/L (ref 6–19)
TROPONIN T SERPL-MCNC: 19 NG/L (ref 6–19)
WBC # BLD AUTO: 11 K/UL (ref 4.8–10.8)

## 2019-10-17 PROCEDURE — 93005 ELECTROCARDIOGRAM TRACING: CPT | Performed by: EMERGENCY MEDICINE

## 2019-10-17 PROCEDURE — 71045 X-RAY EXAM CHEST 1 VIEW: CPT

## 2019-10-17 PROCEDURE — 85025 COMPLETE CBC W/AUTO DIFF WBC: CPT

## 2019-10-17 PROCEDURE — 84484 ASSAY OF TROPONIN QUANT: CPT

## 2019-10-17 PROCEDURE — 99284 EMERGENCY DEPT VISIT MOD MDM: CPT

## 2019-10-17 PROCEDURE — 85610 PROTHROMBIN TIME: CPT

## 2019-10-17 PROCEDURE — 85730 THROMBOPLASTIN TIME PARTIAL: CPT

## 2019-10-17 PROCEDURE — 83690 ASSAY OF LIPASE: CPT

## 2019-10-17 PROCEDURE — 80053 COMPREHEN METABOLIC PANEL: CPT

## 2019-10-17 PROCEDURE — 94760 N-INVAS EAR/PLS OXIMETRY 1: CPT

## 2019-10-17 PROCEDURE — 93005 ELECTROCARDIOGRAM TRACING: CPT

## 2019-10-17 RX ORDER — KETOROLAC TROMETHAMINE 30 MG/ML
15 INJECTION, SOLUTION INTRAMUSCULAR; INTRAVENOUS ONCE
Status: DISCONTINUED | OUTPATIENT
Start: 2019-10-17 | End: 2019-10-18 | Stop reason: HOSPADM

## 2019-10-17 ASSESSMENT — LIFESTYLE VARIABLES: DO YOU DRINK ALCOHOL: NO

## 2019-10-18 NOTE — ED NOTES
The patient has been provided with discharge education and information.  The patient was also provided with instructions on follow up care and return precautions.  The patient verbalizes understanding of discharge instructions, follow up care, and return precautions.  All questions have been answered.  NAD, A/Ox4, good color and appropriate at time of discharge.  Patient ambulated steady gait out of department.

## 2019-10-18 NOTE — DISCHARGE INSTRUCTIONS
Apply moist heat to your chest wall as needed  Take 1 aspirin daily 81 mg.  Continue your current medications  Follow-up with your primary care provider within the week if symptoms persist and return if any change or worsening in your condition.

## 2019-10-18 NOTE — ED NOTES
"Agree with triage assessment, no changes noted. Pt reports \"The pain is a lot better now that I took the ASA.\" Pt reports pain of 3 out of 10. Pt hooked to monitor. Pt denies any further needs at this time. Call light within reach. Bed in low locked position. Comfort measures provided.     "

## 2019-10-18 NOTE — ED NOTES
Med rec complete per pt at bedside   NKDA  No oral ABX within last 14 days   Pt states he is switching from Jardiance to Trulicity but has not yet started the Trulicity

## 2019-10-18 NOTE — ED TRIAGE NOTES
"Pt ambulated to triage with   Chief Complaint   Patient presents with   • Chest Pain     \"discomfort\" for the last wk and increasing today around left breast,  pt took ASA today at home.  pt reports pain improved after ASA taken; pt reports some dizziness, pt reports h/o double vision - been seeing eye doctor and is under treatment.      EKG completed.   Pt Informed regarding triage process and verbalized understanding to inform triage tech or RN for any changes in condition. Placed in lobby.       "

## 2019-10-18 NOTE — ED PROVIDER NOTES
"ED Provider Note     Scribed for Karlee Banks D.O. by Shanna Tipton. 10/17/2019, 6:59 PM.     Primary care provider: Praneeth Cornell M.D.  Means of arrival: Walk-in         History obtained from: Patient  History limited by: None    CHIEF COMPLAINT  Chief Complaint   Patient presents with   • Chest Pain     \"discomfort\" for the last wk and increasing today around left breast,  pt took ASA today at home.  pt reports pain improved after ASA taken; pt reports some dizziness, pt reports h/o double vision - been seeing eye doctor and is under treatment.        HPI  Manolo Puri is a 79 y.o. Male with a history of type 2 diabetes who presents to the emergency Department for moderate constant left sided chest pain onset a 2 weeks ago. The patient describes his pain as pressure with an occasional twinge, and rates it as a 3-4/10. The patient denies any abdominal pain, shortness of breath or nausea. The patient states that he does not have any allergies. The patient states he took aspirin 325 mg which mildly alleviated his pain. The patient reports that he has diplopia and associated dizziness which has been worked up for stroke and is currently being evaluated and treated by ophthalmology. 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  The patient does not have a history of heart problems.       REVIEW OF SYSTEMS  Pertinent positives include left sided chest pain, dizziness, and diplopia. Pertinent negatives include no abdominal pain, shortness of breath, or nausea.   See HPI for further details. All other systems are negative.    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        FAMILY HISTORY  Family History   Problem Relation Age of Onset   • Diabetes Father    • Diabetes Maternal Grandfather        SOCIAL HISTORY  Social History     Tobacco " Use   • Smoking status: Never Smoker   • Smokeless tobacco: Never Used   Substance Use Topics   • Alcohol use: No   • Drug use: No      Social History     Substance and Sexual Activity   Drug Use No       SURGICAL HISTORY  Past Surgical History:   Procedure Laterality Date   • OTHER      Derm removal of SCC       CURRENT MEDICATIONS    Current Facility-Administered Medications:   •  ketorolac (TORADOL) injection 15 mg, 15 mg, Intravenous, Once, Karlee Banks D.O.  •  metFORMIN (GLUCOPHAGE) tablet 1,000 mg, 1,000 mg, Oral, Once, Karlee Banks D.O.    Current Outpatient Medications:   •  Insulin NPH Isophane & Regular (NOVOLIN 70/30 SC), Inject 40 Units as instructed 2 Times a Day., Disp: , Rfl:   •  Dulaglutide (TRULICITY) 0.75 MG/0.5ML Solution Pen-injector, 0.75 mg by Intramuscular route every 7 days., Disp: , Rfl:   •  Metformin HCl 1000 MG TABLET SR 24 HR, Take 1 Tab by mouth 2 Times a Day., Disp: 30 Tab, Rfl: 6    ALLERGIES  No Known Allergies    PHYSICAL EXAM  VITAL SIGNS: /98   Pulse 100   Temp 36.4 °C (97.5 °F) (Temporal)   Resp 16   Ht 1.829 m (6')   Wt 101.7 kg (224 lb 3.3 oz)   SpO2 94%   BMI 30.41 kg/m²     Constitutional: Patient is well developed, well nourished. Non-toxic appearing. No acute distress.   HENT: Normocephalic, atraumatic. Nose normal with no drainage. Oropharynx moist without erythema.  Eyes: Left eye with extraocular muscle impingement when turning to the left.  Cardiovascular: Normal heart rate and Regular rhythm. No murmur, Good heart tones.  Thorax & Lungs: Clear and equal breath sounds with good excursion. No respiratory distress, no rhonchi, wheezing or rales.  Abdomen: Bowel sounds normal in all four quadrants. Soft,nontender, no rebound , guarding, palpable masses.   Extremities: Peripheral pulses 4/4 No edema, No tenderness.  Neuro: Patient is awake alert and oriented time place and person he has no focal motor or sensory deficits, deep tendon reflexes are +2/4  bilaterally.      DIAGNOSTICS/PROCEDURES    LABS  Results for orders placed or performed during the hospital encounter of 10/17/19   CBC with Differential   Result Value Ref Range    WBC 11.0 (H) 4.8 - 10.8 K/uL    RBC 5.91 4.70 - 6.10 M/uL    Hemoglobin 16.3 14.0 - 18.0 g/dL    Hematocrit 51.1 42.0 - 52.0 %    MCV 86.5 81.4 - 97.8 fL    MCH 27.6 27.0 - 33.0 pg    MCHC 31.9 (L) 33.7 - 35.3 g/dL    RDW 42.5 35.9 - 50.0 fL    Platelet Count 270 164 - 446 K/uL    MPV 10.6 9.0 - 12.9 fL    Neutrophils-Polys 54.80 44.00 - 72.00 %    Lymphocytes 20.90 (L) 22.00 - 41.00 %    Monocytes 11.80 0.00 - 13.40 %    Eosinophils 9.10 (H) 0.00 - 6.90 %    Basophils 1.40 0.00 - 1.80 %    Immature Granulocytes 2.00 (H) 0.00 - 0.90 %    Nucleated RBC 0.00 /100 WBC    Neutrophils (Absolute) 6.05 1.82 - 7.42 K/uL    Lymphs (Absolute) 2.31 1.00 - 4.80 K/uL    Monos (Absolute) 1.30 (H) 0.00 - 0.85 K/uL    Eos (Absolute) 1.01 (H) 0.00 - 0.51 K/uL    Baso (Absolute) 0.15 (H) 0.00 - 0.12 K/uL    Immature Granulocytes (abs) 0.22 (H) 0.00 - 0.11 K/uL    NRBC (Absolute) 0.00 K/uL   Complete Metabolic Panel (CMP)   Result Value Ref Range    Sodium 134 (L) 135 - 145 mmol/L    Potassium 4.3 3.6 - 5.5 mmol/L    Chloride 102 96 - 112 mmol/L    Co2 21 20 - 33 mmol/L    Anion Gap 11.0 0.0 - 11.9    Glucose 222 (H) 65 - 99 mg/dL    Bun 24 (H) 8 - 22 mg/dL    Creatinine 1.00 0.50 - 1.40 mg/dL    Calcium 9.5 8.5 - 10.5 mg/dL    AST(SGOT) 16 12 - 45 U/L    ALT(SGPT) 24 2 - 50 U/L    Alkaline Phosphatase 57 30 - 99 U/L    Total Bilirubin 0.4 0.1 - 1.5 mg/dL    Albumin 4.1 3.2 - 4.9 g/dL    Total Protein 7.6 6.0 - 8.2 g/dL    Globulin 3.5 1.9 - 3.5 g/dL    A-G Ratio 1.2 g/dL   Troponin   Result Value Ref Range    Troponin T 18 6 - 19 ng/L   PT/INR   Result Value Ref Range    PT 12.0 12.0 - 14.6 sec    INR 0.87 0.87 - 1.13   APTT   Result Value Ref Range    APTT 30.7 24.7 - 36.0 sec   LIPASE   Result Value Ref Range    Lipase 33 11 - 82 U/L   ESTIMATED GFR    Result Value Ref Range    GFR If African American >60 >60 mL/min/1.73 m 2    GFR If Non African American >60 >60 mL/min/1.73 m 2   TROPONIN   Result Value Ref Range    Troponin T 19 6 - 19 ng/L   EKG   Result Value Ref Range    Report       [facility] Emergency Dept.    Test Date:  2019-10-17  Pt Name:    INDIRA FLORES              Department: ER  MRN:        [id number]                      Room:  Gender:     Male                         Technician: OLEG  :        1940                   Requested By:ER TRIAGE PROTOCOL  Order #:    [id number]                    Reading MD:    Measurements  Intervals                                Axis  Rate:       96                           P:          45  MI:         224                          QRS:        -38  QRSD:       90                           T:          62  QT:         356  QTc:        450    Interpretive Statements  SINUS RHYTHM  FIRST DEGREE AV BLOCK  LEFT AXIS DEVIATION  BORDERLINE R WAVE PROGRESSION, ANTERIOR LEADS  Compared to ECG 2017 00:11:47  No significant changes         Labs reviewed by me    EKG  Results for orders placed or performed during the hospital encounter of 10/17/19   CBC with Differential   Result Value Ref Range    WBC 11.0 (H) 4.8 - 10.8 K/uL    RBC 5.91 4.70 - 6.10 M/uL    Hemoglobin 16.3 14.0 - 18.0 g/dL    Hematocrit 51.1 42.0 - 52.0 %    MCV 86.5 81.4 - 97.8 fL    MCH 27.6 27.0 - 33.0 pg    MCHC 31.9 (L) 33.7 - 35.3 g/dL    RDW 42.5 35.9 - 50.0 fL    Platelet Count 270 164 - 446 K/uL    MPV 10.6 9.0 - 12.9 fL    Neutrophils-Polys 54.80 44.00 - 72.00 %    Lymphocytes 20.90 (L) 22.00 - 41.00 %    Monocytes 11.80 0.00 - 13.40 %    Eosinophils 9.10 (H) 0.00 - 6.90 %    Basophils 1.40 0.00 - 1.80 %    Immature Granulocytes 2.00 (H) 0.00 - 0.90 %    Nucleated RBC 0.00 /100 WBC    Neutrophils (Absolute) 6.05 1.82 - 7.42 K/uL    Lymphs (Absolute) 2.31 1.00 - 4.80 K/uL    Monos (Absolute) 1.30 (H) 0.00 - 0.85 K/uL     Eos (Absolute) 1.01 (H) 0.00 - 0.51 K/uL    Baso (Absolute) 0.15 (H) 0.00 - 0.12 K/uL    Immature Granulocytes (abs) 0.22 (H) 0.00 - 0.11 K/uL    NRBC (Absolute) 0.00 K/uL   Complete Metabolic Panel (CMP)   Result Value Ref Range    Sodium 134 (L) 135 - 145 mmol/L    Potassium 4.3 3.6 - 5.5 mmol/L    Chloride 102 96 - 112 mmol/L    Co2 21 20 - 33 mmol/L    Anion Gap 11.0 0.0 - 11.9    Glucose 222 (H) 65 - 99 mg/dL    Bun 24 (H) 8 - 22 mg/dL    Creatinine 1.00 0.50 - 1.40 mg/dL    Calcium 9.5 8.5 - 10.5 mg/dL    AST(SGOT) 16 12 - 45 U/L    ALT(SGPT) 24 2 - 50 U/L    Alkaline Phosphatase 57 30 - 99 U/L    Total Bilirubin 0.4 0.1 - 1.5 mg/dL    Albumin 4.1 3.2 - 4.9 g/dL    Total Protein 7.6 6.0 - 8.2 g/dL    Globulin 3.5 1.9 - 3.5 g/dL    A-G Ratio 1.2 g/dL   Troponin   Result Value Ref Range    Troponin T 18 6 - 19 ng/L   PT/INR   Result Value Ref Range    PT 12.0 12.0 - 14.6 sec    INR 0.87 0.87 - 1.13   APTT   Result Value Ref Range    APTT 30.7 24.7 - 36.0 sec   LIPASE   Result Value Ref Range    Lipase 33 11 - 82 U/L   ESTIMATED GFR   Result Value Ref Range    GFR If African American >60 >60 mL/min/1.73 m 2    GFR If Non African American >60 >60 mL/min/1.73 m 2   TROPONIN   Result Value Ref Range    Troponin T 19 6 - 19 ng/L   EKG   Result Value Ref Range    Report       [facility] Emergency Dept.    Test Date:  2019-10-17  Pt Name:    INDIRA FLORES              Department: ER  MRN:        [id number]                      Room:  Gender:     Male                         Technician: OLEG  :        1940                   Requested By:ER TRIAGE PROTOCOL  Order #:    [id number]                    Vamsi MD:    Measurements  Intervals                                Axis  Rate:       96                           P:          45  SC:         224                          QRS:        -38  QRSD:       90                           T:          62  QT:         356  QTc:        450    Interpretive  Statements  SINUS RHYTHM  FIRST DEGREE AV BLOCK  LEFT AXIS DEVIATION  BORDERLINE R WAVE PROGRESSION, ANTERIOR LEADS  Compared to ECG 09/04/2017 00:11:47  No significant changes           RADIOLOGY/PROCEDURES  DX-CHEST-PORTABLE (1 VIEW)   Final Result      No acute cardiopulmonary findings.        Results and radiologist interpretation reviewed by me.     COURSE & MEDICAL DECISION MAKING  Pertinent Labs & Imaging studies reviewed. (See chart for details)    6:59 PM - Patient seen and evaluated at bedside. Ordered for PT/INR, APTT, Lipase, GFR, CBC with differential, CMP, Troponin, EKG, DX-chest to evaluate. Patient will be treated with Toradol 15 mg for his symptoms. Differential diagnoses include, but are not limited to, myocardial infarction vs chest wall pain.    8:46 PM -upon recheck the patient is feeling improved.    10:26 PM - Patient was reevaluated at bedside. Discussed lab and radiology results with the patient.  I gave him his evening dose of metformin as well as something to eat.  I did a second troponin which was negative consistent with the first 1.  He is completely asymptomatic upon recheck.  He is to continue his current medications, rest, follow-up with his primary care provider this week for recheck and return if any problems.  He is stable upon discharge.   I informed the patient that I am comfortable with him being discharged. Patient verbalizes understanding and agreement to this plan of care.     The patient is referred to a primary physician for blood pressure management, diabetic screening, and for all other preventative health concerns.    DISPOSITION:  Patient will be discharged home in stable condition.    FOLLOW UP:  Praneeth Cornell M.D.  [address]  [place] [address] [phone]    Schedule an appointment as soon as possible for a visit in 1 week  For recheck or sooner if symptoms worsen        FINAL IMPRESSION  1. Chest wall pain    2. Diabetes mellitus due to underlying  condition with hyperglycemia, with long-term current use of insulin (HCC)         IShanna (Scribe), am scribing for, and in the presence of, Karlee Banks D.O..    Electronically signed by: Shanna Tipton (Scribe), 10/17/2019    Karlee CRUM D.O. personally performed the services described in this documentation, as scribed by Shanna Tipton in my presence, and it is both accurate and complete. C.     The note accurately reflects work and decisions made by me.  Karlee Banks  10/18/2019  3:37 AM

## 2019-12-16 ENCOUNTER — HOSPITAL ENCOUNTER (OUTPATIENT)
Dept: LAB | Facility: MEDICAL CENTER | Age: 79
End: 2019-12-16
Attending: PSYCHIATRY & NEUROLOGY
Payer: MEDICARE

## 2019-12-16 LAB
ALBUMIN SERPL BCP-MCNC: 4.5 G/DL (ref 3.2–4.9)
ALBUMIN/GLOB SERPL: 1.4 G/DL
ALP SERPL-CCNC: 50 U/L (ref 30–99)
ALT SERPL-CCNC: 28 U/L (ref 2–50)
ANION GAP SERPL CALC-SCNC: 10 MMOL/L (ref 0–11.9)
AST SERPL-CCNC: 20 U/L (ref 12–45)
BASOPHILS # BLD AUTO: 1.4 % (ref 0–1.8)
BASOPHILS # BLD: 0.13 K/UL (ref 0–0.12)
BILIRUB SERPL-MCNC: 0.6 MG/DL (ref 0.1–1.5)
BUN SERPL-MCNC: 19 MG/DL (ref 8–22)
CALCIUM SERPL-MCNC: 9.4 MG/DL (ref 8.5–10.5)
CHLORIDE SERPL-SCNC: 105 MMOL/L (ref 96–112)
CO2 SERPL-SCNC: 25 MMOL/L (ref 20–33)
CREAT SERPL-MCNC: 1.06 MG/DL (ref 0.5–1.4)
EOSINOPHIL # BLD AUTO: 0.55 K/UL (ref 0–0.51)
EOSINOPHIL NFR BLD: 5.9 % (ref 0–6.9)
ERYTHROCYTE [DISTWIDTH] IN BLOOD BY AUTOMATED COUNT: 42.4 FL (ref 35.9–50)
ERYTHROCYTE [SEDIMENTATION RATE] IN BLOOD BY WESTERGREN METHOD: 31 MM/HOUR (ref 0–20)
GLOBULIN SER CALC-MCNC: 3.2 G/DL (ref 1.9–3.5)
GLUCOSE SERPL-MCNC: 156 MG/DL (ref 65–99)
HCT VFR BLD AUTO: 51.9 % (ref 42–52)
HGB BLD-MCNC: 16.9 G/DL (ref 14–18)
IMM GRANULOCYTES # BLD AUTO: 0.19 K/UL (ref 0–0.11)
IMM GRANULOCYTES NFR BLD AUTO: 2 % (ref 0–0.9)
LYMPHOCYTES # BLD AUTO: 1.96 K/UL (ref 1–4.8)
LYMPHOCYTES NFR BLD: 21.1 % (ref 22–41)
MCH RBC QN AUTO: 29 PG (ref 27–33)
MCHC RBC AUTO-ENTMCNC: 32.6 G/DL (ref 33.7–35.3)
MCV RBC AUTO: 89 FL (ref 81.4–97.8)
MONOCYTES # BLD AUTO: 1.05 K/UL (ref 0–0.85)
MONOCYTES NFR BLD AUTO: 11.3 % (ref 0–13.4)
NEUTROPHILS # BLD AUTO: 5.4 K/UL (ref 1.82–7.42)
NEUTROPHILS NFR BLD: 58.3 % (ref 44–72)
NRBC # BLD AUTO: 0 K/UL
NRBC BLD-RTO: 0 /100 WBC
PLATELET # BLD AUTO: 279 K/UL (ref 164–446)
PMV BLD AUTO: 11.1 FL (ref 9–12.9)
POTASSIUM SERPL-SCNC: 4.7 MMOL/L (ref 3.6–5.5)
PROT SERPL-MCNC: 7.7 G/DL (ref 6–8.2)
RBC # BLD AUTO: 5.83 M/UL (ref 4.7–6.1)
SODIUM SERPL-SCNC: 140 MMOL/L (ref 135–145)
WBC # BLD AUTO: 9.3 K/UL (ref 4.8–10.8)

## 2019-12-16 PROCEDURE — 85025 COMPLETE CBC W/AUTO DIFF WBC: CPT

## 2019-12-16 PROCEDURE — 82746 ASSAY OF FOLIC ACID SERUM: CPT

## 2019-12-16 PROCEDURE — 84445 ASSAY OF TSI GLOBULIN: CPT

## 2019-12-16 PROCEDURE — 82607 VITAMIN B-12: CPT

## 2019-12-16 PROCEDURE — 83519 RIA NONANTIBODY: CPT

## 2019-12-16 PROCEDURE — 86147 CARDIOLIPIN ANTIBODY EA IG: CPT

## 2019-12-16 PROCEDURE — 84443 ASSAY THYROID STIM HORMONE: CPT

## 2019-12-16 PROCEDURE — 36415 COLL VENOUS BLD VENIPUNCTURE: CPT

## 2019-12-16 PROCEDURE — 83520 IMMUNOASSAY QUANT NOS NONAB: CPT

## 2019-12-16 PROCEDURE — 83516 IMMUNOASSAY NONANTIBODY: CPT

## 2019-12-16 PROCEDURE — 80053 COMPREHEN METABOLIC PANEL: CPT

## 2019-12-16 PROCEDURE — 86780 TREPONEMA PALLIDUM: CPT | Mod: GA

## 2019-12-16 PROCEDURE — 84439 ASSAY OF FREE THYROXINE: CPT

## 2019-12-16 PROCEDURE — 85652 RBC SED RATE AUTOMATED: CPT

## 2019-12-17 LAB
FOLATE SERPL-MCNC: 22.6 NG/ML
TREPONEMA PALLIDUM IGG+IGM AB [PRESENCE] IN SERUM OR PLASMA BY IMMUNOASSAY: NON REACTIVE
TSH SERPL DL<=0.005 MIU/L-ACNC: 3.03 UIU/ML (ref 0.38–5.33)
VIT B12 SERPL-MCNC: 1335 PG/ML (ref 211–911)

## 2019-12-18 LAB
ACHR BIND AB SER-SCNC: 0 NMOL/L (ref 0–0.4)
ACHR BLOCK AB/ACHR TOTAL SFR SER: 0 % (ref 0–26)
CARDIOLIPIN IGA SER IA-ACNC: 8 APL (ref 0–11)
CARDIOLIPIN IGG SER IA-ACNC: 4 GPL (ref 0–14)
CARDIOLIPIN IGM SER IA-ACNC: 13 MPL (ref 0–12)
TSH RECEP AB SER-ACNC: <0.9 IU/L

## 2019-12-20 LAB — TSI ACT/NOR SER: 90 %

## 2019-12-21 LAB — T4 FREE SERPL DIALY-MCNC: 1.6 NG/DL (ref 1.1–2.4)

## 2020-02-20 ENCOUNTER — HOSPITAL ENCOUNTER (EMERGENCY)
Facility: MEDICAL CENTER | Age: 80
End: 2020-02-20
Attending: EMERGENCY MEDICINE
Payer: MEDICARE

## 2020-02-20 ENCOUNTER — APPOINTMENT (OUTPATIENT)
Dept: RADIOLOGY | Facility: MEDICAL CENTER | Age: 80
End: 2020-02-20
Attending: EMERGENCY MEDICINE
Payer: MEDICARE

## 2020-02-20 VITALS
BODY MASS INDEX: 30.97 KG/M2 | HEART RATE: 106 BPM | HEIGHT: 72 IN | OXYGEN SATURATION: 97 % | SYSTOLIC BLOOD PRESSURE: 154 MMHG | WEIGHT: 228.62 LBS | RESPIRATION RATE: 18 BRPM | DIASTOLIC BLOOD PRESSURE: 95 MMHG | TEMPERATURE: 97.9 F

## 2020-02-20 DIAGNOSIS — R10.31 RIGHT LOWER QUADRANT ABDOMINAL PAIN: ICD-10-CM

## 2020-02-20 DIAGNOSIS — R11.2 NON-INTRACTABLE VOMITING WITH NAUSEA, UNSPECIFIED VOMITING TYPE: ICD-10-CM

## 2020-02-20 LAB
ALBUMIN SERPL BCP-MCNC: 4.4 G/DL (ref 3.2–4.9)
ALBUMIN/GLOB SERPL: 1.1 G/DL
ALP SERPL-CCNC: 54 U/L (ref 30–99)
ALT SERPL-CCNC: 32 U/L (ref 2–50)
ANION GAP SERPL CALC-SCNC: 16 MMOL/L (ref 0–11.9)
APPEARANCE UR: CLEAR
AST SERPL-CCNC: 22 U/L (ref 12–45)
BACTERIA #/AREA URNS HPF: NEGATIVE /HPF
BASOPHILS # BLD AUTO: 0.8 % (ref 0–1.8)
BASOPHILS # BLD: 0.15 K/UL (ref 0–0.12)
BILIRUB SERPL-MCNC: 1.2 MG/DL (ref 0.1–1.5)
BILIRUB UR QL STRIP.AUTO: NEGATIVE
BUN SERPL-MCNC: 22 MG/DL (ref 8–22)
CALCIUM SERPL-MCNC: 9.5 MG/DL (ref 8.5–10.5)
CHLORIDE SERPL-SCNC: 100 MMOL/L (ref 96–112)
CO2 SERPL-SCNC: 21 MMOL/L (ref 20–33)
COLOR UR: YELLOW
CREAT SERPL-MCNC: 1.16 MG/DL (ref 0.5–1.4)
EOSINOPHIL # BLD AUTO: 0 K/UL (ref 0–0.51)
EOSINOPHIL NFR BLD: 0 % (ref 0–6.9)
EPI CELLS #/AREA URNS HPF: NEGATIVE /HPF
ERYTHROCYTE [DISTWIDTH] IN BLOOD BY AUTOMATED COUNT: 41.3 FL (ref 35.9–50)
GLOBULIN SER CALC-MCNC: 3.9 G/DL (ref 1.9–3.5)
GLUCOSE SERPL-MCNC: 385 MG/DL (ref 65–99)
GLUCOSE UR STRIP.AUTO-MCNC: >=1000 MG/DL
HCT VFR BLD AUTO: 49.1 % (ref 42–52)
HGB BLD-MCNC: 16.4 G/DL (ref 14–18)
HYALINE CASTS #/AREA URNS LPF: ABNORMAL /LPF
KETONES UR STRIP.AUTO-MCNC: 40 MG/DL
LEUKOCYTE ESTERASE UR QL STRIP.AUTO: NEGATIVE
LIPASE SERPL-CCNC: 15 U/L (ref 11–82)
LYMPHOCYTES # BLD AUTO: 0.17 K/UL (ref 1–4.8)
LYMPHOCYTES NFR BLD: 0.9 % (ref 22–41)
MANUAL DIFF BLD: NORMAL
MCH RBC QN AUTO: 28.9 PG (ref 27–33)
MCHC RBC AUTO-ENTMCNC: 33.4 G/DL (ref 33.7–35.3)
MCV RBC AUTO: 86.4 FL (ref 81.4–97.8)
METAMYELOCYTES NFR BLD MANUAL: 0.9 %
MICRO URNS: ABNORMAL
MONOCYTES # BLD AUTO: 0.65 K/UL (ref 0–0.85)
MONOCYTES NFR BLD AUTO: 3.4 % (ref 0–13.4)
MORPHOLOGY BLD-IMP: NORMAL
NEUTROPHILS # BLD AUTO: 17.86 K/UL (ref 1.82–7.42)
NEUTROPHILS NFR BLD: 93.1 % (ref 44–72)
NEUTS BAND NFR BLD MANUAL: 0.9 % (ref 0–10)
NITRITE UR QL STRIP.AUTO: NEGATIVE
NRBC # BLD AUTO: 0 K/UL
NRBC BLD-RTO: 0 /100 WBC
PH UR STRIP.AUTO: 5 [PH] (ref 5–8)
PLATELET # BLD AUTO: 268 K/UL (ref 164–446)
PLATELET BLD QL SMEAR: NORMAL
PMV BLD AUTO: 10.8 FL (ref 9–12.9)
POTASSIUM SERPL-SCNC: 4.2 MMOL/L (ref 3.6–5.5)
PROT SERPL-MCNC: 8.3 G/DL (ref 6–8.2)
PROT UR QL STRIP: 100 MG/DL
RBC # BLD AUTO: 5.68 M/UL (ref 4.7–6.1)
RBC # URNS HPF: ABNORMAL /HPF
RBC BLD AUTO: PRESENT
RBC UR QL AUTO: ABNORMAL
SODIUM SERPL-SCNC: 137 MMOL/L (ref 135–145)
SP GR UR STRIP.AUTO: 1.03
TOXIC GRANULES BLD QL SMEAR: SLIGHT
UROBILINOGEN UR STRIP.AUTO-MCNC: 0.2 MG/DL
WBC # BLD AUTO: 19 K/UL (ref 4.8–10.8)
WBC #/AREA URNS HPF: ABNORMAL /HPF

## 2020-02-20 PROCEDURE — 81001 URINALYSIS AUTO W/SCOPE: CPT

## 2020-02-20 PROCEDURE — 85007 BL SMEAR W/DIFF WBC COUNT: CPT

## 2020-02-20 PROCEDURE — 700105 HCHG RX REV CODE 258: Performed by: EMERGENCY MEDICINE

## 2020-02-20 PROCEDURE — 83690 ASSAY OF LIPASE: CPT

## 2020-02-20 PROCEDURE — 74177 CT ABD & PELVIS W/CONTRAST: CPT

## 2020-02-20 PROCEDURE — 700111 HCHG RX REV CODE 636 W/ 250 OVERRIDE (IP): Performed by: EMERGENCY MEDICINE

## 2020-02-20 PROCEDURE — 85027 COMPLETE CBC AUTOMATED: CPT

## 2020-02-20 PROCEDURE — 99285 EMERGENCY DEPT VISIT HI MDM: CPT

## 2020-02-20 PROCEDURE — 96374 THER/PROPH/DIAG INJ IV PUSH: CPT

## 2020-02-20 PROCEDURE — 80053 COMPREHEN METABOLIC PANEL: CPT

## 2020-02-20 PROCEDURE — 700117 HCHG RX CONTRAST REV CODE 255: Performed by: EMERGENCY MEDICINE

## 2020-02-20 RX ORDER — ONDANSETRON 4 MG/1
4 TABLET, ORALLY DISINTEGRATING ORAL EVERY 8 HOURS PRN
Qty: 10 TAB | Refills: 0 | Status: SHIPPED | OUTPATIENT
Start: 2020-02-20 | End: 2020-04-01

## 2020-02-20 RX ORDER — SODIUM CHLORIDE 9 MG/ML
500 INJECTION, SOLUTION INTRAVENOUS ONCE
Status: COMPLETED | OUTPATIENT
Start: 2020-02-20 | End: 2020-02-20

## 2020-02-20 RX ORDER — ONDANSETRON 2 MG/ML
4 INJECTION INTRAMUSCULAR; INTRAVENOUS ONCE
Status: COMPLETED | OUTPATIENT
Start: 2020-02-20 | End: 2020-02-20

## 2020-02-20 RX ADMIN — SODIUM CHLORIDE 500 ML: 9 INJECTION, SOLUTION INTRAVENOUS at 08:22

## 2020-02-20 RX ADMIN — ONDANSETRON 4 MG: 2 INJECTION INTRAMUSCULAR; INTRAVENOUS at 08:22

## 2020-02-20 RX ADMIN — IOHEXOL 100 ML: 350 INJECTION, SOLUTION INTRAVENOUS at 09:16

## 2020-02-20 NOTE — ED NOTES
Pt resting quietly in room with eyes closed. Respirations even and unlabored. Call light within reach.

## 2020-02-20 NOTE — ED PROVIDER NOTES
ED Provider Note    CHIEF COMPLAINT  Chief Complaint   Patient presents with   • Abdominal Pain     RLQ, started last night, N/V - x3 twice last night and once this morning; no blood in vomit       HPI  Manolo Puri is a 79 y.o. male who presents for evaluation of abdominal pain.  At approximately 7 PM last night he started having right lower quadrant abdominal pain.  He has had multiple episodes of vomiting since then.  He does not think he has had any fevers but then states that he was sweaty.  He denies any urinary symptoms.  He has no history of previous abdominal surgeries.  He is a diabetic.  He has no history of kidney stones.    REVIEW OF SYSTEMS  See HPI for further details. All other systems negative.    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        FAMILY HISTORY  Family History   Problem Relation Age of Onset   • Diabetes Father    • Diabetes Maternal Grandfather        SOCIAL HISTORY  Social History     Socioeconomic History   • Marital status: Single     Spouse name: Not on file   • Number of children: Not on file   • Years of education: Not on file   • Highest education level: Not on file   Occupational History   • Not on file   Social Needs   • Financial resource strain: Not on file   • Food insecurity     Worry: Not on file     Inability: Not on file   • Transportation needs     Medical: Not on file     Non-medical: Not on file   Tobacco Use   • Smoking status: Never Smoker   • Smokeless tobacco: Never Used   Substance and Sexual Activity   • Alcohol use: No   • Drug use: No   • Sexual activity: Not on file   Lifestyle   • Physical activity     Days per week: Not on file     Minutes per session: Not on file   • Stress: Not on file   Relationships   • Social connections     Talks on phone: Not on file     Gets together: Not on file     Attends Hoahaoism service: Not on file     Active  member of club or organization: Not on file     Attends meetings of clubs or organizations: Not on file     Relationship status: Not on file   • Intimate partner violence     Fear of current or ex partner: Not on file     Emotionally abused: Not on file     Physically abused: Not on file     Forced sexual activity: Not on file   Other Topics Concern   • Not on file   Social History Narrative   • Not on file       SURGICAL HISTORY  Past Surgical History:   Procedure Laterality Date   • OTHER      Derm removal of SCC       CURRENT MEDICATIONS  Home Medications     Reviewed by Stephanie Malone R.N. (Registered Nurse) on 02/20/20 at 0739  Med List Status: Partial   Medication Last Dose Status   Dulaglutide (TRULICITY) 0.75 MG/0.5ML Solution Pen-injector not taking Active   Insulin NPH Isophane & Regular (NOVOLIN 70/30 SC) 2/19/2020 Active   Metformin HCl 1000 MG TABLET SR 24 HR 2/19/2020 Active                ALLERGIES  No Known Allergies    PHYSICAL EXAM  VITAL SIGNS: /86   Pulse (!) 119   Temp 36.6 °C (97.9 °F) (Oral)   Resp 18   Ht 1.829 m (6')   Wt 103.7 kg (228 lb 9.9 oz)   SpO2 91%   BMI 31.01 kg/m²   Constitutional: Well developed, Well nourished, No acute distress, Non-toxic appearance.   HENT: Normocephalic, Atraumatic.  Eyes:  EOMI, Conjunctiva normal, No discharge.   Cardiovascular: Tachycardic, Normal rhythm, No murmurs, No rubs, No gallops.   Thorax & Lungs: Clear to auscultation without wheezes, rales, or rhonchi.    Abdomen: Soft, right lower quadrant tenderness.  No masses.  Skin: Warm, Dry.  Musculoskeletal: Good range of motion in all major joints.   Neurologic: Awake and alert, No focal deficits noted.           RADIOLOGY/PROCEDURES  CT-ABDOMEN-PELVIS WITH   Final Result      No acute intra-abdominal abnormality is seen.      Colonic diverticulosis.      Hepatic steatosis.      Cholelithiasis.      Atherosclerotic plaque.            COURSE & MEDICAL DECISION MAKING  Pertinent Labs &  Imaging studies reviewed. (See chart for details)  This is a 79-year-old here for evaluation of right lower quadrant abdominal pain.  The patient is a diabetic.  His symptoms started last evening.  He has had vomiting since then.  He does not believe he is had any fevers with this.  He has no history of previous abdominal surgeries.  On arrival he is afebrile.  He is slightly tachycardic.  On exam he does have some slight right lower quadrant tenderness.  Laboratories are obtained to include urine which is notable for ketones and glucose and trace blood.  Microscopic shows 0-2 WBCs, 2-5 RBCs and no bacteria.  CBC shows a white count of 19 with a differential of 93 polys.  Chemistries are notable for a glucose of 385 with a normal bicarb.  CT scan of the abdomen and pelvis is obtained and shows no acute intra-abdominal processes.  I discussed the results of the studies with the patient.  He is treated with Zofran but is received no analgesics.  He is sleeping on reevaluation but is easily arousable.  He states he is greatly improved at this time.  At this point we have discussed the possibility of early appendicitis but I think that is unlikely.  This certainly may represent a viral illness.  He will be discharged with a prescription for Zofran.  He will follow-up with his doctor as needed.  I requested that he return to the emergency department immediately for any fevers uncontrolled vomiting or pain or if he is just not well.  He is given a discharge instruction sheet on abdominal pain as well as nausea and vomiting.    FINAL IMPRESSION  1.  Abdominal pain  2.  Nausea and vomiting  3.  Hyperglycemia without acidemia in a known diabetic         Electronically signed by: Gary Bourgeois M.D., 2/20/2020 8:11 AM

## 2020-02-20 NOTE — ED NOTES
Pt verbally understands d/c instructions. 1 paper prescription given. All questions answered. Pt stable and ambulatory upon discharge.

## 2020-02-20 NOTE — ED TRIAGE NOTES
Pt ambulated to triage with   Chief Complaint   Patient presents with   • Abdominal Pain     RLQ, started last night, N/V - x3 twice last night and once this morning; no blood in vomit     Pt reports that he took his BS this morning and was 359.  Pt Informed regarding triage process and verbalized understanding to inform triage tech or RN for any changes in condition. Placed in lobby.

## 2020-04-01 ENCOUNTER — OFFICE VISIT (OUTPATIENT)
Dept: MEDICAL GROUP | Facility: MEDICAL CENTER | Age: 80
End: 2020-04-01
Payer: MEDICARE

## 2020-04-01 VITALS
HEART RATE: 96 BPM | TEMPERATURE: 97.5 F | BODY MASS INDEX: 31.11 KG/M2 | WEIGHT: 229.72 LBS | OXYGEN SATURATION: 94 % | HEIGHT: 72 IN | DIASTOLIC BLOOD PRESSURE: 62 MMHG | SYSTOLIC BLOOD PRESSURE: 126 MMHG

## 2020-04-01 DIAGNOSIS — Z86.19 HISTORY OF SHINGLES: ICD-10-CM

## 2020-04-01 DIAGNOSIS — Z86.69 HISTORY OF DIPLOPIA: ICD-10-CM

## 2020-04-01 DIAGNOSIS — E78.5 HYPERLIPIDEMIA ASSOCIATED WITH TYPE 2 DIABETES MELLITUS (HCC): ICD-10-CM

## 2020-04-01 DIAGNOSIS — Z63.6 CAREGIVER BURDEN: ICD-10-CM

## 2020-04-01 DIAGNOSIS — Z12.12 ENCOUNTER FOR SCREENING FOR COLORECTAL MALIGNANT NEOPLASM: ICD-10-CM

## 2020-04-01 DIAGNOSIS — Z86.79 HISTORY OF PERICARDITIS: ICD-10-CM

## 2020-04-01 DIAGNOSIS — Z85.820 HISTORY OF MELANOMA: ICD-10-CM

## 2020-04-01 DIAGNOSIS — Z79.4 TYPE 2 DIABETES MELLITUS WITH MICROALBUMINURIA, WITH LONG-TERM CURRENT USE OF INSULIN (HCC): ICD-10-CM

## 2020-04-01 DIAGNOSIS — E66.1 CLASS 1 DRUG-INDUCED OBESITY WITH BODY MASS INDEX (BMI) OF 31.0 TO 31.9 IN ADULT, UNSPECIFIED WHETHER SERIOUS COMORBIDITY PRESENT: ICD-10-CM

## 2020-04-01 DIAGNOSIS — R80.9 TYPE 2 DIABETES MELLITUS WITH MICROALBUMINURIA, WITH LONG-TERM CURRENT USE OF INSULIN (HCC): ICD-10-CM

## 2020-04-01 DIAGNOSIS — Z12.5 SPECIAL SCREENING FOR MALIGNANT NEOPLASM OF PROSTATE: ICD-10-CM

## 2020-04-01 DIAGNOSIS — E11.29 TYPE 2 DIABETES MELLITUS WITH MICROALBUMINURIA, WITH LONG-TERM CURRENT USE OF INSULIN (HCC): ICD-10-CM

## 2020-04-01 DIAGNOSIS — L20.89 OTHER ATOPIC DERMATITIS: ICD-10-CM

## 2020-04-01 DIAGNOSIS — Z12.11 ENCOUNTER FOR SCREENING FOR COLORECTAL MALIGNANT NEOPLASM: ICD-10-CM

## 2020-04-01 DIAGNOSIS — E11.69 HYPERLIPIDEMIA ASSOCIATED WITH TYPE 2 DIABETES MELLITUS (HCC): ICD-10-CM

## 2020-04-01 LAB
HBA1C MFR BLD: 8.7 % (ref 0–5.6)
INT CON NEG: NEGATIVE
INT CON POS: POSITIVE

## 2020-04-01 PROCEDURE — 83036 HEMOGLOBIN GLYCOSYLATED A1C: CPT | Performed by: INTERNAL MEDICINE

## 2020-04-01 PROCEDURE — 99204 OFFICE O/P NEW MOD 45 MIN: CPT | Performed by: INTERNAL MEDICINE

## 2020-04-01 RX ORDER — BROMFENAC 0.76 MG/ML
SOLUTION/ DROPS OPHTHALMIC
COMMUNITY
Start: 2020-01-31 | End: 2022-06-27

## 2020-04-01 RX ORDER — METFORMIN HYDROCHLORIDE EXTENDED-RELEASE TABLETS 1000 MG/1
1000 TABLET, FILM COATED, EXTENDED RELEASE ORAL 2 TIMES DAILY
Qty: 180 TAB | Refills: 3 | Status: SHIPPED | OUTPATIENT
Start: 2020-04-01 | End: 2021-05-13 | Stop reason: SDUPTHER

## 2020-04-01 RX ORDER — ATORVASTATIN CALCIUM 10 MG/1
10 TABLET, FILM COATED ORAL DAILY
Qty: 30 TAB | Refills: 3 | Status: SHIPPED | OUTPATIENT
Start: 2020-04-01 | End: 2020-05-13 | Stop reason: SDUPTHER

## 2020-04-01 RX ORDER — FLASH GLUCOSE SENSOR
1 KIT MISCELLANEOUS
Qty: 2 EACH | Refills: 11 | Status: SHIPPED | OUTPATIENT
Start: 2020-04-01 | End: 2021-03-15

## 2020-04-01 RX ORDER — TRIAMCINOLONE ACETONIDE 1 MG/G
1 CREAM TOPICAL 2 TIMES DAILY
Qty: 1 TUBE | Refills: 3 | Status: SHIPPED | OUTPATIENT
Start: 2020-04-01 | End: 2022-06-27

## 2020-04-01 RX ORDER — FLASH GLUCOSE SENSOR
KIT MISCELLANEOUS
COMMUNITY
Start: 2020-03-11 | End: 2020-04-01 | Stop reason: SDUPTHER

## 2020-04-01 SDOH — SOCIAL STABILITY - SOCIAL INSECURITY: DEPENDENT RELATIVE NEEDING CARE AT HOME: Z63.6

## 2020-04-01 ASSESSMENT — PATIENT HEALTH QUESTIONNAIRE - PHQ9: CLINICAL INTERPRETATION OF PHQ2 SCORE: 0

## 2020-04-01 ASSESSMENT — FIBROSIS 4 INDEX: FIB4 SCORE: 1.12

## 2020-04-01 NOTE — PROGRESS NOTES
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    HISTORY OF THE PRESENT ILLNESS: Patient is a 79 y.o. male. This pleasant patient is here today to establish care and discuss the below stated chronic medical conditions. He is accompanied by his wife, Teresa.    Problem   Type 2 Diabetes Mellitus, With Long-Term Current Use of Insulin ([facility])       Ref. Range 8/17/2015 09:37 7/15/2016 10:49 9/4/2017 08:58 5/23/2018 07:32 11/1/2018 11:16 4/1/2020 11:06   Glycohemoglobin Latest Ref Range: 0.0 - 5.6 % 14.0 (H) 12.4 (H) 12.0 (H) 14.2 (H) 13.7 (H) 8.7 (A)   Estim. Avg Glu Latest Units: mg/dL 355 309 298 361 346         Ref. Range 5/23/2018 07:32   Micro Alb Creat Ratio Latest Ref Range: 0 - 30 mg/g 81 (H)       He was diagnosed around age 62 with type 2 diabetes, not sure how long he had prediabetes beforehand. He was initially taking metfomin and has been on 70/30 insulin since age 74, currently using 50 U twice daily. His A1c's typically average in the 8's, however in 2018 he decompensated and had A1c of 13-14. He purchased a Ifrah at that time and has been using it with improved numbers. He denies retinopathy. No history of chronic kidney disease. He has microalbuminuria, not yet on ACEI/ARB as no history of hypertension. No neuropathy.     Hyperlipidemia associated with type 2 diabetes mellitus (HCC)       Ref. Range 5/23/2018 07:32   Cholesterol,Tot Latest Ref Range: 100 - 199  mg/dL 196   Triglycerides Latest Ref Range: 0 - 149 mg/dL 148   HDL Latest Ref Range: >=40 mg/dL 40   LDL Latest Ref Range: <100 mg/dL 126 (H)     He has type 2 diabetes and qualifies for statin therapy. He would be open to initiating on atorvastatin, thinks he took this on the past but stopped it for some unknown reason.     Other Atopic Dermatitis    He reports a history of otitis around the right lower extremity at the ankle level.  He has seen dermatology in the past who prescribed topical steroid cream.  He notes that this is been helpful previously.  He let the prescription ran out and since then the dermatitis has returned.  It is itchy and annoying.  He would like to get back on topical treatment.     Class 1 Drug-Induced Obesity With Body Mass Index (Bmi) of 31.0 to 31.9 in Adult    Patient has stable BMI of 31.     History of Pericarditis    Patient reports this occurred around 2014.  No clear inciting trigger.  No recurrence since that time.  Does not recall what the treatment was.     History of Melanoma    He reports remove history of melanoma on his back.  This was discovered in 2005.  This was removed and he completed treatment for 1 year on what he thinks was interleukin therapy.  No metastatic disease.     History of Diplopia    He reports history of diplopia in the Fall of 2019, this was evaluated by neuro-ophthalmology (Dr. Delacruz) and self resolved. He had a large work up completed which was reportedly stable.     History of Shingles    Occurred in October 2017 along the right anterolateral chest wall at approximately T7 dermatome.     Caregiver [place]    He is a primary caregiver for his wife who has moderate dementia.  He also has support from children and other family/friends in town.  She is living at home and they do not have any hired outside help at this time.     Atypical Chest Pain (Resolved)   Elevated Hematocrit (Resolved)        Allergies: Patient has no known allergies.    Current  Outpatient Medications Ordered in Epic   Medication Sig Dispense Refill   • atorvastatin (LIPITOR) 10 MG Tab Take 1 Tab by mouth every day. 30 Tab 3   • metFORMIN ER 1000 MG TABLET SR 24 HR Take 1 Tab by mouth 2 Times a Day. 180 Tab 3   • Continuous Blood Gluc Sensor (FREESTYLE CONNIE 14 DAY SENSOR) Misc Apply 1 Patch to skin as directed every 14 days. Dispense 2 sensors to last him 1 month 2 Each 11   • triamcinolone acetonide (KENALOG) 0.1 % Cream Apply 1 g to affected area(s) 2 times a day. 1 Tube 3   • Insulin NPH Isophane & Regular (NOVOLIN 70/30 SC) Inject 52 Units as instructed 2 Times a Day.     • BROMSITE 0.075 % Solution        No current Epic-ordered facility-administered medications on file.        Past Medical History:   Diagnosis Date   • Arthritis    • Atypical chest pain 9/3/2017   • Cataract    • Diabetes (HCC)    • Elevated hematocrit 9/3/2017   • Squamous cell cancer of skin of forearm        Past Surgical History:   Procedure Laterality Date   • OTHER      Derm removal of SCC       Social History     Tobacco Use   • Smoking status: Never Smoker   • Smokeless tobacco: Never Used   • Tobacco comment: continued abstinence   Substance Use Topics   • Alcohol use: No   • Drug use: No       Social History     Social History Narrative   • Not on file       Family History   Problem Relation Age of Onset   • Diabetes Father    • Diabetes Maternal Grandfather        Health Maintenance: Completed    ROS:   As above in the HPI All Others Reviewed and Negative  Objective:     Exam: /62 (BP Location: Left arm, Patient Position: Sitting, BP Cuff Size: Adult)   Pulse 96   Temp 36.4 °C (97.5 °F) (Temporal)   Ht 1.829 m (6')   Wt 104.2 kg (229 lb 11.5 oz)   SpO2 94%  Body mass index is 31.16 kg/m².    General: Normal appearing. No distress. Appears stated age.  EENT: Eyes conjunctiva clear lids without ptosis, extraocular movements intact, ears normal shape and contour, canals are clear bilaterally,  tympanic membranes are benign, oropharynx is without erythema, edema or exudates. Fair dentition.   Neck: Supple without JVD. Thyroid is not enlarged.  Pulmonary: Clear to ausculation.  Normal effort. No rales, ronchi, or wheezing. No cough.  Cardiovascular: Regular rate and rhythm without murmur. No lower extremity edema bilaterally.  Abdomen: Soft, nontender, nondistended. Normal bowel sounds.   Neurologic: No resting tremor, no increased tone or rigidity.  Lymph: No cervical or supraclavicular lymph nodes are palpable.  Skin: Warm and dry.  Dermatitis at right lower extremity.  Musculoskeletal: Normal gait. No extremity cyanosis, clubbing, or edema. Patient ambulates independently and without a gait aid.  Psych: Normal mood and affect. Alert and oriented x3. Judgment and insight is normal.  Diabetic Foot Exam: No ulcers/laceration/blisters present on bilateral feet, normal gross anatomy of bilateral feet without abnormal curvature or arch, no toe deformities, + toenail thickness, no ingrown toenails, no increase in skin temperature bilaterally, no skin erythema to bilateral feet, bilateral dorsalis pedis pulses 2+ and equal, Refill less than 2 seconds bilaterally, Yuki 10 g monofilament testing normal in bilateral great toes, bilateral balls of feet at medial/lateral/mid ball of foot    Assessment & Plan:   79 y.o. male with the following -    Problem List Items Addressed This Visit     Type 2 diabetes mellitus, with long-term current use of insulin (HCC)     Chronic and ongoing problem. A1c slightly worsened today, was apparently 8 at last check. Patient has never met with endocrinology, I think that would be reasonable at this juncture. Continue metformin 1000 mg twice daily. Continue use of Ifrah glucose sensor. Will also have him increase insulin 70/30 to 52 U twice daily and send me blood sugar readings in the next week. Initiate atorvastatin 10 mg daily for cholesterol management in setting of  hyperlipidemia.  Will check urine microalbumin at this time and then decide upon initiating ACE inhibitor or ARB therapy.         Relevant Medications    atorvastatin (LIPITOR) 10 MG Tab    metFORMIN ER 1000 MG TABLET SR 24 HR    Continuous Blood Gluc Sensor (FREESTYLE CONNIE 14 DAY SENSOR) Misc    Other Relevant Orders    POCT  A1C (Completed)    REFERRAL TO ENDOCRINOLOGY    MICROALBUMIN CREAT RATIO URINE    Lipid Profile    CBC WITH DIFFERENTIAL    Diabetic Monofilament Lower Extremity Exam (Completed)    Hyperlipidemia associated with type 2 diabetes mellitus (HCC)     Chronic problem that requires medication initiation. Will start atorvastatin 10 mg daily. He will let me know if he develops GI upset or myalgias, discussed possible side effects. Recheck lipid panel.         Relevant Medications    atorvastatin (LIPITOR) 10 MG Tab    metFORMIN ER 1000 MG TABLET SR 24 HR    Other Relevant Orders    Lipid Profile    Other atopic dermatitis     Previous problem, decompensated.  Will renew triamcinolone cream that he can apply twice daily for no more than 14 days in a row.  He will let me know if he needs referral back to dermatology.         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  Recommend patient try to lose weight through healthy dietary choices and increased physical activity.         Relevant Medications    metFORMIN ER 1000 MG TABLET SR 24 HR    Other Relevant Orders    Patient identified as having weight management issue.  Appropriate orders and counseling given.    History of pericarditis     Previous problem, no recurrence.  Continue to observe for recurrence if patient ever complains of pleuritic chest pain or other cardiac equivalents.         History of melanoma     Previous problem, no sign of recurrence.  Continue checking skin for new lesions and refer back to  dermatology as needed.         History of diplopia     Previous problem.  I do not have medical records available from Dr. Delacruz's office to see what the diagnosis was or what treatment was rendered.  We will request these records to review.         History of shingles     Previous problem, no recurrence.  We will plan to vaccinate him with Shingrix after COVID-19 has resolved due to the possibility of developing a flulike illness after the vaccine.  He is agreeable.         Caregiver burden     Chronic ongoing problem.  Stressed to him to let me know if he starts to develop increased stress in caring for his wife with dementia.  They do not currently have any paid assistance in the home.           Other Visit Diagnoses     Encounter for screening for colorectal malignant neoplasm        Relevant Orders    COLOGUARD (FIT DNA)    Special screening for malignant neoplasm of prostate         Relevant Orders    PROSTATE SPECIFIC AG SCREENING           Return in about 3 months (around 7/1/2020).    Please note that this dictation was created using voice recognition software. I have made every reasonable attempt to correct obvious errors, but I expect that there are errors of grammar and possibly content that I did not discover before finalizing the note.

## 2020-04-01 NOTE — LETTER
[email or web address]  Shirley Ozuna D.O.  [address]  [facility] [address]  Fax: [phone]   Authorization for Release/Disclosure of   Protected Health Information   Name: INDIRA FLORES : 1940 SSN: [id number]   Address: [address] [place] [address]  [place] [address] Phone:    [phone] (home)    I authorize the entity listed below to release/disclose the PHI below to:   [email or web address]/[email or web address] Ozuna D.O. and Shirley Ozuna D.O.   Provider or Entity Name:  [facility]    Address   City, State, Zip   Phone:      Fax:     Reason for request: continuity of care   Information to be released:    [  ] LAST COLONOSCOPY,  including any PATH REPORT and follow-up  [  ] LAST FIT/COLOGUARD RESULT [  ] LAST DEXA  [  ] LAST MAMMOGRAM  [  ] LAST PAP  [  ] LAST LABS [  ] RETINA EXAM REPORT  [  ] IMMUNIZATION RECORDS  [x] Release all info      [x] Check here and initial the line next to each item to release ALL health information INCLUDING  _____ Care and treatment for drug and / or alcohol abuse  _____ HIV testing, infection status, or AIDS  _____ Genetic Testing    DATES OF SERVICE OR TIME PERIOD TO BE DISCLOSED: _____________  I understand and acknowledge that:  * This Authorization may be revoked at any time by you in writing, except if your health information has already been used or disclosed.  * Your health information that will be used or disclosed as a result of you signing this authorization could be re-disclosed by the recipient. If this occurs, your re-disclosed health information may no longer be protected by State or Federal laws.  * You may refuse to sign this Authorization. Your refusal will not affect your ability to obtain treatment.  * This Authorization becomes effective upon signing and will  on (date) __________.      If no date is indicated, this Authorization will  one (1) year from the signature date.    Name: Indira Cantu  Khushi    Signature:   Date:     4/1/2020       PLEASE FAX REQUESTED RECORDS BACK TO: [phone]

## 2020-04-02 PROBLEM — Z86.19 HISTORY OF SHINGLES: Status: ACTIVE | Noted: 2020-04-02

## 2020-04-02 PROBLEM — Z86.79 HISTORY OF PERICARDITIS: Status: ACTIVE | Noted: 2020-04-02

## 2020-04-02 PROBLEM — Z12.5 SPECIAL SCREENING FOR MALIGNANT NEOPLASM OF PROSTATE: Status: ACTIVE | Noted: 2020-04-02

## 2020-04-02 PROBLEM — E66.811 CLASS 1 DRUG-INDUCED OBESITY WITH BODY MASS INDEX (BMI) OF 31.0 TO 31.9 IN ADULT: Status: ACTIVE | Noted: 2020-04-02

## 2020-04-02 PROBLEM — E66.1 CLASS 1 DRUG-INDUCED OBESITY WITH BODY MASS INDEX (BMI) OF 31.0 TO 31.9 IN ADULT: Status: ACTIVE | Noted: 2020-04-02

## 2020-04-02 PROBLEM — R07.89 ATYPICAL CHEST PAIN: Status: RESOLVED | Noted: 2017-09-03 | Resolved: 2020-04-02

## 2020-04-02 PROBLEM — Z79.4 TYPE 2 DIABETES MELLITUS, WITH LONG-TERM CURRENT USE OF INSULIN (HCC): Status: ACTIVE | Noted: 2017-09-03

## 2020-04-02 PROBLEM — Z63.6 CAREGIVER BURDEN: Status: ACTIVE | Noted: 2020-04-02

## 2020-04-02 PROBLEM — E11.69 HYPERLIPIDEMIA ASSOCIATED WITH TYPE 2 DIABETES MELLITUS (HCC): Status: ACTIVE | Noted: 2017-09-03

## 2020-04-02 PROBLEM — R80.9 MICROALBUMINURIA: Status: ACTIVE | Noted: 2020-04-02

## 2020-04-02 PROBLEM — L20.89 OTHER ATOPIC DERMATITIS: Status: ACTIVE | Noted: 2020-04-02

## 2020-04-02 PROBLEM — Z86.69 HISTORY OF DIPLOPIA: Status: ACTIVE | Noted: 2020-04-02

## 2020-04-02 PROBLEM — Z85.820 HISTORY OF MELANOMA: Status: ACTIVE | Noted: 2020-04-02

## 2020-04-02 PROBLEM — R71.8 ELEVATED HEMATOCRIT: Status: RESOLVED | Noted: 2017-09-03 | Resolved: 2020-04-02

## 2020-04-02 NOTE — ASSESSMENT & PLAN NOTE
Chronic and ongoing problem. A1c slightly worsened today, was apparently 8 at last check. Patient has never met with endocrinology, I think that would be reasonable at this juncture. Continue metformin 1000 mg twice daily. Continue use of Ifrah glucose sensor. Will also have him increase insulin 70/30 to 52 U twice daily and send me blood sugar readings in the next week. Initiate atorvastatin 10 mg daily for cholesterol management in setting of hyperlipidemia.  Will check urine microalbumin at this time and then decide upon initiating ACE inhibitor or ARB therapy.

## 2020-04-02 NOTE — ASSESSMENT & PLAN NOTE
Previous problem.  I do not have medical records available from Dr. Delacruz's office to see what the diagnosis was or what treatment was rendered.  We will request these records to review.

## 2020-04-02 NOTE — ASSESSMENT & PLAN NOTE
Chronic ongoing problem.  Stressed to him to let me know if he starts to develop increased stress in caring for his wife with dementia.  They do not currently have any paid assistance in the home.

## 2020-04-02 NOTE — ASSESSMENT & PLAN NOTE
Previous problem, no sign of recurrence.  Continue checking skin for new lesions and refer back to dermatology as needed.

## 2020-04-02 NOTE — ASSESSMENT & PLAN NOTE
Chronic ongoing problem.  Recommend patient try to lose weight through healthy dietary choices and increased physical activity.

## 2020-04-02 NOTE — ASSESSMENT & PLAN NOTE
Previous problem, no recurrence.  Continue to observe for recurrence if patient ever complains of pleuritic chest pain or other cardiac equivalents.

## 2020-04-02 NOTE — ASSESSMENT & PLAN NOTE
Previous problem, decompensated.  Will renew triamcinolone cream that he can apply twice daily for no more than 14 days in a row.  He will let me know if he needs referral back to dermatology.

## 2020-04-02 NOTE — ASSESSMENT & PLAN NOTE
Previous problem, no recurrence.  We will plan to vaccinate him with Shingrix after COVID-19 has resolved due to the possibility of developing a flulike illness after the vaccine.  He is agreeable.

## 2020-04-02 NOTE — ASSESSMENT & PLAN NOTE
Chronic problem that requires medication initiation. Will start atorvastatin 10 mg daily. He will let me know if he develops GI upset or myalgias, discussed possible side effects. Recheck lipid panel.

## 2020-05-13 DIAGNOSIS — E11.29 TYPE 2 DIABETES MELLITUS WITH MICROALBUMINURIA, WITH LONG-TERM CURRENT USE OF INSULIN (HCC): ICD-10-CM

## 2020-05-13 DIAGNOSIS — R80.9 TYPE 2 DIABETES MELLITUS WITH MICROALBUMINURIA, WITH LONG-TERM CURRENT USE OF INSULIN (HCC): ICD-10-CM

## 2020-05-13 DIAGNOSIS — E78.5 HYPERLIPIDEMIA ASSOCIATED WITH TYPE 2 DIABETES MELLITUS (HCC): ICD-10-CM

## 2020-05-13 DIAGNOSIS — Z79.4 TYPE 2 DIABETES MELLITUS WITH MICROALBUMINURIA, WITH LONG-TERM CURRENT USE OF INSULIN (HCC): ICD-10-CM

## 2020-05-13 DIAGNOSIS — E11.69 HYPERLIPIDEMIA ASSOCIATED WITH TYPE 2 DIABETES MELLITUS (HCC): ICD-10-CM

## 2020-05-13 RX ORDER — ATORVASTATIN CALCIUM 10 MG/1
10 TABLET, FILM COATED ORAL DAILY
Qty: 90 TAB | Refills: 3 | Status: SHIPPED | OUTPATIENT
Start: 2020-05-13 | End: 2020-06-22 | Stop reason: SDUPTHER

## 2020-05-13 NOTE — TELEPHONE ENCOUNTER
Received via [employer]    Received request via: Pharmacy    Was the patient seen in the last year in this department? Yes    Does the patient have an active prescription (recently filled or refills available) for medication(s) requested? No     Requested Prescriptions     Pending Prescriptions Disp Refills   • atorvastatin (LIPITOR) 10 MG Tab 30 Tab 3     Sig: Take 1 Tab by mouth every day.

## 2020-06-22 DIAGNOSIS — E11.69 HYPERLIPIDEMIA ASSOCIATED WITH TYPE 2 DIABETES MELLITUS (HCC): ICD-10-CM

## 2020-06-22 DIAGNOSIS — Z79.4 TYPE 2 DIABETES MELLITUS WITH MICROALBUMINURIA, WITH LONG-TERM CURRENT USE OF INSULIN (HCC): ICD-10-CM

## 2020-06-22 DIAGNOSIS — E11.29 TYPE 2 DIABETES MELLITUS WITH MICROALBUMINURIA, WITH LONG-TERM CURRENT USE OF INSULIN (HCC): ICD-10-CM

## 2020-06-22 DIAGNOSIS — R80.9 TYPE 2 DIABETES MELLITUS WITH MICROALBUMINURIA, WITH LONG-TERM CURRENT USE OF INSULIN (HCC): ICD-10-CM

## 2020-06-22 DIAGNOSIS — E78.5 HYPERLIPIDEMIA ASSOCIATED WITH TYPE 2 DIABETES MELLITUS (HCC): ICD-10-CM

## 2020-06-22 RX ORDER — ATORVASTATIN CALCIUM 10 MG/1
10 TABLET, FILM COATED ORAL DAILY
Qty: 90 TAB | Refills: 3 | Status: SHIPPED | OUTPATIENT
Start: 2020-06-22 | End: 2020-06-25 | Stop reason: SDUPTHER

## 2020-06-25 DIAGNOSIS — Z79.4 TYPE 2 DIABETES MELLITUS WITH MICROALBUMINURIA, WITH LONG-TERM CURRENT USE OF INSULIN (HCC): ICD-10-CM

## 2020-06-25 DIAGNOSIS — E11.29 TYPE 2 DIABETES MELLITUS WITH MICROALBUMINURIA, WITH LONG-TERM CURRENT USE OF INSULIN (HCC): ICD-10-CM

## 2020-06-25 DIAGNOSIS — R80.9 TYPE 2 DIABETES MELLITUS WITH MICROALBUMINURIA, WITH LONG-TERM CURRENT USE OF INSULIN (HCC): ICD-10-CM

## 2020-06-25 DIAGNOSIS — E78.5 HYPERLIPIDEMIA ASSOCIATED WITH TYPE 2 DIABETES MELLITUS (HCC): ICD-10-CM

## 2020-06-25 DIAGNOSIS — E11.69 HYPERLIPIDEMIA ASSOCIATED WITH TYPE 2 DIABETES MELLITUS (HCC): ICD-10-CM

## 2020-06-25 RX ORDER — ATORVASTATIN CALCIUM 10 MG/1
10 TABLET, FILM COATED ORAL DAILY
Qty: 90 TAB | Refills: 3 | Status: SHIPPED | OUTPATIENT
Start: 2020-06-25 | End: 2020-07-28 | Stop reason: SDUPTHER

## 2020-07-09 LAB
ALBUMIN/CREAT UR: 110 MG/G CREAT (ref 0–29)
BASOPHILS # BLD AUTO: 0.1 X10E3/UL (ref 0–0.2)
BASOPHILS NFR BLD AUTO: 2 %
CHOLEST SERPL-MCNC: 167 MG/DL (ref 100–199)
CREAT UR-MCNC: 138.7 MG/DL
EOSINOPHIL # BLD AUTO: 0.7 X10E3/UL (ref 0–0.4)
EOSINOPHIL NFR BLD AUTO: 7 %
ERYTHROCYTE [DISTWIDTH] IN BLOOD BY AUTOMATED COUNT: 14.3 % (ref 11.6–15.4)
HCT VFR BLD AUTO: 52.3 % (ref 37.5–51)
HDLC SERPL-MCNC: 37 MG/DL
HGB BLD-MCNC: 17.2 G/DL (ref 13–17.7)
IMM GRANULOCYTES # BLD AUTO: 0.2 X10E3/UL (ref 0–0.1)
IMM GRANULOCYTES NFR BLD AUTO: 2 %
IMMATURE CELLS  115398: ABNORMAL
LABORATORY COMMENT REPORT: ABNORMAL
LDLC SERPL CALC-MCNC: 89 MG/DL (ref 0–99)
LYMPHOCYTES # BLD AUTO: 2.1 X10E3/UL (ref 0.7–3.1)
LYMPHOCYTES NFR BLD AUTO: 23 %
MCH RBC QN AUTO: 27.2 PG (ref 26.6–33)
MCHC RBC AUTO-ENTMCNC: 32.9 G/DL (ref 31.5–35.7)
MCV RBC AUTO: 83 FL (ref 79–97)
MICROALBUMIN UR-MCNC: 152.1 UG/ML
MONOCYTES # BLD AUTO: 1.1 X10E3/UL (ref 0.1–0.9)
MONOCYTES NFR BLD AUTO: 12 %
MORPHOLOGY BLD-IMP: ABNORMAL
NEUTROPHILS # BLD AUTO: 5.2 X10E3/UL (ref 1.4–7)
NEUTROPHILS NFR BLD AUTO: 54 %
NRBC BLD AUTO-RTO: ABNORMAL %
PLATELET # BLD AUTO: 298 X10E3/UL (ref 150–450)
PSA SERPL-MCNC: 0.8 NG/ML (ref 0–4)
RBC # BLD AUTO: 6.32 X10E6/UL (ref 4.14–5.8)
TRIGL SERPL-MCNC: 206 MG/DL (ref 0–149)
VLDLC SERPL CALC-MCNC: 41 MG/DL (ref 5–40)
WBC # BLD AUTO: 9.5 X10E3/UL (ref 3.4–10.8)

## 2020-07-28 DIAGNOSIS — R80.9 TYPE 2 DIABETES MELLITUS WITH MICROALBUMINURIA, WITH LONG-TERM CURRENT USE OF INSULIN (HCC): ICD-10-CM

## 2020-07-28 DIAGNOSIS — Z79.4 TYPE 2 DIABETES MELLITUS WITH MICROALBUMINURIA, WITH LONG-TERM CURRENT USE OF INSULIN (HCC): ICD-10-CM

## 2020-07-28 DIAGNOSIS — E11.69 HYPERLIPIDEMIA ASSOCIATED WITH TYPE 2 DIABETES MELLITUS (HCC): ICD-10-CM

## 2020-07-28 DIAGNOSIS — E11.29 TYPE 2 DIABETES MELLITUS WITH MICROALBUMINURIA, WITH LONG-TERM CURRENT USE OF INSULIN (HCC): ICD-10-CM

## 2020-07-28 DIAGNOSIS — E78.5 HYPERLIPIDEMIA ASSOCIATED WITH TYPE 2 DIABETES MELLITUS (HCC): ICD-10-CM

## 2020-07-28 RX ORDER — ATORVASTATIN CALCIUM 10 MG/1
10 TABLET, FILM COATED ORAL DAILY
Qty: 90 TAB | Refills: 3 | Status: SHIPPED | OUTPATIENT
Start: 2020-07-28 | End: 2022-02-28

## 2021-01-11 DIAGNOSIS — Z23 NEED FOR VACCINATION: ICD-10-CM

## 2021-01-26 ENCOUNTER — TELEPHONE (OUTPATIENT)
Dept: MEDICAL GROUP | Facility: PHYSICIAN GROUP | Age: 81
End: 2021-01-26

## 2021-01-26 NOTE — TELEPHONE ENCOUNTER
Please contact patient to schedule appointment with me for an office visit or telemedicine to discuss his diabetic medications.  Thank you!

## 2021-02-01 ENCOUNTER — TELEPHONE (OUTPATIENT)
Dept: MEDICAL GROUP | Facility: PHYSICIAN GROUP | Age: 81
End: 2021-02-01

## 2021-02-01 NOTE — TELEPHONE ENCOUNTER
ESTABLISHED PATIENT PRE-VISIT PLANNING     Patient was NOT contacted to complete PVP.     Note: Patient will not be contacted if there is no indication to call.     1.  Reviewed notes from the last few office visits within the medical group: Yes    2.  If any orders were placed at last visit or intended to be done for this visit (i.e. 6 mos follow-up), do we have Results/Consult Notes?         •  Labs - Labs ordered, completed on 07/07/20 and results are in chart.  Note: If patient appointment is for lab review and patient did not complete labs, check with provider if OK to reschedule patient until labs completed.       •  Imaging - Imaging was not ordered at last office visit.       •  Referrals - Referral ordered, patient has NOT been seen.    3. Is this appointment scheduled as a Hospital Follow-Up? No    4.  Immunizations were updated in Epic using Reconcile Outside Information activity? Yes    5.  Patient is due for the following Health Maintenance Topics:   Health Maintenance Due   Topic Date Due   • Annual Wellness Visit  1940   • COVID-19 Vaccine (1 of 2) 09/24/1958   • COLONOSCOPY  09/24/1992   • IMM ZOSTER VACCINES (1 of 2) 09/24/1992   • IMM PNEUMOCOCCAL VACCINE: 65+ Years (2 of 2 - PPSV23) 09/04/2018   • IMM INFLUENZA (1) 09/01/2020   • A1C SCREENING  10/01/2020   • RETINAL SCREENING  12/16/2020   • SERUM CREATININE  02/20/2021       - Patient plans to schedule appointment for Annual Wellness Visit (AWV), Colonoscopy/FIT, Immunizations: FLU, PNEUMOVAX (PPSV23) and SHINGRIX (Shingles) and Retinal Eye Exam.    6.  AHA (Pulse8) form printed for Provider? Email sent to [facility] requesting form

## 2021-02-03 ENCOUNTER — TELEMEDICINE (OUTPATIENT)
Dept: MEDICAL GROUP | Facility: PHYSICIAN GROUP | Age: 81
End: 2021-02-03
Payer: MEDICARE

## 2021-02-03 VITALS — WEIGHT: 215 LBS | HEIGHT: 72 IN | BODY MASS INDEX: 29.12 KG/M2

## 2021-02-03 DIAGNOSIS — Z79.4 TYPE 2 DIABETES MELLITUS WITH HYPEROSMOLARITY WITHOUT COMA, WITH LONG-TERM CURRENT USE OF INSULIN (HCC): ICD-10-CM

## 2021-02-03 DIAGNOSIS — E11.00 TYPE 2 DIABETES MELLITUS WITH HYPEROSMOLARITY WITHOUT COMA, WITH LONG-TERM CURRENT USE OF INSULIN (HCC): ICD-10-CM

## 2021-02-03 PROCEDURE — 99213 OFFICE O/P EST LOW 20 MIN: CPT | Mod: 95,CR | Performed by: INTERNAL MEDICINE

## 2021-02-03 RX ORDER — METFORMIN HYDROCHLORIDE 500 MG/1
TABLET, EXTENDED RELEASE ORAL
COMMUNITY
Start: 2021-01-17 | End: 2021-05-13

## 2021-02-03 ASSESSMENT — FIBROSIS 4 INDEX: FIB4 SCORE: 1.02

## 2021-02-03 ASSESSMENT — PATIENT HEALTH QUESTIONNAIRE - PHQ9: CLINICAL INTERPRETATION OF PHQ2 SCORE: 0

## 2021-02-04 NOTE — PROGRESS NOTES
Virtual Visit: Established Patient   This visit was conducted via Zoom using secure and encrypted videoconferencing technology. The patient was in a private location in the state of [place].    The patient's identity was confirmed and verbal consent was obtained for this virtual visit.    Subjective:   CC:   Chief Complaint   Patient presents with   • Diabetes   • Medication Management       Manolo Puri is a 80 y.o. male presenting for evaluation and management of:  Patient of Dr. Ozuna      1. Type 2 diabetes mellitus with hyperosmolarity without coma, with long-term current use of insulin (HCC)    Patient with a history of type 2 diabetes is here to discuss his medications.  Patient is currently being treated with Novolin 70/30 52 units subcu twice daily.  Patient is also taking Metformin 1000 mg twice daily.  Patient using the freestyle connie system.  Patient is concerned as his blood sugars have been fluctuating recently.  Blood sugar can reach as high as 350, low of 110.  Patient has not changed anything in his diet or physical activity.  Patient also states that it is very difficult for him to fill his Novolin at his pharmacies.  Patient has tried other insulin preparations in the past but were too expensive.  Most recent hemoglobin A1c in the chart is from April 2020 which was 8.7.  Patient is requesting a referral to a diabetic specialist.    ROS   Denies any recent fevers or chills. No nausea or vomiting. No chest pains or shortness of breath.     No Known Allergies    Current medicines (including changes today)  Current Outpatient Medications   Medication Sig Dispense Refill   • metFORMIN ER (GLUCOPHAGE XR) 500 MG TABLET SR 24 HR      • Continuous Blood Gluc Sensor (Flash NetworksSTYLE CONNIE 14 DAY SENSOR) Misc Apply 1 Patch to skin as directed every 14 days. Dispense 2 sensors to last him 1 month 2 Each 11   • triamcinolone acetonide (KENALOG) 0.1 % Cream Apply 1 g to affected area(s) 2 times a day.  1 Tube 3   • Insulin NPH Isophane & Regular (NOVOLIN 70/30 SC) Inject 52 Units as instructed 2 Times a Day.     • atorvastatin (LIPITOR) 10 MG Tab Take 1 Tab by mouth every day. (Patient not taking: Reported on 2/3/2021) 90 Tab 3   • BROMSITE 0.075 % Solution      • metFORMIN ER 1000 MG TABLET SR 24 HR Take 1 Tab by mouth 2 Times a Day. 180 Tab 3     No current facility-administered medications for this visit.        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  He  has a past surgical history that includes other.       Objective:   Ht 1.829 m (6')   Wt 97.5 kg (215 lb)   BMI 29.16 kg/m²     Physical Exam: Via virtual visit  Constitutional: Alert, no distress, well-groomed.  Skin: No rashes in visible areas.  Eye: Round. Conjunctiva clear, lids normal. No icterus.   ENMT: Lips pink without lesions, good dentition, moist mucous membranes. Phonation normal.  Neck: No masses, no thyromegaly. Moves freely without pain.  Respiratory: Unlabored respiratory effort, no cough or audible wheeze  Psych: Alert and oriented x3, normal affect and mood.       Assessment and Plan:   The following  treatment plan was discussed:     1. Type 2 diabetes mellitus with hyperosmolarity without coma, with long-term current use of insulin (HCC)      - HEMOGLOBIN A1C; Future  - REFERRAL TO ENDOCRINOLOGY    Other orders  - metFORMIN ER (GLUCOPHAGE XR) 500 MG TABLET SR 24 HR        Follow-up: with

## 2021-04-07 ENCOUNTER — TELEPHONE (OUTPATIENT)
Dept: MEDICAL GROUP | Facility: PHYSICIAN GROUP | Age: 81
End: 2021-04-07

## 2021-04-07 NOTE — TELEPHONE ENCOUNTER
ESTABLISHED PATIENT PRE-VISIT PLANNING     Patient was NOT contacted to complete PVP.     Note: Patient will not be contacted if there is no indication to call.     1.  Reviewed notes from the last few office visits within the medical group: Yes    2.  If any orders were placed at last visit or intended to be done for this visit (i.e. 6 mos follow-up), do we have Results/Consult Notes?         •  Labs - Labs were not ordered at last office visit.  Note: If patient appointment is for lab review and patient did not complete labs, check with provider if OK to reschedule patient until labs completed.       •  Imaging - Imaging was not ordered at last office visit.       •  Referrals - Referral ordered, patient has NOT been seen.    3. Is this appointment scheduled as a Hospital Follow-Up? No    4.  Immunizations were updated in Epic using Reconcile Outside Information activity? Yes    5.  Patient is due for the following Health Maintenance Topics:   Health Maintenance Due   Topic Date Due   • Annual Wellness Visit  Never done   • IMM DTaP/Tdap/Td Vaccine (1 - Tdap) Never done   • COLONOSCOPY  Never done   • IMM ZOSTER VACCINES (1 of 2) Never done   • IMM PNEUMOCOCCAL VACCINE: 65+ Years (2 of 2 - PPSV23) 09/04/2018   • A1C SCREENING  10/01/2020   • RETINAL SCREENING  12/16/2020   • SERUM CREATININE  02/20/2021   • DIABETES MONOFILAMENT / LE EXAM  04/01/2021     6.  AHA (Pulse8) form printed for Provider? N/A

## 2021-04-08 ENCOUNTER — APPOINTMENT (OUTPATIENT)
Dept: MEDICAL GROUP | Facility: PHYSICIAN GROUP | Age: 81
End: 2021-04-08
Payer: MEDICARE

## 2021-04-09 NOTE — PROGRESS NOTES
[place] [address] [facility]   Telephone Visit Note      Kurt Cleveland is a 82 y.o. male evaluated via my chart telephone visit on 4/9/2021. Consent:  Pursuant to emergency declaration under the [address] in the [employer], 1135 waiver authorization in the [facility] Act, this telephone visit was completed with patient's consent, to reduce the patient's risk of exposure to COVID-19 and provide necessary medical care. He and/or health care decision maker is aware that that he may receive a bill for this telephone service, depending on his insurance coverage, and has provided verbal consent to proceed. Patient is at his residency and Provider is currently in Pulmonary Clinic at [place] [address] Pulmonary. Documentation:  Willena Dubin states he developed increasing wheezing with chest congestion, cough and thick sputum expectoration over the past 4 days. He denies any fever and chills. He has had the Wilkins Peter COVID-19 vaccination and has not had COVID-19 infection or exposure recently. He does continue on Breo daily and albuterol rescue inhaler and is required his albuterol nebulizer 3-4 times a day and was concerned about using that much and the fact that he is on beta-blockers. Willena Dubin states they are practicing social distancing, wearing a mask when out in public, washing hands frequently or using hand . Denies any fever, chills, malaise, change in sensation of taste or smell, headache or lightheadedness. Denies any known contacts with persons with respiratory infection, positive for coronavirus or under investigation for possible coronavirus exposure.       Past Medical History:   Diagnosis Date    A-fib [facility]) 3/31/14    Initial consult    Acute bronchospasm 9/20/2016    Acute exacerbation of chronic obstructive pulmonary disease (COPD) ([facility] [address] 1/16/2018    Asthma     Atrial fibrillation ([facility] [address]     Blood glucose elevated 12/17/2013 Pt discharged home.  Tele box removed.  IV DC'ed.  Discharge instructions and new scripts provided to pt and he verbalizes understanding.  Pt escorted to car with CNA to meet family.   SR=[id number]    Cancer [facility]) dx 2003    prostrate- tx surg only/ tx for skin cancer of head now    Chest pain 3/14    COPD, mild ([address] 4/65/0244    Diastolic CHF (HCC)     Edema     Family history of diabetes mellitus     Brother    Fatigue 3/14    Glaucoma     \"watching this now\" Dr Opal Montano H/O cardiovascular stress test 4/14/14 5/14EF>55% Atrial septal occluder device noted. 4/14No evidence of hemodynamically significant coronary artery disease    H/O cardiovascular stress test 1/14/2016    lexiscan-normal,EF70%    H/O echocardiogram 02/12/15    EF 60% Mildly hypertrophied LV and LV systolic function. Mild MR & TR. Atrial septal occluder device is seen with is fuctioning normally.  H/O echocardiogram 1/14/2016    EF 60% aaortic root mildly dilated with dimension of 4.3    H/O transesophageal echocardiography ([facility]) for monitoring 5/22/2014    normal septal occluder     Hiatal hernia     planning to see Dr Jessie Myers about this    History of 24 hour EKG monitoring 5/10/14    Sinus rhythm.  History of cardiovascular stress test 12/16/2013 12/2013-([facility]Probably normal Regadenoson/Exercise with Tc-99 sestamibi. No ischemia noted. Small fixed defect in anteroseptuma and a second small fixed defect in inferolateral wall, most consistent with attenuation artifact. Normal LV size. Global LVSF normal and normal wall motion;    History of echocardiogram 11/11/2013 11/2013-([facility])-Interatrial septal occluder device is visualized. Normal biventricular systolic function; LVEF - 60-65%. Mild degenerative valvular changes; Mild AI, Mild MR, trace TR, trace PI. No pericardial effusion;    History of heart surgery 11/13    PFO closure by Amplatzer 25mm Cribriform device to intra-atrial septum--> [employer])    History of kidney stones     tx with surgery for this in the past    History of nuclear stress test 2/16/15    EF 70%.  WNL    Hx of Doppler his asthmatic exacerbation of his chronic mild persistent asthma and mild COPD. I will make no change in his current bronchodilator therapy. If he fails to improve he will call our office and I will get x-rays next week. I will continue to follow him. We have discussed COVID-19 vaccination. I have strongly encouraged them to receive vaccination when it becomes available for their age group. They have been instructed that if they have a [employer] account they will receive a message through their [employer] as to schedule for the COVID-19 immunization. We have discussed the need to maintain yearly flu immunization, pneumococcal vaccination. We have discussed Coronavirus precaution including handwashing practice, wiping items touched in public such as gas pumps, door handles, shopping carts, etc. Self monitoring for infection - fever, chills, cough, SOB. Should he develop symptoms he should call office for further instructions. I affirm this is a Patient initiated episode with an established patient who has not had a related appointment within my department in the past 7 days or scheduled within the next 24 hours. I have spent 20 minutes reviewing previous notes, test results and communicating with patient discussing the diagnosis and importance of treatment plan.     Note: not billable if this call serves to triage the patient into an appointment for the relevant concern      Chang Cutler

## 2021-04-11 DIAGNOSIS — Z79.4 TYPE 2 DIABETES MELLITUS WITH MICROALBUMINURIA, WITH LONG-TERM CURRENT USE OF INSULIN (HCC): ICD-10-CM

## 2021-04-11 DIAGNOSIS — R80.9 TYPE 2 DIABETES MELLITUS WITH MICROALBUMINURIA, WITH LONG-TERM CURRENT USE OF INSULIN (HCC): ICD-10-CM

## 2021-04-11 DIAGNOSIS — E11.29 TYPE 2 DIABETES MELLITUS WITH MICROALBUMINURIA, WITH LONG-TERM CURRENT USE OF INSULIN (HCC): ICD-10-CM

## 2021-04-12 DIAGNOSIS — R80.9 TYPE 2 DIABETES MELLITUS WITH MICROALBUMINURIA, WITH LONG-TERM CURRENT USE OF INSULIN (HCC): ICD-10-CM

## 2021-04-12 DIAGNOSIS — E11.29 TYPE 2 DIABETES MELLITUS WITH MICROALBUMINURIA, WITH LONG-TERM CURRENT USE OF INSULIN (HCC): ICD-10-CM

## 2021-04-12 DIAGNOSIS — Z79.4 TYPE 2 DIABETES MELLITUS WITH MICROALBUMINURIA, WITH LONG-TERM CURRENT USE OF INSULIN (HCC): ICD-10-CM

## 2021-04-12 RX ORDER — FLASH GLUCOSE SENSOR
KIT MISCELLANEOUS
Qty: 2 EACH | Refills: 3 | Status: SHIPPED | OUTPATIENT
Start: 2021-04-12 | End: 2021-08-04

## 2021-04-12 RX ORDER — FLASH GLUCOSE SENSOR
KIT MISCELLANEOUS
Qty: 2 EACH | Refills: 0 | OUTPATIENT
Start: 2021-04-12

## 2021-05-13 DIAGNOSIS — Z79.4 TYPE 2 DIABETES MELLITUS WITH MICROALBUMINURIA, WITH LONG-TERM CURRENT USE OF INSULIN (HCC): ICD-10-CM

## 2021-05-13 DIAGNOSIS — E11.00 TYPE 2 DIABETES MELLITUS WITH HYPEROSMOLARITY WITHOUT COMA, WITH LONG-TERM CURRENT USE OF INSULIN (HCC): ICD-10-CM

## 2021-05-13 DIAGNOSIS — Z79.4 TYPE 2 DIABETES MELLITUS WITH HYPEROSMOLARITY WITHOUT COMA, WITH LONG-TERM CURRENT USE OF INSULIN (HCC): ICD-10-CM

## 2021-05-13 DIAGNOSIS — E11.29 TYPE 2 DIABETES MELLITUS WITH MICROALBUMINURIA, WITH LONG-TERM CURRENT USE OF INSULIN (HCC): ICD-10-CM

## 2021-05-13 DIAGNOSIS — R80.9 TYPE 2 DIABETES MELLITUS WITH MICROALBUMINURIA, WITH LONG-TERM CURRENT USE OF INSULIN (HCC): ICD-10-CM

## 2021-05-13 RX ORDER — METFORMIN HYDROCHLORIDE EXTENDED-RELEASE TABLETS 1000 MG/1
1000 TABLET, FILM COATED, EXTENDED RELEASE ORAL 2 TIMES DAILY
Qty: 180 TABLET | Refills: 1 | Status: SHIPPED | OUTPATIENT
Start: 2021-05-13 | End: 2021-12-22

## 2021-12-05 DIAGNOSIS — E11.29 TYPE 2 DIABETES MELLITUS WITH MICROALBUMINURIA, WITH LONG-TERM CURRENT USE OF INSULIN (HCC): ICD-10-CM

## 2021-12-05 DIAGNOSIS — R80.9 TYPE 2 DIABETES MELLITUS WITH MICROALBUMINURIA, WITH LONG-TERM CURRENT USE OF INSULIN (HCC): ICD-10-CM

## 2021-12-05 DIAGNOSIS — Z79.4 TYPE 2 DIABETES MELLITUS WITH MICROALBUMINURIA, WITH LONG-TERM CURRENT USE OF INSULIN (HCC): ICD-10-CM

## 2021-12-06 RX ORDER — FLASH GLUCOSE SENSOR
KIT MISCELLANEOUS
Qty: 2 EACH | Refills: 0 | Status: SHIPPED | OUTPATIENT
Start: 2021-12-06 | End: 2021-12-07 | Stop reason: SDUPTHER

## 2021-12-07 DIAGNOSIS — Z79.4 TYPE 2 DIABETES MELLITUS WITH MICROALBUMINURIA, WITH LONG-TERM CURRENT USE OF INSULIN (HCC): ICD-10-CM

## 2021-12-07 DIAGNOSIS — R80.9 TYPE 2 DIABETES MELLITUS WITH MICROALBUMINURIA, WITH LONG-TERM CURRENT USE OF INSULIN (HCC): ICD-10-CM

## 2021-12-07 DIAGNOSIS — E11.29 TYPE 2 DIABETES MELLITUS WITH MICROALBUMINURIA, WITH LONG-TERM CURRENT USE OF INSULIN (HCC): ICD-10-CM

## 2021-12-07 RX ORDER — FLASH GLUCOSE SENSOR
1 KIT MISCELLANEOUS
Qty: 3 EACH | Refills: 3 | Status: SHIPPED | OUTPATIENT
Start: 2021-12-07 | End: 2022-09-01

## 2021-12-22 DIAGNOSIS — E11.29 TYPE 2 DIABETES MELLITUS WITH MICROALBUMINURIA, WITH LONG-TERM CURRENT USE OF INSULIN (HCC): ICD-10-CM

## 2021-12-22 DIAGNOSIS — Z79.4 TYPE 2 DIABETES MELLITUS WITH MICROALBUMINURIA, WITH LONG-TERM CURRENT USE OF INSULIN (HCC): ICD-10-CM

## 2021-12-22 DIAGNOSIS — R80.9 TYPE 2 DIABETES MELLITUS WITH MICROALBUMINURIA, WITH LONG-TERM CURRENT USE OF INSULIN (HCC): ICD-10-CM

## 2021-12-22 RX ORDER — METFORMIN HYDROCHLORIDE 500 MG/1
TABLET, EXTENDED RELEASE ORAL
Qty: 30 TABLET | Refills: 0 | Status: SHIPPED | OUTPATIENT
Start: 2021-12-22 | End: 2022-03-29 | Stop reason: SDUPTHER

## 2022-02-28 ENCOUNTER — OFFICE VISIT (OUTPATIENT)
Dept: MEDICAL GROUP | Facility: PHYSICIAN GROUP | Age: 82
End: 2022-02-28
Payer: MEDICARE

## 2022-02-28 ENCOUNTER — HOSPITAL ENCOUNTER (OUTPATIENT)
Dept: RADIOLOGY | Facility: MEDICAL CENTER | Age: 82
End: 2022-02-28
Attending: INTERNAL MEDICINE
Payer: MEDICARE

## 2022-02-28 ENCOUNTER — TELEPHONE (OUTPATIENT)
Dept: MEDICAL GROUP | Facility: PHYSICIAN GROUP | Age: 82
End: 2022-02-28

## 2022-02-28 VITALS
TEMPERATURE: 97.7 F | OXYGEN SATURATION: 95 % | BODY MASS INDEX: 32.03 KG/M2 | HEIGHT: 72 IN | DIASTOLIC BLOOD PRESSURE: 64 MMHG | RESPIRATION RATE: 16 BRPM | SYSTOLIC BLOOD PRESSURE: 132 MMHG | HEART RATE: 104 BPM | WEIGHT: 236.5 LBS

## 2022-02-28 DIAGNOSIS — E11.311 TYPE 2 DIABETES MELLITUS WITH BOTH EYES AFFECTED BY RETINOPATHY AND MACULAR EDEMA, WITH LONG-TERM CURRENT USE OF INSULIN, UNSPECIFIED RETINOPATHY SEVERITY (HCC): ICD-10-CM

## 2022-02-28 DIAGNOSIS — Z79.4 TYPE 2 DIABETES MELLITUS WITH BOTH EYES AFFECTED BY RETINOPATHY AND MACULAR EDEMA, WITH LONG-TERM CURRENT USE OF INSULIN, UNSPECIFIED RETINOPATHY SEVERITY (HCC): ICD-10-CM

## 2022-02-28 DIAGNOSIS — R07.81 RIB PAIN ON RIGHT SIDE: ICD-10-CM

## 2022-02-28 DIAGNOSIS — S22.31XA CLOSED FRACTURE OF ONE RIB OF RIGHT SIDE, INITIAL ENCOUNTER: ICD-10-CM

## 2022-02-28 PROCEDURE — 99214 OFFICE O/P EST MOD 30 MIN: CPT | Performed by: INTERNAL MEDICINE

## 2022-02-28 PROCEDURE — 71100 X-RAY EXAM RIBS UNI 2 VIEWS: CPT | Mod: RT

## 2022-02-28 RX ORDER — OXYCODONE HYDROCHLORIDE 5 MG/1
5 TABLET ORAL EVERY 6 HOURS PRN
Qty: 56 TABLET | Refills: 0 | Status: SHIPPED | OUTPATIENT
Start: 2022-02-28 | End: 2022-03-10 | Stop reason: SDUPTHER

## 2022-02-28 ASSESSMENT — PATIENT HEALTH QUESTIONNAIRE - PHQ9: CLINICAL INTERPRETATION OF PHQ2 SCORE: 0

## 2022-03-01 NOTE — PATIENT INSTRUCTIONS
Take tylenol 1,000 mg four times daily as needed and start lidocaine numbing patch during the day.    Monitor for constipation, decreased respirations, and feeling groggy while on oxycodone.

## 2022-03-01 NOTE — PROGRESS NOTES
Subjective:   Chief Complaint/History of Present Illness:  Manolo Puri is a 81 y.o. male established patient who presents today to discuss medical problems as listed below. Sharad is accompanied by his wife, Lilian.    Problem   Closed Fracture of One Rib of Right Side    Ribs series xray (2/28/2022): Mildly displaced fracture of the seventh rib on the right.    He sustained a fall around 10 or 11 PM this past Saturday.  He simply tripped over his feet in his bedroom.  There was no prodrome.  He hit the right side of his head/face on the bedside table and landed on the floor hard on his right side.  He was able to stop the bleeding fairly easily from the 2 scrapes on his face, one is on the frontal/ temporal aspect on the right side and the other is just lateral to the right eye.  No loss of consciousness.  He did fairly well on Sunday but then today on Monday he had significant worsening of the pain of the right side.  Described as a sharp pain located on the anterolateral aspect of the ribs just below his chest.  Pain is worse with coughing and with standing up.  He wonders if he slept on it wrong last night which made the pain worse.  He has taken Tylenol with mild improvement of pain.     Type 2 Diabetes Mellitus, With Long-Term Current Use of Insulin ([facility])       Ref. Range 8/17/2015 09:37 7/15/2016 10:49 9/4/2017 08:58 5/23/2018 07:32 11/1/2018 11:16 4/1/2020 11:06   Glycohemoglobin Latest Ref Range: 0.0 - 5.6 % 14.0 (H) 12.4 (H) 12.0 (H) 14.2 (H) 13.7 (H) 8.7 (A)   Estim. Avg Glu Latest Units: mg/dL 355 309 298 361 346         Ref. Range 5/23/2018 07:32   Micro Alb Creat Ratio Latest Ref Range: 0 - 30 mg/g 81 (H)       He was diagnosed around age 62 with type 2 diabetes, not sure how long he had prediabetes beforehand. He was initially taking metfomin and has been on 70/30 insulin since age 74, currently using 52 U twice daily. His A1c's typically average in the 8's, however in 2018 he decompensated  and had A1c of 13-14. He purchased a Ifrah at that time and has been using it with improved numbers. 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 No history of chronic kidney disease. He has microalbuminuria. No neuropathy.          Current Medications:  Current Outpatient Medications Ordered in Epic   Medication Sig Dispense Refill   • oxyCODONE immediate-release (ROXICODONE) 5 MG Tab Take 1 Tablet by mouth every 6 hours as needed for Severe Pain for up to 14 days. 56 Tablet 0   • metFORMIN ER (GLUCOPHAGE XR) 500 MG TABLET SR 24 HR Take 2 tablets by mouth twice daily 30 Tablet 0   • Continuous Blood Gluc Sensor (FREESTYLE IFRAH 14 DAY SENSOR) [facility] Place 1 Application on the skin every 14 days. APPLY 1 PATCH TO CHECK GLUCOSE AS DIRECTED AND  CHANGE  EVERY  14  DAYS 3 Each 3   • Continuous Blood Gluc Sensor (FREESTYLE IFRAH SENSOR SYSTEM) [facility]      • BROMSITE 0.075 % Solution      • triamcinolone acetonide (KENALOG) 0.1 % Cream Apply 1 g to affected area(s) 2 times a day. 1 Tube 3   • Insulin NPH Isophane & Regular (NOVOLIN 70/30 SC) Inject 52 Units as instructed 2 Times a Day.       No current [facility]-ordered facility-administered medications on file.          Objective:   Physical Exam:    Vitals: /64 (BP Location: Right arm, Patient Position: Sitting, BP Cuff Size: Adult)   Pulse (!) 104   Temp 36.5 °C (97.7 °F) (Temporal)   Resp 16   Ht 1.829 m (6')   Wt 107 kg (236 lb 8 oz)   SpO2 95%    BMI: Body mass index is 32.08 kg/m².  Physical Exam  Constitutional:       Appearance: He is obese. He is not ill-appearing or toxic-appearing.      Comments: Appears uncomfortable   Eyes:      General: No scleral icterus.     Conjunctiva/sclera: Conjunctivae normal.   Cardiovascular:      Rate and Rhythm: Regular rhythm. Tachycardia present.      Pulses: Normal pulses.   Pulmonary:      Effort: Pulmonary effort is normal. No respiratory distress.      Breath sounds: Normal breath sounds. No wheezing, rhonchi or  rales.   Abdominal:      Comments: Protuberant abdomen limits exam. Pain in RUQ related to musculoskeletal pain from rib injury.   Skin:     Findings: Bruising and lesion present. No rash.      Comments: Healing lesions to right of right lateral eye and right scalp.   Psychiatric:         Mood and Affect: Mood normal.         Behavior: Behavior normal.         Thought Content: Thought content normal.         Judgment: Judgment normal.          Assessment and Plan:   Manolo is a 81 y.o. male with the following:  Problem List Items Addressed This Visit     Type 2 diabetes mellitus, with long-term current use of insulin (HCC)     Chronic and decompensated problem.  Overdue for follow-up.  Will have him come back in next week to get his labs updated.  Continue insulin 70/30 52 units twice daily and metformin. Continue Dr. Alfonso with ophthalmology for treatment of macular edema. Will recheck urine microalbumin. He will continue with Ifrah sensor.         Relevant Orders    CBC WITH DIFFERENTIAL    Comp Metabolic Panel    HEMOGLOBIN A1C    VITAMIN B12    VITAMIN D,25 HYDROXY    MICROALBUMIN CREAT RATIO URINE    Lipid Profile    TSH    FREE THYROXINE    Closed fracture of one rib of right side     New and decompensated problem.  Secondary to fall in his bedroom.  Will have him take oxycodone 5 mg 3-4 times daily over the next 2 weeks as needed for severe pain.  Encouraged him to also purchase lidocaine patches to apply over the top and alternate ice and heat to help with the surrounding muscle pain.  Could consider something like gabapentin if pain is still out of control in a couple days.  Continue Tylenol in the meantime.  I suspect his tachycardia is related to uncontrolled pain.  Acid update me in 2 days also we can refer him to pain medicine at that time if needed for nerve block.  Encouraged him to take frequent deep breaths to help prevent atelectasis and subsequent complication of pneumonia related to rib  fracture.  He voiced understanding and will keep me updated if he has any decline in functionality.         Relevant Medications    oxyCODONE immediate-release (ROXICODONE) 5 MG Tab    Other Relevant Orders    Consent for Opiate Prescription         RTC: Return in about 2 weeks (around 3/14/2022).    I spent a total of 34 minutes with record review, exam, communication with the patient, communication with other providers, and documentation of this encounter.    PLEASE NOTE: This dictation was created using voice recognition software. I have made every reasonable attempt to correct obvious errors, but I expect that there are errors of grammar and possibly content that I did not discover before finalizing the note.      Shirley Ozuna, DO  Geriatric and Internal Medicine  [facility]

## 2022-03-01 NOTE — ASSESSMENT & PLAN NOTE
Chronic and decompensated problem.  Overdue for follow-up.  Will have him come back in next week to get his labs updated.  Continue insulin 70/30 52 units twice daily and metformin. Continue Dr. Alfonso with ophthalmology for treatment of macular edema. Will recheck urine microalbumin. He will continue with Ifrah sensor.

## 2022-03-01 NOTE — TELEPHONE ENCOUNTER
1. Caller Name: Manolo Pepe Swanson                          Call Back Number: [phone] (home) [phone] (work)        How would the patient prefer to be contacted with a response: Phone call OK to leave a detailed message    Pharmacy can only give 7days  For first time user   Spoke with pharmacy pt already picked up  Ok with 1 week

## 2022-03-01 NOTE — ASSESSMENT & PLAN NOTE
New and decompensated problem.  Secondary to fall in his bedroom.  Will have him take oxycodone 5 mg 3-4 times daily over the next 2 weeks as needed for severe pain.  Encouraged him to also purchase lidocaine patches to apply over the top and alternate ice and heat to help with the surrounding muscle pain.  Could consider something like gabapentin if pain is still out of control in a couple days.  Continue Tylenol in the meantime.  I suspect his tachycardia is related to uncontrolled pain.  Acid update me in 2 days also we can refer him to pain medicine at that time if needed for nerve block.  Encouraged him to take frequent deep breaths to help prevent atelectasis and subsequent complication of pneumonia related to rib fracture.  He voiced understanding and will keep me updated if he has any decline in functionality.

## 2022-03-10 ENCOUNTER — NON-PROVIDER VISIT (OUTPATIENT)
Dept: MEDICAL GROUP | Facility: PHYSICIAN GROUP | Age: 82
End: 2022-03-10
Payer: MEDICARE

## 2022-03-10 ENCOUNTER — TELEPHONE (OUTPATIENT)
Dept: MEDICAL GROUP | Facility: PHYSICIAN GROUP | Age: 82
End: 2022-03-10

## 2022-03-10 ENCOUNTER — HOSPITAL ENCOUNTER (OUTPATIENT)
Facility: MEDICAL CENTER | Age: 82
End: 2022-03-10
Attending: INTERNAL MEDICINE
Payer: MEDICARE

## 2022-03-10 DIAGNOSIS — E11.311 TYPE 2 DIABETES MELLITUS WITH BOTH EYES AFFECTED BY RETINOPATHY AND MACULAR EDEMA, WITH LONG-TERM CURRENT USE OF INSULIN, UNSPECIFIED RETINOPATHY SEVERITY (HCC): ICD-10-CM

## 2022-03-10 DIAGNOSIS — S22.31XA CLOSED FRACTURE OF ONE RIB OF RIGHT SIDE, INITIAL ENCOUNTER: ICD-10-CM

## 2022-03-10 DIAGNOSIS — Z79.4 TYPE 2 DIABETES MELLITUS WITH BOTH EYES AFFECTED BY RETINOPATHY AND MACULAR EDEMA, WITH LONG-TERM CURRENT USE OF INSULIN, UNSPECIFIED RETINOPATHY SEVERITY (HCC): ICD-10-CM

## 2022-03-10 LAB
CREAT UR-MCNC: 76.02 MG/DL
MICROALBUMIN UR-MCNC: 39.2 MG/DL
MICROALBUMIN/CREAT UR: 516 MG/G (ref 0–30)

## 2022-03-10 PROCEDURE — 80053 COMPREHEN METABOLIC PANEL: CPT

## 2022-03-10 PROCEDURE — 80061 LIPID PANEL: CPT

## 2022-03-10 PROCEDURE — 82306 VITAMIN D 25 HYDROXY: CPT

## 2022-03-10 PROCEDURE — 99000 SPECIMEN HANDLING OFFICE-LAB: CPT | Performed by: INTERNAL MEDICINE

## 2022-03-10 PROCEDURE — 36415 COLL VENOUS BLD VENIPUNCTURE: CPT | Performed by: INTERNAL MEDICINE

## 2022-03-10 PROCEDURE — 84443 ASSAY THYROID STIM HORMONE: CPT

## 2022-03-10 PROCEDURE — 83036 HEMOGLOBIN GLYCOSYLATED A1C: CPT | Mod: GA

## 2022-03-10 PROCEDURE — 82570 ASSAY OF URINE CREATININE: CPT

## 2022-03-10 PROCEDURE — 82607 VITAMIN B-12: CPT

## 2022-03-10 PROCEDURE — 84439 ASSAY OF FREE THYROXINE: CPT

## 2022-03-10 PROCEDURE — 82043 UR ALBUMIN QUANTITATIVE: CPT

## 2022-03-10 PROCEDURE — 85025 COMPLETE CBC W/AUTO DIFF WBC: CPT

## 2022-03-10 RX ORDER — OXYCODONE HYDROCHLORIDE 5 MG/1
5 TABLET ORAL EVERY 6 HOURS PRN
Qty: 28 TABLET | Refills: 0 | Status: SHIPPED | OUTPATIENT
Start: 2022-03-10 | End: 2022-03-17

## 2022-03-10 NOTE — TELEPHONE ENCOUNTER
1. Caller Name: Manolo Pepe Swanson                          Call Back Number: [phone] (home) [phone] (work)        How would the patient prefer to be contacted with a response: Phone call OK to leave a detailed message    Sharad was here for blood draw had a question about new RX for pain medicine   Called the pharmacy and since they gave him one week  the rest of the RX is null and void   Need new RX and Sharad wants a call when we are done with it, he states he doesn't use My Chart

## 2022-03-10 NOTE — TELEPHONE ENCOUNTER
1. Caller Name: Manolo Pepe Swanson                          Call Back Number: [phone] (home) [phone] (work)        How would the patient prefer to be contacted with a response: Phone call OK to leave a detailed message    Called patient to tell him medicine was called in  Please call pharmacy to see when it will be ready

## 2022-03-10 NOTE — PROGRESS NOTES
Pt was seated, confirmed pt name and , procedure explained to pt, venipuncture performed in LAC using aseptic technique, 3 gold, 1 green, 2 lav. tubes collected, gauze placed with pressure on venipuncture site until hemostasis observed, site clean and dry, no redness or swelling observed, bandage placed, pt tolerated well and voiced no concerns.

## 2022-03-11 LAB
25(OH)D3 SERPL-MCNC: 27 NG/ML (ref 30–100)
ALBUMIN SERPL BCP-MCNC: 4.2 G/DL (ref 3.2–4.9)
ALBUMIN/GLOB SERPL: 1.2 G/DL
ALP SERPL-CCNC: 95 U/L (ref 30–99)
ALT SERPL-CCNC: 21 U/L (ref 2–50)
ANION GAP SERPL CALC-SCNC: 13 MMOL/L (ref 7–16)
AST SERPL-CCNC: 21 U/L (ref 12–45)
BASOPHILS # BLD AUTO: 0.8 % (ref 0–1.8)
BASOPHILS # BLD: 0.08 K/UL (ref 0–0.12)
BILIRUB SERPL-MCNC: 0.3 MG/DL (ref 0.1–1.5)
BUN SERPL-MCNC: 23 MG/DL (ref 8–22)
CALCIUM SERPL-MCNC: 9.6 MG/DL (ref 8.5–10.5)
CHLORIDE SERPL-SCNC: 99 MMOL/L (ref 96–112)
CHOLEST SERPL-MCNC: 160 MG/DL (ref 100–199)
CO2 SERPL-SCNC: 22 MMOL/L (ref 20–33)
CREAT SERPL-MCNC: 1.29 MG/DL (ref 0.5–1.4)
EOSINOPHIL # BLD AUTO: 0.35 K/UL (ref 0–0.51)
EOSINOPHIL NFR BLD: 3.5 % (ref 0–6.9)
ERYTHROCYTE [DISTWIDTH] IN BLOOD BY AUTOMATED COUNT: 46 FL (ref 35.9–50)
EST. AVERAGE GLUCOSE BLD GHB EST-MCNC: 243 MG/DL
GLOBULIN SER CALC-MCNC: 3.5 G/DL (ref 1.9–3.5)
GLUCOSE SERPL-MCNC: 365 MG/DL (ref 65–99)
HBA1C MFR BLD: 10.1 % (ref 4–5.6)
HCT VFR BLD AUTO: 50.4 % (ref 42–52)
HDLC SERPL-MCNC: 31 MG/DL
HGB BLD-MCNC: 15.7 G/DL (ref 14–18)
IMM GRANULOCYTES # BLD AUTO: 0.2 K/UL (ref 0–0.11)
IMM GRANULOCYTES NFR BLD AUTO: 2 % (ref 0–0.9)
LDLC SERPL CALC-MCNC: 77 MG/DL
LYMPHOCYTES # BLD AUTO: 1.48 K/UL (ref 1–4.8)
LYMPHOCYTES NFR BLD: 14.7 % (ref 22–41)
MCH RBC QN AUTO: 28.8 PG (ref 27–33)
MCHC RBC AUTO-ENTMCNC: 31.2 G/DL (ref 33.7–35.3)
MCV RBC AUTO: 92.3 FL (ref 81.4–97.8)
MONOCYTES # BLD AUTO: 0.95 K/UL (ref 0–0.85)
MONOCYTES NFR BLD AUTO: 9.4 % (ref 0–13.4)
NEUTROPHILS # BLD AUTO: 7.04 K/UL (ref 1.82–7.42)
NEUTROPHILS NFR BLD: 69.6 % (ref 44–72)
NRBC # BLD AUTO: 0 K/UL
NRBC BLD-RTO: 0 /100 WBC
PLATELET # BLD AUTO: 303 K/UL (ref 164–446)
PMV BLD AUTO: 11.5 FL (ref 9–12.9)
POTASSIUM SERPL-SCNC: 4.8 MMOL/L (ref 3.6–5.5)
PROT SERPL-MCNC: 7.7 G/DL (ref 6–8.2)
RBC # BLD AUTO: 5.46 M/UL (ref 4.7–6.1)
SODIUM SERPL-SCNC: 134 MMOL/L (ref 135–145)
T4 FREE SERPL-MCNC: 1.2 NG/DL (ref 0.93–1.7)
TRIGL SERPL-MCNC: 262 MG/DL (ref 0–149)
TSH SERPL DL<=0.005 MIU/L-ACNC: 1.28 UIU/ML (ref 0.38–5.33)
VIT B12 SERPL-MCNC: 3695 PG/ML (ref 211–911)
WBC # BLD AUTO: 10.1 K/UL (ref 4.8–10.8)

## 2022-03-29 DIAGNOSIS — E11.29 TYPE 2 DIABETES MELLITUS WITH MICROALBUMINURIA, WITH LONG-TERM CURRENT USE OF INSULIN (HCC): ICD-10-CM

## 2022-03-29 DIAGNOSIS — R80.9 TYPE 2 DIABETES MELLITUS WITH MICROALBUMINURIA, WITH LONG-TERM CURRENT USE OF INSULIN (HCC): ICD-10-CM

## 2022-03-29 DIAGNOSIS — Z79.4 TYPE 2 DIABETES MELLITUS WITH MICROALBUMINURIA, WITH LONG-TERM CURRENT USE OF INSULIN (HCC): ICD-10-CM

## 2022-03-30 RX ORDER — METFORMIN HYDROCHLORIDE 500 MG/1
1000 TABLET, EXTENDED RELEASE ORAL 2 TIMES DAILY
Qty: 240 TABLET | Refills: 0 | Status: SHIPPED | OUTPATIENT
Start: 2022-03-30 | End: 2022-05-31

## 2022-06-27 ENCOUNTER — HOSPITAL ENCOUNTER (INPATIENT)
Facility: MEDICAL CENTER | Age: 82
LOS: 1 days | DRG: 444 | End: 2022-06-30
Attending: EMERGENCY MEDICINE | Admitting: INTERNAL MEDICINE
Payer: MEDICARE

## 2022-06-27 ENCOUNTER — APPOINTMENT (OUTPATIENT)
Dept: RADIOLOGY | Facility: MEDICAL CENTER | Age: 82
DRG: 444 | End: 2022-06-27
Attending: EMERGENCY MEDICINE
Payer: MEDICARE

## 2022-06-27 ENCOUNTER — APPOINTMENT (OUTPATIENT)
Dept: RADIOLOGY | Facility: MEDICAL CENTER | Age: 82
DRG: 444 | End: 2022-06-27
Payer: MEDICARE

## 2022-06-27 PROBLEM — R74.8 ELEVATED LIVER ENZYMES: Status: ACTIVE | Noted: 2022-06-27

## 2022-06-27 PROBLEM — I10 HYPERTENSION: Status: ACTIVE | Noted: 2022-06-27

## 2022-06-27 PROBLEM — E87.1 HYPONATREMIA: Status: ACTIVE | Noted: 2022-06-27

## 2022-06-27 PROBLEM — R11.2 NAUSEA AND VOMITING: Status: ACTIVE | Noted: 2022-06-27

## 2022-06-27 PROBLEM — K80.20 CHOLELITHIASIS: Status: ACTIVE | Noted: 2022-06-27

## 2022-06-27 PROBLEM — R07.9 CHEST PAIN: Status: ACTIVE | Noted: 2022-06-27

## 2022-06-27 LAB
ALBUMIN SERPL BCP-MCNC: 4 G/DL (ref 3.2–4.9)
ALBUMIN/GLOB SERPL: 1.1 G/DL
ALP SERPL-CCNC: 149 U/L (ref 30–99)
ALT SERPL-CCNC: 326 U/L (ref 2–50)
ANION GAP SERPL CALC-SCNC: 13 MMOL/L (ref 7–16)
AST SERPL-CCNC: 460 U/L (ref 12–45)
BASOPHILS # BLD AUTO: 0.8 % (ref 0–1.8)
BASOPHILS # BLD: 0.09 K/UL (ref 0–0.12)
BILIRUB SERPL-MCNC: 2.4 MG/DL (ref 0.1–1.5)
BUN SERPL-MCNC: 23 MG/DL (ref 8–22)
CALCIUM SERPL-MCNC: 9.8 MG/DL (ref 8.5–10.5)
CHLORIDE SERPL-SCNC: 97 MMOL/L (ref 96–112)
CO2 SERPL-SCNC: 23 MMOL/L (ref 20–33)
CREAT SERPL-MCNC: 1.23 MG/DL (ref 0.5–1.4)
D DIMER PPP IA.FEU-MCNC: 0.9 UG/ML (FEU) (ref 0–0.5)
EKG IMPRESSION: NORMAL
EOSINOPHIL # BLD AUTO: 0 K/UL (ref 0–0.51)
EOSINOPHIL NFR BLD: 0 % (ref 0–6.9)
ERYTHROCYTE [DISTWIDTH] IN BLOOD BY AUTOMATED COUNT: 42.2 FL (ref 35.9–50)
FLUAV RNA SPEC QL NAA+PROBE: NEGATIVE
FLUBV RNA SPEC QL NAA+PROBE: NEGATIVE
GFR SERPLBLD CREATININE-BSD FMLA CKD-EPI: 59 ML/MIN/1.73 M 2
GLOBULIN SER CALC-MCNC: 3.6 G/DL (ref 1.9–3.5)
GLUCOSE BLD STRIP.AUTO-MCNC: 218 MG/DL (ref 65–99)
GLUCOSE SERPL-MCNC: 344 MG/DL (ref 65–99)
HAV IGM SERPL QL IA: NORMAL
HBV CORE IGM SER QL: NORMAL
HBV SURFACE AG SER QL: NORMAL
HCT VFR BLD AUTO: 49.1 % (ref 42–52)
HCV AB SER QL: NORMAL
HGB BLD-MCNC: 16.2 G/DL (ref 14–18)
LIPASE SERPL-CCNC: 141 U/L (ref 11–82)
LYMPHOCYTES # BLD AUTO: 0.68 K/UL (ref 1–4.8)
LYMPHOCYTES NFR BLD: 6.1 % (ref 22–41)
MANUAL DIFF BLD: NORMAL
MCH RBC QN AUTO: 28.5 PG (ref 27–33)
MCHC RBC AUTO-ENTMCNC: 33 G/DL (ref 33.7–35.3)
MCV RBC AUTO: 86.3 FL (ref 81.4–97.8)
MONOCYTES # BLD AUTO: 0.87 K/UL (ref 0–0.85)
MONOCYTES NFR BLD AUTO: 7.8 % (ref 0–13.4)
MORPHOLOGY BLD-IMP: NORMAL
MYELOCYTES NFR BLD MANUAL: 0.9 %
NEUTROPHILS # BLD AUTO: 9.37 K/UL (ref 1.82–7.42)
NEUTROPHILS NFR BLD: 83.5 % (ref 44–72)
NEUTS BAND NFR BLD MANUAL: 0.9 % (ref 0–10)
NRBC # BLD AUTO: 0 K/UL
NRBC BLD-RTO: 0 /100 WBC
PLATELET # BLD AUTO: 280 K/UL (ref 164–446)
PLATELET BLD QL SMEAR: NORMAL
PMV BLD AUTO: 11.1 FL (ref 9–12.9)
POTASSIUM SERPL-SCNC: 4.9 MMOL/L (ref 3.6–5.5)
PROT SERPL-MCNC: 7.6 G/DL (ref 6–8.2)
RBC # BLD AUTO: 5.69 M/UL (ref 4.7–6.1)
RBC BLD AUTO: NORMAL
RSV RNA SPEC QL NAA+PROBE: NEGATIVE
SARS-COV-2 RNA RESP QL NAA+PROBE: NOTDETECTED
SODIUM SERPL-SCNC: 133 MMOL/L (ref 135–145)
SPECIMEN SOURCE: NORMAL
TROPONIN T SERPL-MCNC: 30 NG/L (ref 6–19)
TROPONIN T SERPL-MCNC: 30 NG/L (ref 6–19)
WBC # BLD AUTO: 11.1 K/UL (ref 4.8–10.8)

## 2022-06-27 PROCEDURE — A9270 NON-COVERED ITEM OR SERVICE: HCPCS | Performed by: HOSPITALIST

## 2022-06-27 PROCEDURE — 85379 FIBRIN DEGRADATION QUANT: CPT

## 2022-06-27 PROCEDURE — 85025 COMPLETE CBC W/AUTO DIFF WBC: CPT

## 2022-06-27 PROCEDURE — 700105 HCHG RX REV CODE 258: Performed by: HOSPITALIST

## 2022-06-27 PROCEDURE — 71045 X-RAY EXAM CHEST 1 VIEW: CPT

## 2022-06-27 PROCEDURE — 36415 COLL VENOUS BLD VENIPUNCTURE: CPT

## 2022-06-27 PROCEDURE — 93005 ELECTROCARDIOGRAM TRACING: CPT | Performed by: EMERGENCY MEDICINE

## 2022-06-27 PROCEDURE — 700105 HCHG RX REV CODE 258: Performed by: EMERGENCY MEDICINE

## 2022-06-27 PROCEDURE — 85007 BL SMEAR W/DIFF WBC COUNT: CPT

## 2022-06-27 PROCEDURE — 96372 THER/PROPH/DIAG INJ SC/IM: CPT

## 2022-06-27 PROCEDURE — 83690 ASSAY OF LIPASE: CPT

## 2022-06-27 PROCEDURE — G0378 HOSPITAL OBSERVATION PER HR: HCPCS

## 2022-06-27 PROCEDURE — 99220 PR INITIAL OBSERVATION CARE,LEVL III: CPT | Mod: AI | Performed by: HOSPITALIST

## 2022-06-27 PROCEDURE — 99284 EMERGENCY DEPT VISIT MOD MDM: CPT | Performed by: SURGERY

## 2022-06-27 PROCEDURE — 82962 GLUCOSE BLOOD TEST: CPT

## 2022-06-27 PROCEDURE — 84484 ASSAY OF TROPONIN QUANT: CPT

## 2022-06-27 PROCEDURE — 76705 ECHO EXAM OF ABDOMEN: CPT

## 2022-06-27 PROCEDURE — C9803 HOPD COVID-19 SPEC COLLECT: HCPCS | Performed by: EMERGENCY MEDICINE

## 2022-06-27 PROCEDURE — 99285 EMERGENCY DEPT VISIT HI MDM: CPT

## 2022-06-27 PROCEDURE — 0241U HCHG SARS-COV-2 COVID-19 NFCT DS RESP RNA 4 TRGT MIC: CPT

## 2022-06-27 PROCEDURE — 700102 HCHG RX REV CODE 250 W/ 637 OVERRIDE(OP): Performed by: HOSPITALIST

## 2022-06-27 PROCEDURE — 80074 ACUTE HEPATITIS PANEL: CPT

## 2022-06-27 PROCEDURE — 93005 ELECTROCARDIOGRAM TRACING: CPT

## 2022-06-27 PROCEDURE — 80053 COMPREHEN METABOLIC PANEL: CPT

## 2022-06-27 RX ORDER — ASPIRIN 325 MG
325 TABLET ORAL DAILY
Status: DISCONTINUED | OUTPATIENT
Start: 2022-06-27 | End: 2022-06-30 | Stop reason: HOSPADM

## 2022-06-27 RX ORDER — HYDRALAZINE HYDROCHLORIDE 20 MG/ML
10 INJECTION INTRAMUSCULAR; INTRAVENOUS EVERY 4 HOURS PRN
Status: DISCONTINUED | OUTPATIENT
Start: 2022-06-27 | End: 2022-06-30 | Stop reason: HOSPADM

## 2022-06-27 RX ORDER — SODIUM CHLORIDE 9 MG/ML
INJECTION, SOLUTION INTRAVENOUS CONTINUOUS
Status: DISCONTINUED | OUTPATIENT
Start: 2022-06-27 | End: 2022-06-29

## 2022-06-27 RX ORDER — ASPIRIN 300 MG/1
300 SUPPOSITORY RECTAL DAILY
Status: DISCONTINUED | OUTPATIENT
Start: 2022-06-27 | End: 2022-06-30 | Stop reason: HOSPADM

## 2022-06-27 RX ORDER — ONDANSETRON 2 MG/ML
4 INJECTION INTRAMUSCULAR; INTRAVENOUS EVERY 4 HOURS PRN
Status: DISCONTINUED | OUTPATIENT
Start: 2022-06-27 | End: 2022-06-30 | Stop reason: HOSPADM

## 2022-06-27 RX ORDER — AMOXICILLIN 250 MG
2 CAPSULE ORAL 2 TIMES DAILY
Status: DISCONTINUED | OUTPATIENT
Start: 2022-06-28 | End: 2022-06-30 | Stop reason: HOSPADM

## 2022-06-27 RX ORDER — ACETAMINOPHEN 325 MG/1
650 TABLET ORAL EVERY 6 HOURS PRN
Status: DISCONTINUED | OUTPATIENT
Start: 2022-06-27 | End: 2022-06-30 | Stop reason: HOSPADM

## 2022-06-27 RX ORDER — DEXTROSE MONOHYDRATE 25 G/50ML
25 INJECTION, SOLUTION INTRAVENOUS
Status: DISCONTINUED | OUTPATIENT
Start: 2022-06-27 | End: 2022-06-30 | Stop reason: HOSPADM

## 2022-06-27 RX ORDER — BISACODYL 10 MG
10 SUPPOSITORY, RECTAL RECTAL
Status: DISCONTINUED | OUTPATIENT
Start: 2022-06-27 | End: 2022-06-30 | Stop reason: HOSPADM

## 2022-06-27 RX ORDER — POLYETHYLENE GLYCOL 3350 17 G/17G
1 POWDER, FOR SOLUTION ORAL
Status: DISCONTINUED | OUTPATIENT
Start: 2022-06-27 | End: 2022-06-30 | Stop reason: HOSPADM

## 2022-06-27 RX ORDER — ONDANSETRON 4 MG/1
4 TABLET, ORALLY DISINTEGRATING ORAL EVERY 4 HOURS PRN
Status: DISCONTINUED | OUTPATIENT
Start: 2022-06-27 | End: 2022-06-30 | Stop reason: HOSPADM

## 2022-06-27 RX ORDER — ASPIRIN 81 MG/1
324 TABLET, CHEWABLE ORAL DAILY
Status: DISCONTINUED | OUTPATIENT
Start: 2022-06-27 | End: 2022-06-30 | Stop reason: HOSPADM

## 2022-06-27 RX ORDER — SODIUM CHLORIDE, SODIUM LACTATE, POTASSIUM CHLORIDE, CALCIUM CHLORIDE 600; 310; 30; 20 MG/100ML; MG/100ML; MG/100ML; MG/100ML
1000 INJECTION, SOLUTION INTRAVENOUS ONCE
Status: COMPLETED | OUTPATIENT
Start: 2022-06-27 | End: 2022-06-27

## 2022-06-27 RX ADMIN — SODIUM CHLORIDE, POTASSIUM CHLORIDE, SODIUM LACTATE AND CALCIUM CHLORIDE 1000 ML: 600; 310; 30; 20 INJECTION, SOLUTION INTRAVENOUS at 15:06

## 2022-06-27 RX ADMIN — ASPIRIN 81 MG 324 MG: 81 TABLET ORAL at 18:36

## 2022-06-27 RX ADMIN — SODIUM CHLORIDE: 9 INJECTION, SOLUTION INTRAVENOUS at 18:36

## 2022-06-27 RX ADMIN — INSULIN HUMAN 50 UNITS: 100 INJECTION, SUSPENSION SUBCUTANEOUS at 18:36

## 2022-06-27 RX ADMIN — INSULIN HUMAN 3 UNITS: 100 INJECTION, SOLUTION PARENTERAL at 18:43

## 2022-06-27 ASSESSMENT — PATIENT HEALTH QUESTIONNAIRE - PHQ9
2. FEELING DOWN, DEPRESSED, IRRITABLE, OR HOPELESS: NOT AT ALL
SUM OF ALL RESPONSES TO PHQ9 QUESTIONS 1 AND 2: 0
1. LITTLE INTEREST OR PLEASURE IN DOING THINGS: NOT AT ALL

## 2022-06-27 ASSESSMENT — LIFESTYLE VARIABLES: DO YOU DRINK ALCOHOL: NO

## 2022-06-27 ASSESSMENT — FIBROSIS 4 INDEX: FIB4 SCORE: 1.19

## 2022-06-27 NOTE — H&P
Hospital Medicine History & Physical Note    Date of Service  2022    Primary Care Physician  Shirley Ozuna D.O.    Consultants  general surgery    Specialist Names: Mary Garcia MD    Code Status  Full Code    Chief Complaint  Chief Complaint   Patient presents with   • Vomiting     Woke up vomiting at 6am  then had chest tightness   • Chest Pain     Constant tightness in the center of his chest, no radiating        History of Presenting Illness  Manolo Puri is a 81 y.o. male diabetic with dyslipidemia who presented 2022 with chest pain along with nausea and vomiting upon waking this morning.  He states three days ago he was lifting 40 lb bundles of magazines and felt very tired, more so than normal but no chest pain.  Today he cannot recall if his localized chest ache came first or the nausea.  He vomiting 3-4 times with non-bloody emesis of fluid he had previously drank.  He denies any bowel changes and no blood in stool.      I discussed the plan of care with patient.    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    Surgical History  Eye surgery at [facility]     Family History  family history includes Diabetes in his father and maternal grandfather. Mother  age 98.  Father  in accident falling down stairs at age 78.    Family history reviewed with patient. There is no family history that is pertinent to the chief complaint.     Social History   reports that he has never smoked. He has never used smokeless tobacco. He reports that he does not drink alcohol and does not use drugs. , wife has advanced dementia.  Has 6 children.      Allergies  No Known Allergies    Medications  Prior to Admission Medications   Prescriptions Last Dose Informant Patient Reported? Taking?    Continuous Blood Gluc Sensor (FREESTYLE CONNIE 14 DAY SENSOR) [facility]   No No   Sig: Place 1 Application on the skin every 14 days. APPLY 1 PATCH TO CHECK GLUCOSE AS DIRECTED AND  CHANGE  EVERY  14  DAYS   Continuous Blood Gluc Sensor (FREESTYLE CONNIE SENSOR SYSTEM) [facility]   Yes No   insulin 70/30 (HUMULIN 70/30/NOVOLIN 70/30) (70-30) 100 UNIT/ML Suspension 6/27/2022 at 0830 Patient Yes No   Sig: Inject 50 Units under the skin 2 times a day.   metFORMIN ER (GLUCOPHAGE XR) 500 MG TABLET SR 24 HR 6/27/2022 at 0830  No No   Sig: Take 2 tablets by mouth twice daily   Patient taking differently: Take 1,000 mg by mouth 2 times a day.      Facility-Administered Medications: None       Physical Exam  Temp:  [36.8 °C (98.2 °F)] 36.8 °C (98.2 °F)  Pulse:  [91-98] 96  Resp:  [15-20] 20  BP: (143-188)/() 182/101  SpO2:  [92 %-98 %] 92 %  Blood Pressure : (!) 185/99   Temperature: 36.8 °C (98.2 °F)   Pulse: 93   Respiration: 20   Pulse Oximetry: 95 %       Physical Exam  Vitals reviewed.   Constitutional:       Appearance: Normal appearance. He is obese. He is not diaphoretic.   HENT:      Head: Normocephalic and atraumatic.      Nose: Nose normal.      Mouth/Throat:      Mouth: Mucous membranes are moist.      Pharynx: No oropharyngeal exudate.   Eyes:      General: No scleral icterus.        Right eye: No discharge.         Left eye: No discharge.      Extraocular Movements: Extraocular movements intact.      Conjunctiva/sclera: Conjunctivae normal.   Cardiovascular:      Rate and Rhythm: Normal rate and regular rhythm.      Pulses:           Radial pulses are 2+ on the right side and 2+ on the left side.        Dorsalis pedis pulses are 2+ on the right side and 2+ on the left side.      Heart sounds: No murmur heard.  Pulmonary:      Effort: Pulmonary effort is normal. No respiratory distress.      Breath sounds: Normal breath sounds. No wheezing or rales.   Abdominal:      General: Bowel sounds are normal. There is no  distension.      Palpations: Abdomen is soft.      Tenderness: There is no abdominal tenderness. Negative signs include Stinson's sign.   Musculoskeletal:         General: No swelling or tenderness.      Cervical back: No tenderness. No muscular tenderness.   Lymphadenopathy:      Cervical: No cervical adenopathy.   Skin:     Coloration: Skin is not jaundiced or pale.   Neurological:      General: No focal deficit present.      Mental Status: He is alert and oriented to person, place, and time. Mental status is at baseline.      Cranial Nerves: No cranial nerve deficit.   Psychiatric:         Mood and Affect: Mood normal.         Behavior: Behavior normal.         Laboratory:  Recent Labs     06/27/22  1103   WBC 11.1*   RBC 5.69   HEMOGLOBIN 16.2   HEMATOCRIT 49.1   MCV 86.3   MCH 28.5   MCHC 33.0*   RDW 42.2   PLATELETCT 280   MPV 11.1     Recent Labs     06/27/22  1103   SODIUM 133*   POTASSIUM 4.9   CHLORIDE 97   CO2 23   GLUCOSE 344*   BUN 23*   CREATININE 1.23   CALCIUM 9.8     Recent Labs     06/27/22  1103   ALTSGPT 326*   ASTSGOT 460*   ALKPHOSPHAT 149*   TBILIRUBIN 2.4*   LIPASE 141*   GLUCOSE 344*         No results for input(s): NTPROBNP in the last 72 hours.      Recent Labs     06/27/22  1103   TROPONINT 30*       Imaging:  US-RUQ   Final Result      1. Markedly suboptimal examination secondary to patient body habitus and overlying bowel gas.   2. Hepatomegaly and hepatic steatosis.   3. Cholelithiasis, with no sonographic evidence of acute cholecystitis.   4. Nonvisualization of the main portal vein. It was visualized and patent on the prior examination.      DX-CHEST-PORTABLE (1 VIEW)   Final Result      No acute findings.      NM-CARDIAC STRESS TEST    (Results Pending)   UD-COUUCMC-Q/O    (Results Pending)           Assessment/Plan:  Justification for Admission Status  I anticipate this patient is appropriate for observation status at this time because stress testing to rule out ACS and MRCP to  evaluate for choledocolithiasis.    * Chest pain- (present on admission)  Assessment & Plan  Monitor on telemetry  Follow serial troponin for any elevation  Check MRCP given elevate liver enzymes  Checking lipase  EKG showing sinus tach with 1st degree HB, avoid beta blocker  Aspirin  Check statin in am.    Cholelithiasis  Assessment & Plan  Noted on U/S abd.  MRCP to rule out bile duct obstruction. If obstructed needs GI.  Pain management  General Surgeon, Mary Garcia, consulted.    Hypertension  Assessment & Plan  Monitor vitals.  PRN Hydralazine IV for SBP >180.    Hyponatremia  Assessment & Plan  Na:133  IV NS @ 125cc/hr  Repeat am CMP  Mild component from hyperglycemia but mostly dehydration from vomiting    Nausea and vomiting  Assessment & Plan  Antiemetics prn  Check lipase  IV fluids  Diet as tolerates    Elevated liver enzymes  Assessment & Plan  AST:460, ALT:326, Alk Phop:149, T bili:2.4  U/S RUQ poor view due to body habitus  Check MRCP  Ordered acute hepatitis panel  Monitor am CMP  IV fluids.    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   Encourage dietary change and increase in activity    Hyperlipidemia associated with type 2 diabetes mellitus (HCC)- (present on admission)  Assessment & Plan  Lab Results   Component Value Date/Time    CHOLSTRLTOT 160 03/10/2022 09:50 AM    LDL 77 03/10/2022 09:50 AM    HDL 31 (A) 03/10/2022 09:50 AM    TRIGLYCERIDE 262 (H) 03/10/2022 09:50 AM                Type 2 diabetes mellitus, with long-term current use of insulin (HCC)- (present on admission)  Assessment & Plan  Diabetic diet  70/30 insulin if tolerating meals  Monitor accuchecks and cover with SSI  HgbA1c:10.1 in past 4 months.      VTE prophylaxis: SCDs/TEDs and Xarelto 10 mg daily as prophylaxis

## 2022-06-27 NOTE — ASSESSMENT & PLAN NOTE
U/S RUQ poor view due to body habitus  MRCP neg for CBD stone  Hepatitis panel neg  Mixed results on repeat draw  GS Dr. Garcia following  Likely to need OP magnus w IOC  Will keep overnight 1 more night, monitor on diabetic diet, stop fluids and repeat hepatic panel in the a.m.

## 2022-06-27 NOTE — ASSESSMENT & PLAN NOTE
Diabetic diet  70/30 insulin if tolerating meals  Monitor accuchecks and cover with SSI  HgbA1c:10.1 in past 4 months.

## 2022-06-27 NOTE — ED PROVIDER NOTES
ED Provider Note    CHIEF COMPLAINT  Chief Complaint   Patient presents with   • Vomiting     Woke up vomiting at 6am  then had chest tightness   • Chest Pain     Constant tightness in the center of his chest, no radiating        HPI  Manolo Puri is a 81 y.o. male who presents to the emergency department with complaint of vomiting and chest pain.  He states he woke up this morning with constant chest tightness in the center of his chest with no radiation, no alleviating exacerbating factors, denies fever, shakes, chills, sweats, cough.  In addition, has had intermittent vomiting throughout the day.  He is a diabetic and his sugars have been out of control recently.  The patient states he normally uses his insulin has been corrected with slight have success at this point sugars in the 300 range.  Denies dysuria as well.  He does not see abdominal pain yet feels distention more than anything.  He is vaccinated for COVID  REVIEW OF SYSTEMS  Positives as above. Pertinent negatives include or, shakes, chills, sweats or loss of sensation or strength arms or legs, cough  All other 10 review of systems are negative    PAST MEDICAL HISTORY  Past Medical History:   Diagnosis Date   • Arthritis    • Atypical chest pain 9/3/2017   • Cataract    • Diabetes (HCC)    • Elevated hematocrit 9/3/2017   • Squamous cell cancer of skin of forearm        FAMILY HISTORY  Noncontributory    SOCIAL HISTORY  Social History     Socioeconomic History   • Marital status: Single   Tobacco Use   • Smoking status: Never Smoker   • Smokeless tobacco: Never Used   • Tobacco comment: continued abstinence   Vaping Use   • Vaping Use: Never used   Substance and Sexual Activity   • Alcohol use: No   • Drug use: No   • Sexual activity: Not Currently     Partners: Female       SURGICAL HISTORY  Past Surgical History:   Procedure Laterality Date   • OTHER      Derm removal of SCC       CURRENT MEDICATIONS  Home Medications     Reviewed by  Cintia Bonner R.N. (Registered Nurse) on 06/27/22 at 1054  Med List Status: Partial   Medication Last Dose Status   BROMSITE 0.075 % Solution  Active   Continuous Blood Gluc Sensor (FREESTYLE CONNIE 14 DAY SENSOR) Misc  Active   Continuous Blood Gluc Sensor (FREESTYLE CONNIE SENSOR SYSTEM) Misc  Active   Insulin NPH Isophane & Regular (NOVOLIN 70/30 SC) 6/27/2022 Active   metFORMIN ER (GLUCOPHAGE XR) 500 MG TABLET SR 24 HR 6/27/2022 Active   triamcinolone acetonide (KENALOG) 0.1 % Cream  Active                ALLERGIES  No Known Allergies    PHYSICAL EXAM  VITAL SIGNS: BP (!) 143/91   Pulse 91   Temp 36.8 °C (98.2 °F) (Temporal)   Resp 18   Wt 105 kg (230 lb 9.6 oz)   SpO2 98%   BMI 31.28 kg/m²      Constitutional: Well developed, Well nourished, No acute distress, Non-toxic appearance.   Eyes: PERRLA, EOMI, Conjunctiva normal, No discharge.   Cardiovascular: Normal heart rate, Normal rhythm, No murmurs, No rubs, No gallops, and intact distal pulses.   Thorax & Lungs:  No respiratory distress, no rales, no rhonchi, No wheezing, No chest wall tenderness.   Abdomen: Bowel sounds normal, Soft, slight epigastric tenderness, No guarding, No rebound, No pulsatile masses.   Skin: Warm, Dry, No erythema, No rash.   Extremities: Full range of motion, no deformity, no edema.  Neurologic: Alert & oriented x 3, No focal deficits noted, acting appropriately on pedal exam.  Psychiatric: Affect normal for clinical presentation.      LABORATORY/ECG  Results for orders placed or performed during the hospital encounter of 06/27/22   CBC with Differential   Result Value Ref Range    WBC 11.1 (H) 4.8 - 10.8 K/uL    RBC 5.69 4.70 - 6.10 M/uL    Hemoglobin 16.2 14.0 - 18.0 g/dL    Hematocrit 49.1 42.0 - 52.0 %    MCV 86.3 81.4 - 97.8 fL    MCH 28.5 27.0 - 33.0 pg    MCHC 33.0 (L) 33.7 - 35.3 g/dL    RDW 42.2 35.9 - 50.0 fL    Platelet Count 280 164 - 446 K/uL    MPV 11.1 9.0 - 12.9 fL    Neutrophils-Polys 83.50 (H) 44.00 -  72.00 %    Lymphocytes 6.10 (L) 22.00 - 41.00 %    Monocytes 7.80 0.00 - 13.40 %    Eosinophils 0.00 0.00 - 6.90 %    Basophils 0.80 0.00 - 1.80 %    Nucleated RBC 0.00 /100 WBC    Neutrophils (Absolute) 9.37 (H) 1.82 - 7.42 K/uL    Lymphs (Absolute) 0.68 (L) 1.00 - 4.80 K/uL    Monos (Absolute) 0.87 (H) 0.00 - 0.85 K/uL    Eos (Absolute) 0.00 0.00 - 0.51 K/uL    Baso (Absolute) 0.09 0.00 - 0.12 K/uL    NRBC (Absolute) 0.00 K/uL   Complete Metabolic Panel (CMP)   Result Value Ref Range    Sodium 133 (L) 135 - 145 mmol/L    Potassium 4.9 3.6 - 5.5 mmol/L    Chloride 97 96 - 112 mmol/L    Co2 23 20 - 33 mmol/L    Anion Gap 13.0 7.0 - 16.0    Glucose 344 (H) 65 - 99 mg/dL    Bun 23 (H) 8 - 22 mg/dL    Creatinine 1.23 0.50 - 1.40 mg/dL    Calcium 9.8 8.5 - 10.5 mg/dL    AST(SGOT) 460 (H) 12 - 45 U/L    ALT(SGPT) 326 (H) 2 - 50 U/L    Alkaline Phosphatase 149 (H) 30 - 99 U/L    Total Bilirubin 2.4 (H) 0.1 - 1.5 mg/dL    Albumin 4.0 3.2 - 4.9 g/dL    Total Protein 7.6 6.0 - 8.2 g/dL    Globulin 3.6 (H) 1.9 - 3.5 g/dL    A-G Ratio 1.1 g/dL   Troponin   Result Value Ref Range    Troponin T 30 (H) 6 - 19 ng/L   ESTIMATED GFR   Result Value Ref Range    GFR (CKD-EPI) 59 (A) >60 mL/min/1.73 m 2   DIFFERENTIAL MANUAL   Result Value Ref Range    Bands-Stabs 0.90 0.00 - 10.00 %    Myelocytes 0.90 %    Manual Diff Status PERFORMED    PERIPHERAL SMEAR REVIEW   Result Value Ref Range    Peripheral Smear Review see below    PLATELET ESTIMATE   Result Value Ref Range    Plt Estimation Normal    MORPHOLOGY   Result Value Ref Range    RBC Morphology Normal    CoV-2, FLU A/B, and RSV by PCR (2-4 Hours CEPHEID) : Collect NP swab in VTM    Specimen: Respirate   Result Value Ref Range    SARS-CoV-2 Source NP Swab    EKG   Result Value Ref Range    Report       [facility] Emergency Dept.    Test Date:  2022-06-27  Pt Name:    INDIRA FLORES              Department: ER  MRN:        [id number]                       Room:  Gender:     Male                         Technician: [id number]  :        [id number]                   Requested By:ER TRIAGE PROTOCOL  Order #:    [id number]                    Reading MD: CALEB ARROYO DO    Measurements  Intervals                                Axis  Rate:       99                           P:          52  WI:         232                          QRS:        -15  QRSD:       94                           T:          69  QT:         348  QTc:        447    Interpretive Statements  SINUS RHYTHM  FIRST DEGREE AV BLOCK  PROBABLE LEFT ATRIAL ABNORMALITY  BORDERLINE LEFT AXIS DEVIATION  BORDERLINE R WAVE PROGRESSION, ANTERIOR LEADS  Compared to ECG 10/17/2019 18:11:32  No significant changes  Electronically Signed On 2022 13:25:45 PDT by CALEB ARROYO DO           RADIOLOGY/PROCEDURES  US-RUQ   Final Result      1. Markedly suboptimal examination secondary to patient body habitus and overlying bowel gas.   2. Hepatomegaly and hepatic steatosis.   3. Cholelithiasis, with no sonographic evidence of acute cholecystitis.   4. Nonvisualization of the main portal vein. It was visualized and patent on the prior examination.      DX-CHEST-PORTABLE (1 VIEW)   Final Result      No acute findings.          COURSE & MEDICAL DECISION MAKING  Pertinent Labs & Imaging studies reviewed. (See chart for details)  A pleasant 81-year-old gentleman presents with nausea, vomiting, chest pain.  EKG shows no acute changes although he has a troponin of 30.  Patient does have a heart score of 5 the patient does not have an ST elevation myocardial infarction.  In addition, the patient has nausea and vomiting but no significant abdominal discomfort.  I surprised to see his total bilirubin of 2.4, alkaline phos is 149, ALT is 326 AST is 460 yet denies alcohol use and his ultrasound is negative for acute abnormality except for cholelithiasis and the common bile duct was not visualized.  Is possible patient  had choledocholithiasis.  On reevaluation at 1530, the patient is speaking in full sentence, soft, nonsurgical abdomen, there is no evidence of ST elevation myocardial infarction.  Discussed the patient with Dr. Munguia who graciously excepts hospitalization for this patient possible evaluation for his hepatic injury as well as chest pain.      FINAL IMPRESSION  Chest pain  Acute liver injury  Cholelithiasis       Electronically signed by: Jf Robin D.O., 6/27/2022 1:23 PM

## 2022-06-27 NOTE — ED TRIAGE NOTES
Ambulates to triage  Chief Complaint   Patient presents with   • Vomiting     Woke up vomiting at 6am  then had chest tightness   • Chest Pain     Constant tightness in the center of his chest, no radiating      Pt said he vomited again while waiting in the lobby, denies any abd pain, denies any blood in his vomit.

## 2022-06-28 ENCOUNTER — APPOINTMENT (OUTPATIENT)
Dept: RADIOLOGY | Facility: MEDICAL CENTER | Age: 82
DRG: 444 | End: 2022-06-28
Attending: HOSPITALIST
Payer: MEDICARE

## 2022-06-28 PROBLEM — R10.9 ABDOMINAL PAIN: Status: ACTIVE | Noted: 2022-06-28

## 2022-06-28 LAB
ALBUMIN SERPL BCP-MCNC: 3.6 G/DL (ref 3.2–4.9)
ALBUMIN/GLOB SERPL: 1.3 G/DL
ALP SERPL-CCNC: 151 U/L (ref 30–99)
ALT SERPL-CCNC: 453 U/L (ref 2–50)
ANION GAP SERPL CALC-SCNC: 12 MMOL/L (ref 7–16)
AST SERPL-CCNC: 421 U/L (ref 12–45)
BILIRUB SERPL-MCNC: 4.4 MG/DL (ref 0.1–1.5)
BUN SERPL-MCNC: 23 MG/DL (ref 8–22)
CALCIUM SERPL-MCNC: 9 MG/DL (ref 8.5–10.5)
CHLORIDE SERPL-SCNC: 104 MMOL/L (ref 96–112)
CO2 SERPL-SCNC: 24 MMOL/L (ref 20–33)
CREAT SERPL-MCNC: 1.05 MG/DL (ref 0.5–1.4)
ERYTHROCYTE [DISTWIDTH] IN BLOOD BY AUTOMATED COUNT: 41.1 FL (ref 35.9–50)
GFR SERPLBLD CREATININE-BSD FMLA CKD-EPI: 72 ML/MIN/1.73 M 2
GLOBULIN SER CALC-MCNC: 2.8 G/DL (ref 1.9–3.5)
GLUCOSE BLD STRIP.AUTO-MCNC: 132 MG/DL (ref 65–99)
GLUCOSE BLD STRIP.AUTO-MCNC: 206 MG/DL (ref 65–99)
GLUCOSE BLD STRIP.AUTO-MCNC: 225 MG/DL (ref 65–99)
GLUCOSE BLD STRIP.AUTO-MCNC: 281 MG/DL (ref 65–99)
GLUCOSE BLD STRIP.AUTO-MCNC: 285 MG/DL (ref 65–99)
GLUCOSE SERPL-MCNC: 123 MG/DL (ref 65–99)
HCT VFR BLD AUTO: 44 % (ref 42–52)
HGB BLD-MCNC: 14.6 G/DL (ref 14–18)
MCH RBC QN AUTO: 28.3 PG (ref 27–33)
MCHC RBC AUTO-ENTMCNC: 33.2 G/DL (ref 33.7–35.3)
MCV RBC AUTO: 85.4 FL (ref 81.4–97.8)
PLATELET # BLD AUTO: 239 K/UL (ref 164–446)
PMV BLD AUTO: 10.8 FL (ref 9–12.9)
POTASSIUM SERPL-SCNC: 3.9 MMOL/L (ref 3.6–5.5)
PROT SERPL-MCNC: 6.4 G/DL (ref 6–8.2)
RBC # BLD AUTO: 5.15 M/UL (ref 4.7–6.1)
SODIUM SERPL-SCNC: 140 MMOL/L (ref 135–145)
TROPONIN T SERPL-MCNC: 34 NG/L (ref 6–19)
WBC # BLD AUTO: 9.6 K/UL (ref 4.8–10.8)

## 2022-06-28 PROCEDURE — G0378 HOSPITAL OBSERVATION PER HR: HCPCS

## 2022-06-28 PROCEDURE — A9502 TC99M TETROFOSMIN: HCPCS

## 2022-06-28 PROCEDURE — 74181 MRI ABDOMEN W/O CONTRAST: CPT | Mod: MG

## 2022-06-28 PROCEDURE — A9270 NON-COVERED ITEM OR SERVICE: HCPCS | Performed by: HOSPITALIST

## 2022-06-28 PROCEDURE — 700105 HCHG RX REV CODE 258: Performed by: HOSPITALIST

## 2022-06-28 PROCEDURE — 82962 GLUCOSE BLOOD TEST: CPT | Mod: 91

## 2022-06-28 PROCEDURE — 36415 COLL VENOUS BLD VENIPUNCTURE: CPT

## 2022-06-28 PROCEDURE — 84484 ASSAY OF TROPONIN QUANT: CPT

## 2022-06-28 PROCEDURE — 700102 HCHG RX REV CODE 250 W/ 637 OVERRIDE(OP): Performed by: HOSPITALIST

## 2022-06-28 PROCEDURE — 85027 COMPLETE CBC AUTOMATED: CPT

## 2022-06-28 PROCEDURE — 80053 COMPREHEN METABOLIC PANEL: CPT

## 2022-06-28 PROCEDURE — 99225 PR SUBSEQUENT OBSERVATION CARE,LEVEL II: CPT | Performed by: NURSE PRACTITIONER

## 2022-06-28 PROCEDURE — 96372 THER/PROPH/DIAG INJ SC/IM: CPT

## 2022-06-28 RX ADMIN — ASPIRIN 325 MG: 325 TABLET ORAL at 18:06

## 2022-06-28 RX ADMIN — INSULIN HUMAN 50 UNITS: 100 INJECTION, SUSPENSION SUBCUTANEOUS at 19:32

## 2022-06-28 RX ADMIN — SODIUM CHLORIDE: 9 INJECTION, SOLUTION INTRAVENOUS at 07:05

## 2022-06-28 RX ADMIN — INSULIN HUMAN 5 UNITS: 100 INJECTION, SOLUTION PARENTERAL at 22:01

## 2022-06-28 RX ADMIN — INSULIN HUMAN 3 UNITS: 100 INJECTION, SOLUTION PARENTERAL at 14:11

## 2022-06-28 ASSESSMENT — LIFESTYLE VARIABLES
TOTAL SCORE: 0
ALCOHOL_USE: NO
HOW MANY TIMES IN THE PAST YEAR HAVE YOU HAD 5 OR MORE DRINKS IN A DAY: 0
TOTAL SCORE: 0
EVER HAD A DRINK FIRST THING IN THE MORNING TO STEADY YOUR NERVES TO GET RID OF A HANGOVER: NO
CONSUMPTION TOTAL: NEGATIVE
HAVE YOU EVER FELT YOU SHOULD CUT DOWN ON YOUR DRINKING: NO
EVER FELT BAD OR GUILTY ABOUT YOUR DRINKING: NO
HAVE PEOPLE ANNOYED YOU BY CRITICIZING YOUR DRINKING: NO
AVERAGE NUMBER OF DAYS PER WEEK YOU HAVE A DRINK CONTAINING ALCOHOL: 0
TOTAL SCORE: 0
ON A TYPICAL DAY WHEN YOU DRINK ALCOHOL HOW MANY DRINKS DO YOU HAVE: 0

## 2022-06-28 ASSESSMENT — ENCOUNTER SYMPTOMS
VOMITING: 1
RESPIRATORY NEGATIVE: 1
EYES NEGATIVE: 1
CHILLS: 0
NAUSEA: 1
NEUROLOGICAL NEGATIVE: 1
ABDOMINAL PAIN: 1
PSYCHIATRIC NEGATIVE: 1
FEVER: 0

## 2022-06-28 ASSESSMENT — COGNITIVE AND FUNCTIONAL STATUS - GENERAL
DAILY ACTIVITIY SCORE: 24
WALKING IN HOSPITAL ROOM: A LITTLE
SUGGESTED CMS G CODE MODIFIER DAILY ACTIVITY: CH
CLIMB 3 TO 5 STEPS WITH RAILING: A LITTLE
SUGGESTED CMS G CODE MODIFIER MOBILITY: CJ
MOBILITY SCORE: 22

## 2022-06-28 NOTE — PROGRESS NOTES
Tele strip shift review  Sinus Rhythm, First Degree Heart Block, HR: 89-98  0.23 / 0.08 / 0.39

## 2022-06-28 NOTE — PROGRESS NOTES
DATE: 6/28/2022    Hospital Day 1 cholelithiasis.    INTERVAL EVENTS:  WBC remains normalized.  LFTs & total bili elevated(increasing).  MRCP pending.    Awaiting MRCP results.  Surgery will continue, anticipate need for lap magnus, but may need ERCP for stone removal prior to surgery.  GI consultation likely to be helpful, although MRCP still pending.     PHYSICAL EXAMINATION:  Vital Signs: /83   Pulse 91   Temp 37.1 °C (98.7 °F) (Temporal)   Resp 18   Wt 105 kg (230 lb 9.6 oz)   SpO2 90%     Alert, afebrile, no acute distress.  Unlabored respirations tolerating room air.  Abdomen soft without distension.  No abdominal tenderness to palpation.  No nausea or vomiting.  No jaundice.    LABORATORY VALUES:   Recent Labs     06/27/22  1103 06/28/22  0232   WBC 11.1* 9.6   RBC 5.69 5.15   HEMOGLOBIN 16.2 14.6   HEMATOCRIT 49.1 44.0   MCV 86.3 85.4   MCH 28.5 28.3   MCHC 33.0* 33.2*   RDW 42.2 41.1   PLATELETCT 280 239   MPV 11.1 10.8     Recent Labs     06/27/22  1103 06/28/22  0232   SODIUM 133* 140   POTASSIUM 4.9 3.9   CHLORIDE 97 104   CO2 23 24   GLUCOSE 344* 123*   BUN 23* 23*   CREATININE 1.23 1.05   CALCIUM 9.8 9.0     Recent Labs     06/27/22  1103 06/28/22  0232   ASTSGOT 460* 421*   ALTSGPT 326* 453*   TBILIRUBIN 2.4* 4.4*   ALKPHOSPHAT 149* 151*   GLOBULIN 3.6* 2.8            IMAGING:   NM-CARDIAC STRESS TEST         US-RUQ   Final Result      1. Markedly suboptimal examination secondary to patient body habitus and overlying bowel gas.   2. Hepatomegaly and hepatic steatosis.   3. Cholelithiasis, with no sonographic evidence of acute cholecystitis.   4. Nonvisualization of the main portal vein. It was visualized and patent on the prior examination.      DX-CHEST-PORTABLE (1 VIEW)   Final Result      No acute findings.      [id number]    (Results Pending)       ASSESSMENT AND PLAN:   Cholelithiasis  Assessment & Plan  US without wall thickening, but stones and sludge present with elevated  LFTs.   MRCP pending, possible choledocholithiasis.   Will follow along closely.         ____________________________________     SIMBA Pena    DD: 6/28/2022  10:37 AM

## 2022-06-28 NOTE — CONSULTS
CHIEF COMPLAINT: chest pain     HISTORY OF PRESENT ILLNESS: The patient is a 81 year-old White man who presents to the Emergency Department with chest pain.  He says he woke up this morning with nausea vomiting and chest pain.  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  Since that time he says he has felt fatigued, but not really had any specific pain.  When he woke up this morning he was fatigued and also had nausea vomiting.  He claims that he had chest pain in his mid chest just above his epigastric area.  He had multiple episodes of vomiting today.  These were not associated with any food.  He has never had pain or symptoms like this before.  He does have a history of diabetes but denies other medical problems.  He has a mild elevation in troponin and elevated liver function test.  We were called to evaluate for possible cholecystitis.  The patient denies any history of previous abdominal surgery.    PAST MEDICAL HISTORY:  has a past medical history of Arthritis, Atypical chest pain (9/3/2017), Cataract, Diabetes (HCC), Elevated hematocrit (9/3/2017), and Squamous cell cancer of skin of forearm.    PAST SURGICAL HISTORY:  has a past surgical history that includes other.    ALLERGIES: No Known Allergies    CURRENT MEDICATIONS:    Home Medications     Reviewed by Aneudy Silvestre (Pharmacy Tech) on 06/27/22 at 1425  Med List Status: Complete   Medication Last Dose Status   Continuous Blood Gluc Sensor (FREESTYLE CONNIE 14 DAY SENSOR) Misc  Active   Continuous Blood Gluc Sensor (FREESTYLE CONNIE SENSOR SYSTEM) [facility] [place]  Active   insulin 70/30 (HUMULIN 70/30/NOVOLIN 70/30) (70-30) 100 UNIT/ML Suspension 6/27/2022 Active   metFORMIN ER (GLUCOPHAGE XR) 500 MG TABLET SR 24 HR 6/27/2022 Active                FAMILY HISTORY: family history includes Diabetes in his father and maternal grandfather.    SOCIAL HISTORY:  reports that he has never smoked. He has never used smokeless  tobacco. He reports that he does not drink alcohol and does not use drugs.    REVIEW OF SYSTEMS: Comprehensive review of systems is negative with the exception of the aforementioned HPI, PMH, and PSH bullets in accordance with CMS guidelines.    PHYSICAL EXAMINATION:      Constitutional:     Vital Signs: BP (!) 182/101   Pulse 96   Temp 36.8 °C (98.2 °F) (Temporal)   Resp 20   Wt 105 kg (230 lb 9.6 oz)   SpO2 92%    General Appearance: appears stated age, is in no apparent distress, overweight.  HEENT: The pupils are equal, round, and reactive to light bilaterally. The extraocular muscles are intact bilaterally.. The sclera are not icteric. Nares and oropharynx are clear.   Neck: Supple. No adenopathy.  Respiratory:   Inspection: Unlabored respirations, no intercostal retractions, paradoxical motion, or accessory muscle use.   Auscultation: normal.  Cardiovascular:   Inspection: The skin is warm and dry.  Auscultation: Regular rate and rhythm.   Peripheral Pulses: Normal.   Abdomen:  Inspection: Abdominal inspection reveals no abrasions, contusions, lacerations or penetrating wounds.   Palpation: Palpation is remarkable for moderate tenderness in the epigastric region. No abdominal wall hernias.  Extremities:   Examination of the upper and lower extremities demonstrates no cyanosis edema or clubbing.  Neurologic:   Alert & oriented to person, time and place. Normal motor function. Normal sensory function. No focal deficits noted.    LABORATORY VALUES:   Recent Labs     06/27/22  1103   WBC 11.1*   RBC 5.69   HEMOGLOBIN 16.2   HEMATOCRIT 49.1   MCV 86.3   MCH 28.5   MCHC 33.0*   RDW 42.2   PLATELETCT 280   MPV 11.1     Recent Labs     06/27/22  1103   SODIUM 133*   POTASSIUM 4.9   CHLORIDE 97   CO2 23   GLUCOSE 344*   BUN 23*   CREATININE 1.23   CALCIUM 9.8     Recent Labs     06/27/22  1103   ASTSGOT 460*   ALTSGPT 326*   TBILIRUBIN 2.4*   ALKPHOSPHAT 149*   GLOBULIN 3.6*            IMAGING:   US-RUQ   Final  Result      1. Markedly suboptimal examination secondary to patient body habitus and overlying bowel gas.   2. Hepatomegaly and hepatic steatosis.   3. Cholelithiasis, with no sonographic evidence of acute cholecystitis.   4. Nonvisualization of the main portal vein. It was visualized and patent on the prior examination.      DX-CHEST-PORTABLE (1 VIEW)   Final Result      No acute findings.      NM-CARDIAC STRESS TEST    (Results Pending)   [id number]/O    (Results Pending)       ASSESSMENT AND PLAN:     Cholelithiasis  Assessment & Plan  US without wall thickening, but stones and sludge present with elevated LFTs.   MRCP pending, possible choledocholithiasis.   Will follow along closely.   Stress test also pending.          ____________________________________     Mary Garcia M.D.    DD: 6/27/2022  6:00 PM

## 2022-06-28 NOTE — ASSESSMENT & PLAN NOTE
US without wall thickening, but stones and sludge present with elevated LFTs.   MRCP negative.   Will follow along closely.

## 2022-06-28 NOTE — PROGRESS NOTES
Spoke with MRI regarding patients upcoming scan. Patient had questions regarding a small titanium medical device in the area of his right eye. Pt states that early am someone would be giving him a call back to provide more information on the medical device & it's compatability with the MRI machine. Until then, the patient states that he would prefer to refuse the scan or additional scans. On call made aware. MRI made aware. Will follow up with the patient in the AM regarding the scan.

## 2022-06-28 NOTE — PROGRESS NOTES
[facility] Medicine Daily Progress Note    Date of Service  6/28/2022    Chief Complaint  Manolo Puri is a 81 y.o. male admitted 6/27/2022 with CP associated with nausea and vomitting    Hospital Course  Mr. Manolo Puri is a 81-year-old male with a PMHx of DLD, DMT2, who presented on 6/27/2022 due to chest pain associated with nausea and vomiting.    Patient reports he started to feel all the symptoms after waking up in the morning of admission.  Approximately 3 days ago, he was lifting 40 pound bundles of magazine and felt very tired more so than normal but no chest pain.  On admission day, patient recalls his chest pain as localized, then had the nausea and vomiting.  He has 3-4 times episodes of vomiting that is nonbloody emesis fluid with fluid contents he previously drank.  Patient denies any bowel changes, and no blood stools.    In ER, notable lab findings noted glucose 123, BUN 23, , , alk phos 151, total bilirubin 4.4.  Initial troponin at 30 and 34.  Patient will be admitted for chest pain rule out.  ERP consulted surgery due to elevated LFTs.    Cardiac stress test noted no evidence of significant jeopardized viable myocardium or prior myocardial infarction with normal left ventricular size, ejection fraction, and wall motion.  General surgery following patient pending MRCP.  Pending further recommendations from general surgery after MRCP is resulted.      Interval Problem Update  -Patient seen and examined.  Patient reports no chest pain.  Patient also reports no episodes of nausea or vomiting for over 24 hours.  Discussed with patient cardiac stress test results.  Pending MRCP and further recommendations from general surgery.  -Plan of care: Continue to monitor patient; pending MRCP results and further recommendations from surgery  -Disposition: Anticipated to stay overnight until MRCP is resulted and further recommendations from surgery is made  -Lab work: Reviewed;  expected  -VSS at this time    I have discussed this patient's plan of care and discharge plan at IDT rounds today with Case Management, Nursing, Nursing leadership, and other members of the IDT team.    Consultants/Specialty  general surgery    Code Status  Full Code    Disposition  Patient is not medically cleared for discharge.   Anticipate discharge to to home with close outpatient follow-up.  I have placed the appropriate orders for post-discharge needs.    Review of Systems  Review of Systems   Constitutional: Positive for malaise/fatigue. Negative for chills and fever.   HENT: Negative.    Eyes: Negative.    Respiratory: Negative.    Cardiovascular: Positive for chest pain.   Gastrointestinal: Positive for abdominal pain, nausea and vomiting.   Genitourinary: Negative.    Skin: Negative.    Neurological: Negative.    Endo/Heme/Allergies: Negative.    Psychiatric/Behavioral: Negative.         Physical Exam  Temp:  [36.4 °C (97.5 °F)-37.1 °C (98.7 °F)] 36.4 °C (97.5 °F)  Pulse:  [] 91  Resp:  [15-63] 16  BP: (120-188)/() 146/85  SpO2:  [90 %-95 %] 95 %    Physical Exam  Vitals and nursing note reviewed.   Constitutional:       Appearance: He is obese.   HENT:      Head: Normocephalic.      Nose: Nose normal.      Mouth/Throat:      Mouth: Mucous membranes are moist.      Pharynx: Oropharynx is clear.   Eyes:      Pupils: Pupils are equal, round, and reactive to light.   Cardiovascular:      Rate and Rhythm: Normal rate and regular rhythm.      Pulses: Normal pulses.      Heart sounds: Normal heart sounds.   Pulmonary:      Effort: Pulmonary effort is normal.      Breath sounds: Normal breath sounds.   Abdominal:      General: Bowel sounds are normal.      Palpations: Abdomen is soft.      Tenderness: There is abdominal tenderness.   Musculoskeletal:         General: Tenderness present.      Cervical back: Normal range of motion and neck supple.   Skin:     General: Skin is dry.      Capillary  Refill: Capillary refill takes 2 to 3 seconds.   Neurological:      Mental Status: He is alert. Mental status is at baseline.      Motor: Weakness present.         Fluids    Intake/Output Summary (Last 24 hours) at 6/28/2022 1317  Last data filed at 6/28/2022 0400  Gross per 24 hour   Intake --   Output 650 ml   Net -650 ml       Laboratory  Recent Labs     06/27/22  1103 06/28/22  0232   WBC 11.1* 9.6   RBC 5.69 5.15   HEMOGLOBIN 16.2 14.6   HEMATOCRIT 49.1 44.0   MCV 86.3 85.4   MCH 28.5 28.3   MCHC 33.0* 33.2*   RDW 42.2 41.1   PLATELETCT 280 239   MPV 11.1 10.8     Recent Labs     06/27/22  1103 06/28/22  0232   SODIUM 133* 140   POTASSIUM 4.9 3.9   CHLORIDE 97 104   CO2 23 24   GLUCOSE 344* 123*   BUN 23* 23*   CREATININE 1.23 1.05   CALCIUM 9.8 9.0                   Imaging  NM-CARDIAC STRESS TEST   Final Result      US-RUQ   Final Result      1. Markedly suboptimal examination secondary to patient body habitus and overlying bowel gas.   2. Hepatomegaly and hepatic steatosis.   3. Cholelithiasis, with no sonographic evidence of acute cholecystitis.   4. Nonvisualization of the main portal vein. It was visualized and patent on the prior examination.      DX-CHEST-PORTABLE (1 VIEW)   Final Result      No acute findings.      [id number]    (Results Pending)        Assessment/Plan  * Chest pain- (present on admission)  Assessment & Plan  -Monitor on telemetry  -Follow serial troponin for any elevation  -Check MRCP given elevate liver enzymes  -Checking lipase  -EKG showing sinus tach with 1st degree HB, avoid beta blocker  -Aspirin  -Check statin     Cholelithiasis  Assessment & Plan  -Noted on U/S abd.  -MRCP to rule out bile duct obstruction. If obstructed needs GI.  -Pain management  -General Surgeon, Mary Garcia, consulted and following    Hypertension  Assessment & Plan  -Monitor vitals.  -PRN Hydralazine IV for SBP >180.    Hyponatremia  Assessment & Plan  -Na:133  -IV NS @ 125cc/hr  -Repeat am  CMP  -Mild component from hyperglycemia but mostly dehydration from vomiting    Nausea and vomiting  Assessment & Plan  -Antiemetics prn  -Check lipase  -IV fluids  -Diet as tolerates    Elevated liver enzymes  Assessment & Plan  -AST:460, ALT:326, Alk Phop:149, T bili:2.4  -U/S RUQ poor view due to body habitus  -Check MRCP  -Ordered acute hepatitis panel  -Monitor am CMP  -IV fluids  -GS Dr. 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   Encourage dietary change and increase in activity    Hyperlipidemia associated with type 2 diabetes mellitus (HCC)- (present on admission)  Assessment & Plan  Lab Results   Component Value Date/Time    CHOLSTRLTOT 160 03/10/2022 09:50 AM    LDL 77 03/10/2022 09:50 AM    HDL 31 (A) 03/10/2022 09:50 AM    TRIGLYCERIDE 262 (H) 03/10/2022 09:50 AM                Type 2 diabetes mellitus, with long-term current use of insulin (HCC)- (present on admission)  Assessment & Plan  -Diabetic diet  -70/30 insulin if tolerating meals  -Monitor accuchecks and cover with SSI  -HgbA1c:10.1 in past 4 months.       VTE prophylaxis: Xarelto 10 mg daily as prophylaxis --HELD    I have performed a physical exam and reviewed and updated ROS and Plan today (6/28/2022). In review of yesterday's note (6/27/2022), there are no changes except as documented above.    ==========================================================================================================  Please note that this dictation was created using voice recognition software. I have made every reasonable attempt to correct obvious errors, but there may be errors of grammar and possibly content that I did not discover before finalizing the note.    Electronically signed by:  MESERET Jordan, MSN, APRN, FNP-C  Hospitalist Services  [facility]  [phone]  [phone]  [email or web address]  06/28/22                   132

## 2022-06-28 NOTE — PROGRESS NOTES
4 Eyes Skin Assessment Completed by Gage RN and JOE Pettit.    Head WDL  Ears WDL  Nose WDL  Mouth WDL  Neck WDL  Breast/Chest WDL  Shoulder Blades WDL  Spine WDL  (R) Arm/Elbow/Hand WDL  (L) Arm/Elbow/Hand WDL  Abdomen WDL  Groin WDL  Scrotum/Coccyx/Buttocks WDL  (R) Leg WDL  (L) Leg WDL  (R) Heel/Foot/Toe WDL  (L) Heel/Foot/Toe WDL          Devices In Places Tele Box, Blood Pressure Cuff and Pulse Ox      Interventions In Place N/A    Possible Skin Injury No    Pictures Uploaded Into Epic N/A  Wound Consult Placed N/A  RN Wound Prevention Protocol Ordered No

## 2022-06-28 NOTE — HOSPITAL COURSE
Mr. Manolo Puri is a 81-year-old male with a PMHx of DLD, DMT2, who presented on 6/27/2022 due to chest pain associated with nausea and vomiting.    Patient reports he started to feel all the symptoms after waking up in the morning of admission.  Approximately 3 days ago, he was lifting 40 pound bundles of magazine and felt very tired more so than normal but no chest pain.  On admission day, patient recalls his chest pain as localized, then had the nausea and vomiting.  He has 3-4 times episodes of vomiting that is nonbloody emesis fluid with fluid contents he previously drank.  Patient denies any bowel changes, and no blood stools.    In ER, notable lab findings noted glucose 123, BUN 23, , , alk phos 151, total bilirubin 4.4.  Initial troponin at 30 and 34.  Patient will be admitted for chest pain rule out.  ERP consulted surgery due to elevated LFTs.    Cardiac stress test noted no evidence of significant jeopardized viable myocardium or prior myocardial infarction with normal left ventricular size, ejection fraction, and wall motion.  MRCP showed no evidence of choledocholithiasis or CBD duct abnormalities.    Patient had a brief episode where his total bilirubin and transaminitis increased despite resolution of his pain.  Gastroenterology was consulted and recommended serial checks of his hepatic function due to suspicion of a gallstone passage around the time of his transaminitis tres.  He was monitored overnight on a diabetic diet with serial checks of his labs that showed steady and persistent improvement.  His pain has resolved.  He will need to follow-up with Dr. Garcia to discuss outpatient cholecystectomy for symptomatic cholelithiasis..  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

## 2022-06-28 NOTE — PROGRESS NOTES
"Rounding on pt. Patient in bed resting, states that he is doing \"alright\". No complaints of any pain, chest pain or SOB... Patient states that he does not require anything at this time nor has any questions. Reminder to patient regarding NPO status, patient verbalizes understanding. Bed locked, in lowest position, personal items within reach, call light within reach, urinal emptied and within reach... Will continue to monitor.  "

## 2022-06-28 NOTE — ED NOTES
Report to Floor RN. Pt transported to inpatient room with ED RN on cardiac monitor, with infusion running per MAR, on ED gurney.

## 2022-06-28 NOTE — PROGRESS NOTES
Report received from ED nurse, patient arrived on stretcher with tele monitor, IV and lines intact. Patient has no complaints at this time of any pain or SOB. Pt and family oriented to room and devices, demonstrated how to call staff if needed. Education provided on POC and shift goals. Patient and family given opportunity to ask questions. No further questions or request at this time. Bed locked, devices in place, call light within reach.

## 2022-06-28 NOTE — PROGRESS NOTES
4 Eyes Skin Assessment Completed.    Head WDL  Ears WDL  Nose WDL  Mouth WDL  Neck WDL  Breast/Chest WDL  Shoulder Blades WDL  Spine WDL  (R) Arm/Elbow/Hand WDL  (L) Arm/Elbow/Hand WDL  Abdomen WDL  Groin WDL  Scrotum/Coccyx/Buttocks WDL  (R) Leg WDL  (L) Leg WDL  (R) Heel/Foot/Toe great toe cut with bandaid. (pt stated attempted to clip toe nail and missed)  (L) Heel/Foot/Toe WDL          Devices In Places Pulse Ox      Interventions In Place N/A    Possible Skin Injury No    Pictures Uploaded Into Epic N/A  Wound Consult Placed N/A  RN Wound Prevention Protocol Ordered No

## 2022-06-29 PROBLEM — K85.90 PANCREATITIS: Status: ACTIVE | Noted: 2022-06-29

## 2022-06-29 PROBLEM — K75.89 CHOLESTATIC HEPATITIS: Status: ACTIVE | Noted: 2022-06-29

## 2022-06-29 LAB
ALBUMIN SERPL BCP-MCNC: 3.5 G/DL (ref 3.2–4.9)
ALBUMIN SERPL BCP-MCNC: 3.7 G/DL (ref 3.2–4.9)
ALBUMIN/GLOB SERPL: 1.1 G/DL
ALP SERPL-CCNC: 189 U/L (ref 30–99)
ALP SERPL-CCNC: 194 U/L (ref 30–99)
ALT SERPL-CCNC: 377 U/L (ref 2–50)
ALT SERPL-CCNC: 425 U/L (ref 2–50)
ANION GAP SERPL CALC-SCNC: 11 MMOL/L (ref 7–16)
AST SERPL-CCNC: 229 U/L (ref 12–45)
AST SERPL-CCNC: 278 U/L (ref 12–45)
BILIRUB CONJ SERPL-MCNC: 2.9 MG/DL (ref 0.1–0.5)
BILIRUB CONJ SERPL-MCNC: 3.3 MG/DL (ref 0.1–0.5)
BILIRUB INDIRECT SERPL-MCNC: 0.7 MG/DL (ref 0–1)
BILIRUB SERPL-MCNC: 4 MG/DL (ref 0.1–1.5)
BILIRUB SERPL-MCNC: 4.7 MG/DL (ref 0.1–1.5)
BUN SERPL-MCNC: 15 MG/DL (ref 8–22)
CALCIUM SERPL-MCNC: 8.8 MG/DL (ref 8.5–10.5)
CHLORIDE SERPL-SCNC: 104 MMOL/L (ref 96–112)
CO2 SERPL-SCNC: 23 MMOL/L (ref 20–33)
CREAT SERPL-MCNC: 0.94 MG/DL (ref 0.5–1.4)
ERYTHROCYTE [DISTWIDTH] IN BLOOD BY AUTOMATED COUNT: 44.3 FL (ref 35.9–50)
GFR SERPLBLD CREATININE-BSD FMLA CKD-EPI: 82 ML/MIN/1.73 M 2
GLOBULIN SER CALC-MCNC: 3.5 G/DL (ref 1.9–3.5)
GLUCOSE BLD STRIP.AUTO-MCNC: 159 MG/DL (ref 65–99)
GLUCOSE BLD STRIP.AUTO-MCNC: 193 MG/DL (ref 65–99)
GLUCOSE BLD STRIP.AUTO-MCNC: 201 MG/DL (ref 65–99)
GLUCOSE BLD STRIP.AUTO-MCNC: 218 MG/DL (ref 65–99)
GLUCOSE SERPL-MCNC: 158 MG/DL (ref 65–99)
HCT VFR BLD AUTO: 48.8 % (ref 42–52)
HGB BLD-MCNC: 15.6 G/DL (ref 14–18)
MCH RBC QN AUTO: 28.3 PG (ref 27–33)
MCHC RBC AUTO-ENTMCNC: 32 G/DL (ref 33.7–35.3)
MCV RBC AUTO: 88.6 FL (ref 81.4–97.8)
PLATELET # BLD AUTO: 269 K/UL (ref 164–446)
PMV BLD AUTO: 11.1 FL (ref 9–12.9)
POTASSIUM SERPL-SCNC: 4.1 MMOL/L (ref 3.6–5.5)
PROT SERPL-MCNC: 6.7 G/DL (ref 6–8.2)
PROT SERPL-MCNC: 7.2 G/DL (ref 6–8.2)
RBC # BLD AUTO: 5.51 M/UL (ref 4.7–6.1)
SODIUM SERPL-SCNC: 138 MMOL/L (ref 135–145)
WBC # BLD AUTO: 7 K/UL (ref 4.8–10.8)

## 2022-06-29 PROCEDURE — 99232 SBSQ HOSP IP/OBS MODERATE 35: CPT | Performed by: SURGERY

## 2022-06-29 PROCEDURE — 80076 HEPATIC FUNCTION PANEL: CPT

## 2022-06-29 PROCEDURE — 700105 HCHG RX REV CODE 258: Performed by: HOSPITALIST

## 2022-06-29 PROCEDURE — 85027 COMPLETE CBC AUTOMATED: CPT

## 2022-06-29 PROCEDURE — 96372 THER/PROPH/DIAG INJ SC/IM: CPT

## 2022-06-29 PROCEDURE — 99232 SBSQ HOSP IP/OBS MODERATE 35: CPT | Performed by: NURSE PRACTITIONER

## 2022-06-29 PROCEDURE — A9270 NON-COVERED ITEM OR SERVICE: HCPCS | Performed by: HOSPITALIST

## 2022-06-29 PROCEDURE — 700102 HCHG RX REV CODE 250 W/ 637 OVERRIDE(OP): Performed by: HOSPITALIST

## 2022-06-29 PROCEDURE — 770001 HCHG ROOM/CARE - MED/SURG/GYN PRIV*

## 2022-06-29 PROCEDURE — 82248 BILIRUBIN DIRECT: CPT

## 2022-06-29 PROCEDURE — 82962 GLUCOSE BLOOD TEST: CPT

## 2022-06-29 PROCEDURE — 80053 COMPREHEN METABOLIC PANEL: CPT

## 2022-06-29 RX ORDER — ENOXAPARIN SODIUM 100 MG/ML
40 INJECTION SUBCUTANEOUS DAILY
Status: DISCONTINUED | OUTPATIENT
Start: 2022-06-29 | End: 2022-06-30 | Stop reason: HOSPADM

## 2022-06-29 RX ADMIN — INSULIN HUMAN 2 UNITS: 100 INJECTION, SOLUTION PARENTERAL at 21:49

## 2022-06-29 RX ADMIN — SODIUM CHLORIDE: 9 INJECTION, SOLUTION INTRAVENOUS at 05:15

## 2022-06-29 RX ADMIN — INSULIN HUMAN 50 UNITS: 100 INJECTION, SUSPENSION SUBCUTANEOUS at 18:36

## 2022-06-29 RX ADMIN — ASPIRIN 325 MG: 325 TABLET ORAL at 18:38

## 2022-06-29 RX ADMIN — INSULIN HUMAN 2 UNITS: 100 INJECTION, SOLUTION PARENTERAL at 09:00

## 2022-06-29 RX ADMIN — INSULIN HUMAN 50 UNITS: 100 INJECTION, SUSPENSION SUBCUTANEOUS at 08:59

## 2022-06-29 RX ADMIN — SENNOSIDES AND DOCUSATE SODIUM 2 TABLET: 50; 8.6 TABLET ORAL at 18:41

## 2022-06-29 RX ADMIN — SENNOSIDES AND DOCUSATE SODIUM 2 TABLET: 50; 8.6 TABLET ORAL at 05:15

## 2022-06-29 RX ADMIN — INSULIN HUMAN 3 UNITS: 100 INJECTION, SOLUTION PARENTERAL at 18:36

## 2022-06-29 RX ADMIN — INSULIN HUMAN 3 UNITS: 100 INJECTION, SOLUTION PARENTERAL at 13:32

## 2022-06-29 ASSESSMENT — ENCOUNTER SYMPTOMS
PSYCHIATRIC NEGATIVE: 1
GASTROINTESTINAL NEGATIVE: 1
EYES NEGATIVE: 1
RESPIRATORY NEGATIVE: 1
NEUROLOGICAL NEGATIVE: 1
CARDIOVASCULAR NEGATIVE: 1
MUSCULOSKELETAL NEGATIVE: 1

## 2022-06-29 NOTE — CONSULTS
Gastroenterology Consult Note:    SIMBA Womack  Date & Time note created:    6/29/2022   1:14 PM     Referring MD:  Chris MÉNDEZ    Patient ID:  Name:             Manolo Puri   YOB: 1940  Age:                 81 y.o.  male   MRN:               [id number]                                                             Reason for Consult:      Elevated LFT's  Chest pain  N/V    History of Present Illness:    This is a 82 yo male PMH diabetes, dyslipidemia, hypertension who was admitted to the hospital 6/27/22 with chest pain, N/V. 3 days prior to admission, he was lifting 40 lb bundles of magazines and felt very tired. Began to have epigastric/chest pain with 3 episodes of non bloody emesis. Notable ER labs: TB: 2.4. AST: 460. ALT: 326. ALP: 149. Lipase: 141. Troponin: 30. Abdominal US: Hepatomegaly and hepatic steatosis. Cholelithiasis, with no sonographic evidence of acute cholecystitis. General surgery was consulted.    MRCP 6/28/22: there is cholelithiasis without evidence of cholecystitis. No biliary dilatation or stones in duct.     Currently, asymptomatic. Abdominal pain resolved after admission. Tolerating PO intake. LFT's this morning: TB trended up from 4.4-->4.7. AST: 278. ALT: 425. ALP: 194.    Review of Systems:      Review of Systems   Constitutional: Positive for malaise/fatigue.   HENT: Negative.    Eyes: Negative.    Respiratory: Negative.    Cardiovascular: Negative.    Gastrointestinal: Negative.    Genitourinary: Negative.    Musculoskeletal: Negative.    Skin: Negative.    Neurological: Negative.    Psychiatric/Behavioral: Negative.              Physical Exam:  Vitals/ General Appearance:   Weight/BMI: Body mass index is 31.28 kg/m².    Vitals:    06/28/22 1208 06/28/22 1450 06/28/22 2057 06/29/22 0848   BP: (!) 146/85 (!) 152/92 (!) 156/102 (!) 154/106   Pulse: 91 97 95 98   Resp: 16 16 18 18   Temp: 36.4 °C (97.5 °F) 37.2 °C (98.9 °F) 36.5 °C (97.7  °F) 36.8 °C (98.2 °F)   TempSrc: Temporal Temporal Temporal Temporal   SpO2: 95% 94% 93% 96%   Weight:         Oxygen Therapy:  Pulse Oximetry: 96 %, O2 (LPM): 0, O2 Delivery Device: None - Room Air    Physical Exam  Vitals reviewed.   Constitutional:       Appearance: He is obese.   HENT:      Mouth/Throat:      Mouth: Mucous membranes are moist.   Eyes:      General: Scleral icterus present.      Extraocular Movements: Extraocular movements intact.      Pupils: Pupils are equal, round, and reactive to light.      Comments: Left eye injected   Cardiovascular:      Rate and Rhythm: Normal rate and regular rhythm.      Pulses: Normal pulses.      Heart sounds: Normal heart sounds.   Pulmonary:      Effort: Pulmonary effort is normal.      Breath sounds: Normal breath sounds.   Abdominal:      General: Bowel sounds are normal.      Palpations: Abdomen is soft.      Tenderness: There is no abdominal tenderness.   Musculoskeletal:         General: Normal range of motion.      Cervical back: Normal range of motion and neck supple.   Skin:     General: Skin is warm and dry.      Coloration: Jaundice: slight.   Neurological:      General: No focal deficit present.      Mental Status: He is alert and oriented to person, place, and time.         Past Medical History:   Past Medical History:   Diagnosis Date   • Arthritis    • Atypical chest pain 9/3/2017   • Cataract    • Diabetes (HCC)    • Elevated hematocrit 9/3/2017   • Squamous cell cancer of skin of forearm        Past Surgical History:  Past Surgical History:   Procedure Laterality Date   • OTHER      Derm removal of SCC       Hospital Medications:    Current Facility-Administered Medications:   •  insulin 70/30 (Humulin 70/30,NovoLIN 70/30) injection, 50 Units, Subcutaneous, BID, Gui Munguia D.O., 50 Units at 06/29/22 0859  •  senna-docusate (PERICOLACE or SENOKOT S) 8.6-50 MG per tablet 2 Tablet, 2 Tablet, Oral, BID, 2 Tablet at 06/29/22 2472 **AND**  polyethylene glycol/lytes (MIRALAX) PACKET 1 Packet, 1 Packet, Oral, QDAY PRN **AND** magnesium hydroxide (MILK OF MAGNESIA) suspension 30 mL, 30 mL, Oral, QDAY PRN **AND** bisacodyl (DULCOLAX) suppository 10 mg, 10 mg, Rectal, QDAY PRN, Gui Munguia D.O.  •  aspirin (ASA) tablet 325 mg, 325 mg, Oral, DAILY AT 1800, 325 mg at 06/28/22 1806 **OR** aspirin (ASA) chewable tab 324 mg, 324 mg, Oral, DAILY AT 1800, 324 mg at 06/27/22 1836 **OR** aspirin (ASA) suppository 300 mg, 300 mg, Rectal, DAILY AT 1800, Gui Munguia D.O.  •  NS infusion, , Intravenous, Continuous, Gui Munguia D.O., Last Rate: 125 mL/hr at 06/29/22 0515, New Bag at 06/29/22 0515  •  [Held by provider] rivaroxaban (XARELTO) tablet 10 mg, 10 mg, Oral, DAILY AT 1800, Gui Munguia D.O.  •  acetaminophen (Tylenol) tablet 650 mg, 650 mg, Oral, Q6HRS PRN, Gui Mugnuia D.O.  •  ondansetron (ZOFRAN) syringe/vial injection 4 mg, 4 mg, Intravenous, Q4HRS PRN, Gui Munguia D.O.  •  ondansetron (ZOFRAN ODT) dispertab 4 mg, 4 mg, Oral, Q4HRS PRN, Gui Munguia D.O.  •  insulin regular (HumuLIN R,NovoLIN R) injection, 2-9 Units, Subcutaneous, 4X/DAY ACHS, 2 Units at 06/29/22 0900 **AND** POC blood glucose manual result, , , Q AC AND BEDTIME(S) **AND** NOTIFY MD and PharmD, , , Once **AND** Administer 20 grams of glucose (approximately 8 ounces of fruit juice) every 15 minutes PRN FSBG less than 70 mg/dL, , , PRN **AND** dextrose 50% (D50W) injection 25 g, 25 g, Intravenous, Q15 MIN PRN, Gui Munguia D.O.  •  hydrALAZINE (APRESOLINE) injection 10 mg, 10 mg, Intravenous, Q4HRS PRN, Gui Munguia D.O.    Current Outpatient Medications:  Current Facility-Administered Medications   Medication Dose Route Frequency Provider Last Rate Last Admin   • insulin 70/30 (Humulin 70/30,NovoLIN 70/30) injection  50 Units Subcutaneous BID Gui Munguia D.O.   50 Units at 06/29/22 0859   • senna-docusate (PERICOLACE or SENOKOT S) 8.6-50 MG per tablet 2  Tablet  2 Tablet Oral BID MONTSERRAT SchneiderOJudith   2 Tablet at 06/29/22 0515    And   • polyethylene glycol/lytes (MIRALAX) PACKET 1 Packet  1 Packet Oral QDAY PRN Gui Munguia D.O.        And   • magnesium hydroxide (MILK OF MAGNESIA) suspension 30 mL  30 mL Oral QDAY PRN Gui Munguia D.O.        And   • bisacodyl (DULCOLAX) suppository 10 mg  10 mg Rectal QDAY PRN Gui Munguia D.O.       • aspirin (ASA) tablet 325 mg  325 mg Oral DAILY AT 1800 MONTSERRAT SchneiderOJudith   325 mg at 06/28/22 1806    Or   • aspirin (ASA) chewable tab 324 mg  324 mg Oral DAILY AT 1800 MONTSERRAT SchneiderOJudith   324 mg at 06/27/22 1836    Or   • aspirin (ASA) suppository 300 mg  300 mg Rectal DAILY AT 1800 Gui Munguia D.O.       • NS infusion   Intravenous Continuous Gui Munguia D.O. 125 mL/hr at 06/29/22 0515 New Bag at 06/29/22 0515   • [Held by provider] rivaroxaban (XARELTO) tablet 10 mg  10 mg Oral DAILY AT 1800 Gui Munguia D.O.       • acetaminophen (Tylenol) tablet 650 mg  650 mg Oral Q6HRS PRN Gui Munguia D.O.       • ondansetron (ZOFRAN) syringe/vial injection 4 mg  4 mg Intravenous Q4HRS PRN Gui Munguia D.O.       • ondansetron (ZOFRAN ODT) dispertab 4 mg  4 mg Oral Q4HRS PRN MONTSERRAT SchneiderO.       • insulin regular (HumuLIN R,NovoLIN R) injection  2-9 Units Subcutaneous 4X/DAY ACHS Gui Munguia D.O.   2 Units at 06/29/22 0900    And   • dextrose 50% (D50W) injection 25 g  25 g Intravenous Q15 MIN PRN Gui Munguia D.O.       • hydrALAZINE (APRESOLINE) injection 10 mg  10 mg Intravenous Q4HRS PRN Gui Munguia D.O.           Medication Allergy:  No Known Allergies    Family History:  Family History   Problem Relation Age of Onset   • Diabetes Father    • Diabetes Maternal Grandfather        Social History:  Social History     Socioeconomic History   • Marital status: Single     Spouse name: Not on file   • Number of children: Not on file   • Years of education: Not on file   • Highest education  level: Not on file   Occupational History   • Not on file   Tobacco Use   • Smoking status: Never Smoker   • Smokeless tobacco: Never Used   • Tobacco comment: continued abstinence   Vaping Use   • Vaping Use: Never used   Substance and Sexual Activity   • Alcohol use: No   • Drug use: No   • Sexual activity: Not Currently     Partners: Female   Other Topics Concern   • Not on file   Social History Narrative   • Not on file     Social Determinants of Health     Financial Resource Strain: Not on file   Food Insecurity: Not on file   Transportation Needs: Not on file   Physical Activity: Not on file   Stress: Not on file   Social Connections: Not on file   Intimate Partner Violence: Not on file   Housing Stability: Not on file         MDM (Data Review):     Records reviewed and summarized in current documentation    Lab Data Review:  Recent Results (from the past 24 hour(s))   POCT glucose device results    Collection Time: 06/28/22  5:43 PM   Result Value Ref Range    POC Glucose, Blood 225 (H) 65 - 99 mg/dL   POCT glucose device results    Collection Time: 06/28/22  8:47 PM   Result Value Ref Range    POC Glucose, Blood 285 (H) 65 - 99 mg/dL   POCT glucose device results    Collection Time: 06/28/22  9:59 PM   Result Value Ref Range    POC Glucose, Blood 281 (H) 65 - 99 mg/dL   CBC WITHOUT DIFFERENTIAL    Collection Time: 06/29/22  8:38 AM   Result Value Ref Range    WBC 7.0 4.8 - 10.8 K/uL    RBC 5.51 4.70 - 6.10 M/uL    Hemoglobin 15.6 14.0 - 18.0 g/dL    Hematocrit 48.8 42.0 - 52.0 %    MCV 88.6 81.4 - 97.8 fL    MCH 28.3 27.0 - 33.0 pg    MCHC 32.0 (L) 33.7 - 35.3 g/dL    RDW 44.3 35.9 - 50.0 fL    Platelet Count 269 164 - 446 K/uL    MPV 11.1 9.0 - 12.9 fL   Comp Metabolic Panel    Collection Time: 06/29/22  8:38 AM   Result Value Ref Range    Sodium 138 135 - 145 mmol/L    Potassium 4.1 3.6 - 5.5 mmol/L    Chloride 104 96 - 112 mmol/L    Co2 23 20 - 33 mmol/L    Anion Gap 11.0 7.0 - 16.0    Glucose 158 (H) 65  - 99 mg/dL    Bun 15 8 - 22 mg/dL    Creatinine 0.94 0.50 - 1.40 mg/dL    Calcium 8.8 8.5 - 10.5 mg/dL    AST(SGOT) 278 (H) 12 - 45 U/L    ALT(SGPT) 425 (H) 2 - 50 U/L    Alkaline Phosphatase 194 (H) 30 - 99 U/L    Total Bilirubin 4.7 (H) 0.1 - 1.5 mg/dL    Albumin 3.7 3.2 - 4.9 g/dL    Total Protein 7.2 6.0 - 8.2 g/dL    Globulin 3.5 1.9 - 3.5 g/dL    A-G Ratio 1.1 g/dL   ESTIMATED GFR    Collection Time: 06/29/22  8:38 AM   Result Value Ref Range    GFR (CKD-EPI) 82 >60 mL/min/1.73 m 2   POCT glucose device results    Collection Time: 06/29/22  8:57 AM   Result Value Ref Range    POC Glucose, Blood 159 (H) 65 - 99 mg/dL       Imaging/Procedures Review:      [id number]   Final Result      1.  There is cholelithiasis with no MRI evidence of acute cholecystitis.   2.  No biliary dilatation and no choledocholithiasis.            NM-CARDIAC STRESS TEST   Final Result      US-RUQ   Final Result      1. Markedly suboptimal examination secondary to patient body habitus and overlying bowel gas.   2. Hepatomegaly and hepatic steatosis.   3. Cholelithiasis, with no sonographic evidence of acute cholecystitis.   4. Nonvisualization of the main portal vein. It was visualized and patent on the prior examination.      DX-CHEST-PORTABLE (1 VIEW)   Final Result      No acute findings.           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 Labs revealed elevated LFT's. Abdominal US cholelithiasis with no cholecystitis. On 6/28/22 his bilirubin vicki from 2.4-->4.4. MRCP: cholelithiasis with no evidence of cholecystitis. No biliary dilatation or choledocholithiasis. This morning bilirubin trended up slightly from 4.4-->4.7. Hepatitis panel: non reactive.  Cardiac workup negative. Currently, patient is asymptomatic. Tolerating PO intake. General surgery is involved.    ASSESSMENT:  1.  Abdominal pain: resolved.   2. Elevated LFT's likely due to pass gallstone  3. Type 2 DM  4. Obesity    PLAN:  1. Repeat LFT's (likely he had a passed gallstone)  2. Recommend cholecystectomy with IOC as an inpatient or outpatient setting  3. If LFT's continue to trend up, will need further evaluation of chronic liver disease.    Thank your for the opportunity to assist in the care of your patient.  Please call for any questions or concerns.    WELLINGTON Womack.

## 2022-06-29 NOTE — CARE PLAN
The patient is Stable - Low risk of patient condition declining or worsening    Shift Goals  Clinical Goals: pain control  Patient Goals: rest    Progress made toward(s) clinical / shift goals:    Problem: Knowledge Deficit - Standard  Goal: Patient and family/care givers will demonstrate understanding of plan of care, disease process/condition, diagnostic tests and medications  6/29/2022 0622 by Maria C See R.N.  Outcome: Progressing  6/29/2022 0621 by Maria C See R.N.  Outcome: Progressing     Problem: Communication  Goal: The ability to communicate needs accurately and effectively will improve  Outcome: Progressing     Problem: Urinary Elimination  Goal: Establish and maintain regular urinary output  Outcome: Progressing     Problem: Bowel Elimination  Goal: Establish and maintain regular bowel function  Outcome: Progressing     Problem: Mobility  Goal: Patient's capacity to carry out activities will improve  Outcome: Progressing       Patient is not progressing towards the following goals:

## 2022-06-29 NOTE — ASSESSMENT & PLAN NOTE
Suspected due to biliary obstruction  MRCP negative for obstruction  Could be related to nausea vomiting  Clinically resolved with resolution of pain and tolerance of an oral diet  Stop fluids

## 2022-06-29 NOTE — PROGRESS NOTES
Hospital Medicine Daily Progress Note    Date of Service  6/29/2022    Chief Complaint  Manolo Puri is a 81 y.o. male admitted 6/27/2022 with N/V/AP/CP w cholestatic hepatitis, mild pancreatitis    Hospital Course  Mr. Manolo Puri is a 81-year-old male with a PMHx of DLD, DMT2, who presented on 6/27/2022 due to chest pain associated with nausea and vomiting.    Patient reports he started to feel all the symptoms after waking up in the morning of admission.  Approximately 3 days ago, he was lifting 40 pound bundles of magazine and felt very tired more so than normal but no chest pain.  On admission day, patient recalls his chest pain as localized, then had the nausea and vomiting.  He has 3-4 times episodes of vomiting that is nonbloody emesis fluid with fluid contents he previously drank.  Patient denies any bowel changes, and no blood stools.    In ER, notable lab findings noted glucose 123, BUN 23, , , alk phos 151, total bilirubin 4.4.  Initial troponin at 30 and 34.  Patient will be admitted for chest pain rule out.  ERP consulted surgery due to elevated LFTs.    Cardiac stress test noted no evidence of significant jeopardized viable myocardium or prior myocardial infarction with normal left ventricular size, ejection fraction, and wall motion.        Interval Problem Update  MRCP negative for choledocholithiasis.  No CBD abnormality.  Patient says his symptoms are gone.  Labs obtained 0838 this morning shows a worsening total bilirubin at 4.7, worsening alkaline phosphatase at 194, and mildly improved AST and ALT at 278/425 respectively  Discussed with Dr. Garcia.  Consulted GI given worsening cholestatic hepatitis  Suspicion is for likely recently passed stone  Repeat hepatic panel shows bilirubin has declined to 4.0 and alkaline phosphatase 189.  Again improvement in AST/ALT at 229/377  Discussed with GI, patient, RN.    Consultants/Specialty  General  surgery  Gastroenterology    Code Status  Full Code    Disposition  Patient is not medically cleared for discharge.   Anticipate discharge to to home with close outpatient follow-up.  I have placed the appropriate orders for post-discharge needs.    Review of Systems  ROS     Physical Exam  Temp:  [36.5 °C (97.7 °F)-36.8 °C (98.2 °F)] 36.8 °C (98.2 °F)  Pulse:  [95-98] 98  Resp:  [18] 18  BP: (154-156)/(102-106) 154/106  SpO2:  [93 %-96 %] 96 %    Physical Exam    Fluids    Intake/Output Summary (Last 24 hours) at 6/29/2022 1523  Last data filed at 6/29/2022 1000  Gross per 24 hour   Intake 240 ml   Output 0 ml   Net 240 ml       Laboratory  Recent Labs     06/27/22  1103 06/28/22  0232 06/29/22  0838   WBC 11.1* 9.6 7.0   RBC 5.69 5.15 5.51   HEMOGLOBIN 16.2 14.6 15.6   HEMATOCRIT 49.1 44.0 48.8   MCV 86.3 85.4 88.6   MCH 28.5 28.3 28.3   MCHC 33.0* 33.2* 32.0*   RDW 42.2 41.1 44.3   PLATELETCT 280 239 269   MPV 11.1 10.8 11.1     Recent Labs     06/27/22  1103 06/28/22  0232 06/29/22  0838   SODIUM 133* 140 138   POTASSIUM 4.9 3.9 4.1   CHLORIDE 97 104 104   CO2 23 24 23   GLUCOSE 344* 123* 158*   BUN 23* 23* 15   CREATININE 1.23 1.05 0.94   CALCIUM 9.8 9.0 8.8                   Imaging  [id number]   Final Result      1.  There is cholelithiasis with no MRI evidence of acute cholecystitis.   2.  No biliary dilatation and no choledocholithiasis.            NM-CARDIAC STRESS TEST   Final Result      US-RUQ   Final Result      1. Markedly suboptimal examination secondary to patient body habitus and overlying bowel gas.   2. Hepatomegaly and hepatic steatosis.   3. Cholelithiasis, with no sonographic evidence of acute cholecystitis.   4. Nonvisualization of the main portal vein. It was visualized and patent on the prior examination.      DX-CHEST-PORTABLE (1 VIEW)   Final Result      No acute findings.           Assessment/Plan  * Chest pain- (present on admission)  Assessment & Plan  Resolved  Cont ASA  MPI  negative  As needed calcium carbonate      Pancreatitis  Assessment & Plan  Suspected due to biliary obstruction  MRCP negative for obstruction  Could be related to nausea vomiting  Clinically resolved with resolution of pain and tolerance of an oral diet  Stop fluids    Elevated liver enzymes  Assessment & Plan  U/S RUQ poor view due to body habitus  MRCP neg for CBD stone  Hepatitis panel neg  Mixed results on repeat draw  GS Dr. Garcia following  Likely to need OP magnus w IOC  Will keep overnight 1 more night, monitor on diabetic diet, stop fluids and repeat hepatic panel in the a.m.    Type 2 diabetes mellitus, with long-term current use of insulin (HCC)- (present on admission)  Assessment & Plan  Diabetic diet  70/30 insulin if tolerating meals  Monitor accuchecks and cover with SSI  HgbA1c:10.1 in past 4 months.    Cholelithiasis  Assessment & Plan  Noted on U/S abd.      Hypertension  Assessment & Plan  -Monitor vitals.  -PRN Hydralazine IV for SBP >180.    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   Encourage dietary change and increase in activity    Hyperlipidemia associated with type 2 diabetes mellitus (HCC)- (present on admission)  Assessment & Plan  OP management         VTE prophylaxis: enoxaparin ppx    I have performed a physical exam and reviewed and updated ROS and Plan today (6/29/2022). In review of yesterday's note (6/28/2022), there are no changes except as documented above.

## 2022-06-29 NOTE — DISCHARGE PLANNING
ERIC Gandhi met with patient bedside and introduced Community Care Management and completed SDOH. Patient lives with his wife and they own their home and is an active . Patient has great family support  Patient is active with PCP and agreed for CHW to schedule a hospital follow up. Patient is confident in managing his health and medications and does not use any medical equipment. Patient denies any barriers with food, housing and transportation.   Community Health Worker Intake  • Social determinates of health intake : Completed.   • Identified barriers to : None  • Contact information provided to Manolo Puri : Yes  • Has PCP appointment scheduled for : 07/05/22 @ 7:25 am   • Scheduled Food Delivery/Home Visit/Outpatient Visit: No  • Inpatient assessment completed.    Plan:  ERIC Gandhi scheduled hospital follow up for 07/05/22 @ 7:25 am with Shirley Ozuna and provided all contact information and will follow up post discharge.

## 2022-06-29 NOTE — PROGRESS NOTES
DATE: 6/29/2022    Hospital Day 2 cholelithiasis.    INTERVAL EVENTS:  WBC remains normalized.  Total bili elevated(increasing).  MRCP without any stones, ductal dilation or acute cholecystitis.    Discussed with patient and with medicine team, will have GI weigh in with no clarity on stones being the primary cause of his issue.  Pain entirely resolved.    Will cont to follow for potential need for lap magnus     PHYSICAL EXAMINATION:  Vital Signs: BP (!) 154/106   Pulse 98   Temp 36.8 °C (98.2 °F) (Temporal)   Resp 18   Wt 105 kg (230 lb 9.6 oz)   SpO2 96%     Alert, afebrile, no acute distress.  Unlabored respirations tolerating room air.  Abdomen soft without distension.  No abdominal tenderness to palpation.  No nausea or vomiting.  No jaundice.    LABORATORY VALUES:   Recent Labs     06/27/22  1103 06/28/22  0232 06/29/22  0838   WBC 11.1* 9.6 7.0   RBC 5.69 5.15 5.51   HEMOGLOBIN 16.2 14.6 15.6   HEMATOCRIT 49.1 44.0 48.8   MCV 86.3 85.4 88.6   MCH 28.5 28.3 28.3   MCHC 33.0* 33.2* 32.0*   RDW 42.2 41.1 44.3   PLATELETCT 280 239 269   MPV 11.1 10.8 11.1     Recent Labs     06/27/22  1103 06/28/22  0232 06/29/22  0838   SODIUM 133* 140 138   POTASSIUM 4.9 3.9 4.1   CHLORIDE 97 104 104   CO2 23 24 23   GLUCOSE 344* 123* 158*   BUN 23* 23* 15   CREATININE 1.23 1.05 0.94   CALCIUM 9.8 9.0 8.8     Recent Labs     06/27/22  1103 06/28/22  0232 06/29/22  0838   ASTSGOT 460* 421* 278*   ALTSGPT 326* 453* 425*   TBILIRUBIN 2.4* 4.4* 4.7*   ALKPHOSPHAT 149* 151* 194*   GLOBULIN 3.6* 2.8 3.5            IMAGING:   [id number]   Final Result      1.  There is cholelithiasis with no MRI evidence of acute cholecystitis.   2.  No biliary dilatation and no choledocholithiasis.            NM-CARDIAC STRESS TEST   Final Result      US-RUQ   Final Result      1. Markedly suboptimal examination secondary to patient body habitus and overlying bowel gas.   2. Hepatomegaly and hepatic steatosis.   3. Cholelithiasis, with  no sonographic evidence of acute cholecystitis.   4. Nonvisualization of the main portal vein. It was visualized and patent on the prior examination.      DX-CHEST-PORTABLE (1 VIEW)   Final Result      No acute findings.          ASSESSMENT AND PLAN:   Cholelithiasis  Assessment & Plan  US without wall thickening, but stones and sludge present with elevated LFTs.   MRCP negative.   Will follow along closely.         ____________________________________     Mary Garcia M.D.    DD: 6/28/2022  10:37 AM

## 2022-06-29 NOTE — PROGRESS NOTES
Pt is A&O 4  Pain -   - nausea  Tolerating a CHO diet   Incision -  - Drains  + Voids  + flatus  - BM  Up SBA  SCD's off  Bed alarm off, pt low fall risk per nancy maddox  Reviewed plan of care with patient, bed in lowest position and locked, pt resting comfortably now, call light within reach, all needs met at this time. Interventions will be executed per plan of care

## 2022-06-30 VITALS
SYSTOLIC BLOOD PRESSURE: 169 MMHG | OXYGEN SATURATION: 95 % | BODY MASS INDEX: 31.28 KG/M2 | TEMPERATURE: 98 F | WEIGHT: 230.6 LBS | RESPIRATION RATE: 20 BRPM | HEART RATE: 82 BPM | DIASTOLIC BLOOD PRESSURE: 101 MMHG

## 2022-06-30 PROBLEM — R10.9 ABDOMINAL PAIN: Status: RESOLVED | Noted: 2022-06-28 | Resolved: 2022-06-30

## 2022-06-30 PROBLEM — R11.2 NAUSEA AND VOMITING: Status: RESOLVED | Noted: 2022-06-27 | Resolved: 2022-06-30

## 2022-06-30 PROBLEM — R07.9 CHEST PAIN: Status: RESOLVED | Noted: 2022-06-27 | Resolved: 2022-06-30

## 2022-06-30 PROBLEM — E87.1 HYPONATREMIA: Status: RESOLVED | Noted: 2022-06-27 | Resolved: 2022-06-30

## 2022-06-30 PROBLEM — K85.90 PANCREATITIS: Status: RESOLVED | Noted: 2022-06-29 | Resolved: 2022-06-30

## 2022-06-30 LAB
ALBUMIN SERPL BCP-MCNC: 3.3 G/DL (ref 3.2–4.9)
ALP SERPL-CCNC: 185 U/L (ref 30–99)
ALT SERPL-CCNC: 326 U/L (ref 2–50)
AST SERPL-CCNC: 175 U/L (ref 12–45)
BILIRUB CONJ SERPL-MCNC: 1.7 MG/DL (ref 0.1–0.5)
BILIRUB INDIRECT SERPL-MCNC: 0.8 MG/DL (ref 0–1)
BILIRUB SERPL-MCNC: 2.5 MG/DL (ref 0.1–1.5)
GLUCOSE BLD STRIP.AUTO-MCNC: 114 MG/DL (ref 65–99)
GLUCOSE BLD STRIP.AUTO-MCNC: 173 MG/DL (ref 65–99)
GLUCOSE BLD STRIP.AUTO-MCNC: 69 MG/DL (ref 65–99)
PROT SERPL-MCNC: 6.4 G/DL (ref 6–8.2)

## 2022-06-30 PROCEDURE — 700102 HCHG RX REV CODE 250 W/ 637 OVERRIDE(OP): Performed by: HOSPITALIST

## 2022-06-30 PROCEDURE — 80076 HEPATIC FUNCTION PANEL: CPT

## 2022-06-30 PROCEDURE — A9270 NON-COVERED ITEM OR SERVICE: HCPCS | Performed by: HOSPITALIST

## 2022-06-30 PROCEDURE — 82962 GLUCOSE BLOOD TEST: CPT

## 2022-06-30 PROCEDURE — 99239 HOSP IP/OBS DSCHRG MGMT >30: CPT | Mod: FS | Performed by: NURSE PRACTITIONER

## 2022-06-30 RX ADMIN — INSULIN HUMAN 50 UNITS: 100 INJECTION, SUSPENSION SUBCUTANEOUS at 09:02

## 2022-06-30 RX ADMIN — SENNOSIDES AND DOCUSATE SODIUM 2 TABLET: 50; 8.6 TABLET ORAL at 04:26

## 2022-06-30 ASSESSMENT — ENCOUNTER SYMPTOMS
CONSTITUTIONAL NEGATIVE: 1
PSYCHIATRIC NEGATIVE: 1
RESPIRATORY NEGATIVE: 1
EYES NEGATIVE: 1
CARDIOVASCULAR NEGATIVE: 1
GASTROINTESTINAL NEGATIVE: 1
NEUROLOGICAL NEGATIVE: 1
MUSCULOSKELETAL NEGATIVE: 1

## 2022-06-30 NOTE — PROGRESS NOTES
"Hypoglycemia intervention     Pt called this RN into room stating that BS \"felt low\" BS checked and resulted to 69. Pt provided w/ 16oz apple juice.    Pt BS rechecked 30 minutes later. Bs 114. Pt provided with salines and string cheese.   "

## 2022-06-30 NOTE — PROGRESS NOTES
Pt was scheduled for procedure tomorrow, but has decided he wants to go home and come back for procedure as an outpatient.  MD spoke to pt also.  NPO order cancelled. Will monitor am labs.

## 2022-06-30 NOTE — CARE PLAN
The patient is Stable - Low risk of patient condition declining or worsening    Shift Goals  Clinical Goals: monitor labs  Patient Goals: rest    Progress made toward(s) clinical / shift goals:  awaiting result of AM labs     Patient is not progressing towards the following goals:    Problem: Knowledge Deficit - Standard  Goal: Patient and family/care givers will demonstrate understanding of plan of care, disease process/condition, diagnostic tests and medications  Outcome: Progressing     Problem: Communication  Goal: The ability to communicate needs accurately and effectively will improve  Outcome: Progressing         Problem: Mobility  Goal: Patient's capacity to carry out activities will improve  Outcome: Not Progressing

## 2022-06-30 NOTE — DISCHARGE INSTRUCTIONS
Discharge Instructions per DEV Delong-BC    1.  Review your discharge Medication Reconciliation form. You may be provided with new prescriptions or changes to previously prescribed medications.  Be sure to take all of your prescribed medications exactly as directed.  Further questions can be directed to your local pharmacist or primary care provider (PCP).    2. Follow-up with your PCP.  An appointment may have already been scheduled for you.  Please review your discharge paperwork and locate this information.  Please be sure to call your PCP to cancel if you are unable to make this appointment or require schedule changes.  It is critical to to follow-up with your PCP to review hospital records and ensure any further diagnostic work-up, prescription refills, or referrals.     3. ACTIVITIES: After discharge from the hospital, you may resume routine activities including walking and going u/down stairs. However, no strenuous activity. For further clarification, call your PCP     4. DRIVING: You may drive whenever you are off pain medications and are able to perform the activities needed to drive, i.e. turning, bending, twisting, etc.     5. BOWEL FUNCTION: A few patients, after hospitalizations, will develop bowel irregularity. You could also experience constipation due to pain medication and decreased activity level. If you feel this is occurring, please take an over-the-counter laxative (Milk of Magnesia, Ex-Lax, Senokot, etc.) until the problem has resolved.     6. PAIN MEDICATION: You may be given a prescription for pain medication at discharge. Please take these as directed. It is important to remember not to take medications on an empty stomach as this may cause nausea/vomiting.  You can transition from the prescription-strength pain medication to over-the-counter medications as your pain improves, such as tylenol if you are medically cleared to do so. DO NOT TAKE MORE THAN 4,000 mg OF ACETAMINOPHEN IN A  24-HOUR PERIOD. KEEP A PAIN JOURNAL; RECORD YOUR PAIN SCORE, PAIN MEDICATIONS ADMINISTERED, AND TIME. YOU SHOULD BE TAKING PAIN MEDICINE (OTC OR PRESCRIBED) EVERY 4-6 HOURS UNTIL YOU NO LONGER NEED MEDICINE FOR PAIN.    7. LABS/IMAGING: You may be asked to complete labs or imaging prior to your next provider appointment. If you use [facility], a paper order is not required. If you use another lab or imaging center, be sure you have your order requisition form at discharge. Contact scheduling or use [facility] to arrange a lab appointment.    8. BLOOD PRESSURE: Measure your blood pressure and pulse every day and write it down on a piece of paper or record it in a smart phone leslie.  Bring this to your next PCP appointment.  If you feel dizzy, take your blood pressure and pulse and call your PCP.    9. RETURN TO THE ER: Return to the ER if you develop recurrent chest pain, shortness of breath, bloody sputum, fever of 101.5 or greater, intractable nausea or vomiting, blood in the vomit or urine, intractable pain, new onset inability to ambulate, focal motor weakness, acute vision disturbance, acute speech disturbance, intractable abdominal pain, diffuse watery diarrhea or any other worrisome symptoms.        Discharge Instructions    Discharged to home by car with relative. Discharged via wheelchair, hospital escort: Yes.  Special equipment needed: Not Applicable    Be sure to schedule a follow-up appointment with your primary care doctor or any specialists as instructed.     Discharge Plan:   Diet Plan: Discussed  Activity Level: Discussed  Confirmed Follow up Appointment: Patient to Call and Schedule Appointment  Confirmed Symptoms Management: Discussed  Medication Reconciliation Updated: Yes    I understand that a diet low in cholesterol, fat, and sodium is recommended for good health. Unless I have been given specific instructions below for another diet, I accept this instruction as my diet prescription.   Other diet:  regular diet    Special Instructions: None    Is patient discharged on Warfarin / Coumadin?   No     Depression / Suicide Risk    As you are discharged from this [facility] facility, it is important to learn how to keep safe from harming yourself.    Recognize the warning signs:  Abrupt changes in personality, positive or negative- including increase in energy   Giving away possessions  Change in eating patterns- significant weight changes-  positive or negative  Change in sleeping patterns- unable to sleep or sleeping all the time   Unwillingness or inability to communicate  Depression  Unusual sadness, discouragement and loneliness  Talk of wanting to die  Neglect of personal appearance   Rebelliousness- reckless behavior  Withdrawal from people/activities they love  Confusion- inability to concentrate     If you or a loved one observes any of these behaviors or has concerns about self-harm, here's what you can do:  Talk about it- your feelings and reasons for harming yourself  Remove any means that you might use to hurt yourself (examples: pills, rope, extension cords, firearm)  Get professional help from the community (Mental Health, Substance Abuse, psychological counseling)  Do not be alone:Call your Safe Contact- someone whom you trust who will be there for you.  Call your local CRISIS HOTLINE 218-0005 or 429-327-2010  Call your local Children's Mobile Crisis Response Team [place] (246) 295-0161 or www.[employer]  Call the toll free National Suicide Prevention Hotlines   National Suicide Prevention Lifeline 637-308-JFPW (1894)  National Hope Line Network 800-SUICIDE (235-8533)    Cholelithiasis    Cholelithiasis is a form of gallbladder disease in which gallstones form in the gallbladder. The gallbladder is an organ that stores bile. Bile is made in the liver, and it helps to digest fats. Gallstones begin as small crystals and slowly grow into stones. They may cause no symptoms until the  gallbladder tightens (contracts) and a gallstone is blocking the duct (gallbladder attack), which can cause pain. Cholelithiasis is also referred to as gallstones.  There are two main types of gallstones:  Cholesterol stones. These are made of hardened cholesterol and are usually yellow-green in color. They are the most common type of gallstone. Cholesterol is a white, waxy, fat-like substance that is made in the liver.  Pigment stones. These are dark in color and are made of a red-yellow substance that forms when hemoglobin from red blood cells breaks down (bilirubin).  What are the causes?  This condition may be caused by an imbalance in the substances that bile is made of. This can happen if the bile:  Has too much bilirubin.  Has too much cholesterol.  Does not have enough bile salts. These salts help the body absorb and digest fats.  In some cases, this condition can also be caused by the gallbladder not emptying completely or often enough.  What increases the risk?  The following factors may make you more likely to develop this condition:  Being female.  Having multiple pregnancies. Health care providers sometimes advise removing diseased gallbladders before future pregnancies.  Eating a diet that is heavy in fried foods, fat, and refined carbohydrates, like white bread and white rice.  Being obese.  Being older than age 40.  Prolonged use of medicines that contain female hormones (estrogen).  Having diabetes mellitus.  Rapidly losing weight.  Having a family history of gallstones.  Being of  or [place] descent.  Having an intestinal disease such as Crohn disease.  Having metabolic syndrome.  Having cirrhosis.  Having severe types of anemia such as sickle cell anemia.  What are the signs or symptoms?  In most cases, there are no symptoms. These are known as silent gallstones. If a gallstone blocks the bile ducts, it can cause a gallbladder attack. The main symptom of a gallbladder attack is  sudden pain in the upper right abdomen. The pain usually comes at night or after eating a large meal. The pain can last for one or several hours and can spread to the right shoulder or chest.  If the bile duct is blocked for more than a few hours, it can cause infection or inflammation of the gallbladder, liver, or pancreas, which may cause:  Nausea.  Vomiting.  Abdominal pain that lasts for 5 hours or more.  Fever or chills.  Yellowing of the skin or the whites of the eyes (jaundice).  Dark urine.  Light-colored stools.  How is this diagnosed?  This condition may be diagnosed based on:  A physical exam.  Your medical history.  An ultrasound of your gallbladder.  CT scan.  MRI.  Blood tests to check for signs of infection or inflammation.  A scan of your gallbladder and bile ducts (biliary system) using nonharmful radioactive material and special cameras that can see the radioactive material (cholescintigram). This test checks to see how your gallbladder contracts and whether bile ducts are blocked.  Inserting a small tube with a camera on the end (endoscope) through your mouth to inspect bile ducts and check for blockages (endoscopic retrograde cholangiopancreatogram).  How is this treated?  Treatment for gallstones depends on the severity of the condition. Silent gallstones do not need treatment. If the gallstones cause a gallbladder attack or other symptoms, treatment may be required. Options for treatment include:  Surgery to remove the gallbladder (cholecystectomy). This is the most common treatment.  Medicines to dissolve gallstones. These are most effective at treating small gallstones. You may need to take medicines for up to 6-12 months.  Shock wave treatment (extracorporeal biliary lithotripsy). In this treatment, an ultrasound machine sends shock waves to the gallbladder to break gallstones into smaller pieces. These pieces can then be passed into the intestines or be dissolved by medicine. This is rarely  used.  Removing gallstones through endoscopic retrograde cholangiopancreatogram. A small basket can be attached to the endoscope and used to capture and remove gallstones.  Follow these instructions at home:  Take over-the-counter and prescription medicines only as told by your health care provider.  Maintain a healthy weight and follow a healthy diet. This includes:  Reducing fatty foods, such as fried food.  Reducing refined carbohydrates, like white bread and white rice.  Increasing fiber. Aim for foods like almonds, fruit, and beans.  Keep all follow-up visits as told by your health care provider. This is important.  Contact a health care provider if:  You think you have had a gallbladder attack.  You have been diagnosed with silent gallstones and you develop abdominal pain or indigestion.  Get help right away if:  You have pain from a gallbladder attack that lasts for more than 2 hours.  You have abdominal pain that lasts for more than 5 hours.  You have a fever or chills.  You have persistent nausea and vomiting.  You develop jaundice.  You have dark urine or light-colored stools.  Summary  Cholelithiasis (also called gallstones) is a form of gallbladder disease in which gallstones form in the gallbladder.  This condition is caused by an imbalance in the substances that make up bile. This can happen if the bile has too much cholesterol, too much bilirubin, or not enough bile salts.  You are more likely to develop this condition if you are female, pregnant, using medicines with estrogen, obese, older than age 40, or have a family history of gallstones. You may also develop gallstones if you have diabetes, an intestinal disease, cirrhosis, or metabolic syndrome.  Treatment for gallstones depends on the severity of the condition. Silent gallstones do not need treatment.  If gallstones cause a gallbladder attack or other symptoms, treatment may be needed. The most common treatment is surgery to remove the  gallbladder.  This information is not intended to replace advice given to you by your health care provider. Make sure you discuss any questions you have with your health care provider.  Document Released: 12/14/2006 Document Revised: 11/30/2018 Document Reviewed: 09/03/2017  Elsevier Patient Education © 2020 [employer]  Gallbladder Eating Plan  If you have a gallbladder condition, you may have trouble digesting fats. Eating a low-fat diet can help reduce your symptoms, and may be helpful before and after having surgery to remove your gallbladder (cholecystectomy). Your health care provider may recommend that you work with a diet and nutrition specialist (dietitian) to help you reduce the amount of fat in your diet.  What are tips for following this plan?  General guidelines  Limit your fat intake to less than 30% of your total daily calories. If you eat around 1,800 calories each day, this is less than 60 grams (g) of fat per day.  Fat is an important part of a healthy diet. Eating a low-fat diet can make it hard to maintain a healthy body weight. Ask your dietitian how much fat, calories, and other nutrients you need each day.  Eat small, frequent meals throughout the day instead of three large meals.  Drink at least 8-10 cups of fluid a day. Drink enough fluid to keep your urine clear or pale yellow.  Limit alcohol intake to no more than 1 drink a day for nonpregnant women and 2 drinks a day for men. One drink equals 12 oz of beer, 5 oz of wine, or 1½ oz of hard liquor.  Reading food labels  Check Nutrition Facts on food labels for the amount of fat per serving. Choose foods with less than 3 grams of fat per serving.  Shopping  Choose nonfat and low-fat healthy foods. Look for the words “nonfat,” “low fat,” or “fat free.”  Avoid buying processed or prepackaged foods.  Cooking  Cook using low-fat methods, such as baking, broiling, grilling, or boiling.  Cook with small amounts of healthy fats, such as olive oil,  grapeseed oil, canola oil, or sunflower oil.  What foods are recommended?    All fresh, frozen, or canned fruits and vegetables.  Whole grains.  Low-fat or non-fat (skim) milk and yogurt.  Lean meat, skinless poultry, fish, eggs, and beans.  Low-fat protein supplement powders or drinks.  Spices and herbs.  What foods are not recommended?  High-fat foods. These include baked goods, fast food, fatty cuts of meat, ice cream, french toast, sweet rolls, pizza, cheese bread, foods covered with butter, creamy sauces, or cheese.  Fried foods. These include french fries, tempura, battered fish, breaded chicken, fried breads, and sweets.  Foods with strong odors.  Foods that cause bloating and gas.  Summary  A low-fat diet can be helpful if you have a gallbladder condition, or before and after gallbladder surgery.  Limit your fat intake to less than 30% of your total daily calories. This is about 60 g of fat if you eat 1,800 calories each day.  Eat small, frequent meals throughout the day instead of three large meals.  This information is not intended to replace advice given to you by your health care provider. Make sure you discuss any questions you have with your health care provider.  Document Released: 12/23/2014 Document Revised: 04/09/2020 Document Reviewed: 01/25/2018  Elsevier Patient Education © 2020 [employer] Inc[employer]

## 2022-06-30 NOTE — PROGRESS NOTES
Pt is A&O 4  Pain -   - nausea  Tolerating a CHO diet   Incision -  - Drains  + Voids  + flatus  + BM 6/29  Up SBA  SCD's off  Bed alarm off, pt low fall risk per nancy maddox  Reviewed plan of care with patient, bed in lowest position and locked, pt resting comfortably now, call light within reach, all needs met at this time. Interventions will be executed per plan of care

## 2022-06-30 NOTE — CARE PLAN
Problem: Knowledge Deficit - Standard  Goal: Patient and family/care givers will demonstrate understanding of plan of care, disease process/condition, diagnostic tests and medications  Outcome: Progressing     Problem: Psychosocial  Goal: Patient's level of anxiety will decrease  Outcome: Progressing     Problem: Mobility  Goal: Patient's capacity to carry out activities will improve  Outcome: Progressing   The patient is Stable - Low risk of patient condition declining or worsening    Shift Goals  Clinical Goals: labs, mobilize, pain control  Patient Goals: rest    Progress made toward(s) clinical / shift goals: pt states pain is tolerable this shift and understands to call if medication is needed for pain. Pt up self ambulating around room, declined hallway walk at this time.    Patient is not progressing towards the following goals:

## 2022-06-30 NOTE — DISCHARGE SUMMARY
Discharge Summary    CHIEF COMPLAINT ON ADMISSION  Chief Complaint   Patient presents with   • Vomiting     Woke up vomiting at 6am  then had chest tightness   • Chest Pain     Constant tightness in the center of his chest, no radiating        Reason for Admission  Chest Pain     Admission Date  6/27/2022    CODE STATUS  Full Code    HPI & HOSPITAL COURSE  Mr. Manolo Puri is a 81-year-old male with a PMHx of DLD, DMT2, who presented on 6/27/2022 due to chest pain associated with nausea and vomiting.    Patient reports he started to feel all the symptoms after waking up in the morning of admission.  Approximately 3 days ago, he was lifting 40 pound bundles of magazine and felt very tired more so than normal but no chest pain.  On admission day, patient recalls his chest pain as localized, then had the nausea and vomiting.  He has 3-4 times episodes of vomiting that is nonbloody emesis fluid with fluid contents he previously drank.  Patient denies any bowel changes, and no blood stools.    In ER, notable lab findings noted glucose 123, BUN 23, , , alk phos 151, total bilirubin 4.4.  Initial troponin at 30 and 34.  Patient will be admitted for chest pain rule out.  ERP consulted surgery due to elevated LFTs.    Cardiac stress test noted no evidence of significant jeopardized viable myocardium or prior myocardial infarction with normal left ventricular size, ejection fraction, and wall motion.  MRCP showed no evidence of choledocholithiasis or CBD duct abnormalities.    Patient had a brief episode where his total bilirubin and transaminitis increased despite resolution of his pain.  Gastroenterology was consulted and recommended serial checks of his hepatic function due to suspicion of a gallstone passage around the time of his transaminitis tres.  He was monitored overnight on a diabetic diet with serial checks of his labs that showed steady and persistent improvement.  His pain has resolved.  He  will need to follow-up with Dr. Garcia to discuss outpatient cholecystectomy for symptomatic cholelithiasis..  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    Therefore, he is discharged in good and stable condition to home with close outpatient follow-up.    The patient met 2-midnight criteria for an inpatient stay at the time of discharge.    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    Hypertension POA: Yes    Cholelithiasis POA: Yes    Cholestatic hepatitis POA: Yes  Resolved Problems:    Pancreatitis POA: Yes    Nausea and vomiting POA: Yes    Hyponatremia POA: Yes    Abdominal pain POA: Yes      FOLLOW UP  Future Appointments   Date Time Provider Department Center   7/5/2022  7:40 AM VIRAJ Rizzo None     Mary Garcia M.D.  [address] [id number] [phone]    Schedule an appointment as soon as possible for a visit  To discuss Gall Stones/Gall bladder removal      MEDICATIONS ON DISCHARGE     Medication List      Change how you take these medications      Instructions   metFORMIN  MG Tb24  What changed: when to take this  Commonly known as: GLUCOPHAGE XR   Take 2 tablets by mouth twice daily        Continue taking these medications      Instructions   * FreeStyle Ifrah Sensor System Misc      * FreeStyle Ifrah 14 Day Sensor Misc   Doctor's comments: Please provide 3 month supply of sensors for Ifrah system with 3 refills  Place 1 Application on the skin every 14 days. APPLY 1 PATCH TO CHECK GLUCOSE AS DIRECTED AND  CHANGE   EVERY  14  DAYS  Dose: 1 Application     insulin 70/30 (70-30) 100 UNIT/ML Susp  Commonly known as: HumuLIN 70/30/NovoLIN 70/30   Inject 50 Units under the skin 2 times a day.  Dose: 50 Units      * This list has 2 medication(s) that are the same as other medications prescribed for you. Read the directions carefully, and ask your doctor or other care provider to review them with you.         Allergies  No Known Allergies    DIET  Orders Placed This Encounter   Procedures   • Diet Order Diet: Consistent CHO (Diabetic); Nutrient modifications: (optional): Low Fat     Standing Status:   Standing     Number of Occurrences:   1     Order Specific Question:   Diet:     Answer:   Consistent CHO (Diabetic) [4]     Order Specific Question:   Nutrient modifications: (optional)     Answer:   Low Fat [5]       ACTIVITY  As tolerated.  Weight bearing as tolerated    CONSULTATIONS  G sx  GI    LABORATORY  Lab Results   Component Value Date    SODIUM 138 06/29/2022    POTASSIUM 4.1 06/29/2022    CHLORIDE 104 06/29/2022    CO2 23 06/29/2022    GLUCOSE 158 (H) 06/29/2022    BUN 15 06/29/2022    CREATININE 0.94 06/29/2022        Lab Results   Component Value Date    WBC 7.0 06/29/2022    HEMOGLOBIN 15.6 06/29/2022    HEMATOCRIT 48.8 06/29/2022    PLATELETCT 269 06/29/2022        Total time of the discharge process exceeds 38 minutes.

## 2022-06-30 NOTE — PROGRESS NOTES
Patient discharged home. Patient AOX 4.  IV removed, discharge instructions provided to patient and reviewed with them. All questions answered, patient provided copy of discharge instructions and instructed on when to F/U with MD. Patient walked off unit. Patient discharged into the care of his niece.

## 2022-06-30 NOTE — PROGRESS NOTES
Gastroenterology Progress Note     Author: Homer Vital M.D.   Date & Time Created: 6/30/2022 7:58 AM    Chief Complaint:  This is a 82 yo male PMH diabetes, dyslipidemia, hypertension who was admitted to the hospital 6/27/22 with chest pain, N/V. 3 days prior to admission, he was lifting 40 lb bundles of magazines and felt very tired. Began to have epigastric/chest pain with 3 episodes of non bloody emesis. Notable ER labs: TB: 2.4. AST: 460. ALT: 326. ALP: 149. Lipase: 141. Troponin: 30. Abdominal US: Hepatomegaly and hepatic steatosis. Cholelithiasis, with no sonographic evidence of acute cholecystitis. General surgery was consulted.     MRCP 6/28/22: there is cholelithiasis without evidence of cholecystitis. No biliary dilatation or stones in duct.      Currently, asymptomatic. Abdominal pain resolved after admission. Tolerating PO intake. LFT's this morning: TB trended up from 4.4-->4.7. AST: 278. ALT: 425. ALP: 194.    Interval History:  6/30/2022: LFt improving. No pain    Review of Systems:  Review of Systems   Constitutional: Negative.    HENT: Negative.    Eyes: Negative.    Respiratory: Negative.    Cardiovascular: Negative.    Gastrointestinal: Negative.    Genitourinary: Negative.    Musculoskeletal: Negative.    Skin: Negative.    Neurological: Negative.    Endo/Heme/Allergies: Negative.    Psychiatric/Behavioral: Negative.        Physical Exam:  Physical Exam  Constitutional:       Appearance: Normal appearance. He is obese.   HENT:      Head: Normocephalic.      Mouth/Throat:      Mouth: Mucous membranes are moist.   Eyes:      Extraocular Movements: Extraocular movements intact.      Pupils: Pupils are equal, round, and reactive to light.   Cardiovascular:      Rate and Rhythm: Normal rate and regular rhythm.      Pulses: Normal pulses.      Heart sounds: Normal heart sounds.   Pulmonary:      Effort: Pulmonary effort is normal.      Breath sounds: Normal breath sounds.   Abdominal:      General:  Abdomen is flat. Bowel sounds are normal. There is no distension.      Palpations: Abdomen is soft. There is no mass.      Tenderness: There is no abdominal tenderness.      Hernia: No hernia is present.   Musculoskeletal:         General: Normal range of motion.   Skin:     General: Skin is warm and dry.      Capillary Refill: Capillary refill takes 2 to 3 seconds.   Neurological:      General: No focal deficit present.      Mental Status: He is alert and oriented to person, place, and time.         Labs:          Recent Labs     22  1103 22  0232 22  0838   SODIUM 133* 140 138   POTASSIUM 4.9 3.9 4.1   CHLORIDE 97 104 104   CO2 23 24 23   BUN 23* 23* 15   CREATININE 1.23 1.05 0.94   CALCIUM 9.8 9.0 8.8     Recent Labs     22  1103 22  0232 22  0838 22  1341 22  1640 22  0304   ALTSGPT 326* 453* 425* 377*  --  326*   ASTSGOT 460* 421* 278* 229*  --  175*   ALKPHOSPHAT 149* 151* 194* 189*  --  185*   TBILIRUBIN 2.4* 4.4* 4.7* 4.0*  --  2.5*   DBILIRUBIN  --   --   --  3.3* 2.9* 1.7*   LIPASE 141*  --   --   --   --   --    GLUCOSE 344* 123* 158*  --   --   --      Recent Labs     22  1103 06/28/22  0232 22  0838   RBC 5.69 5.15 5.51   HEMOGLOBIN 16.2 14.6 15.6   HEMATOCRIT 49.1 44.0 48.8   PLATELETCT 280 239 269     Recent Labs     22  1103 22  0232 22  0838 22  1341 22  0304   WBC 11.1* 9.6 7.0  --   --    NEUTSPOLYS 83.50*  --   --   --   --    LYMPHOCYTES 6.10*  --   --   --   --    MONOCYTES 7.80  --   --   --   --    EOSINOPHILS 0.00  --   --   --   --    BASOPHILS 0.80  --   --   --   --    ASTSGOT 460* 421* 278* 229* 175*   ALTSGPT 326* 453* 425* 377* 326*   ALKPHOSPHAT 149* 151* 194* 189* 185*   TBILIRUBIN 2.4* 4.4* 4.7* 4.0* 2.5*     Hemodynamics:  Temp (24hrs), Av.6 °C (97.8 °F), Min:36 °C (96.8 °F), Max:37 °C (98.6 °F)  Temperature: 36.7 °C (98 °F)  Pulse  Av.9  Min: 85  Max: 109   Blood Pressure : (!)  158/101     Respiratory:    Respiration: 20, Pulse Oximetry: 95 %        RUL Breath Sounds: Clear, RML Breath Sounds: Clear, RLL Breath Sounds: Clear, ELLIS Breath Sounds: Clear, LLL Breath Sounds: Clear  Fluids:    Intake/Output Summary (Last 24 hours) at 6/30/2022 0758  Last data filed at 6/29/2022 1500  Gross per 24 hour   Intake 480 ml   Output 0 ml   Net 480 ml        GI/Nutrition:  Orders Placed This Encounter   Procedures   • Diet Order Diet: Consistent CHO (Diabetic); Nutrient modifications: (optional): Low Fat     Standing Status:   Standing     Number of Occurrences:   1     Order Specific Question:   Diet:     Answer:   Consistent CHO (Diabetic) [4]     Order Specific Question:   Nutrient modifications: (optional)     Answer:   Low Fat [5]     Medical Decision Making, by Problem:  Active Hospital Problems    Diagnosis    • Cholestatic hepatitis [K75.89]    • Elevated liver enzymes [R74.8]    • Hypertension [I10]    • Cholelithiasis [K80.20]    • 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 [E66.1, Z68.31]    • Hyperlipidemia associated with type 2 diabetes mellitus (HCC) [E11.69, E78.5]    • Type 2 diabetes mellitus, with long-term current use of insulin (HCC) [E11.9, Z79.4]      This is a pleasant 80 yo male who was admitted to the hospital 6/27/22 with a sudden onset of chest/epigastric pain, N/V. Labs revealed elevated LFT's. Abdominal US cholelithiasis with no cholecystitis. On 6/28/22 his bilirubin vicki from 2.4-->4.4. MRCP: cholelithiasis with no evidence of cholecystitis. No biliary dilatation or choledocholithiasis. This morning bilirubin trended up slightly from 4.4-->4.7. Hepatitis panel: non reactive.  Cardiac workup negative. Currently, patient is asymptomatic. Tolerating PO intake. General surgery is involved.     ASSESSMENT:  1.  Abdominal pain: resolved.   2. Elevated LFT's likely due to pass gallstone  3. Type 2 DM  4. Obesity    Plan:  1. Follow-up with  PCP in 1 weeks to check normalization of LFT  2. If persistent LFT, patient can be re-referred back to GI for chronic liver disease elevation. Patient understands that more labs can be performed for evaluation but given resolution of symptoms, improved LFT suspect stone passage most likely as patient did not have any other inciting factors.  3. Discharge planning as per team  4. Discussion about cholecystectomy and IOC deferred to surgery  5. Follow-up as needed.    GI TO SIGN OFF / STAND BY - Thank you very much for allowing me to participate in the care of your patient.  GI to sign off at this time.  If the patient develops changes in clinical course/decompensation or you have any additional questions or concerns, please don't hesitate to reengage [facility].     Quality-Core Measures

## 2022-07-01 ENCOUNTER — PATIENT OUTREACH (OUTPATIENT)
Dept: MEDICAL GROUP | Facility: PHYSICIAN GROUP | Age: 82
End: 2022-07-01
Payer: MEDICARE

## 2022-07-01 NOTE — PROGRESS NOTES
7/1/22 Nurse CM post discharge outreach call. Reviewed discharge instructions. Pt has no questions and will follow-up with PCP.

## 2022-07-01 NOTE — PROGRESS NOTES
Subjective:     Manolo Puri is a 81 y.o. male who presents for Hospital Follow-up.    POST DISCHARGE CALL:  Discharge Date:6/30/2022   Date of Outreach Call: 7/1/2022  2:44 PM  Now that you're home, how are you doing? Good  Comment:Reports feeling good.States blood sugars in a  good range.  Do you have questions about your medications? No  Did you fill your medications? No  Comment:No new medications  Do you have a follow-up appointment scheduled?Yes  Comment:7/5/22  Discharging Department: *  Number of Attempts: 1  Current or previous attempts completed within two business days of discharge? Yes  Provided education regarding treatment plan, medication, self-management, ADLs? Yes  Has patient completed Advance Directive? If yes, advise them to bring to appointment. No  Care Manager phone number provided? Yes  Is there anything else I can help you with? No    HPI:   Recently hospitalized for cholestatic hepatitis due to cholelithiasis.    Problem   Hospital Discharge Follow-Up    He was admitted for 3 days to the hospital in late June/early July 2022 due to chest pain with associated nausea and vomiting.  He underwent cardiac stress testing which was negative for ischemia.  Initial right upper quadrant ultrasound showed hepatomegaly with fatty liver, follow-up MRCP showed borderline splenomegaly but no clear gallbladder pathology outside of Cholelithiasis.  GI and general surgery were both consulted and suspect he had enlarged gallstone which spontaneously dislodged, recommendation for consideration of outpatient cholecystectomy.  He had transaminitis felt to be related to gallbladder pathology which trended down on its own with supportive treatment.     Cholestatic Hepatitis     Latest Reference Range & Units 06/27/22 11:03 06/28/22 02:32 06/29/22 08:38 06/29/22 13:41 06/30/22 03:04   AST(SGOT) 12 - 45 U/L 460 (H) 421 (H) 278 (H) 229 (H) 175 (H)   ALT(SGPT) 2 - 50 U/L 326 (H) 453 (H) 425 (H) 377 (H)  326 (H)   Alkaline Phosphatase 30 - 99 U/L 149 (H) 151 (H) 194 (H) 189 (H) 185 (H)   Total Bilirubin 0.1 - 1.5 mg/dL 2.4 (H) 4.4 (H) 4.7 (H) 4.0 (H) 2.5 (H)   Direct Bilirubin 0.1 - 0.5 mg/dL    3.3 (H) 1.7 (H)     He was hospitalized for chest pain with nausea and vomiting and was found to have cholestatic hepatitis.  Felt most consistent with a lodged gallstone which became dislodged on its own.  Met with both GI and general surgery during his admission.  Did not have any real abdominal pain and the chest pain resolved after his first day in the hospital.     Type 2 Diabetes Mellitus, With Long-Term Current Use of Insulin ([facility])     Latest Reference Range & Units 03/10/22 09:50   Glycohemoglobin 4.0 - 5.6 % 10.1 (H)   Estim. Avg Glu mg/dL 243      Latest Reference Range & Units 07/08/20 04:51   Microalbumin-Creatinine 0 - 29 mg/g creat 110 (H)     He was diagnosed around age 62 with type 2 diabetes, not sure how long he had prediabetes beforehand.  He was initially taking metfomin and has been on 70/30 insulin since age 74, currently using 52 U twice daily. A1c has ranged 8.7-14 since 2018.     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 No history of chronic kidney disease. He has microalbuminuria, worsening. No neuropathy.         Current medicines (including reconciliation performed today)  Current Outpatient Medications   Medication Sig Dispense Refill   • Continuous Blood Gluc  (DEXCOM G5  KIT) Device 1 Each one time for 1 dose. 1 Each 0   • Continuous Blood Gluc Transmit (DEXCOM G5 MOBILE TRANSMITTER) Misc 1 Each one time for 1 dose. 1 Each 0   • Continuous Blood Gluc Sensor (DEXCOM G5 MOB/G4 PLAT SENSOR) Misc 2 Each every 14 days. 4 Each 3   • metFORMIN ER (GLUCOPHAGE XR) 500 MG TABLET SR 24 HR Take 2 tablets by mouth twice daily (Patient taking differently: Take 1,000 mg by mouth 2 times a day.) 360 Tablet 3   • Continuous Blood Gluc Sensor (FREESTYLE CONNIE 14 DAY SENSOR) Misc Place 1  Application on the skin every 14 days. APPLY 1 PATCH TO CHECK GLUCOSE AS DIRECTED AND  CHANGE  EVERY  14  DAYS 3 Each 3   • Continuous Blood Gluc Sensor (FREESTYLE CONNIE SENSOR SYSTEM) Misc      • insulin 70/30 (HUMULIN 70/30/NOVOLIN 70/30) (70-30) 100 UNIT/ML Suspension Inject 50 Units under the skin 2 times a day.       No current facility-administered medications for this visit.       Allergies:   Patient has no known allergies.    Social History     Tobacco Use   • Smoking status: Never Smoker   • Smokeless tobacco: Never Used   • Tobacco comment: continued abstinence   Vaping Use   • Vaping Use: Never used   Substance Use Topics   • Alcohol use: No   • Drug use: No       ROS:  No fevers/chills, no abdominal pain, normal BM's    Objective:     Vitals:    07/05/22 0907   BP: 126/82   BP Location: Right arm   Patient Position: Sitting   BP Cuff Size: Adult   Pulse: 90   Resp: 16   Temp: 36.6 °C (97.8 °F)   TempSrc: Temporal   SpO2: 95%   Weight: 104 kg (229 lb)   Height: 1.829 m (6')     Body mass index is 31.06 kg/m².    Physical Exam:  Physical Exam  Constitutional:       General: He is not in acute distress.     Appearance: Normal appearance. He is not ill-appearing.   HENT:      Right Ear: Ear canal normal. There is no impacted cerumen.      Left Ear: Ear canal normal. There is no impacted cerumen.   Eyes:      General: No scleral icterus.     Conjunctiva/sclera: Conjunctivae normal.   Cardiovascular:      Rate and Rhythm: Normal rate and regular rhythm.      Pulses: Normal pulses.   Pulmonary:      Effort: Pulmonary effort is normal. No respiratory distress.      Breath sounds: No wheezing or rhonchi.   Abdominal:      General: Bowel sounds are normal. There is distension.      Palpations: Abdomen is soft.      Tenderness: There is no abdominal tenderness. There is no guarding or rebound.   Musculoskeletal:      Right lower leg: No edema.      Left lower leg: No edema.      Comments: Diabetic Foot Exam: No  ulcers/laceration/blisters present on bilateral feet, cut at distal 1st toe from nail clipping, normal gross anatomy of bilateral feet without abnormal curvature or arch, no toe deformities, + toenail thickness, no ingrown toenails, no increase in skin temperature bilaterally, no skin erythema to bilateral feet, bilateral dorsalis pedis 2+ and equal, Refill less than 2 seconds bilaterally, Yuki 10 g monofilament testing decreased in bilateral great toes, bilateral balls of feet at medial/lateral/mid ball of foot   Skin:     General: Skin is warm.      Findings: No bruising or rash.   Psychiatric:         Mood and Affect: Mood normal.         Behavior: Behavior normal.         Thought Content: Thought content normal.         Judgment: Judgment normal.         Assessment and Plan:   Problem List Items Addressed This Visit     Type 2 diabetes mellitus, with long-term current use of insulin (HCC)     Chronic and ongoing problem, A1c continues to be uncontrolled.  He would like to switch from the chantelle to Dexcom to see if he can get more continuous feedback of numbers so he can be better about dietary control.  We will update his urine microalbumin and A1c.  Would be a good candidate for referral to pharmacotherapy service to discuss other treatment options, Tresiba or Toujeo may be a good insulin for him to transition to as well as consideration for GLP-1 therapy if amenable. No neuropathy on foot exam today. Update urine microalbumin. Continue follow up with ophthalmology as recommended for macular edema.           Relevant Medications    Continuous Blood Gluc  (DEXCOM G5  KIT) Device    Continuous Blood Gluc Transmit (DEXCOM G5 MOBILE TRANSMITTER) Misc    Continuous Blood Gluc Sensor (DEXCOM G5 MOB/G4 PLAT SENSOR) Misc    Other Relevant Orders    MICROALBUMIN CREAT RATIO URINE    Diabetic Monofilament Lower Extremity Exam (Completed)    HEMOGLOBIN A1C    Cholestatic hepatitis     Recent problem,  requires follow-up, recheck liver enzymes to ensure this has normalized.           Relevant Orders    CBC WITH DIFFERENTIAL    Basic Metabolic Panel    HEPATIC FUNCTION PANEL    Hospital discharge follow-up     New and decompensated problem.  Please see HPI for full details, admitted for cholestatic hepatitis from likely Choledocolithiasis which dislodged on its own.  Met with GI and general surgery, consideration for outpatient laparoscopic cholecystectomy, patient like to hold off at this time if possible.  He will try to avoid fatty foods so as to not inflamed gallbladder.  We will follow-up on liver enzymes and other blood work to ensure stability.  Could also be related to his uncontrolled diabetes, he would like to switch his meter from chantelle to Dexcom to see if he can get better control, suspect he will also need to meet with our outpatient pharmacy group.                 - Chart and discharge summary were reviewed.   - Hospitalization and results reviewed with patient.   - Medications reviewed including instructions regarding high risk medications, dosing and side effects.  - Recommended Services: No services needed at this time  - Advance directive/POLST on file?  No  on his refridgerator at home, advised him to bring a copy in to scan, 2 daughters who live locally are his mPOA    Follow-up:Return in about 3 months (around 10/5/2022).    Face-to-face transitional care management services with HIGH (today's visit is within days post discharge & LACE+ score 59+) medical decision complexity were provided.     LACE+ Historical Score Over Time (0-28: Low, 29-58: Medium, 59+: High): 77

## 2022-07-05 ENCOUNTER — OFFICE VISIT (OUTPATIENT)
Dept: MEDICAL GROUP | Facility: PHYSICIAN GROUP | Age: 82
End: 2022-07-05
Payer: MEDICARE

## 2022-07-05 VITALS
DIASTOLIC BLOOD PRESSURE: 82 MMHG | RESPIRATION RATE: 16 BRPM | HEIGHT: 72 IN | OXYGEN SATURATION: 95 % | SYSTOLIC BLOOD PRESSURE: 126 MMHG | WEIGHT: 229 LBS | HEART RATE: 90 BPM | BODY MASS INDEX: 31.02 KG/M2 | TEMPERATURE: 97.8 F

## 2022-07-05 DIAGNOSIS — Z79.4 TYPE 2 DIABETES MELLITUS WITH MICROALBUMINURIA, WITH LONG-TERM CURRENT USE OF INSULIN (HCC): ICD-10-CM

## 2022-07-05 DIAGNOSIS — Z09 HOSPITAL DISCHARGE FOLLOW-UP: ICD-10-CM

## 2022-07-05 DIAGNOSIS — E11.29 TYPE 2 DIABETES MELLITUS WITH MICROALBUMINURIA, WITH LONG-TERM CURRENT USE OF INSULIN (HCC): ICD-10-CM

## 2022-07-05 DIAGNOSIS — K75.89 CHOLESTATIC HEPATITIS: ICD-10-CM

## 2022-07-05 DIAGNOSIS — R80.9 TYPE 2 DIABETES MELLITUS WITH MICROALBUMINURIA, WITH LONG-TERM CURRENT USE OF INSULIN (HCC): ICD-10-CM

## 2022-07-05 PROCEDURE — 99496 TRANSJ CARE MGMT HIGH F2F 7D: CPT | Performed by: INTERNAL MEDICINE

## 2022-07-05 RX ORDER — BLOOD-GLUCOSE SENSOR
2 EACH MISCELLANEOUS
Qty: 4 EACH | Refills: 3 | Status: SHIPPED | OUTPATIENT
Start: 2022-07-05 | End: 2022-08-29

## 2022-07-05 RX ORDER — BLOOD-GLUCOSE,RECEIVER,CONT
1 EACH MISCELLANEOUS ONCE
Qty: 1 EACH | Refills: 0 | Status: SHIPPED | OUTPATIENT
Start: 2022-07-05 | End: 2022-07-05

## 2022-07-05 RX ORDER — BLOOD-GLUCOSE TRANSMITTER
1 EACH MISCELLANEOUS ONCE
Qty: 1 EACH | Refills: 0 | Status: SHIPPED | OUTPATIENT
Start: 2022-07-05 | End: 2022-07-05

## 2022-07-05 ASSESSMENT — FIBROSIS 4 INDEX: FIB4 SCORE: 2.85

## 2022-07-05 NOTE — ASSESSMENT & PLAN NOTE
Chronic and ongoing problem, A1c continues to be uncontrolled.  He would like to switch from the chantelle to Dexcom to see if he can get more continuous feedback of numbers so he can be better about dietary control.  We will update his urine microalbumin and A1c.  Would be a good candidate for referral to pharmacotherapy service to discuss other treatment options, Tresiba or Toujeo may be a good insulin for him to transition to as well as consideration for GLP-1 therapy if amenable. No neuropathy on foot exam today. Update urine microalbumin. Continue follow up with ophthalmology as recommended for macular edema.

## 2022-07-05 NOTE — ASSESSMENT & PLAN NOTE
New and decompensated problem.  Please see HPI for full details, admitted for cholestatic hepatitis from likely Choledocolithiasis which dislodged on its own.  Met with GI and general surgery, consideration for outpatient laparoscopic cholecystectomy, patient like to hold off at this time if possible.  He will try to avoid fatty foods so as to not inflamed gallbladder.  We will follow-up on liver enzymes and other blood work to ensure stability.  Could also be related to his uncontrolled diabetes, he would like to switch his meter from chantelle to Dexcom to see if he can get better control, suspect he will also need to meet with our outpatient pharmacy group.

## 2022-07-08 ENCOUNTER — PATIENT OUTREACH (OUTPATIENT)
Dept: HEALTH INFORMATION MANAGEMENT | Facility: OTHER | Age: 82
End: 2022-07-08
Payer: MEDICARE

## 2022-07-08 NOTE — PROGRESS NOTES
ERIC Gandhi called patient via TC post hospital discharge and patient completed PCP appointment and has no other needs at this time.  CHELSYW Oksana will discharge patient from [facility] and remove from case load and master list as all goals have been met.

## 2022-08-29 DIAGNOSIS — Z79.4 TYPE 2 DIABETES MELLITUS WITH MICROALBUMINURIA, WITH LONG-TERM CURRENT USE OF INSULIN (HCC): ICD-10-CM

## 2022-08-29 DIAGNOSIS — E11.29 TYPE 2 DIABETES MELLITUS WITH MICROALBUMINURIA, WITH LONG-TERM CURRENT USE OF INSULIN (HCC): ICD-10-CM

## 2022-08-29 DIAGNOSIS — R80.9 TYPE 2 DIABETES MELLITUS WITH MICROALBUMINURIA, WITH LONG-TERM CURRENT USE OF INSULIN (HCC): ICD-10-CM

## 2022-08-29 RX ORDER — FLASH GLUCOSE SENSOR
1 KIT MISCELLANEOUS
Qty: 1 EACH | Refills: 6 | Status: SHIPPED | OUTPATIENT
Start: 2022-08-29 | End: 2023-08-29

## 2022-09-01 RX ORDER — FLASH GLUCOSE SENSOR
KIT MISCELLANEOUS
Qty: 3 EACH | Refills: 0 | Status: SHIPPED | OUTPATIENT
Start: 2022-09-01 | End: 2023-02-14 | Stop reason: SDUPTHER

## 2022-11-08 ENCOUNTER — PATIENT MESSAGE (OUTPATIENT)
Dept: HEALTH INFORMATION MANAGEMENT | Facility: OTHER | Age: 82
End: 2022-11-08

## 2023-01-12 ENCOUNTER — TELEPHONE (OUTPATIENT)
Dept: HEALTH INFORMATION MANAGEMENT | Facility: OTHER | Age: 83
End: 2023-01-12
Payer: MEDICARE

## 2023-02-07 ENCOUNTER — OFFICE VISIT (OUTPATIENT)
Dept: URGENT CARE | Facility: CLINIC | Age: 83
End: 2023-02-07
Payer: MEDICARE

## 2023-02-07 VITALS
DIASTOLIC BLOOD PRESSURE: 78 MMHG | HEART RATE: 110 BPM | TEMPERATURE: 100.6 F | SYSTOLIC BLOOD PRESSURE: 126 MMHG | RESPIRATION RATE: 16 BRPM | HEIGHT: 72 IN | WEIGHT: 222 LBS | OXYGEN SATURATION: 94 % | BODY MASS INDEX: 30.07 KG/M2

## 2023-02-07 DIAGNOSIS — R53.81 MALAISE: ICD-10-CM

## 2023-02-07 DIAGNOSIS — U07.1 COVID: ICD-10-CM

## 2023-02-07 LAB
EXTERNAL QUALITY CONTROL: ABNORMAL
INT CON NEG: ABNORMAL
INT CON POS: ABNORMAL
SARS-COV+SARS-COV-2 AG RESP QL IA.RAPID: POSITIVE

## 2023-02-07 PROCEDURE — 87426 SARSCOV CORONAVIRUS AG IA: CPT | Performed by: FAMILY MEDICINE

## 2023-02-07 PROCEDURE — 99213 OFFICE O/P EST LOW 20 MIN: CPT | Mod: CS | Performed by: FAMILY MEDICINE

## 2023-02-07 ASSESSMENT — ENCOUNTER SYMPTOMS
CARDIOVASCULAR NEGATIVE: 1
NUMBER OF EPISODES OF EMESIS TODAY: 1
COUGH: 1
SORE THROAT: 1

## 2023-02-07 ASSESSMENT — FIBROSIS 4 INDEX: FIB4 SCORE: 2.88

## 2023-02-07 NOTE — PROGRESS NOTES
"Subjective:   Manolo Puri is a 82 y.o. male who presents for Emesis (X2 days, \"Throat feels irritated, headache, stomach is hurting from coughing. Positive Covid test this morning\" )      Emesis  Associated symptoms include coughing and a sore throat.     Review of Systems   Constitutional:  Positive for malaise/fatigue.   HENT:  Positive for sore throat.    Respiratory:  Positive for cough.    Cardiovascular: Negative.    Skin: Negative.      Medications, Allergies, and current problem list reviewed today in Epic.     Objective:     /78 (BP Location: Right arm, Patient Position: Sitting, BP Cuff Size: Adult)   Pulse (!) 110   Temp (!) 38.1 °C (100.6 °F) (Temporal)   Resp 16   Ht 1.829 m (6')   Wt 101 kg (222 lb)   SpO2 94%     Physical Exam  Vitals and nursing note reviewed.   Constitutional:       Appearance: Normal appearance.   HENT:      Head: Normocephalic and atraumatic.      Right Ear: Tympanic membrane normal.      Left Ear: Tympanic membrane normal.      Nose: Congestion present.      Mouth/Throat:      Pharynx: Posterior oropharyngeal erythema present.   Cardiovascular:      Rate and Rhythm: Normal rate and regular rhythm.      Pulses: Normal pulses.      Heart sounds: Normal heart sounds.   Pulmonary:      Effort: Pulmonary effort is normal.      Breath sounds: Normal breath sounds.   Abdominal:      General: Abdomen is flat. Bowel sounds are normal.      Palpations: Abdomen is soft.   Neurological:      Mental Status: He is alert.       Assessment/Plan:     Diagnosis and associated orders:     1. Malaise        2. COVID           Comments/MDM:     Symptomatic relief         Differential diagnosis, natural history, supportive care, and indications for immediate follow-up discussed.    Advised the patient to follow-up with the primary care physician for recheck, reevaluation, and consideration of further management.    Please note that this dictation was created using voice " recognition software. I have made a reasonable attempt to correct obvious errors, but I expect that there are errors of grammar and possibly content that I did not discover before finalizing the note.    This note was electronically signed by Raimundo Kerns M.D.

## 2023-02-14 DIAGNOSIS — E11.29 TYPE 2 DIABETES MELLITUS WITH MICROALBUMINURIA, WITH LONG-TERM CURRENT USE OF INSULIN (HCC): ICD-10-CM

## 2023-02-14 DIAGNOSIS — Z79.4 TYPE 2 DIABETES MELLITUS WITH MICROALBUMINURIA, WITH LONG-TERM CURRENT USE OF INSULIN (HCC): ICD-10-CM

## 2023-02-14 DIAGNOSIS — R80.9 TYPE 2 DIABETES MELLITUS WITH MICROALBUMINURIA, WITH LONG-TERM CURRENT USE OF INSULIN (HCC): ICD-10-CM

## 2023-02-14 RX ORDER — FLASH GLUCOSE SENSOR
KIT MISCELLANEOUS
Qty: 6 EACH | Refills: 3 | Status: SHIPPED | OUTPATIENT
Start: 2023-02-14 | End: 2023-08-29 | Stop reason: SDUPTHER

## 2023-02-14 NOTE — TELEPHONE ENCOUNTER
Received request via: Patient    Was the patient seen in the last year in this department? Yes  LOV 07/05/2022  Does the patient have an active prescription (recently filled or refills available) for medication(s) requested? No    Does the patient have senior living Plus and need 100 day supply (blood pressure, diabetes and cholesterol meds only)? Refill is for blood glucose sensors.

## 2023-08-10 DIAGNOSIS — R80.9 TYPE 2 DIABETES MELLITUS WITH MICROALBUMINURIA, WITH LONG-TERM CURRENT USE OF INSULIN (HCC): ICD-10-CM

## 2023-08-10 DIAGNOSIS — E11.29 TYPE 2 DIABETES MELLITUS WITH MICROALBUMINURIA, WITH LONG-TERM CURRENT USE OF INSULIN (HCC): ICD-10-CM

## 2023-08-10 DIAGNOSIS — Z79.4 TYPE 2 DIABETES MELLITUS WITH MICROALBUMINURIA, WITH LONG-TERM CURRENT USE OF INSULIN (HCC): ICD-10-CM

## 2023-08-10 RX ORDER — METFORMIN HYDROCHLORIDE 500 MG/1
1000 TABLET, EXTENDED RELEASE ORAL 2 TIMES DAILY
Qty: 360 TABLET | Refills: 3 | OUTPATIENT
Start: 2023-08-10

## 2023-08-10 NOTE — TELEPHONE ENCOUNTER
Has not been seen in over a year. Needs appt on the books,can give him labs too and once scheduled I can send in refill

## 2023-08-10 NOTE — TELEPHONE ENCOUNTER
Received request via: Patient    Was the patient seen in the last year in this department? Yes    Does the patient have an active prescription (recently filled or refills available) for medication(s) requested? No    Does the patient have correction Plus and need 100 day supply (blood pressure, diabetes and cholesterol meds only)? Yes

## 2023-08-15 DIAGNOSIS — R80.9 TYPE 2 DIABETES MELLITUS WITH MICROALBUMINURIA, WITH LONG-TERM CURRENT USE OF INSULIN (HCC): ICD-10-CM

## 2023-08-15 DIAGNOSIS — E11.29 TYPE 2 DIABETES MELLITUS WITH MICROALBUMINURIA, WITH LONG-TERM CURRENT USE OF INSULIN (HCC): ICD-10-CM

## 2023-08-15 DIAGNOSIS — Z79.4 TYPE 2 DIABETES MELLITUS WITH MICROALBUMINURIA, WITH LONG-TERM CURRENT USE OF INSULIN (HCC): ICD-10-CM

## 2023-08-15 RX ORDER — METFORMIN HYDROCHLORIDE 500 MG/1
1000 TABLET, EXTENDED RELEASE ORAL 2 TIMES DAILY
Qty: 360 TABLET | Refills: 3 | OUTPATIENT
Start: 2023-08-15

## 2023-08-29 ENCOUNTER — OFFICE VISIT (OUTPATIENT)
Dept: MEDICAL GROUP | Facility: PHYSICIAN GROUP | Age: 83
End: 2023-08-29
Payer: MEDICARE

## 2023-08-29 VITALS
HEIGHT: 72 IN | DIASTOLIC BLOOD PRESSURE: 64 MMHG | OXYGEN SATURATION: 96 % | WEIGHT: 220.2 LBS | TEMPERATURE: 96.7 F | SYSTOLIC BLOOD PRESSURE: 128 MMHG | HEART RATE: 101 BPM | BODY MASS INDEX: 29.82 KG/M2 | RESPIRATION RATE: 12 BRPM

## 2023-08-29 DIAGNOSIS — R80.9 TYPE 2 DIABETES MELLITUS WITH MICROALBUMINURIA, WITH LONG-TERM CURRENT USE OF INSULIN (HCC): ICD-10-CM

## 2023-08-29 DIAGNOSIS — R80.9 DIABETES MELLITUS WITH MICROALBUMINURIA (HCC): ICD-10-CM

## 2023-08-29 DIAGNOSIS — E78.5 HYPERLIPIDEMIA ASSOCIATED WITH TYPE 2 DIABETES MELLITUS (HCC): ICD-10-CM

## 2023-08-29 DIAGNOSIS — E11.3213 BOTH EYES AFFECTED BY MILD NONPROLIFERATIVE DIABETIC RETINOPATHY WITH MACULAR EDEMA, ASSOCIATED WITH TYPE 2 DIABETES MELLITUS (HCC): ICD-10-CM

## 2023-08-29 DIAGNOSIS — E11.29 TYPE 2 DIABETES MELLITUS WITH MICROALBUMINURIA, WITH LONG-TERM CURRENT USE OF INSULIN (HCC): ICD-10-CM

## 2023-08-29 DIAGNOSIS — Z79.4 TYPE 2 DIABETES MELLITUS WITH DIABETIC POLYNEUROPATHY, WITH LONG-TERM CURRENT USE OF INSULIN (HCC): ICD-10-CM

## 2023-08-29 DIAGNOSIS — E11.69 HYPERLIPIDEMIA ASSOCIATED WITH TYPE 2 DIABETES MELLITUS (HCC): ICD-10-CM

## 2023-08-29 DIAGNOSIS — Z79.4 TYPE 2 DIABETES MELLITUS WITH MICROALBUMINURIA, WITH LONG-TERM CURRENT USE OF INSULIN (HCC): ICD-10-CM

## 2023-08-29 DIAGNOSIS — E11.42 TYPE 2 DIABETES MELLITUS WITH DIABETIC POLYNEUROPATHY, WITH LONG-TERM CURRENT USE OF INSULIN (HCC): ICD-10-CM

## 2023-08-29 DIAGNOSIS — Z12.5 ENCOUNTER FOR SCREENING FOR MALIGNANT NEOPLASM OF PROSTATE: ICD-10-CM

## 2023-08-29 DIAGNOSIS — E11.29 DIABETES MELLITUS WITH MICROALBUMINURIA (HCC): ICD-10-CM

## 2023-08-29 DIAGNOSIS — Z79.4 LONG-TERM INSULIN USE (HCC): ICD-10-CM

## 2023-08-29 PROBLEM — R74.8 ELEVATED LIVER ENZYMES: Status: RESOLVED | Noted: 2022-06-27 | Resolved: 2023-08-29

## 2023-08-29 PROBLEM — Z09 HOSPITAL DISCHARGE FOLLOW-UP: Status: RESOLVED | Noted: 2022-07-05 | Resolved: 2023-08-29

## 2023-08-29 PROBLEM — K75.89 CHOLESTATIC HEPATITIS: Status: RESOLVED | Noted: 2022-06-29 | Resolved: 2023-08-29

## 2023-08-29 PROBLEM — K80.20 CHOLELITHIASIS: Status: RESOLVED | Noted: 2022-06-27 | Resolved: 2023-08-29

## 2023-08-29 PROCEDURE — 3078F DIAST BP <80 MM HG: CPT | Performed by: INTERNAL MEDICINE

## 2023-08-29 PROCEDURE — 99214 OFFICE O/P EST MOD 30 MIN: CPT | Performed by: INTERNAL MEDICINE

## 2023-08-29 PROCEDURE — 3074F SYST BP LT 130 MM HG: CPT | Performed by: INTERNAL MEDICINE

## 2023-08-29 RX ORDER — METFORMIN HYDROCHLORIDE 500 MG/1
1000 TABLET, EXTENDED RELEASE ORAL 2 TIMES DAILY
Qty: 400 TABLET | Refills: 3 | Status: SHIPPED | OUTPATIENT
Start: 2023-08-29

## 2023-08-29 RX ORDER — INSULIN ASPART 100 [IU]/ML
10 INJECTION, SOLUTION INTRAVENOUS; SUBCUTANEOUS
Qty: 3 ML | Refills: 3 | Status: SHIPPED | OUTPATIENT
Start: 2023-08-29 | End: 2023-08-31

## 2023-08-29 RX ORDER — FLASH GLUCOSE SENSOR
KIT MISCELLANEOUS
Qty: 2 EACH | Refills: 11 | Status: SHIPPED | OUTPATIENT
Start: 2023-08-29 | End: 2023-12-07

## 2023-08-29 RX ORDER — INSULIN GLARGINE 300 U/ML
70 INJECTION, SOLUTION SUBCUTANEOUS EVERY MORNING
Qty: 1.5 ML | Refills: 3 | Status: SHIPPED | OUTPATIENT
Start: 2023-08-29 | End: 2024-01-08

## 2023-08-29 ASSESSMENT — PATIENT HEALTH QUESTIONNAIRE - PHQ9: CLINICAL INTERPRETATION OF PHQ2 SCORE: 0

## 2023-08-29 ASSESSMENT — FIBROSIS 4 INDEX: FIB4 SCORE: 2.88

## 2023-08-29 NOTE — PROGRESS NOTES
Subjective:   Chief Complaint/History of Present Illness:  Manolo Puri is a 82 y.o. male established patient who presents today to discuss medical problems as listed below. Manolo is unaccompanied for today's visit.    Problem   Long-Term Insulin Use (Hcc)    Been on premix insulin 70/30 50 units twice daily and A1c is above goal.  Reviewed chantelle 2 CGM readings.  Recommend he transition onto basal/bolus approach.     Both Eyes Affected By Mild Nonproliferative Diabetic Retinopathy With Macular Edema, Associated With Type 2 Diabetes Mellitus (Hcc)    He is in a research study with Dr. Alfonso of ophthalmology due to diabetic retinopathy.  He follows up every 2 months.  I do not have any recent clinic notes.     Diabetes Mellitus With Microalbuminuria (Formerly [facility])     Latest Reference Range & Units 03/10/22 09:39   Micro Alb Creat Ratio 0 - 30 mg/g 516 (H)     He has multiple complications related to type 2 diabetes, microalbuminuria noted on most recent evaluation in 2022.  We will update his blood and urine studies and then can recommend appropriate treatment, he will need to start on ARB therapy.     Type 2 Diabetes Mellitus With Diabetic Polyneuropathy, With Long-Term Current Use of Insulin (Formerly [facility])     Latest Reference Range & Units 03/10/22 09:50   Glycohemoglobin 4.0 - 5.6 % 10.1 (H)   Estim. Avg Glu mg/dL 243      Latest Reference Range & Units 07/08/20 04:51   Microalbumin-Creatinine 0 - 29 mg/g creat 110 (H)     He was diagnosed around age 62 with type 2 diabetes, not sure how long he had prediabetes beforehand.  He was initially taking metfomin and has been on 70/30 insulin since age 74, currently using 52 U twice daily. A1c has ranged 8.7-14 since 2018.     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 No history of chronic kidney disease. He has microalbuminuria, worsening. No neuropathy.     Hyperlipidemia Associated With Type 2 Diabetes Mellitus (Formerly [facility])     Latest Reference Range & Units  03/10/22 09:50   Cholesterol,Tot 100 - 199 mg/dL 160   Triglycerides 0 - 149 mg/dL 262 (H)   HDL >=40 mg/dL 31 !   LDL <100 mg/dL 77     He has type 2 diabetes and qualifies for statin therapy. He would be open to initiating on atorvastatin, thinks he took this on the past but stopped it for some unknown reason.          Current Medications:  Current Outpatient Medications Ordered in Epic   Medication Sig Dispense Refill    metFORMIN ER (GLUCOPHAGE XR) 500 MG TABLET SR 24 HR Take 2 Tablets by mouth 2 times a day. 400 Tablet 3    Insulin Glargine, 1 Unit Dial, (TOUJEO SOLOSTAR) 300 UNIT/ML Solution Pen-injector Inject 70 Units under the skin every morning. 1.5 mL 3    insulin aspart (NOVOLOG FLEXPEN) 100 UNIT/ML injection PEN Inject 10 Units under the skin 3 times a day before meals. 3 mL 3    Continuous Blood Gluc Sensor (FREESTYLE CONNIE 14 DAY SENSOR) [facility] 2 sensors for every 28 days 2 Each 11    Continuous Blood Gluc Sensor (FREESTYLE CONNIE SENSOR SYSTEM) [facility]        No current Epic-ordered facility-administered medications on file.          Objective:   Physical Exam:    Vitals: /64 (BP Location: Right arm, Patient Position: Sitting, BP Cuff Size: Adult)   Pulse (!) 101   Temp 35.9 °C (96.7 °F) (Temporal)   Resp 12   Ht 1.829 m (6')   Wt 99.9 kg (220 lb 3.2 oz)   SpO2 96%    BMI: Body mass index is 29.86 kg/m².  Physical Exam  Constitutional:       General: He is not in acute distress.     Appearance: Normal appearance. He is not ill-appearing.   HENT:      Right Ear: Ear canal and external ear normal. There is no impacted cerumen.      Left Ear: Ear canal and external ear normal. There is no impacted cerumen.   Eyes:      General: No scleral icterus.     Conjunctiva/sclera: Conjunctivae normal.   Cardiovascular:      Rate and Rhythm: Normal rate and regular rhythm.      Pulses: Normal pulses.      Heart sounds: No murmur heard.  Pulmonary:      Effort: Pulmonary effort is normal. No respiratory  distress.      Breath sounds: Normal breath sounds. No wheezing, rhonchi or rales.   Abdominal:      General: Bowel sounds are normal. There is distension.      Palpations: Abdomen is soft.      Tenderness: There is no abdominal tenderness.   Musculoskeletal:      Right lower leg: No edema.      Left lower leg: No edema.      Comments: Diabetic Foot Exam: No ulcers/laceration/blisters present on bilateral feet, normal gross anatomy of bilateral feet without abnormal curvature or arch, no toe deformities, +toenail thickness, no ingrown toenails, no increase in skin temperature bilaterally, no skin erythema to bilateral feet, eczema on top of right foot, dry skin on bilateral plantar surfaces, bilateral dorsalis pedis 2+ and equal, Refill less than 2 seconds bilaterally, [place] 10 g monofilament testing abnormal/decreased in bilateral great toes, bilateral balls of feet at medial/lateral/mid ball of foot       Lymphadenopathy:      Cervical: No cervical adenopathy.   Skin:     General: Skin is warm and dry.      Findings: No rash.   Neurological:      Gait: Gait normal.   Psychiatric:         Mood and Affect: Mood normal.         Behavior: Behavior normal.         Thought Content: Thought content normal.         Judgment: Judgment normal.               Assessment and Plan:   Manolo is a 82 y.o. male with the following:  Problem List Items Addressed This Visit       Both eyes affected by mild nonproliferative diabetic retinopathy with macular edema, associated with type 2 diabetes mellitus (HCC)     Chronic ongoing problem, he has signed an SAUL so I can have his most updated eye exam records.  He reports vision is stable with current research study he is participating in with Dr. Alfonso.         Relevant Medications    metFORMIN ER (GLUCOPHAGE XR) 500 MG TABLET SR 24 HR    Insulin Glargine, 1 Unit Dial, (TOUJEO SOLOSTAR) 300 UNIT/ML Solution Pen-injector    insulin aspart (NOVOLOG FLEXPEN) 100 UNIT/ML injection PEN     Continuous Blood Gluc Sensor (FREESTYLE CONNIE 14 DAY SENSOR) [facility]    Diabetes mellitus with microalbuminuria (HCC)     Chronic ongoing problem, decompensated at last visit and then lost to follow-up.  Check urine microalbumin and will plan on initiating ARB if indicated once I have most recent blood results.         Relevant Medications    metFORMIN ER (GLUCOPHAGE XR) 500 MG TABLET SR 24 HR    Insulin Glargine, 1 Unit Dial, (TOUJEO SOLOSTAR) 300 UNIT/ML Solution Pen-injector    insulin aspart (NOVOLOG FLEXPEN) 100 UNIT/ML injection PEN    Continuous Blood Gluc Sensor (FREESTYLE CONNIE 14 DAY SENSOR) [facility]    Hyperlipidemia associated with type 2 diabetes mellitus (HCC)     Chronic ongoing issue, previously on statin and then stopped taking in the past.  Recently lost follow-up.  Update lipid panel and then can initiate him back on therapy, will plan to start rosuvastatin 20 mg daily.         Relevant Medications    metFORMIN ER (GLUCOPHAGE XR) 500 MG TABLET SR 24 HR    Insulin Glargine, 1 Unit Dial, (TOUJEO SOLOSTAR) 300 UNIT/ML Solution Pen-injector    insulin aspart (NOVOLOG FLEXPEN) 100 UNIT/ML injection PEN    Other Relevant Orders    FREE THYROXINE    TSH    VITAMIN B12    VITAMIN D,25 HYDROXY (DEFICIENCY)    HEMOGLOBIN A1C    Lipid Profile    Comp Metabolic Panel    CBC WITH DIFFERENTIAL    Long-term insulin use (HCC)     Chronic decompensated problem.  Recommend he initiate Toujeo 70 units daily and NovoLog 10 units 3 times daily AC.  We will get prior authorization for connie 2 and arrange for short-term follow-up to continue adjusting insulin as needed.  He will get all his blood work updated in the meantime.  Referral placed to pharmacotherapy services.         Relevant Medications    Insulin Glargine, 1 Unit Dial, (TOUJEO SOLOSTAR) 300 UNIT/ML Solution Pen-injector    insulin aspart (NOVOLOG FLEXPEN) 100 UNIT/ML injection PEN    Continuous Blood Gluc Sensor (FREESTYLE CONNIE 14 DAY SENSOR) [facility]    Type 2  diabetes mellitus with diabetic polyneuropathy, with long-term current use of insulin (HCC)     Chronic decompensated problem, overdue for follow-up, has not followed with endocrinology in quite some time.  He has been overwhelmed as a primary caregiver for his wife with advanced dementia who is currently on hospice.  He is agreeable to getting all of his testing updated and starting more aggressive therapy for his uncontrolled diabetes.  Will initiate Toujeo 70 units daily and NovoLog 10 units 3 times daily with meals and adjust as needed.  Refer to pharmacotherapy services.  Reached out to pharmacist with [facility] to assist with prior authorization of Ifrah 2 sensor.  Foot exam demonstrates neuropathy.  We will request most recent retinal examination, he follows every 2 months with Dr. Alfonso due to macular edema from diabetic retinopathy.  History of microalbuminuria, will likely need addition of ARB once have updated labs.         Relevant Medications    metFORMIN ER (GLUCOPHAGE XR) 500 MG TABLET SR 24 HR    Insulin Glargine, 1 Unit Dial, (TOUJEO SOLOSTAR) 300 UNIT/ML Solution Pen-injector    insulin aspart (NOVOLOG FLEXPEN) 100 UNIT/ML injection PEN    Continuous Blood Gluc Sensor (FREESTYLE IFRAH 14 DAY SENSOR) Misc     Other Visit Diagnoses       Encounter for screening for malignant neoplasm of prostate        Relevant Orders    PROSTATE SPECIFIC AG SCREENING               RTC: Return in about 6 weeks (around 10/10/2023).    I spent a total of 38 minutes with record review, exam, communication with the patient, communication with other providers, and documentation of this encounter.    PLEASE NOTE: This dictation was created using voice recognition software. I have made every reasonable attempt to correct obvious errors, but I expect that there are errors of grammar and possibly content that I did not discover before finalizing the note.      Shirley Ozuna, DO  Geriatric and Internal  Medicine  [facility]

## 2023-08-29 NOTE — LETTER
[employer]  Shirley Ozuna D.O.  [address]  [place] [address]  Fax: [phone]   Authorization for Release/Disclosure of   Protected Health Information   Name: INDIRA FLORES : 1940 SSN: [id number]   Address: [address] [place] [address].  [place] [address] Phone:    [phone] (home) [phone] (work)   I authorize the entity listed below to release/disclose the PHI below to:   [employer]/Shirley Ozuna D.O. and Shirley Ozuna D.O.   Provider or Entity Name:  [facility]      Address   [facility]   Phone:  [phone]    Fax:     Reason for request: continuity of care   Information to be released:    [  ] LAST COLONOSCOPY,  including any PATH REPORT and follow-up  [  ] LAST FIT/COLOGUARD RESULT [  ] LAST DEXA  [  ] LAST MAMMOGRAM  [  ] LAST PAP  [  ] LAST LABS [  ] RETINA EXAM REPORT  [  ] IMMUNIZATION RECORDS  [  ] Release all info      [  ] Check here and initial the line next to each item to release ALL health information INCLUDING  _____ Care and treatment for drug and / or alcohol abuse  _____ HIV testing, infection status, or AIDS  _____ Genetic Testing    DATES OF SERVICE OR TIME PERIOD TO BE DISCLOSED: _____________  I understand and acknowledge that:  * This Authorization may be revoked at any time by you in writing, except if your health information has already been used or disclosed.  * Your health information that will be used or disclosed as a result of you signing this authorization could be re-disclosed by the recipient. If this occurs, your re-disclosed health information may no longer be protected by State or Federal laws.  * You may refuse to sign this Authorization. Your refusal will not affect your ability to obtain treatment.  * This Authorization becomes effective upon signing and will  on (date) __________.      If no date is indicated, this Authorization will  one (1) year from the signature date.     Name: Manolo Puri  Signature: Date:   8/29/2023     PLEASE FAX REQUESTED RECORDS BACK TO: [phone]

## 2023-08-29 NOTE — ASSESSMENT & PLAN NOTE
Chronic ongoing problem, decompensated at last visit and then lost to follow-up.  Check urine microalbumin and will plan on initiating ARB if indicated once I have most recent blood results.

## 2023-08-29 NOTE — ASSESSMENT & PLAN NOTE
Chronic decompensated problem, overdue for follow-up, has not followed with endocrinology in quite some time.  He has been overwhelmed as a primary caregiver for his wife with advanced dementia who is currently on hospice.  He is agreeable to getting all of his testing updated and starting more aggressive therapy for his uncontrolled diabetes.  Will initiate Toujeo 70 units daily and NovoLog 10 units 3 times daily with meals and adjust as needed.  Refer to pharmacotherapy services.  Reached out to pharmacist with [facility] to assist with prior authorization of Ifrah 2 sensor.  Foot exam demonstrates neuropathy.  We will request most recent retinal examination, he follows every 2 months with Dr. Alfonso due to macular edema from diabetic retinopathy.  History of microalbuminuria, will likely need addition of ARB once have updated labs.

## 2023-08-29 NOTE — ASSESSMENT & PLAN NOTE
Chronic ongoing issue, previously on statin and then stopped taking in the past.  Recently lost follow-up.  Update lipid panel and then can initiate him back on therapy, will plan to start rosuvastatin 20 mg daily.

## 2023-08-29 NOTE — ASSESSMENT & PLAN NOTE
Chronic decompensated problem.  Recommend he initiate Toujeo 70 units daily and NovoLog 10 units 3 times daily AC.  We will get prior authorization for chantelle 2 and arrange for short-term follow-up to continue adjusting insulin as needed.  He will get all his blood work updated in the meantime.  Referral placed to pharmacotherapy services.

## 2023-08-29 NOTE — ASSESSMENT & PLAN NOTE
Chronic ongoing problem, he has signed an SAUL so I can have his most updated eye exam records.  He reports vision is stable with current research study he is participating in with Dr. Alfonso.

## 2023-08-30 ENCOUNTER — DOCUMENTATION (OUTPATIENT)
Dept: VASCULAR LAB | Facility: MEDICAL CENTER | Age: 83
End: 2023-08-30
Payer: MEDICARE

## 2023-08-30 NOTE — PROGRESS NOTES
[facility] Pharmacotherapy [facility]     Received diabetes referral from Shirley Ozuna D.O. on 8-29-23.     Called and spoke to patient. Initial visit scheduled on 8- at 11a.     Insurance: [facility]  PCP: [facility]  Locations to be seen: Any     [facility] at [phone], fax [phone]    Brody Patton, IleanaD

## 2023-08-31 ENCOUNTER — NON-PROVIDER VISIT (OUTPATIENT)
Dept: MEDICAL GROUP | Facility: MEDICAL CENTER | Age: 83
End: 2023-08-31
Payer: MEDICARE

## 2023-08-31 ENCOUNTER — ANTICOAGULATION MONITORING (OUTPATIENT)
Dept: MEDICAL GROUP | Facility: MEDICAL CENTER | Age: 83
End: 2023-08-31

## 2023-08-31 DIAGNOSIS — Z79.4 TYPE 2 DIABETES MELLITUS WITH DIABETIC POLYNEUROPATHY, WITH LONG-TERM CURRENT USE OF INSULIN (HCC): ICD-10-CM

## 2023-08-31 DIAGNOSIS — E11.42 TYPE 2 DIABETES MELLITUS WITH DIABETIC POLYNEUROPATHY, WITH LONG-TERM CURRENT USE OF INSULIN (HCC): ICD-10-CM

## 2023-08-31 LAB
HBA1C MFR BLD: 13.2 % (ref ?–5.8)
POCT INT CON NEG: NEGATIVE
POCT INT CON POS: POSITIVE

## 2023-08-31 PROCEDURE — 83036 HEMOGLOBIN GLYCOSYLATED A1C: CPT | Performed by: INTERNAL MEDICINE

## 2023-08-31 PROCEDURE — 99211 OFF/OP EST MAY X REQ PHY/QHP: CPT | Performed by: FAMILY MEDICINE

## 2023-08-31 RX ORDER — SEMAGLUTIDE 0.68 MG/ML
0.25 INJECTION, SOLUTION SUBCUTANEOUS
Qty: 3 ML | Refills: 11 | Status: SHIPPED | OUTPATIENT
Start: 2023-08-31 | End: 2023-09-21

## 2023-08-31 RX ORDER — PEN NEEDLE, DIABETIC 30 GX3/16"
NEEDLE, DISPOSABLE MISCELLANEOUS
Qty: 400 EACH | Refills: 11 | Status: SHIPPED | OUTPATIENT
Start: 2023-08-31 | End: 2024-03-18

## 2023-08-31 RX ORDER — DAPAGLIFLOZIN 10 MG/1
10 TABLET, FILM COATED ORAL DAILY
Qty: 30 TABLET | Refills: 11 | Status: SHIPPED | OUTPATIENT
Start: 2023-08-31

## 2023-08-31 NOTE — PROGRESS NOTES
Patient Consult Note    TIME IN: 10:48 am  TIME OUT: 11:50 am    Primary care physician: Shirley Ozuna D.O.    Reason for consult: Management of Uncontrolled Type 2 Diabetes    HPI:  Manolo Puri is a 82 y.o. old patient who comes in today for evaluation of above stated problem.    Most Recent HbA1c and POCT glucose:   Lab Results   Component Value Date/Time    HBA1C 13.2 (A) 2023 11:25 AM            Most Recent Scr:  Lab Results   Component Value Date/Time    CREATININE 0.94 2022 08:38 AM        Current Diabetes Medication Regimen  Metformin IR/ER: 1000 mg BID     Basal Insulin: Toujeo 70 units QHS - Has yet to start  Prandial Insulin: Novolog 10 units TID prior to meals - Has yet to start    HAS NOT P/U HIS PEN NEEDLES, SO CONTINUES ON MIXED INSULIN CURRENTLY. HE HAS ALREADY P/U TOUJEO AND NOVOLOG.    CURRENT INSULIN REGIMEN: Novolin 70/30 - 50 units BID     Previous Diabetes Medications and Reason for Discontinuation  Novolin 70/30 - Dc'd by PCP to transition to basal/bolus regimen  Jardiance - Too expensive   Trulicity - Too expensive    Pt has home glucometer and proper testing technique - Yes. Pt w/ Freestyle Ifrah CGM.  Discussed BG Goals: FBG 80 - 130, 2hPP < 180, A1c < 7%    Pt reports blood sugars:   7 day av  14 day av  30 day av    7 day TITR: 2%  14 day TITR: 4%  30 day TITR: 5%    No hypoglycemia per CGM in the last 30 days    Trends: BG increases significantly after ~ 9am & stays elevated throughout the day. Lowest BG typically ~ 7 am (~ low 300's)     Hypoglycemia awareness - Yes  Nocturnal hypoglycemia- Denies  Hypoglycemia:  None recently - states one BG reading of 50 in 2023 that he corrected w/ honey.    Pt's treatment of Hypoglycemia - 15:15 Rule    Current Exercise - Nothing currently for last month or so. Typically goes to the gym thrice weekly for 60 min - focuses on balance and strength training. Takes care of his wife (has Alzheimer's)    Exercise Goal - Increase as tolerated.    Dietary:  Breakfast - Scrambled eggs, toast, cup of fruit  Lunch - Mac and cheese, chili, chicken noodle soup  Dinner - Similar to lunch  Snacks - Peanuts, chips, fruit (plum, peach, watermelon, cantaloupe)   Desserts - Cookies occasionally (héctor snaps, coconut cookies)  Drinks - Zip fizz, milk, diet Dr. Pepper, not much in the way of H2O    Foot Exam:  Monofilament exam - Completed 8/2023    Preventative Management  BP regimen (ACE/ARB) - None  Statin - None  Last Retinal Scan - 8/2023. Goes to eye DrJudith Every other month  Last Microalbumin/Cr - Due. Orders in place.  Last A1c -   Lab Results   Component Value Date/Time    HBA1C 13.2 (A) 08/31/2023 11:25 AM        Past Medical History:  Patient Active Problem List    Diagnosis Date Noted    Long-term insulin use ([facility]) 08/29/2023    Both eyes affected by mild nonproliferative diabetic retinopathy with macular edema, associated with type 2 diabetes mellitus ([facility]) 08/29/2023    Diabetes mellitus with microalbuminuria ([facility]) 08/29/2023    Hypertension 06/27/2022    Closed fracture of one rib of right side 02/28/2022    Other atopic dermatitis 04/02/2020    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 04/02/2020    History of pericarditis 04/02/2020    History of melanoma 04/02/2020    History of diplopia 04/02/2020    History of shingles 04/02/2020    Caregiver burden 04/02/2020    Unsteady gait 10/09/2018    Type 2 diabetes mellitus with diabetic polyneuropathy, with long-term current use of insulin ([facility]) 09/03/2017    Hyperlipidemia associated with type 2 diabetes mellitus ([facility]) 09/03/2017       Past Surgical History:  Past Surgical History:   Procedure Laterality Date    OTHER      Derm removal of SCC       Allergies:  Patient has no known allergies.    Social History:  Social History     Socioeconomic History    Marital status: Single     Spouse name: Not on file    Number of children:  Not on file    Years of education: Not on file    Highest education level: Not on file   Occupational History    Not on file   Tobacco Use    Smoking status: Never    Smokeless tobacco: Never    Tobacco comments:     continued abstinence   Vaping Use    Vaping Use: Never used   Substance and Sexual Activity    Alcohol use: No    Drug use: No    Sexual activity: Not Currently     Partners: Female   Other Topics Concern    Not on file   Social History Narrative    Not on file     Social Determinants of Health     Financial Resource Strain: Not on file   Food Insecurity: Not on file   Transportation Needs: Not on file   Physical Activity: Not on file   Stress: Not on file   Social Connections: Not on file   Intimate Partner Violence: Not on file   Housing Stability: Not on file       Family History:  Family History   Problem Relation Age of Onset    Diabetes Father     Diabetes Maternal Grandfather        Medications:    Current Outpatient Medications:     Insulin Pen Needle (PEN NEEDLES) 32G X 4 MM Misc, Use to inject insulin as directed up to 4 times daily (basal insulin and meal time insulin), Disp: 400 Each, Rfl: 11    Semaglutide,0.25 or 0.5MG/DOS, (OZEMPIC, 0.25 OR 0.5 MG/DOSE,) 2 MG/3ML Solution Pen-injector, Inject 0.25 mg under the skin every 7 days., Disp: 3 mL, Rfl: 11    dapagliflozin propanediol (FARXIGA) 10 MG Tab, Take 1 Tablet by mouth every day., Disp: 30 Tablet, Rfl: 11    metFORMIN ER (GLUCOPHAGE XR) 500 MG TABLET SR 24 HR, Take 2 Tablets by mouth 2 times a day., Disp: 400 Tablet, Rfl: 3    Insulin Glargine, 1 Unit Dial, (TOUJEO SOLOSTAR) 300 UNIT/ML Solution Pen-injector, Inject 70 Units under the skin every morning., Disp: 1.5 mL, Rfl: 3    Continuous Blood Gluc Sensor (FREESTYLE CONNIE 14 DAY SENSOR) Misc, 2 sensors for every 28 days, Disp: 2 Each, Rfl: 11    Continuous Blood Gluc Sensor (FREESTYLE CONNIE SENSOR SYSTEM) Misc, , Disp: , Rfl:     Labs: Reviewed    Physical Examination:  Vital signs:  There were no vitals taken for this visit. There is no height or weight on file to calculate BMI.    Assessment and Plan:    1. DM2  Basic physiology of DMII was explained to patient as well as microvascular/macrovascular complications. The importance of increasing physical activity to improve diabetes control was discussed with the patient. Patient was also educated on changing diet and making better choices to help control blood sugar.   Discussed Goals: FBG 80 - 130, 2hPP < 180, a1c < 7%   Per interrogation of pt's CGM, his BG is significantly above goal at this time.  Conducted repeat A1c in clinic today and it increased from 10.1% (3/2022) to 13.2% (8/2023).  Pt presents from recent visit w/ PCP. She transitioned him from mixed insulin to a basal/bolus regimen - pt has yet to start d/t needing pen needles for these new insulin pens. Sent in Rx accordingly today.  Pt previously Rx'd SGLT2i & GLP1 - he refused to continue d/t cost of medication. Per preliminary conversation, pt should qualify for PAP for both Farxiga and Ozempic. Given this, pt willing to start both medications today and pay for a month or so until approved for PAP - provided him w/ applications to complete and present along w/ financial documentation at f/u.    - Medication changes:  DC Novolin 70/30  DO NOT START Novolog  START Ozempic 0.25 mg subQ once weekly  Counseled pt regarding MOA, SE, and administration  START Farxiga 10 mg once daily  Counseled pt regarding MOA, SE, and administration  START Toujeo 70 units QHS  Continue metformin 1000 mg BID    - Lifestyle changes:  Diet: Maximize lean proteins and veggies. Cut out/down on carbs. Avoid simple sugars.   Exercise: Increase as tolerated    Follow Up:  3 weeks    Lemuel Montanez, PharmD, BCACP    CC:   Shirley Ozuna D.O.

## 2023-09-01 ENCOUNTER — HOSPITAL ENCOUNTER (OUTPATIENT)
Dept: LAB | Facility: MEDICAL CENTER | Age: 83
End: 2023-09-01
Attending: INTERNAL MEDICINE
Payer: MEDICARE

## 2023-09-01 DIAGNOSIS — R80.9 TYPE 2 DIABETES MELLITUS WITH MICROALBUMINURIA, WITH LONG-TERM CURRENT USE OF INSULIN (HCC): ICD-10-CM

## 2023-09-01 DIAGNOSIS — Z12.5 ENCOUNTER FOR SCREENING FOR MALIGNANT NEOPLASM OF PROSTATE: ICD-10-CM

## 2023-09-01 DIAGNOSIS — E78.5 HYPERLIPIDEMIA ASSOCIATED WITH TYPE 2 DIABETES MELLITUS (HCC): ICD-10-CM

## 2023-09-01 DIAGNOSIS — E11.29 TYPE 2 DIABETES MELLITUS WITH MICROALBUMINURIA, WITH LONG-TERM CURRENT USE OF INSULIN (HCC): ICD-10-CM

## 2023-09-01 DIAGNOSIS — Z79.4 TYPE 2 DIABETES MELLITUS WITH MICROALBUMINURIA, WITH LONG-TERM CURRENT USE OF INSULIN (HCC): ICD-10-CM

## 2023-09-01 DIAGNOSIS — E11.69 HYPERLIPIDEMIA ASSOCIATED WITH TYPE 2 DIABETES MELLITUS (HCC): ICD-10-CM

## 2023-09-01 LAB
25(OH)D3 SERPL-MCNC: 27 NG/ML (ref 30–100)
ALBUMIN SERPL BCP-MCNC: 4.1 G/DL (ref 3.2–4.9)
ALBUMIN/GLOB SERPL: 1.2 G/DL
ALP SERPL-CCNC: 95 U/L (ref 30–99)
ALT SERPL-CCNC: 28 U/L (ref 2–50)
ANION GAP SERPL CALC-SCNC: 11 MMOL/L (ref 7–16)
AST SERPL-CCNC: 21 U/L (ref 12–45)
BASOPHILS # BLD AUTO: 1.1 % (ref 0–1.8)
BASOPHILS # BLD: 0.11 K/UL (ref 0–0.12)
BILIRUB SERPL-MCNC: 0.6 MG/DL (ref 0.1–1.5)
BUN SERPL-MCNC: 39 MG/DL (ref 8–22)
CALCIUM ALBUM COR SERPL-MCNC: 9.8 MG/DL (ref 8.5–10.5)
CALCIUM SERPL-MCNC: 9.9 MG/DL (ref 8.5–10.5)
CHLORIDE SERPL-SCNC: 97 MMOL/L (ref 96–112)
CHOLEST SERPL-MCNC: 205 MG/DL (ref 100–199)
CO2 SERPL-SCNC: 25 MMOL/L (ref 20–33)
CREAT SERPL-MCNC: 1.44 MG/DL (ref 0.5–1.4)
CREAT UR-MCNC: 30.9 MG/DL
EOSINOPHIL # BLD AUTO: 0.33 K/UL (ref 0–0.51)
EOSINOPHIL NFR BLD: 3.2 % (ref 0–6.9)
ERYTHROCYTE [DISTWIDTH] IN BLOOD BY AUTOMATED COUNT: 41.3 FL (ref 35.9–50)
EST. AVERAGE GLUCOSE BLD GHB EST-MCNC: 315 MG/DL
FASTING STATUS PATIENT QL REPORTED: NORMAL
GFR SERPLBLD CREATININE-BSD FMLA CKD-EPI: 49 ML/MIN/1.73 M 2
GLOBULIN SER CALC-MCNC: 3.4 G/DL (ref 1.9–3.5)
GLUCOSE SERPL-MCNC: 373 MG/DL (ref 65–99)
HBA1C MFR BLD: 12.6 % (ref 4–5.6)
HCT VFR BLD AUTO: 48.8 % (ref 42–52)
HDLC SERPL-MCNC: 38 MG/DL
HGB BLD-MCNC: 16.4 G/DL (ref 14–18)
IMM GRANULOCYTES # BLD AUTO: 0.24 K/UL (ref 0–0.11)
IMM GRANULOCYTES NFR BLD AUTO: 2.3 % (ref 0–0.9)
LDLC SERPL CALC-MCNC: 128 MG/DL
LYMPHOCYTES # BLD AUTO: 1.74 K/UL (ref 1–4.8)
LYMPHOCYTES NFR BLD: 16.7 % (ref 22–41)
MCH RBC QN AUTO: 28.6 PG (ref 27–33)
MCHC RBC AUTO-ENTMCNC: 33.6 G/DL (ref 32.3–36.5)
MCV RBC AUTO: 85.2 FL (ref 81.4–97.8)
MICROALBUMIN UR-MCNC: 38.2 MG/DL
MICROALBUMIN/CREAT UR: 1236 MG/G (ref 0–30)
MONOCYTES # BLD AUTO: 1.07 K/UL (ref 0–0.85)
MONOCYTES NFR BLD AUTO: 10.3 % (ref 0–13.4)
NEUTROPHILS # BLD AUTO: 6.91 K/UL (ref 1.82–7.42)
NEUTROPHILS NFR BLD: 66.4 % (ref 44–72)
NRBC # BLD AUTO: 0 K/UL
NRBC BLD-RTO: 0 /100 WBC (ref 0–0.2)
PLATELET # BLD AUTO: 234 K/UL (ref 164–446)
PMV BLD AUTO: 11.7 FL (ref 9–12.9)
POTASSIUM SERPL-SCNC: 6.1 MMOL/L (ref 3.6–5.5)
PROT SERPL-MCNC: 7.5 G/DL (ref 6–8.2)
PSA SERPL-MCNC: 0.74 NG/ML (ref 0–4)
RBC # BLD AUTO: 5.73 M/UL (ref 4.7–6.1)
SODIUM SERPL-SCNC: 133 MMOL/L (ref 135–145)
T4 FREE SERPL-MCNC: 1.15 NG/DL (ref 0.93–1.7)
TRIGL SERPL-MCNC: 194 MG/DL (ref 0–149)
TSH SERPL DL<=0.005 MIU/L-ACNC: 2.42 UIU/ML (ref 0.38–5.33)
VIT B12 SERPL-MCNC: >4000 PG/ML (ref 211–911)
WBC # BLD AUTO: 10.4 K/UL (ref 4.8–10.8)

## 2023-09-01 PROCEDURE — 82570 ASSAY OF URINE CREATININE: CPT

## 2023-09-01 PROCEDURE — 83036 HEMOGLOBIN GLYCOSYLATED A1C: CPT

## 2023-09-01 PROCEDURE — 36415 COLL VENOUS BLD VENIPUNCTURE: CPT

## 2023-09-01 PROCEDURE — 80061 LIPID PANEL: CPT

## 2023-09-01 PROCEDURE — 82043 UR ALBUMIN QUANTITATIVE: CPT

## 2023-09-01 PROCEDURE — 80053 COMPREHEN METABOLIC PANEL: CPT

## 2023-09-01 PROCEDURE — 84439 ASSAY OF FREE THYROXINE: CPT

## 2023-09-01 PROCEDURE — 82306 VITAMIN D 25 HYDROXY: CPT

## 2023-09-01 PROCEDURE — 82607 VITAMIN B-12: CPT

## 2023-09-01 PROCEDURE — 84443 ASSAY THYROID STIM HORMONE: CPT

## 2023-09-01 PROCEDURE — 84153 ASSAY OF PSA TOTAL: CPT

## 2023-09-01 PROCEDURE — 85025 COMPLETE CBC W/AUTO DIFF WBC: CPT

## 2023-09-08 DIAGNOSIS — E87.5 HYPERKALEMIA: ICD-10-CM

## 2023-09-08 DIAGNOSIS — N17.9 ACUTE KIDNEY INJURY (HCC): ICD-10-CM

## 2023-09-21 ENCOUNTER — NON-PROVIDER VISIT (OUTPATIENT)
Dept: MEDICAL GROUP | Facility: MEDICAL CENTER | Age: 83
End: 2023-09-21
Payer: MEDICARE

## 2023-09-21 DIAGNOSIS — Z79.4 TYPE 2 DIABETES MELLITUS WITH DIABETIC POLYNEUROPATHY, WITH LONG-TERM CURRENT USE OF INSULIN (HCC): ICD-10-CM

## 2023-09-21 DIAGNOSIS — E11.42 TYPE 2 DIABETES MELLITUS WITH DIABETIC POLYNEUROPATHY, WITH LONG-TERM CURRENT USE OF INSULIN (HCC): ICD-10-CM

## 2023-09-21 PROCEDURE — 99211 OFF/OP EST MAY X REQ PHY/QHP: CPT | Performed by: STUDENT IN AN ORGANIZED HEALTH CARE EDUCATION/TRAINING PROGRAM

## 2023-09-21 RX ORDER — INSULIN ASPART 100 [IU]/ML
5-10 INJECTION, SOLUTION INTRAVENOUS; SUBCUTANEOUS
COMMUNITY
End: 2024-03-04

## 2023-09-21 RX ORDER — SEMAGLUTIDE 0.68 MG/ML
0.5 INJECTION, SOLUTION SUBCUTANEOUS
Qty: 3 ML | Refills: 11 | Status: SHIPPED | OUTPATIENT
Start: 2023-09-21 | End: 2023-10-12

## 2023-09-21 NOTE — PROGRESS NOTES
Patient Consult Note    TIME IN: 10:36 am  TIME OUT: 11:10 am    Primary care physician: Shirley Ozuna D.O.    Reason for consult: Management of Uncontrolled Type 2 Diabetes    HPI:  Manolo Puri is a 82 y.o. old patient who comes in today for evaluation of above stated problem.    Most Recent HbA1c and POCT glucose:   Lab Results   Component Value Date/Time    HBA1C 12.6 (H) 09/01/2023 09:45 AM            Most Recent Scr:  Lab Results   Component Value Date/Time    CREATININE 1.44 (H) 09/01/2023 09:45 AM        Current Diabetes Medication Regimen  Metformin IR/ER: 1000 mg BID - Has not been using  GLP-1 Agent: Ozempic 0.25 mg subQ once weekly   SGLT-2 Inhibitor: Farxiga 10 mg once daily     Basal Insulin: Toujeo 70 units QAM - just started in the last week  Rapid Acting Insulin: Novolog 10 units TID - Pt confused and used despite being told to hold    Previous Diabetes Medications and Reason for Discontinuation  Novolin 70/30 - Dc'd by PCP to transition to basal/bolus regimen  Jardiance - Too expensive   Trulicity - Too expensive    Pt has home glucometer and proper testing technique - Yes. Pt w/ Freestyle Ifrah CGM.  Discussed BG Goals: FBG 80 - 130, 2hPP < 180, A1c < 7%    Pt reports blood sugars:   Avg BG  7 day: 312  14 day: 367  30 day: 392    TITR  7 day: 0%  14 day: 0%  30 day: 0%    Trends: BG increases significantly after ~ 9am & stays elevated throughout the day. Lowest BG typically ~ 7 am (~ low 300's). Previously trending in the 400's (last 30 days)    Lows: None noted in the last 30 days    Hypoglycemia awareness - Yes  Nocturnal hypoglycemia- None  Hypoglycemia:  None    Pt's treatment of Hypoglycemia - 15:15 Rule    Current Exercise - Totally unchanged since last visit per pt. Nothing currently for last month or so. Typically goes to the gym thrice weekly for 60 min - focuses on balance and strength training. Takes care of his wife (has Alzheimer's)   Exercise Goal - Increase as  tolerated.     Dietary: Totally unchanged since last visit per pt.  Breakfast - Scrambled eggs, toast, cup of fruit  Lunch - Mac and cheese, chili, chicken noodle soup  Dinner - Similar to lunch  Snacks - Peanuts, chips, fruit (plum, peach, watermelon, cantaloupe)   Desserts - Cookies occasionally (héctor snaps, coconut cookies)  Drinks - Zip fizz, milk, diet Dr. Pepper, not much in the way of H2O     Foot Exam:  Monofilament exam - Completed 8/2023     Preventative Management  BP regimen (ACE/ARB) - None  Statin - None  Last Retinal Scan - 8/2023. Goes to eye DrJudith Every other month  Last Microalbumin/Cr -    Latest Reference Range & Units 09/01/23 09:44   Micro Alb Creat Ratio 0 - 30 mg/g 1236 (H)   Creatinine, Urine mg/dL 30.90   Microalbumin, Urine Random mg/dL 38.2   (H): Data is abnormally high  Last A1c -   Lab Results   Component Value Date/Time    HBA1C 12.6 (H) 09/01/2023 09:45 AM          Past Medical History:  Patient Active Problem List    Diagnosis Date Noted    Long-term insulin use ([facility]) 08/29/2023    Both eyes affected by mild nonproliferative diabetic retinopathy with macular edema, associated with type 2 diabetes mellitus ([facility]) 08/29/2023    Diabetes mellitus with microalbuminuria ([facility]) 08/29/2023    Hypertension 06/27/2022    Closed fracture of one rib of right side 02/28/2022    Other atopic dermatitis 04/02/2020    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 04/02/2020    History of pericarditis 04/02/2020    History of melanoma 04/02/2020    History of diplopia 04/02/2020    History of shingles 04/02/2020    Caregiver burden 04/02/2020    Unsteady gait 10/09/2018    Type 2 diabetes mellitus with diabetic polyneuropathy, with long-term current use of insulin ([facility]) 09/03/2017    Hyperlipidemia associated with type 2 diabetes mellitus ([facility]) 09/03/2017       Past Surgical History:  Past Surgical History:   Procedure Laterality Date    OTHER      Derm removal of  SCC       Allergies:  Patient has no known allergies.    Social History:  Social History     Socioeconomic History    Marital status: Single     Spouse name: Not on file    Number of children: Not on file    Years of education: Not on file    Highest education level: Not on file   Occupational History    Not on file   Tobacco Use    Smoking status: Never    Smokeless tobacco: Never    Tobacco comments:     continued abstinence   Vaping Use    Vaping Use: Never used   Substance and Sexual Activity    Alcohol use: No    Drug use: No    Sexual activity: Not Currently     Partners: Female   Other Topics Concern    Not on file   Social History Narrative    Not on file     Social Determinants of Health     Financial Resource Strain: Not on file   Food Insecurity: Not on file   Transportation Needs: Not on file   Physical Activity: Not on file   Stress: Not on file   Social Connections: Not on file   Intimate Partner Violence: Not on file   Housing Stability: Not on file       Family History:  Family History   Problem Relation Age of Onset    Diabetes Father     Diabetes Maternal Grandfather        Medications:    Current Outpatient Medications:     insulin aspart (NOVOLOG) 100 UNIT/ML Solution, Inject 10 Units under the skin 3 times a day before meals., Disp: , Rfl:     Semaglutide,0.25 or 0.5MG/DOS, (OZEMPIC, 0.25 OR 0.5 MG/DOSE,) 2 MG/3ML Solution Pen-injector, Inject 0.5 mg under the skin every 7 days., Disp: 3 mL, Rfl: 11    Insulin Pen Needle (PEN NEEDLES) 32G X 4 MM Misc, Use to inject insulin as directed up to 4 times daily (basal insulin and meal time insulin), Disp: 400 Each, Rfl: 11    dapagliflozin propanediol (FARXIGA) 10 MG Tab, Take 1 Tablet by mouth every day., Disp: 30 Tablet, Rfl: 11    metFORMIN ER (GLUCOPHAGE XR) 500 MG TABLET SR 24 HR, Take 2 Tablets by mouth 2 times a day., Disp: 400 Tablet, Rfl: 3    Insulin Glargine, 1 Unit Dial, (TOUJEO SOLOSTAR) 300 UNIT/ML Solution Pen-injector, Inject 70 Units  under the skin every morning., Disp: 1.5 mL, Rfl: 3    Continuous Blood Gluc Sensor (FREESTYLE CONNIE 14 DAY SENSOR) Misc, 2 sensors for every 28 days, Disp: 2 Each, Rfl: 11    Continuous Blood Gluc Sensor (FREESTYLE CONNIE SENSOR SYSTEM) Misc, , Disp: , Rfl:     Labs: Reviewed    Physical Examination:  Vital signs: There were no vitals taken for this visit. There is no height or weight on file to calculate BMI.    Assessment and Plan:    1. DM2  Since last visit, pt was able to obtain and start both Farxiga and Ozempic. He was confused about our plan, so has only been using Farxiga + Ozempic and NOT his Toujeo. Resumed Toujeo in the last week. He has NOT been taking metformin. He was also using his Novolog when instructed to DC.  After our last visit, pt had repeat A1c drawn (POCT on 8/31 and then venipuncture on 9/1) - venipuncture A1c was significantly lower than POCT A1c in clinic. Noted to now be 12.6%.   Per interrogation of pt's CGM, his BG is trending lower than our last visit, but remains significantly above goal.  Pt states he's made no lifestyle changes since our last visit. Encouraged him to do so. See below for recs.  Pt did NOT bring in his PAP apps for Ozempic or Farxiga. He states he forgot about them and likely threw them away. Provided pt w/ new apps today to complete and present at f/u.  Pt appears to have memory issues - Counseled him in great detail in regard to DM medication regimen and strict adherence to avoid hypoglycemia. Reviewed importance of completing PAP apps and showed pt which sections he is to complete.  Pt would benefit from ACEi and statin - will address at f/u visits.     - Medication changes:  INCREASE Ozempic up to 0.5 mg subQ once weekly  Continue metformin ER 1000 mg BID  Continue Farxiga 10 mg once daily  Continue Toujeo 70 units - start using QHS  Continue Novolog 10 units TID - for now    - Lifestyle changes:  Diet: Maximize lean proteins and veggies. Cut out/down on carbs.  Avoid simple sugars.   Exercise: Increase as tolerated    Follow Up:  3 weeks    Lemuel Montanez, PharmD, BCACP    CC:   Shirley Ozuna D.O.

## 2023-09-21 NOTE — Clinical Note
Just GILBERTO on Sharad's labs - he was pretty hyperkalemic. No noted sx today in clinic.  Let me know if we can assist further!  Thanks, Lemuel

## 2023-10-12 ENCOUNTER — NON-PROVIDER VISIT (OUTPATIENT)
Dept: MEDICAL GROUP | Facility: MEDICAL CENTER | Age: 83
End: 2023-10-12
Payer: MEDICARE

## 2023-10-12 DIAGNOSIS — E11.42 TYPE 2 DIABETES MELLITUS WITH DIABETIC POLYNEUROPATHY, WITH LONG-TERM CURRENT USE OF INSULIN (HCC): ICD-10-CM

## 2023-10-12 DIAGNOSIS — Z79.4 TYPE 2 DIABETES MELLITUS WITH DIABETIC POLYNEUROPATHY, WITH LONG-TERM CURRENT USE OF INSULIN (HCC): ICD-10-CM

## 2023-10-12 PROCEDURE — 99211 OFF/OP EST MAY X REQ PHY/QHP: CPT | Performed by: STUDENT IN AN ORGANIZED HEALTH CARE EDUCATION/TRAINING PROGRAM

## 2023-10-12 RX ORDER — SEMAGLUTIDE 1.34 MG/ML
1 INJECTION, SOLUTION SUBCUTANEOUS
Qty: 3 ML | Refills: 11 | Status: SHIPPED | OUTPATIENT
Start: 2023-10-12 | End: 2023-12-07

## 2023-10-12 NOTE — PROGRESS NOTES
Patient Consult Note    TIME IN: 10:50 am  TIME OUT: 11:20 am    Primary care physician: Shirley Ozuna D.O.    Reason for consult: Management of Uncontrolled Type 2 Diabetes    HPI:  Manolo Puri is a 83 y.o. old patient who comes in today for evaluation of above stated problem.    Most Recent HbA1c:   Lab Results   Component Value Date/Time    HBA1C 12.6 (H) 09/01/2023 09:45 AM       Most Recent Scr:  Lab Results   Component Value Date/Time    CREATININE 1.44 (H) 09/01/2023 09:45 AM        Current Diabetes Medication Regimen  Metformin IR/ER: 1000 mg BID   GLP-1 Agent: Ozempic 0.5 mg subQ once weekly   SGLT-2 Inhibitor: Farxiga 10 mg once daily      Basal Insulin: Toujeo 70 units once daily  Rapid Acting Insulin: Novolog 10 units TID - was using POST-PRANDIAL, counseled to use PRE-prandial.    Previous Diabetes Medications and Reason for Discontinuation  Novolin 70/30 - Dc'd by PCP to transition to basal/bolus regimen  Jardiance - Too expensive   Trulicity - Too expensive    Pt has home glucometer and proper testing technique - Yes. Pt w/ Freestyle Ifrah CGM.  Discussed BG Goals: FBG 80 - 130, 2hPP < 180, A1c < 7%    Pt reports blood sugars:   Avg BG  7 day: 223  14 day: 220  30 day: 229    TITR  7 day: 19%  14 day: 15%  30 day: 20%    Trends: BG increases significantly after ~ 9am & stays elevated throughout the day. Lowest BG typically ~ 7 am (~ 180's). Previously trending in the 400's (last 30 days)    Lows: None noted w/in the last 30 days    Hypoglycemia awareness - Yes  Nocturnal hypoglycemia- None  Hypoglycemia:  None    Pt's treatment of Hypoglycemia - 15:15 Rule     Current Exercise - Totally unchanged since last visit per pt. Nothing currently for last month or so. Typically goes to the gym thrice weekly for 60 min - focuses on balance and strength training. Takes care of his wife (has Alzheimer's)   Exercise Goal - Increase as tolerated.     Dietary: Totally unchanged since last visit  per pt.  Breakfast - Scrambled eggs, toast, cup of fruit  Lunch - Mac and cheese, chili, chicken noodle soup  Dinner - Similar to lunch  Snacks - Peanuts, chips, fruit (plum, peach, watermelon, cantaloupe)   Desserts - Cookies occasionally (héctor snaps, coconut cookies)  Drinks - Zip fizz, milk, diet Dr. Pepper, not much in the way of H2O     Foot Exam:  Monofilament exam - Completed 8/2023     Preventative Management  BP regimen (ACE/ARB) - None  Statin - None  Last Retinal Scan - 8/2023. Goes to eye DrJudith Every other month  Last Microalbumin/Cr -     Latest Reference Range & Units 09/01/23 09:44   Micro Alb Creat Ratio 0 - 30 mg/g 1236 (H)   Creatinine, Urine mg/dL 30.90   Microalbumin, Urine Random mg/dL 38.2   (H): Data is abnormally high  Last A1c -   Lab Results   Component Value Date/Time    HBA1C 12.6 (H) 09/01/2023 09:45 AM          Past Medical History:  Patient Active Problem List    Diagnosis Date Noted    Long-term insulin use ([facility]) 08/29/2023    Both eyes affected by mild nonproliferative diabetic retinopathy with macular edema, associated with type 2 diabetes mellitus ([facility]) 08/29/2023    Diabetes mellitus with microalbuminuria ([facility]) 08/29/2023    Hypertension 06/27/2022    Closed fracture of one rib of right side 02/28/2022    Other atopic dermatitis 04/02/2020    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 04/02/2020    History of pericarditis 04/02/2020    History of melanoma 04/02/2020    History of diplopia 04/02/2020    History of shingles 04/02/2020    Caregiver burden 04/02/2020    Unsteady gait 10/09/2018    Type 2 diabetes mellitus with diabetic polyneuropathy, with long-term current use of insulin ([facility]) 09/03/2017    Hyperlipidemia associated with type 2 diabetes mellitus ([facility]) 09/03/2017       Past Surgical History:  Past Surgical History:   Procedure Laterality Date    OTHER      Derm removal of SCC       Allergies:  Patient has no known  allergies.    Social History:  Social History     Socioeconomic History    Marital status: Single     Spouse name: Not on file    Number of children: Not on file    Years of education: Not on file    Highest education level: Not on file   Occupational History    Not on file   Tobacco Use    Smoking status: Never    Smokeless tobacco: Never    Tobacco comments:     continued abstinence   Vaping Use    Vaping Use: Never used   Substance and Sexual Activity    Alcohol use: No    Drug use: No    Sexual activity: Not Currently     Partners: Female   Other Topics Concern    Not on file   Social History Narrative    Not on file     Social Determinants of Health     Financial Resource Strain: Not on file   Food Insecurity: Not on file   Transportation Needs: Not on file   Physical Activity: Not on file   Stress: Not on file   Social Connections: Not on file   Intimate Partner Violence: Not on file   Housing Stability: Not on file       Family History:  Family History   Problem Relation Age of Onset    Diabetes Father     Diabetes Maternal Grandfather        Medications:    Current Outpatient Medications:     Semaglutide, 1 MG/DOSE, (OZEMPIC, 1 MG/DOSE,) 4 MG/3ML Solution Pen-injector, Inject 1 mg under the skin every 7 days., Disp: 3 mL, Rfl: 11    insulin aspart (NOVOLOG) 100 UNIT/ML Solution, Inject 10 Units under the skin 3 times a day before meals., Disp: , Rfl:     Insulin Pen Needle (PEN NEEDLES) 32G X 4 MM Misc, Use to inject insulin as directed up to 4 times daily (basal insulin and meal time insulin), Disp: 400 Each, Rfl: 11    dapagliflozin propanediol (FARXIGA) 10 MG Tab, Take 1 Tablet by mouth every day., Disp: 30 Tablet, Rfl: 11    metFORMIN ER (GLUCOPHAGE XR) 500 MG TABLET SR 24 HR, Take 2 Tablets by mouth 2 times a day., Disp: 400 Tablet, Rfl: 3    Insulin Glargine, 1 Unit Dial, (TOUJEO SOLOSTAR) 300 UNIT/ML Solution Pen-injector, Inject 70 Units under the skin every morning., Disp: 1.5 mL, Rfl: 3     Continuous Blood Gluc Sensor (FREESTYLE CONNIE 14 DAY SENSOR) Mis, 2 sensors for every 28 days, Disp: 2 Each, Rfl: 11    Continuous Blood Gluc Sensor (FREESTYLE CONNIE SENSOR SYSTEM) Mis, , Disp: , Rfl:     Labs: Reviewed    Physical Examination:  Vital signs: There were no vitals taken for this visit. There is no height or weight on file to calculate BMI.    Assessment and Plan:    1. DM2  Since last visit, pt was able to increase his Ozempic up to 0.5 mg subQ once weekly. He does c/o occasional loose stools since dose increase - no other SE noted.  Discussed methods in which to mitigate SE - pt will ensure adequate hydration and start OTC fiber supplementation.  Per interrogation of CGM, pt's BG are slowly improving, but remain significantly above goal.   Pt's FBG is now trending ~ 180's (was > 300's) - much improved.   Upon pt interview, pt states he's using his prandial insulin AFTER eating and not before - counseled him to only use prior to eating.   Pt did NOT bring in his PAP apps for Ozempic or Farxiga. He states he forgot about them but agrees to complete and present at f/u or sooner if he can.    - Medication changes:  INCREASE Ozempic up to 1 mg subQ once weekly  Continue metformin ER 1000 mg BID  Continue Farxiga 10 mg once daily  Continue Toujeo 70 units   Continue Novolog 10 units TID - for now    - Lifestyle changes:  Diet: Maximize lean proteins and veggies. Cut out/down on carbs. Avoid simple sugars.   Exercise: Increase as tolerated    Follow Up:  ~ 1 month    Lemuel Montanez, PharmD, BCACP    CC:   Shirley Ozuna D.O.

## 2023-10-17 ENCOUNTER — OFFICE VISIT (OUTPATIENT)
Dept: MEDICAL GROUP | Facility: PHYSICIAN GROUP | Age: 83
End: 2023-10-17
Payer: MEDICARE

## 2023-10-17 VITALS
HEIGHT: 72 IN | BODY MASS INDEX: 30.2 KG/M2 | OXYGEN SATURATION: 93 % | HEART RATE: 95 BPM | DIASTOLIC BLOOD PRESSURE: 70 MMHG | WEIGHT: 223 LBS | RESPIRATION RATE: 16 BRPM | SYSTOLIC BLOOD PRESSURE: 122 MMHG | TEMPERATURE: 97.7 F

## 2023-10-17 DIAGNOSIS — Z79.4 TYPE 2 DIABETES MELLITUS WITH DIABETIC POLYNEUROPATHY, WITH LONG-TERM CURRENT USE OF INSULIN (HCC): ICD-10-CM

## 2023-10-17 DIAGNOSIS — R80.9 POSITIVE FOR MACROALBUMINURIA: ICD-10-CM

## 2023-10-17 DIAGNOSIS — G31.9 MILD CEREBRAL ATROPHY (HCC): ICD-10-CM

## 2023-10-17 DIAGNOSIS — N17.9 AKI (ACUTE KIDNEY INJURY) (HCC): ICD-10-CM

## 2023-10-17 DIAGNOSIS — E11.42 TYPE 2 DIABETES MELLITUS WITH DIABETIC POLYNEUROPATHY, WITH LONG-TERM CURRENT USE OF INSULIN (HCC): ICD-10-CM

## 2023-10-17 PROCEDURE — 99214 OFFICE O/P EST MOD 30 MIN: CPT | Performed by: INTERNAL MEDICINE

## 2023-10-17 PROCEDURE — 3078F DIAST BP <80 MM HG: CPT | Performed by: INTERNAL MEDICINE

## 2023-10-17 PROCEDURE — 3074F SYST BP LT 130 MM HG: CPT | Performed by: INTERNAL MEDICINE

## 2023-10-17 ASSESSMENT — FIBROSIS 4 INDEX: FIB4 SCORE: 1.37

## 2023-10-17 NOTE — ASSESSMENT & PLAN NOTE
New problem, incidental finding on previous CT head imaging, no concerning cognitive issues at this time.

## 2023-10-17 NOTE — ASSESSMENT & PLAN NOTE
Chronic decompensated problem, now working with pharmacist and has agreed to increase regimen. Continue Toujeo 70 units, Novolog 10 units TID AC, metformin 1000 mg BID, Farxiga 10 mg daily, and Ozempic 1 mg weekly. Will follow up with pharmacist in mid November and can recheck A1c at that time.

## 2023-10-17 NOTE — ASSESSMENT & PLAN NOTE
Chronic decompensated issue, no ARB/ACEI until he completes repeat BMP due to recent hyperkalemia and DALY. Now on stronger diabetes regimen.

## 2023-10-17 NOTE — PROGRESS NOTES
Subjective:   Chief Complaint/History of Present Illness:  Manolo Puri is a 83 y.o. male established patient who presents today to discuss medical problems as listed below. Sharad is unaccompanied for today's visit.    Problem   Mild Cerebral Atrophy (Hcc)    CT head imaging with mild cerebral atrophy, incidental finding, no clinical correlate.     Johnny (Acute Kidney Injury) ([facility])    GFR 49 on most recent lab draw, previously 70-80 range. Also with macroalbuminuria due to longstanding uncontrolled DM2. Did not go into lab for recheck yet. Coexisting hyperkalemia at 6.1.     Positive for Macroalbuminuria     Latest Reference Range & Units 09/01/23 09:44   Micro Alb Creat Ratio 0 - 30 mg/g 1236 (H)     He has multiple complications related to type 2 diabetes, microalbuminuria noted on most recent evaluation in 2022.  We will update his blood and urine studies and then can recommend appropriate treatment, he will need to start on ARB therapy.    No ARB/ACEI until he completes repeat BMP due to hyperkalemia and JOHNNY.     Type 2 Diabetes Mellitus With Diabetic Polyneuropathy, With Long-Term Current Use of Insulin ([facility])     Latest Reference Range & Units 09/01/23 09:45   Glycohemoglobin 4.0 - 5.6 % 12.6 (H)     He was diagnosed around age 62 with type 2 diabetes, not sure how long he had prediabetes beforehand.  He was initially taking metfomin and has been on 70/30 insulin since age 74, currently using 52 U twice daily. A1c has ranged 8.7-14 since 2018.     Agreeable to changing regimen due to significant worsening of A1c and kidney function.    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 Now with CKD and macroalbuminuria.    Current regimen: Toujeo 70 units, Novolog 10 units TID AC, metformin 1000 mg BID, Farxiga 10 mg daily, and Ozempic 1 mg weekly          Current Medications:  Current Outpatient Medications Ordered in Epic   Medication Sig Dispense Refill    Semaglutide, 1 MG/DOSE, (OZEMPIC, 1  MG/DOSE,) 4 MG/3ML Solution Pen-injector Inject 1 mg under the skin every 7 days. 3 mL 11    insulin aspart (NOVOLOG) 100 UNIT/ML Solution Inject 10 Units under the skin 3 times a day before meals.      Insulin Pen Needle (PEN NEEDLES) 32G X 4 MM Misc Use to inject insulin as directed up to 4 times daily (basal insulin and meal time insulin) 400 Each 11    dapagliflozin propanediol (FARXIGA) 10 MG Tab Take 1 Tablet by mouth every day. 30 Tablet 11    metFORMIN ER (GLUCOPHAGE XR) 500 MG TABLET SR 24 HR Take 2 Tablets by mouth 2 times a day. 400 Tablet 3    Insulin Glargine, 1 Unit Dial, (TOUJEO SOLOSTAR) 300 UNIT/ML Solution Pen-injector Inject 70 Units under the skin every morning. 1.5 mL 3    Continuous Blood Gluc Sensor (FREESTYLE CONNIE 14 DAY SENSOR) [facility] 2 sensors for every 28 days 2 Each 11    Continuous Blood Gluc Sensor (FREESTYLE CONNIE SENSOR SYSTEM) [facility]        No current Epic-ordered facility-administered medications on file.          Objective:   Physical Exam:    Vitals: /70 (BP Location: Right arm, Patient Position: Sitting, BP Cuff Size: Adult)   Pulse 95   Temp 36.5 °C (97.7 °F) (Temporal)   Resp 16   Ht 1.829 m (6')   Wt 101 kg (223 lb)   SpO2 93%    BMI: Body mass index is 30.24 kg/m².  Physical Exam  Constitutional:       General: He is not in acute distress.     Appearance: Normal appearance. He is not ill-appearing.   HENT:      Right Ear: Ear canal normal. There is no impacted cerumen.      Left Ear: Ear canal normal. There is no impacted cerumen.   Eyes:      General: No scleral icterus.     Conjunctiva/sclera: Conjunctivae normal.   Cardiovascular:      Rate and Rhythm: Normal rate and regular rhythm.      Pulses: Normal pulses.      Heart sounds: No murmur heard.  Pulmonary:      Effort: Pulmonary effort is normal. No respiratory distress.      Breath sounds: Normal breath sounds. No wheezing, rhonchi or rales.   Psychiatric:         Mood and Affect: Mood normal.         Behavior:  Behavior normal.         Thought Content: Thought content normal.         Judgment: Judgment normal.          Assessment and Plan:   Manolo is a 83 y.o. male with the following:  Problem List Items Addressed This Visit       DALY (acute kidney injury) (HCC)     New decompensated issue, never went to lab for redraw, stressed importance of rechecking metabolic panel for DALY and hyperkalemia. Next steps pending results of above.         Mild cerebral atrophy (HCC)     New problem, incidental finding on previous CT head imaging, no concerning cognitive issues at this time.         Positive for macroalbuminuria     Chronic decompensated issue, no ARB/ACEI until he completes repeat BMP due to recent hyperkalemia and DALY. Now on stronger diabetes regimen.         Type 2 diabetes mellitus with diabetic polyneuropathy, with long-term current use of insulin (HCC)     Chronic decompensated problem, now working with pharmacist and has agreed to increase regimen. Continue Toujeo 70 units, Novolog 10 units TID AC, metformin 1000 mg BID, Farxiga 10 mg daily, and Ozempic 1 mg weekly. Will follow up with pharmacist in mid November and can recheck A1c at that time.               RTC: Return in about 4 months (around 2/17/2024).    I spent a total of 32 minutes with record review, exam, communication with the patient, communication with other providers, and documentation of this encounter.    PLEASE NOTE: This dictation was created using voice recognition software. I have made every reasonable attempt to correct obvious errors, but I expect that there are errors of grammar and possibly content that I did not discover before finalizing the note.      Shirley Ozuna, DO  Geriatric and Internal Medicine  [facility]

## 2023-10-17 NOTE — ASSESSMENT & PLAN NOTE
New decompensated issue, never went to lab for redraw, stressed importance of rechecking metabolic panel for DALY and hyperkalemia. Next steps pending results of above.

## 2023-11-16 ENCOUNTER — NON-PROVIDER VISIT (OUTPATIENT)
Dept: MEDICAL GROUP | Facility: MEDICAL CENTER | Age: 83
End: 2023-11-16
Payer: MEDICARE

## 2023-11-16 DIAGNOSIS — Z79.4 TYPE 2 DIABETES MELLITUS WITH DIABETIC POLYNEUROPATHY, WITH LONG-TERM CURRENT USE OF INSULIN (HCC): ICD-10-CM

## 2023-11-16 DIAGNOSIS — E11.42 TYPE 2 DIABETES MELLITUS WITH DIABETIC POLYNEUROPATHY, WITH LONG-TERM CURRENT USE OF INSULIN (HCC): ICD-10-CM

## 2023-11-16 PROCEDURE — 99211 OFF/OP EST MAY X REQ PHY/QHP: CPT | Performed by: STUDENT IN AN ORGANIZED HEALTH CARE EDUCATION/TRAINING PROGRAM

## 2023-11-16 NOTE — PROGRESS NOTES
Patient Consult Note    TIME IN: 10:37 am  TIME OUT: 11:10 am    Primary care physician: Shirley Ozuna D.O.    Reason for consult: Management of Uncontrolled Type 2 Diabetes    HPI:  Manolo Puri is a 83 y.o. old patient who comes in today for evaluation of above stated problem.    Most Recent HbA1c:   Lab Results   Component Value Date/Time    HBA1C 12.6 (H) 09/01/2023 09:45 AM       Most Recent Scr:  Lab Results   Component Value Date/Time    CREATININE 1.44 (H) 09/01/2023 09:45 AM        Current Diabetes Medication Regimen  Metformin IR/ER: 1000 mg BID   GLP-1 Agent: Ozempic 1 mg subQ once weekly   SGLT-2 Inhibitor: Farxiga 10 mg once daily      Basal Insulin: Toujeo 70 units once daily  Rapid Acting Insulin: Novolog 10 units TID     Previous Diabetes Medications and Reason for Discontinuation  Novolin 70/30 - Dc'd by PCP to transition to basal/bolus regimen  Jardiance - Too expensive   Trulicity - Too expensive    Pt has home glucometer and proper testing technique - Yes. Pt w/ [employer] Ifrah CGM.  Discussed BG Goals: FBG 80 - 130, 2hPP < 180, A1c < 7%    Pt reports blood sugars:   Avg BG  7 day: 173  14 day: 193  30 day: 209    TITR  7 day: 42%  14 day: 32%  30 day: 26%    Trends: Spikes after ~ 9 am then steadily downtrends the remainder of the day    Hypoglycemia: 4x in the last 30 days. States in the later evening - manages by eating fruit or toast w/ honey. States he thinks he administered prandial insulin post dinner.    Hypoglycemia awareness - Yes  Nocturnal hypoglycemia- None  Hypoglycemia:  None    Pt's treatment of Hypoglycemia - 15:15 Rule     Current Exercise - Typically goes to the gym thrice weekly for 60 min - focuses on balance and strength training. Takes care of his wife (has Alzheimer's)   Exercise Goal - Increase as tolerated.     Dietary:   Breakfast - Scrambled eggs, toast, cup of fruit  Lunch - Mac and cheese, chili, chicken noodle soup  Dinner - Similar to  lunch  Snacks - Peanuts, chips, fruit (plum, peach, watermelon, cantaloupe)   Desserts - Cookies occasionally (héctor snaps, coconut cookies)  Drinks - Zip fizz, milk, diet Dr. Pepper, not much in the way of H2O     Foot Exam:  Monofilament exam - Completed 8/2023     Preventative Management  BP regimen (ACE/ARB) - None  Statin - None  Last Retinal Scan - 8/2023. Goes to eye DrJudith Every other month  Last Microalbumin/Cr -     Latest Reference Range & Units 09/01/23 09:44   Micro Alb Creat Ratio 0 - 30 mg/g 1236 (H)   Creatinine, Urine mg/dL 30.90   Microalbumin, Urine Random mg/dL 38.2   (H): Data is abnormally high  Last A1c -   Lab Results   Component Value Date/Time    HBA1C 12.6 (H) 09/01/2023 09:45 AM          Past Medical History:  Patient Active Problem List    Diagnosis Date Noted    Mild cerebral atrophy ([facility]) 10/17/2023    DALY (acute kidney injury) ([facility]) 10/17/2023    Long-term insulin use ([facility]) 08/29/2023    Both eyes affected by mild nonproliferative diabetic retinopathy with macular edema, associated with type 2 diabetes mellitus ([facility]) 08/29/2023    Positive for macroalbuminuria 08/29/2023    Hypertension 06/27/2022    Closed fracture of one rib of right side 02/28/2022    Other atopic dermatitis 04/02/2020    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 04/02/2020    History of pericarditis 04/02/2020    History of melanoma 04/02/2020    History of diplopia 04/02/2020    History of shingles 04/02/2020    Caregiver burden 04/02/2020    Unsteady gait 10/09/2018    Type 2 diabetes mellitus with diabetic polyneuropathy, with long-term current use of insulin ([facility]) 09/03/2017    Hyperlipidemia associated with type 2 diabetes mellitus ([facility]) 09/03/2017       Past Surgical History:  Past Surgical History:   Procedure Laterality Date    OTHER      Derm removal of SCC       Allergies:  Patient has no known allergies.    Social History:  Social History     Socioeconomic History     Marital status: Single     Spouse name: Not on file    Number of children: Not on file    Years of education: Not on file    Highest education level: Not on file   Occupational History    Not on file   Tobacco Use    Smoking status: Never    Smokeless tobacco: Never    Tobacco comments:     continued abstinence   Vaping Use    Vaping Use: Never used   Substance and Sexual Activity    Alcohol use: No    Drug use: No    Sexual activity: Not Currently     Partners: Female   Other Topics Concern    Not on file   Social History Narrative    Not on file     Social Determinants of Health     Financial Resource Strain: Not on file   Food Insecurity: Not on file   Transportation Needs: Not on file   Physical Activity: Not on file   Stress: Not on file   Social Connections: Not on file   Intimate Partner Violence: Not on file   Housing Stability: Not on file       Family History:  Family History   Problem Relation Age of Onset    Diabetes Father     Diabetes Maternal Grandfather        Medications:    Current Outpatient Medications:     Semaglutide, 1 MG/DOSE, (OZEMPIC, 1 MG/DOSE,) 4 MG/3ML Solution Pen-injector, Inject 1 mg under the skin every 7 days., Disp: 3 mL, Rfl: 11    insulin aspart (NOVOLOG) 100 UNIT/ML Solution, Inject 10 Units under the skin 3 times a day before meals., Disp: , Rfl:     Insulin Pen Needle (PEN NEEDLES) 32G X 4 MM Misc, Use to inject insulin as directed up to 4 times daily (basal insulin and meal time insulin), Disp: 400 Each, Rfl: 11    dapagliflozin propanediol (FARXIGA) 10 MG Tab, Take 1 Tablet by mouth every day., Disp: 30 Tablet, Rfl: 11    metFORMIN ER (GLUCOPHAGE XR) 500 MG TABLET SR 24 HR, Take 2 Tablets by mouth 2 times a day., Disp: 400 Tablet, Rfl: 3    Insulin Glargine, 1 Unit Dial, (TOUJEO SOLOSTAR) 300 UNIT/ML Solution Pen-injector, Inject 70 Units under the skin every morning., Disp: 1.5 mL, Rfl: 3    Continuous Blood Gluc Sensor (FREESTYLE CONNIE 14 DAY SENSOR) Misc, 2 sensors for  every 28 days, Disp: 2 Each, Rfl: 11    Continuous Blood Gluc Sensor (FREESTYLE CONNIE SENSOR SYSTEM) [facility] [place], , Disp: , Rfl:     Labs: Reviewed    Physical Examination:  Vital signs: There were no vitals taken for this visit. There is no height or weight on file to calculate BMI.    Assessment and Plan:    1. DM2  Since last visit, pt was able to increase his Ozempic up to 1 mg once weekly. He states he is now using prandial insulin prior to food now as well - see below as pt later noted he has used PP at times since last visit.  Pt states he's been experiencing more diarrhea lately. Will try OTC fiber supplement to see if this relieves his sx. Did not worsen w/ Ozmepic dose increase per pt.  Per interrogation of CGM, pt's BG continues to trend down. Most recent avg is in the 170's whereas it was in the 300's when we established.   Pt has had four incidents of hypoglycemia - he states this was likely d/t administering Novolog after dinner as BG < 70 occurred in the evening. He manages appropriately. Counseled again regarding the importance of only administering this insulin prior to food.  Noted pt BG trending significantly higher after ~ 9am and then steadily decreases the rest of the day. Pt will work to decrease carb intake in the AM.  Pt states his medications are affordable at this time. He does not believe he would financially qualify for PAP. Will not pursue further at this time.    - Medication changes:  INCREASE Toujeo up to 77 units once daily (10% increase)  DECREASE Novolog PM dose to 5 units if note BG < 70 again in the evening. Otherwise continue 10 units TID BEFORE meals  Continue Ozempic 1 mg subQ once weekly  Deferred further titration today d/t GI complaints  Continue Farxiga 10 mg once daily  Continue metformin 1000 mg BID - repeat BMP prior to f/u to recheck kidney function    - Lifestyle changes:  Diet: Maximize lean proteins and veggies. Cut out/down on carbs. Avoid simple sugars.   Exercise:  Increase as tolerated    Follow Up:  ~ 1 weeks via telephone, 3 weeks in clinic for repeat A1c    Lemuel Montanez, Srinivasan, BCACP    CC:   Shirley Ozuna D.O.

## 2023-11-16 NOTE — PATIENT INSTRUCTIONS
INCREASE Toujeo to 77 units once daily    ONLY ADMINISTER NOVOLOG BEFORE EATING, NEVER AFTER    If you experience blood sugar less than 70 in the evening again, cut your Novolog dose down to 5 units in the evening instead of 10 units    Go to the lab for BMP and A1c at least a couple of days prior to our next appt on 12/7    Try a fiber supplement such as Metamucil (goal fiber intake is 25-30 grams per day)

## 2023-12-06 ENCOUNTER — HOSPITAL ENCOUNTER (OUTPATIENT)
Dept: LAB | Facility: MEDICAL CENTER | Age: 83
End: 2023-12-06
Attending: INTERNAL MEDICINE
Payer: MEDICARE

## 2023-12-06 DIAGNOSIS — Z79.4 TYPE 2 DIABETES MELLITUS WITH DIABETIC POLYNEUROPATHY, WITH LONG-TERM CURRENT USE OF INSULIN (HCC): ICD-10-CM

## 2023-12-06 DIAGNOSIS — E11.42 TYPE 2 DIABETES MELLITUS WITH DIABETIC POLYNEUROPATHY, WITH LONG-TERM CURRENT USE OF INSULIN (HCC): ICD-10-CM

## 2023-12-06 LAB
ANION GAP SERPL CALC-SCNC: 12 MMOL/L (ref 7–16)
BUN SERPL-MCNC: 37 MG/DL (ref 8–22)
CALCIUM SERPL-MCNC: 9.4 MG/DL (ref 8.5–10.5)
CHLORIDE SERPL-SCNC: 102 MMOL/L (ref 96–112)
CO2 SERPL-SCNC: 23 MMOL/L (ref 20–33)
CREAT SERPL-MCNC: 1.35 MG/DL (ref 0.5–1.4)
EST. AVERAGE GLUCOSE BLD GHB EST-MCNC: 220 MG/DL
GFR SERPLBLD CREATININE-BSD FMLA CKD-EPI: 53 ML/MIN/1.73 M 2
GLUCOSE SERPL-MCNC: 330 MG/DL (ref 65–99)
HBA1C MFR BLD: 9.3 % (ref 4–5.6)
POTASSIUM SERPL-SCNC: 5.1 MMOL/L (ref 3.6–5.5)
SODIUM SERPL-SCNC: 137 MMOL/L (ref 135–145)

## 2023-12-06 PROCEDURE — 36415 COLL VENOUS BLD VENIPUNCTURE: CPT

## 2023-12-06 PROCEDURE — 80048 BASIC METABOLIC PNL TOTAL CA: CPT

## 2023-12-06 PROCEDURE — 83036 HEMOGLOBIN GLYCOSYLATED A1C: CPT

## 2023-12-07 ENCOUNTER — NON-PROVIDER VISIT (OUTPATIENT)
Dept: MEDICAL GROUP | Facility: MEDICAL CENTER | Age: 83
End: 2023-12-07
Payer: MEDICARE

## 2023-12-07 DIAGNOSIS — Z79.4 TYPE 2 DIABETES MELLITUS WITH DIABETIC POLYNEUROPATHY, WITH LONG-TERM CURRENT USE OF INSULIN (HCC): ICD-10-CM

## 2023-12-07 DIAGNOSIS — E11.42 TYPE 2 DIABETES MELLITUS WITH DIABETIC POLYNEUROPATHY, WITH LONG-TERM CURRENT USE OF INSULIN (HCC): ICD-10-CM

## 2023-12-07 PROCEDURE — 99211 OFF/OP EST MAY X REQ PHY/QHP: CPT | Performed by: NURSE PRACTITIONER

## 2023-12-07 RX ORDER — BLOOD-GLUCOSE SENSOR
1 EACH MISCELLANEOUS
Qty: 2 EACH | Refills: 11 | Status: SHIPPED | OUTPATIENT
Start: 2023-12-07 | End: 2024-02-15

## 2023-12-07 RX ORDER — SEMAGLUTIDE 2.68 MG/ML
2 INJECTION, SOLUTION SUBCUTANEOUS
Qty: 3 ML | Refills: 11 | Status: SHIPPED | OUTPATIENT
Start: 2023-12-07 | End: 2024-02-15 | Stop reason: SDUPTHER

## 2023-12-07 NOTE — PATIENT INSTRUCTIONS
INCREASE your Ozempic up to 2 mg once weekly (Can use two injections of the 1 mg pen until this supply is exhausted)    DECREASE Novolog to 5 units BEFORE DINNER and 10 units BEFORE breakfast and lunch    ONLY ADMINISTER NOVOLOG BEFORE EATING, NEVER AFTER     Try a fiber supplement such as Metamucil (goal fiber intake is 25-30 grams per day)

## 2023-12-07 NOTE — PROGRESS NOTES
Patient Consult Note    TIME IN: 10:20 am  TIME OUT: 10:54 am    Primary care physician: Shirley Ozuna D.O.    Reason for consult: Management of Uncontrolled Type 2 Diabetes    HPI:  Manolo Puri is a 83 y.o. old patient who comes in today for evaluation of above stated problem.    Most Recent HbA1c:   Lab Results   Component Value Date/Time    HBA1C 9.3 (H) 12/06/2023 09:32 AM       Most Recent Scr:  Lab Results   Component Value Date/Time    CREATININE 1.35 12/06/2023 09:32 AM        Current Diabetes Medication Regimen  Metformin IR/ER: 1000 mg BID   GLP-1 Agent: Ozempic 1 mg subQ once weekly   SGLT-2 Inhibitor: Farxiga 10 mg once daily      Basal Insulin: Toujeo 77 units once daily - Pt did NOT increase as previously instructed at our last visit.   Rapid Acting Insulin: Novolog 10 units TID     Previous Diabetes Medications and Reason for Discontinuation  Novolin 70/30 - Dc'd by PCP to transition to basal/bolus regimen  Jardiance - Too expensive   Trulicity - Too expensive    Pt has home glucometer and proper testing technique - Yes. Pt w/ Freestyle Ifrah CGM.  Discussed BG Goals: FBG 80 - 130, 2hPP < 180, A1c < 7%    Pt reports blood sugars:   Avg BG  7 day: 206  14 day: 246  30 day: 216    TITR  7 day: 10%  14 day: 7%  30 day: 18%    Trends: Spikes after ~ 9 am then steadily downtrends the remainder of the day     Hypoglycemia: 2 events in the last 30 days - occurred between 12-3 am    Hypoglycemia awareness - Yes  Nocturnal hypoglycemia- None  Hypoglycemia:  None    Pt's treatment of Hypoglycemia - 15:15 Rule     Current Exercise - Has not been going to the gym since last visit. Takes care of his wife (has Alzheimer's) and now his son who has leukemia and requires bone marrow transplant.   Exercise Goal - Increase as tolerated to goal of 150 min/week.     Dietary:   Breakfast - Scrambled eggs, toast, cup of fruit  Lunch - Mac and cheese, chili, chicken noodle soup  Dinner - Similar to  lunch  Snacks - Peanuts, chips, fruit (plum, peach, watermelon, cantaloupe)   Desserts - Cookies occasionally (héctor snaps, coconut cookies)  Drinks - Zip fizz, milk, diet Dr. Pepper, not much in the way of H2O     Foot Exam:  Monofilament exam - Completed 8/2023     Preventative Management  BP regimen (ACE/ARB) - None  Statin - None  Last Retinal Scan - 8/2023. Goes to eye DrJudith Every other month  Last Microalbumin/Cr -     Latest Reference Range & Units 09/01/23 09:44   Micro Alb Creat Ratio 0 - 30 mg/g 1236 (H)   Creatinine, Urine mg/dL 30.90   Microalbumin, Urine Random mg/dL 38.2   (H): Data is abnormally high  Last A1c -   Lab Results   Component Value Date/Time    HBA1C 9.3 (H) 12/06/2023 09:32 AM          Past Medical History:  Patient Active Problem List    Diagnosis Date Noted    Mild cerebral atrophy ([facility]) 10/17/2023    DALY (acute kidney injury) ([facility]) 10/17/2023    Long-term insulin use ([facility]) 08/29/2023    Both eyes affected by mild nonproliferative diabetic retinopathy with macular edema, associated with type 2 diabetes mellitus ([facility]) 08/29/2023    Positive for macroalbuminuria 08/29/2023    Hypertension 06/27/2022    Closed fracture of one rib of right side 02/28/2022    Other atopic dermatitis 04/02/2020    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 04/02/2020    History of pericarditis 04/02/2020    History of melanoma 04/02/2020    History of diplopia 04/02/2020    History of shingles 04/02/2020    Caregiver burden 04/02/2020    Unsteady gait 10/09/2018    Type 2 diabetes mellitus with diabetic polyneuropathy, with long-term current use of insulin ([facility]) 09/03/2017    Hyperlipidemia associated with type 2 diabetes mellitus ([facility]) 09/03/2017       Past Surgical History:  Past Surgical History:   Procedure Laterality Date    OTHER      Derm removal of SCC       Allergies:  Patient has no known allergies.    Social History:  Social History     Socioeconomic History     Marital status: Single     Spouse name: Not on file    Number of children: Not on file    Years of education: Not on file    Highest education level: Not on file   Occupational History    Not on file   Tobacco Use    Smoking status: Never    Smokeless tobacco: Never    Tobacco comments:     continued abstinence   Vaping Use    Vaping Use: Never used   Substance and Sexual Activity    Alcohol use: No    Drug use: No    Sexual activity: Not Currently     Partners: Female   Other Topics Concern    Not on file   Social History Narrative    Not on file     Social Determinants of Health     Financial Resource Strain: Not on file   Food Insecurity: Not on file   Transportation Needs: Not on file   Physical Activity: Not on file   Stress: Not on file   Social Connections: Not on file   Intimate Partner Violence: Not on file   Housing Stability: Not on file       Family History:  Family History   Problem Relation Age of Onset    Diabetes Father     Diabetes Maternal Grandfather        Medications:    Current Outpatient Medications:     Continuous Blood Gluc Sensor (FREESTYLE CONNIE 3 SENSOR) Misc, 1 Each every 14 days. Use as directed, Disp: 2 Each, Rfl: 11    Semaglutide, 2 MG/DOSE, (OZEMPIC, 2 MG/DOSE,) 8 MG/3ML Solution Pen-injector, Inject 2 mg under the skin every 7 days., Disp: 3 mL, Rfl: 11    insulin aspart (NOVOLOG) 100 UNIT/ML Solution, Inject 5-10 Units under the skin 3 times a day before meals. 10 units with breakfast and lunch, 5 units with dinner, Disp: , Rfl:     Insulin Pen Needle (PEN NEEDLES) 32G X 4 MM Misc, Use to inject insulin as directed up to 4 times daily (basal insulin and meal time insulin), Disp: 400 Each, Rfl: 11    dapagliflozin propanediol (FARXIGA) 10 MG Tab, Take 1 Tablet by mouth every day., Disp: 30 Tablet, Rfl: 11    metFORMIN ER (GLUCOPHAGE XR) 500 MG TABLET SR 24 HR, Take 2 Tablets by mouth 2 times a day., Disp: 400 Tablet, Rfl: 3    Insulin Glargine, 1 Unit Dial, (TOUJEO SOLOSTAR) 300  UNIT/ML Solution Pen-injector, Inject 70 Units under the skin every morning., Disp: 1.5 mL, Rfl: 3    Continuous Blood Gluc Sensor (FREESTYLE CONNIE SENSOR SYSTEM) Misc, , Disp: , Rfl:     Labs: Reviewed    Physical Examination:  Vital signs: There were no vitals taken for this visit. There is no height or weight on file to calculate BMI.    Assessment and Plan:    1. DM2  Since pt's last appt, he was able to go to the lab for repeat A1c - it decreased from 12.6% (9/2023) to 9.3% (12/2023). Pt goal is < 7.5%.   Of note, BG on 12/6 was significantly above goal but he was NOT fasting.  Since last visit, pt did not increase his insulin as previously instructed. I encouraged him again to ensure he reads the notes I provide on the AVS every visit. He states there is no one available to assist him w/ his medications.  Per interrogation of pt's CGM, his BG remain significantly above goal at this time. They have increased since our last visit unfortunately.  Pt has had two incidents of hypoglycemia in the early AM (12-3am) - suspect d/t his evening Novolog dose.  Pt would like most up to date Freestyle sensor as his current sensor does not have the option for low BG alerts. Sent in Rx accordingly.  Pt states he was able to obtain and start fiber supplement since last visit - diarrhea has resolved.   I am somewhat concerned about hypoglycemia for pt w/ insulin titration given his memory issues. Since he is tolerating Ozempic well w/ no issues at this time we will instead further optimize this medication vs titrating his insulin.    - Medication changes:  INCREASE Ozempic up to 2 mg subQ once weekly  Counseled pt to ensure he pre-emptively decreases portion sizes  DECREASE Novolog PM dose to 5 units in the evening. Otherwise continue 10 units BEFORE breakfast/lunch  Continue Toujeo 70 units once daily  Continue Farxiga 10 mg once daily  Continue metformin 1000 mg BID    - Lifestyle changes:  Diet: Maximize lean proteins and  veggies. Cut out/down on carbs. Avoid simple sugars.   Exercise: Increase as tolerated    Follow Up:  2 weeks    Lemuel Montanez PharmD, BCACP    CC:   Shirley Ozuna D.O.

## 2023-12-11 ENCOUNTER — TELEPHONE (OUTPATIENT)
Dept: MEDICAL GROUP | Facility: PHYSICIAN GROUP | Age: 83
End: 2023-12-11
Payer: MEDICARE

## 2023-12-11 NOTE — TELEPHONE ENCOUNTER
1. Caller Name: Manolo Pepe Swanson                          Call Back Number: [phone] (home) [phone] (work)        How would the patient prefer to be contacted with a response: Phone call OK to leave a detailed message    Lab results     Let Sharad know he is doing much better, A1c is all the way down to 9.3 which is a 3 point improvement from September, congratulated him at home and keep up the good work.  Kidney function also doing better which is reassuring.  High potassium problem has also resolved.  He will be seeing the pharmacist tomorrow to continue adjusting his diabetes regimen.  Let me know if he has any follow-up questions for us.     No questions for us.  Feeling good  Pharmacist visit went well.

## 2023-12-21 ENCOUNTER — NON-PROVIDER VISIT (OUTPATIENT)
Dept: MEDICAL GROUP | Facility: MEDICAL CENTER | Age: 83
End: 2023-12-21
Payer: MEDICARE

## 2023-12-21 DIAGNOSIS — Z79.4 TYPE 2 DIABETES MELLITUS WITH DIABETIC POLYNEUROPATHY, WITH LONG-TERM CURRENT USE OF INSULIN (HCC): ICD-10-CM

## 2023-12-21 DIAGNOSIS — E78.5 HYPERLIPIDEMIA ASSOCIATED WITH TYPE 2 DIABETES MELLITUS (HCC): ICD-10-CM

## 2023-12-21 DIAGNOSIS — E11.42 TYPE 2 DIABETES MELLITUS WITH DIABETIC POLYNEUROPATHY, WITH LONG-TERM CURRENT USE OF INSULIN (HCC): ICD-10-CM

## 2023-12-21 DIAGNOSIS — E11.69 HYPERLIPIDEMIA ASSOCIATED WITH TYPE 2 DIABETES MELLITUS (HCC): ICD-10-CM

## 2023-12-21 PROCEDURE — 99211 OFF/OP EST MAY X REQ PHY/QHP: CPT | Performed by: STUDENT IN AN ORGANIZED HEALTH CARE EDUCATION/TRAINING PROGRAM

## 2023-12-21 RX ORDER — ATORVASTATIN CALCIUM 20 MG/1
20 TABLET, FILM COATED ORAL NIGHTLY
Qty: 30 TABLET | Refills: 11 | Status: SHIPPED | OUTPATIENT
Start: 2023-12-21 | End: 2024-02-15 | Stop reason: SDUPTHER

## 2023-12-21 NOTE — PROGRESS NOTES
Patient Consult Note    TIME IN: 2:58 pm  TIME OUT: 3:28 pm    Primary care physician: Shirley Ozuna D.O.    Reason for consult: Management of Uncontrolled Type 2 Diabetes    HPI:  Manolo Puri is a 83 y.o. old patient who comes in today for evaluation of above stated problem.    Most Recent HbA1c:   Lab Results   Component Value Date/Time    HBA1C 9.3 (H) 12/06/2023 09:32 AM       Most Recent Scr:  Lab Results   Component Value Date/Time    CREATININE 1.35 12/06/2023 09:32 AM        Current Diabetes Medication Regimen  Metformin IR/ER: 1000 mg BID   GLP-1 Agent: Ozempic 2 mg subQ once weekly   SGLT-2 Inhibitor: Farxiga 10 mg once daily      Basal Insulin: Toujeo 70 units once daily   Rapid Acting Insulin: Novolog 10 units prior to breakfast/lunch, 5 units prior to dinner - has been forgetting to administer prior to meals and then skips the dose    Previous Diabetes Medications and Reason for Discontinuation  Novolin 70/30 - Dc'd by PCP to transition to basal/bolus regimen  Jardiance - Too expensive   Trulicity - Too expensive    Pt has home glucometer and proper testing technique - Yes. Pt w/ [employer] CGM.  Discussed BG Goals: FBG 80 - 130, 2hPP < 180, A1c < 7%    Pt reports blood sugars:   Avg BG  7 day: 362  14 day: 294  30 day: 268    TITR  7 day: 0%  14 day: 11%  30 day: 9%    Trends: BG spikes significantly after lunch.    Hypoglycemia: None since last visit.    Hypoglycemia awareness - Yes  Nocturnal hypoglycemia- None  Hypoglycemia:  None    Pt's treatment of Hypoglycemia - 15:15 Rule     Current Exercise - Has not been going to the gym since last visit. Takes care of his wife (has Alzheimer's) and now his son who has leukemia and requires bone marrow transplant. Hoping to get back to the gym in January.  Exercise Goal - Increase as tolerated to goal of 150 min/week.     Dietary: Eating more baked goods, pastries, sweets w/ the holidays.  Breakfast - Scrambled eggs, toast, cup of  fruit  Lunch - Mac and cheese, chili, chicken noodle soup  Dinner - Similar to lunch  Snacks - Peanuts, chips, fruit (plum, peach, watermelon, cantaloupe)   Desserts - Cookies occasionally (héctor snaps, coconut cookies)  Drinks - Zip fizz, milk, diet Dr. Pepper, not much in the way of H2O     Foot Exam:  Monofilament exam - Completed 8/2023     Preventative Management  BP regimen (ACE/ARB) - None  Statin - None  Last Retinal Scan - 8/2023. Goes to eye DrJudith Every other month  Last Microalbumin/Cr -     Latest Reference Range & Units 09/01/23 09:44   Micro Alb Creat Ratio 0 - 30 mg/g 1236 (H)   Creatinine, Urine mg/dL 30.90   Microalbumin, Urine Random mg/dL 38.2   (H): Data is abnormally high  Last A1c -   Lab Results   Component Value Date/Time    HBA1C 9.3 (H) 12/06/2023 09:32 AM          Past Medical History:  Patient Active Problem List    Diagnosis Date Noted    Mild cerebral atrophy ([facility]) 10/17/2023    DALY (acute kidney injury) ([facility]) 10/17/2023    Long-term insulin use ([facility]) 08/29/2023    Both eyes affected by mild nonproliferative diabetic retinopathy with macular edema, associated with type 2 diabetes mellitus ([facility]) 08/29/2023    Positive for macroalbuminuria 08/29/2023    Hypertension 06/27/2022    Closed fracture of one rib of right side 02/28/2022    Other atopic dermatitis 04/02/2020    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 04/02/2020    History of pericarditis 04/02/2020    History of melanoma 04/02/2020    History of diplopia 04/02/2020    History of shingles 04/02/2020    Caregiver burden 04/02/2020    Unsteady gait 10/09/2018    Type 2 diabetes mellitus with diabetic polyneuropathy, with long-term current use of insulin ([facility]) 09/03/2017    Hyperlipidemia associated with type 2 diabetes mellitus ([facility]) 09/03/2017       Past Surgical History:  Past Surgical History:   Procedure Laterality Date    OTHER      Derm removal of SCC       Allergies:  Patient has no  known allergies.    Social History:  Social History     Socioeconomic History    Marital status: Single     Spouse name: Not on file    Number of children: Not on file    Years of education: Not on file    Highest education level: Not on file   Occupational History    Not on file   Tobacco Use    Smoking status: Never    Smokeless tobacco: Never    Tobacco comments:     continued abstinence   Vaping Use    Vaping Use: Never used   Substance and Sexual Activity    Alcohol use: No    Drug use: No    Sexual activity: Not Currently     Partners: Female   Other Topics Concern    Not on file   Social History Narrative    Not on file     Social Determinants of Health     Financial Resource Strain: Not on file   Food Insecurity: Not on file   Transportation Needs: Not on file   Physical Activity: Not on file   Stress: Not on file   Social Connections: Not on file   Intimate Partner Violence: Not on file   Housing Stability: Not on file       Family History:  Family History   Problem Relation Age of Onset    Diabetes Father     Diabetes Maternal Grandfather        Medications:    Current Outpatient Medications:     Continuous Blood Gluc Sensor (FREESTYLE CONNIE 3 SENSOR) Misc, 1 Each every 14 days. Use as directed, Disp: 2 Each, Rfl: 11    Semaglutide, 2 MG/DOSE, (OZEMPIC, 2 MG/DOSE,) 8 MG/3ML Solution Pen-injector, Inject 2 mg under the skin every 7 days., Disp: 3 mL, Rfl: 11    insulin aspart (NOVOLOG) 100 UNIT/ML Solution, Inject 5-10 Units under the skin 3 times a day before meals. 10 units with breakfast and lunch, 5 units with dinner, Disp: , Rfl:     Insulin Pen Needle (PEN NEEDLES) 32G X 4 MM Misc, Use to inject insulin as directed up to 4 times daily (basal insulin and meal time insulin), Disp: 400 Each, Rfl: 11    dapagliflozin propanediol (FARXIGA) 10 MG Tab, Take 1 Tablet by mouth every day., Disp: 30 Tablet, Rfl: 11    metFORMIN ER (GLUCOPHAGE XR) 500 MG TABLET SR 24 HR, Take 2 Tablets by mouth 2 times a day.,  Disp: 400 Tablet, Rfl: 3    Insulin Glargine, 1 Unit Dial, (TOUJEO SOLOSTAR) 300 UNIT/ML Solution Pen-injector, Inject 70 Units under the skin every morning., Disp: 1.5 mL, Rfl: 3    Continuous Blood Gluc Sensor (FREESTYLE CONNIE SENSOR SYSTEM) [facility], , Disp: , Rfl:     Labs: Reviewed    Physical Examination:  Vital signs: There were no vitals taken for this visit. There is no height or weight on file to calculate BMI.    Assessment and Plan:    1. DM2  Since pt's last visit he states he was able to increase his Ozempic up to 2 mg subQ once weekly w/ no issues or SE's.  Pt was unable to obtain the Freestyle Connie 3 sensor - he is unsure why but will check w/ the pharmacy again today after our appt.  Called the pharmacy - they state the CGM is able to to filled on 12/27. Pt will p/u at this time.  Per interrogation of pt's CGM, pt's BG have increased significantly since last visit. Pt attributes to eating more sweets w/ the holidays as well as forgetting to administer his prandial insulin - encouraged him to use a phone alarm to increase compliance.  Again, I am somewhat concerned about pt's adherence to his medications given the number of DM meds and optimized doses - his BG remain significantly elevated despite this. Pt endorses compliance w/ all medications aside from Novolog at this time. Encouraged him to use phone alarm to increase adherence.  Of note, pt qualifies for statin given DM and age. He is amenable to initiation of moderate intensity statin w/ repeat lipid panel in ~ 8 weeks. Will also discuss initiation of ACEi/ARB at subsequent f/u visits.    - Medication changes:  INCREASE Touejo up to 80 units QHS  Continue Ozempic 2 mg subQ once weekly  Continue metformin 1000 mg BID  Continue Farxiga 10 mg once daily  Continue Novolog 10 units w/ breakfast/lunch, 5 units w/ dinner    - Lifestyle changes:  Diet: Maximize lean proteins and veggies. Cut out/down on carbs. Avoid simple sugars.   Exercise: Increase as  tolerated    Follow Up:  ~ 2.5 weeks    Lemuel Montanez, Srinivasan, BCACP    CC:   Shirley Ozuna D.O.

## 2024-01-08 DIAGNOSIS — E11.29 DIABETES MELLITUS WITH MICROALBUMINURIA (HCC): ICD-10-CM

## 2024-01-08 DIAGNOSIS — E11.3213 BOTH EYES AFFECTED BY MILD NONPROLIFERATIVE DIABETIC RETINOPATHY WITH MACULAR EDEMA, ASSOCIATED WITH TYPE 2 DIABETES MELLITUS (HCC): ICD-10-CM

## 2024-01-08 DIAGNOSIS — Z79.4 LONG-TERM INSULIN USE (HCC): ICD-10-CM

## 2024-01-08 DIAGNOSIS — E11.42 TYPE 2 DIABETES MELLITUS WITH DIABETIC POLYNEUROPATHY, WITH LONG-TERM CURRENT USE OF INSULIN (HCC): ICD-10-CM

## 2024-01-08 DIAGNOSIS — R80.9 DIABETES MELLITUS WITH MICROALBUMINURIA (HCC): ICD-10-CM

## 2024-01-08 DIAGNOSIS — Z79.4 TYPE 2 DIABETES MELLITUS WITH DIABETIC POLYNEUROPATHY, WITH LONG-TERM CURRENT USE OF INSULIN (HCC): ICD-10-CM

## 2024-01-08 RX ORDER — INSULIN GLARGINE 300 U/ML
80 INJECTION, SOLUTION SUBCUTANEOUS EVERY MORNING
Qty: 6 ML | Refills: 3 | Status: SHIPPED | OUTPATIENT
Start: 2024-01-08 | End: 2024-03-18

## 2024-02-15 ENCOUNTER — OFFICE VISIT (OUTPATIENT)
Dept: MEDICAL GROUP | Facility: PHYSICIAN GROUP | Age: 84
End: 2024-02-15
Payer: MEDICARE

## 2024-02-15 ENCOUNTER — TELEPHONE (OUTPATIENT)
Dept: VASCULAR LAB | Facility: MEDICAL CENTER | Age: 84
End: 2024-02-15

## 2024-02-15 VITALS
RESPIRATION RATE: 16 BRPM | SYSTOLIC BLOOD PRESSURE: 132 MMHG | OXYGEN SATURATION: 93 % | DIASTOLIC BLOOD PRESSURE: 60 MMHG | HEIGHT: 72 IN | HEART RATE: 94 BPM | BODY MASS INDEX: 31.17 KG/M2 | WEIGHT: 230.1 LBS | TEMPERATURE: 97 F

## 2024-02-15 DIAGNOSIS — E11.3213 BOTH EYES AFFECTED BY MILD NONPROLIFERATIVE DIABETIC RETINOPATHY WITH MACULAR EDEMA, ASSOCIATED WITH TYPE 2 DIABETES MELLITUS (HCC): ICD-10-CM

## 2024-02-15 DIAGNOSIS — G31.9 MILD CEREBRAL ATROPHY (HCC): ICD-10-CM

## 2024-02-15 DIAGNOSIS — E11.42 TYPE 2 DIABETES MELLITUS WITH DIABETIC POLYNEUROPATHY, WITH LONG-TERM CURRENT USE OF INSULIN (HCC): ICD-10-CM

## 2024-02-15 DIAGNOSIS — E11.69 HYPERLIPIDEMIA ASSOCIATED WITH TYPE 2 DIABETES MELLITUS (HCC): ICD-10-CM

## 2024-02-15 DIAGNOSIS — E78.5 HYPERLIPIDEMIA ASSOCIATED WITH TYPE 2 DIABETES MELLITUS (HCC): ICD-10-CM

## 2024-02-15 DIAGNOSIS — Z79.4 TYPE 2 DIABETES MELLITUS WITH DIABETIC POLYNEUROPATHY, WITH LONG-TERM CURRENT USE OF INSULIN (HCC): ICD-10-CM

## 2024-02-15 DIAGNOSIS — Z79.4 LONG-TERM INSULIN USE (HCC): ICD-10-CM

## 2024-02-15 DIAGNOSIS — N18.31 STAGE 3A CHRONIC KIDNEY DISEASE (CKD): ICD-10-CM

## 2024-02-15 LAB
HBA1C MFR BLD: 9.6 % (ref ?–5.8)
POCT INT CON NEG: NEGATIVE
POCT INT CON POS: POSITIVE

## 2024-02-15 PROCEDURE — 3075F SYST BP GE 130 - 139MM HG: CPT | Performed by: INTERNAL MEDICINE

## 2024-02-15 PROCEDURE — 99214 OFFICE O/P EST MOD 30 MIN: CPT | Performed by: INTERNAL MEDICINE

## 2024-02-15 PROCEDURE — 3078F DIAST BP <80 MM HG: CPT | Performed by: INTERNAL MEDICINE

## 2024-02-15 RX ORDER — ACYCLOVIR 400 MG/1
1 TABLET ORAL
Qty: 9 EACH | Refills: 3 | Status: SHIPPED | OUTPATIENT
Start: 2024-02-15 | End: 2024-03-18

## 2024-02-15 RX ORDER — ATORVASTATIN CALCIUM 20 MG/1
20 TABLET, FILM COATED ORAL NIGHTLY
Qty: 100 TABLET | Refills: 3 | Status: SHIPPED | OUTPATIENT
Start: 2024-02-15 | End: 2024-03-18

## 2024-02-15 RX ORDER — SEMAGLUTIDE 2.68 MG/ML
2 INJECTION, SOLUTION SUBCUTANEOUS
Qty: 3 ML | Refills: 0 | Status: SHIPPED | OUTPATIENT
Start: 2024-02-15

## 2024-02-15 ASSESSMENT — PATIENT HEALTH QUESTIONNAIRE - PHQ9: CLINICAL INTERPRETATION OF PHQ2 SCORE: 0

## 2024-02-15 ASSESSMENT — FIBROSIS 4 INDEX: FIB4 SCORE: 1.37

## 2024-02-15 NOTE — PROGRESS NOTES
Subjective:   Chief Complaint/History of Present Illness:  Manolo Puri is a 83 y.o. male established patient who presents today to discuss medical problems as listed below. Manolo is unaccompanied for today's visit.    Problem   Mild Cerebral Atrophy (Hcc)    CT head imaging with mild cerebral atrophy, incidental finding, no clinical correlate.     Stage 3a Chronic Kidney Disease (Ckd) ([facility])    GFR 49 on most recent lab draw, previously 70-80 range. Also with macroalbuminuria due to longstanding uncontrolled DM2. Did not go into lab for recheck yet. Coexisting hyperkalemia at 6.1.     Long-Term Insulin Use ([facility])    Been on premix insulin 70/30 50 units twice daily and A1c is above goal.  Reviewed chantelle 2 CGM readings.  Recommend he transition onto basal/bolus approach.     Both Eyes Affected By Mild Nonproliferative Diabetic Retinopathy With Macular Edema, Associated With Type 2 Diabetes Mellitus ([facility])    He is in a research study with Dr. Alfonso of ophthalmology due to diabetic retinopathy.  He follows up every 2 months.  I do not have any recent clinic notes.     Type 2 Diabetes Mellitus With Diabetic Polyneuropathy, With Long-Term Current Use of Insulin ([facility])     Latest Reference Range & Units 02/15/24 10:40   Glycohemoglobin 5.8 % 9.6 !     He was diagnosed around age 62 with type 2 diabetes, not sure how long he had prediabetes beforehand.  He was initially taking metfomin and has been on 70/30 insulin since age 74, currently using 52 U twice daily. A1c has ranged 8.7-14 since 2018.     Agreeable to changing regimen due to significant worsening of A1c and kidney function.    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 Now with CKD and macroalbuminuria.    Current regimen: Toujeo 80 units, Novolog 10 units TID AC, metformin 1000 mg BID, Farxiga 10 mg daily, and Ozempic 2 mg weekly     Hyperlipidemia Associated With Type 2 Diabetes Mellitus ([facility])     Latest Reference Range & Units 09/01/23  09:45   Cholesterol,Tot 100 - 199 mg/dL 205 (H)   Triglycerides 0 - 149 mg/dL 194 (H)   HDL >=40 mg/dL 38 !   LDL <100 mg/dL 128 (H)     He has type 2 diabetes and qualifies for statin therapy. He would be open to initiating on atorvastatin, thinks he took this on the past but stopped it for some unknown reason.          Current Medications:  Current Outpatient Medications Ordered in Epic   Medication Sig Dispense Refill    Continuous Blood Gluc Sensor (DEXCOM G7 SENSOR) Misc 1 Each every 10 days. 9 Each 3    atorvastatin (LIPITOR) 20 MG Tab Take 1 Tablet by mouth every evening. 100 Tablet 3    Semaglutide, 2 MG/DOSE, (OZEMPIC, 2 MG/DOSE,) 8 MG/3ML Solution Pen-injector Inject 2 mg under the skin every 7 days. 3 mL 0    Insulin Glargine, 1 Unit Dial, (TOUJEO SOLOSTAR) 300 UNIT/ML Solution Pen-injector Inject 80 Units under the skin every morning. 6 mL 3    insulin aspart (NOVOLOG) 100 UNIT/ML Solution Inject 5-10 Units under the skin 3 times a day before meals. 10 units with breakfast and lunch, 5 units with dinner      Insulin Pen Needle (PEN NEEDLES) 32G X 4 MM Misc Use to inject insulin as directed up to 4 times daily (basal insulin and meal time insulin) 400 Each 11    dapagliflozin propanediol (FARXIGA) 10 MG Tab Take 1 Tablet by mouth every day. 30 Tablet 11    metFORMIN ER (GLUCOPHAGE XR) 500 MG TABLET SR 24 HR Take 2 Tablets by mouth 2 times a day. 400 Tablet 3     No current Epic-ordered facility-administered medications on file.          Objective:   Physical Exam:    Vitals: /60 (BP Location: Left arm, Patient Position: Sitting, BP Cuff Size: Adult)   Pulse 94   Temp 36.1 °C (97 °F) (Temporal)   Resp 16   Ht 1.829 m (6')   Wt 104 kg (230 lb 1.6 oz)   SpO2 93%    BMI: Body mass index is 31.21 kg/m².  Physical Exam  Constitutional:       General: He is not in acute distress.     Appearance: Normal appearance. He is not ill-appearing.   HENT:      Right Ear: External ear normal.      Left Ear:  External ear normal.   Eyes:      General: No scleral icterus.     Conjunctiva/sclera: Conjunctivae normal.   Cardiovascular:      Rate and Rhythm: Normal rate and regular rhythm.      Pulses: Normal pulses.   Pulmonary:      Effort: Pulmonary effort is normal. No respiratory distress.      Breath sounds: Normal breath sounds. No wheezing or rhonchi.   Abdominal:      General: Bowel sounds are normal. There is distension.      Palpations: Abdomen is soft.      Tenderness: There is no abdominal tenderness.   Musculoskeletal:      Right lower leg: No edema.      Left lower leg: No edema.   Skin:     General: Skin is warm and dry.      Findings: No rash.   Neurological:      Gait: Gait normal.   Psychiatric:         Mood and Affect: Mood normal.         Behavior: Behavior normal.         Thought Content: Thought content normal.         Judgment: Judgment normal.          Assessment and Plan:   Manolo is a 83 y.o. male with the following:  Problem List Items Addressed This Visit       Both eyes affected by mild nonproliferative diabetic retinopathy with macular edema, associated with type 2 diabetes mellitus (HCC)     Chronic ongoing problem, continue close follow-up with ophthalmology, he is currently engaged in the study due to his diabetic retinopathy and follows up every 2 months.         Relevant Medications    Semaglutide, 2 MG/DOSE, (OZEMPIC, 2 MG/DOSE,) 8 MG/3ML Solution Pen-injector    Hyperlipidemia associated with type 2 diabetes mellitus (HCC)     Chronic decompensated problem, took himself off his statin medicine, he did not think he needed it.  Discussed with him importance of compliance with statin therapy with his history of diabetes due to risk of future complications.  Resent to the pharmacy and encouraged him to start taking it.         Relevant Medications    atorvastatin (LIPITOR) 20 MG Tab    Semaglutide, 2 MG/DOSE, (OZEMPIC, 2 MG/DOSE,) 8 MG/3ML Solution Pen-injector    Other Relevant Orders     Lipid Profile    Long-term insulin use (HCC)     Chronic ongoing problem, now on basal/bolus approach and following with David Shore with the pharmacotherapy group.  No known hypoglycemia, still above goal, going to be transitioning from chantelle to Dexcom.         Relevant Medications    Continuous Blood Gluc Sensor (DEXCOM G7 SENSOR) Misc    Mild cerebral atrophy (HCC)     Chronic stable problem, no signs of dementing process, likely age related, continue with observation.         Stage 3a chronic kidney disease (CKD) (HCC)     Chronic ongoing problem, likely due to uncontrolled diabetes, working to get under better control now, no ACEI due to history of hyperkalemia at this time.  Recheck before next follow-up.         Type 2 diabetes mellitus with diabetic polyneuropathy, with long-term current use of insulin (HCC)     Chronic ongoing problem, A1c generally trending down, 13 last fall and down to 9.6, missed a few follow-ups with the diabetes pharmacist due to some health issues with his son, will notify David Shore to get him back in so he can continue adjusting his regiment.  Will switch him the Dexcom because of insurance preferences.  Will call in a few weeks of Ozempic samples to double our pharmacy to help offset cost.  Continue metformin 1000 mg twice daily, Toujeo 80 units daily, Farxiga 10 mg daily, and NovoLog 5 to 10 units with meals continue Ozempic 2 mg weekly.  Get lab work before follow-up in 3 months.  Advised him to get back on statin therapy.         Relevant Medications    Continuous Blood Gluc Sensor (DEXCOM G7 SENSOR) Misc    atorvastatin (LIPITOR) 20 MG Tab    Semaglutide, 2 MG/DOSE, (OZEMPIC, 2 MG/DOSE,) 8 MG/3ML Solution Pen-injector    Other Relevant Orders    POCT  A1C (Completed)    FREE THYROXINE    TSH    VITAMIN B12    VITAMIN D,25 HYDROXY (DEFICIENCY)    MICROALBUMIN CREAT RATIO URINE    Comp Metabolic Panel    CBC WITH DIFFERENTIAL          RTC: Return in about 3 months (around  5/15/2024).    I spent a total of 34 minutes with record review, exam, communication with the patient, communication with other providers, and documentation of this encounter.    PLEASE NOTE: This dictation was created using voice recognition software. I have made every reasonable attempt to correct obvious errors, but I expect that there are errors of grammar and possibly content that I did not discover before finalizing the note.      Shirley Ozuna, DO  Geriatric and Internal Medicine  [facility]

## 2024-02-15 NOTE — LETTER
[address] [facility] [facility] [address]        Dear Manolo Puri ,    We have been unsuccessful in our attempts to contact you regarding your Diabetes Clinical Pharmacist follow up.  Please contact us if you would like to schedule an appointment for help managing your blood sugar.    Please contact our clinic so we may assist you.  We are open Monday-Friday 8 am until 5 pm.  You may reach our Service at [phone].        Sincerely,    Boubacar Matos, IleanaD, [facility]  Clinic Supervisor  [facility]  Outpatient Anticoagulation Service

## 2024-02-15 NOTE — ASSESSMENT & PLAN NOTE
Chronic stable problem, no signs of dementing process, likely age related, continue with observation.

## 2024-02-15 NOTE — ASSESSMENT & PLAN NOTE
Chronic decompensated problem, took himself off his statin medicine, he did not think he needed it.  Discussed with him importance of compliance with statin therapy with his history of diabetes due to risk of future complications.  Resent to the pharmacy and encouraged him to start taking it.

## 2024-02-15 NOTE — ASSESSMENT & PLAN NOTE
Chronic ongoing problem, now on basal/bolus approach and following with David Shore with the pharmacotherapy group.  No known hypoglycemia, still above goal, going to be transitioning from chantelle to Dexcom.

## 2024-02-15 NOTE — TELEPHONE ENCOUNTER
Called pt to r/s missed DM f/u appt - no answer. LVM requesting he c/b to r/s ASAP.    Sent letter to pt as well.    Lemuel Montanez, IleanaD, BCACP

## 2024-02-15 NOTE — ASSESSMENT & PLAN NOTE
Chronic ongoing problem, likely due to uncontrolled diabetes, working to get under better control now, no ACEI due to history of hyperkalemia at this time.  Recheck before next follow-up.

## 2024-02-15 NOTE — ASSESSMENT & PLAN NOTE
Chronic ongoing problem, A1c generally trending down, 13 last fall and down to 9.6, missed a few follow-ups with the diabetes pharmacist due to some health issues with his son, will notify David Shore to get him back in so he can continue adjusting his regiment.  Will switch him the Dexcom because of insurance preferences.  Will call in a few weeks of Ozempic samples to double our pharmacy to help offset cost.  Continue metformin 1000 mg twice daily, Toujeo 80 units daily, Farxiga 10 mg daily, and NovoLog 5 to 10 units with meals continue Ozempic 2 mg weekly.  Get lab work before follow-up in 3 months.  Advised him to get back on statin therapy.

## 2024-02-15 NOTE — ASSESSMENT & PLAN NOTE
Chronic ongoing problem, continue close follow-up with ophthalmology, he is currently engaged in the study due to his diabetic retinopathy and follows up every 2 months.

## 2024-03-04 ENCOUNTER — OFFICE VISIT (OUTPATIENT)
Dept: MEDICAL GROUP | Facility: MEDICAL CENTER | Age: 84
End: 2024-03-04
Payer: MEDICARE

## 2024-03-04 VITALS — SYSTOLIC BLOOD PRESSURE: 127 MMHG | HEART RATE: 97 BPM | DIASTOLIC BLOOD PRESSURE: 76 MMHG

## 2024-03-04 DIAGNOSIS — E11.9 TYPE 2 DIABETES MELLITUS WITHOUT COMPLICATION, WITHOUT LONG-TERM CURRENT USE OF INSULIN (HCC): ICD-10-CM

## 2024-03-04 PROCEDURE — 99211 OFF/OP EST MAY X REQ PHY/QHP: CPT

## 2024-03-04 PROCEDURE — 3078F DIAST BP <80 MM HG: CPT

## 2024-03-04 PROCEDURE — 3074F SYST BP LT 130 MM HG: CPT

## 2024-03-04 RX ORDER — INSULIN ASPART 100 [IU]/ML
10-15 INJECTION, SOLUTION INTRAVENOUS; SUBCUTANEOUS
Qty: 27 ML | Refills: 3 | Status: SHIPPED | OUTPATIENT
Start: 2024-03-04 | End: 2024-03-18

## 2024-03-04 NOTE — PROGRESS NOTES
Patient Consult Note    TIME IN: 10:19 am  TIME OUT: 11:04 pm    Primary care physician: Shirley Ozuna D.O.    Reason for consult: Management of Uncontrolled Type 2 Diabetes    HPI:  Manolo Puri is a 83 y.o. old patient who comes in today for evaluation of above stated problem.    Most Recent HbA1c:   Lab Results   Component Value Date/Time    HBA1C 9.6 (A) 02/15/2024 10:40 AM       Most Recent Scr:  Lab Results   Component Value Date/Time    CREATININE 1.35 12/06/2023 09:32 AM        Current Diabetes Medication Regimen  Metformin IR/ER: 1000 mg BID   GLP-1 Agent: Ozempic 2 mg subQ once weekly   SGLT-2 Inhibitor: Farxiga 10 mg once daily      Basal Insulin: Toujeo 72 units once daily   Intermediate Acting Insulin: Novolog 70/30 30 units prior to breakfast/dinner     Previous Diabetes Medications and Reason for Discontinuation    Jardiance - Too expensive   Trulicity - Too expensive    Pt has home glucometer and proper testing technique - Yes. Pt w/ dexcom, just changed  Discussed BG Goals: FBG 80 - 130, 2hPP < 180, A1c < 7%    Pt reports blood sugars:   Avg BG  3 day: 162      Trends: BG spikes significantly after meals    Hypoglycemia: None since last visit.    Hypoglycemia awareness - Yes  Nocturnal hypoglycemia- None  Hypoglycemia:  None    Pt's treatment of Hypoglycemia - 15:15 Rule     Current Exercise - Has not been going to the gym since last visit. Takes care of his wife (has Alzheimer's) and now his son who has leukemia and requires bone marrow transplant. Hoping to get back to the gym in January.  Exercise Goal - Increase as tolerated to goal of 150 min/week.     Dietary: Diet unchanged - unable to discuss at length today  Previously:  Breakfast - Scrambled eggs, toast, cup of fruit  Lunch - Mac and cheese, chili, chicken noodle soup  Dinner - Similar to lunch  Snacks - Peanuts, chips, fruit (plum, peach, watermelon, cantaloupe)   Desserts - Cookies occasionally (héctor snaps, coconut  cookies)  Drinks - Zip fizz, milk, diet Dr. Pepper, not much in the way of H2O     Foot Exam:  Monofilament exam - Completed 8/2023     Preventative Management  BP regimen (ACE/ARB) - None  Statin - atorvastatin 20 mg daily  Last Retinal Scan - 8/2023. Goes to eye DrJudith Every other month  Last Microalbumin/Cr -     Latest Reference Range & Units 09/01/23 09:44   Micro Alb Creat Ratio 0 - 30 mg/g 1236 (H)   Creatinine, Urine mg/dL 30.90   Microalbumin, Urine Random mg/dL 38.2   (H): Data is abnormally high  Last A1c -   Lab Results   Component Value Date/Time    HBA1C 9.6 (A) 02/15/2024 10:40 AM          Past Medical History:  Patient Active Problem List    Diagnosis Date Noted    Mild cerebral atrophy ([facility]) 10/17/2023    Stage 3a chronic kidney disease (CKD) ([facility]) 10/17/2023    Long-term insulin use ([facility]) 08/29/2023    Both eyes affected by mild nonproliferative diabetic retinopathy with macular edema, associated with type 2 diabetes mellitus ([facility]) 08/29/2023    Positive for macroalbuminuria 08/29/2023    Hypertension 06/27/2022    Closed fracture of one rib of right side 02/28/2022    Other atopic dermatitis 04/02/2020    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 04/02/2020    History of pericarditis 04/02/2020    History of melanoma 04/02/2020    History of diplopia 04/02/2020    History of shingles 04/02/2020    Caregiver burden 04/02/2020    Unsteady gait 10/09/2018    Type 2 diabetes mellitus with diabetic polyneuropathy, with long-term current use of insulin ([facility]) 09/03/2017    Hyperlipidemia associated with type 2 diabetes mellitus ([facility]) 09/03/2017       Past Surgical History:  Past Surgical History:   Procedure Laterality Date    OTHER      Derm removal of SCC       Allergies:  Patient has no known allergies.    Social History:  Social History     Socioeconomic History    Marital status: Single     Spouse name: Not on file    Number of children: Not on file    Years of  education: Not on file    Highest education level: Not on file   Occupational History    Not on file   Tobacco Use    Smoking status: Never    Smokeless tobacco: Never    Tobacco comments:     continued abstinence   Vaping Use    Vaping Use: Never used   Substance and Sexual Activity    Alcohol use: No    Drug use: No    Sexual activity: Not Currently     Partners: Female   Other Topics Concern    Not on file   Social History Narrative    Not on file     Social Determinants of Health     Financial Resource Strain: Not on file   Food Insecurity: Not on file   Transportation Needs: Not on file   Physical Activity: Not on file   Stress: Not on file   Social Connections: Not on file   Intimate Partner Violence: Not on file   Housing Stability: Not on file       Family History:  Family History   Problem Relation Age of Onset    Diabetes Father     Diabetes Maternal Grandfather        Medications:    Current Outpatient Medications:     insulin aspart (NOVOLOG FLEXPEN) 100 UNIT/ML injection PEN, Inject 10-15 Units under the skin 3 times a day before meals., Disp: 27 mL, Rfl: 3    Continuous Blood Gluc Sensor (DEXCOM G7 SENSOR) Misc, 1 Each every 10 days., Disp: 9 Each, Rfl: 3    atorvastatin (LIPITOR) 20 MG Tab, Take 1 Tablet by mouth every evening., Disp: 100 Tablet, Rfl: 3    Semaglutide, 2 MG/DOSE, (OZEMPIC, 2 MG/DOSE,) 8 MG/3ML Solution Pen-injector, Inject 2 mg under the skin every 7 days., Disp: 3 mL, Rfl: 0    Insulin Glargine, 1 Unit Dial, (TOUJEO SOLOSTAR) 300 UNIT/ML Solution Pen-injector, Inject 80 Units under the skin every morning., Disp: 6 mL, Rfl: 3    Insulin Pen Needle (PEN NEEDLES) 32G X 4 MM Misc, Use to inject insulin as directed up to 4 times daily (basal insulin and meal time insulin), Disp: 400 Each, Rfl: 11    dapagliflozin propanediol (FARXIGA) 10 MG Tab, Take 1 Tablet by mouth every day., Disp: 30 Tablet, Rfl: 11    metFORMIN ER (GLUCOPHAGE XR) 500 MG TABLET SR 24 HR, Take 2 Tablets by mouth 2  times a day., Disp: 400 Tablet, Rfl: 3    Labs: Reviewed    Physical Examination:  Vital signs: /76   Pulse 97  There is no height or weight on file to calculate BMI.    Assessment and Plan:    1. DM2  Patient states he forgets to take medications about half of the time - states he will have his caregiver help him with medication on a daily basis.   Pt states he has still been doing the novolog 70/30 and buying it over the counter at [facility] - discussed that basal + bolus may provide better coverage for patient based on blood sugars.   Patient has changed to Dexcom reader - time was spent during visit downloading dexcom clarity leslie so that future BG details can be seen. Unable to fully troubleshoot with time allotted for patient.     - Medication changes:  Continue Toujeo up to 72 units QHS  Continue Ozempic 2 mg subQ once weekly  Continue metformin 1000 mg BID  Continue Farxiga 10 mg once daily  Change Novolog 70/30 - to Novolog aspart 10 units w/ breakfast/lunch, 5 units w/ dinner    - Lifestyle changes:  Diet: Maximize lean proteins and veggies. Cut out/down on carbs. Avoid simple sugars.   Exercise: Increase as tolerated    Follow Up:  1 weeks    An Schmitt PharmD    CC:   Shirley Ozuna D.O.

## 2024-03-14 ENCOUNTER — TELEPHONE (OUTPATIENT)
Dept: MEDICAL GROUP | Facility: MEDICAL CENTER | Age: 84
End: 2024-03-14

## 2024-03-14 NOTE — TELEPHONE ENCOUNTER
Called patient - missed diabetes appt today. Insulin was changed at last visit. States he was able to start new insulin aspart - has not had any low BG. He will try again to download the dexcom clarity leslie so that blood sugar results can be reviewed. Rescheduled patient for soonest available in 2 weeks.     An Schmitt, IleanaD

## 2024-03-18 ENCOUNTER — APPOINTMENT (OUTPATIENT)
Dept: RADIOLOGY | Facility: MEDICAL CENTER | Age: 84
End: 2024-03-18
Attending: EMERGENCY MEDICINE
Payer: MEDICARE

## 2024-03-18 ENCOUNTER — HOSPITAL ENCOUNTER (INPATIENT)
Facility: MEDICAL CENTER | Age: 84
LOS: 6 days | End: 2024-03-24
Attending: EMERGENCY MEDICINE | Admitting: SURGERY
Payer: MEDICARE

## 2024-03-18 DIAGNOSIS — A41.9 SEPSIS, DUE TO UNSPECIFIED ORGANISM, UNSPECIFIED WHETHER ACUTE ORGAN DYSFUNCTION PRESENT (HCC): ICD-10-CM

## 2024-03-18 DIAGNOSIS — K85.10 GALLSTONE PANCREATITIS: ICD-10-CM

## 2024-03-18 DIAGNOSIS — K80.50 CHOLEDOCHOLITHIASIS: ICD-10-CM

## 2024-03-18 DIAGNOSIS — K81.0 ACUTE CHOLECYSTITIS: ICD-10-CM

## 2024-03-18 PROBLEM — R17 TOTAL BILIRUBIN, ELEVATED: Status: ACTIVE | Noted: 2024-03-18

## 2024-03-18 LAB
ALBUMIN SERPL BCP-MCNC: 3.5 G/DL (ref 3.2–4.9)
ALBUMIN/GLOB SERPL: 0.9 G/DL
ALP SERPL-CCNC: 276 U/L (ref 30–99)
ALT SERPL-CCNC: 158 U/L (ref 2–50)
ANION GAP SERPL CALC-SCNC: 17 MMOL/L (ref 7–16)
APPEARANCE UR: CLEAR
AST SERPL-CCNC: 79 U/L (ref 12–45)
BACTERIA #/AREA URNS HPF: NEGATIVE /HPF
BASOPHILS # BLD AUTO: 0.5 % (ref 0–1.8)
BASOPHILS # BLD: 0.09 K/UL (ref 0–0.12)
BILIRUB SERPL-MCNC: 5.1 MG/DL (ref 0.1–1.5)
BILIRUB UR QL STRIP.AUTO: NEGATIVE
BUN SERPL-MCNC: 43 MG/DL (ref 8–22)
CALCIUM ALBUM COR SERPL-MCNC: 9.7 MG/DL (ref 8.5–10.5)
CALCIUM SERPL-MCNC: 9.3 MG/DL (ref 8.5–10.5)
CHLORIDE SERPL-SCNC: 97 MMOL/L (ref 96–112)
CO2 SERPL-SCNC: 21 MMOL/L (ref 20–33)
COLOR UR: ABNORMAL
CREAT SERPL-MCNC: 1.52 MG/DL (ref 0.5–1.4)
EKG IMPRESSION: NORMAL
EOSINOPHIL # BLD AUTO: 0.05 K/UL (ref 0–0.51)
EOSINOPHIL NFR BLD: 0.3 % (ref 0–6.9)
EPI CELLS #/AREA URNS HPF: NEGATIVE /HPF
ERYTHROCYTE [DISTWIDTH] IN BLOOD BY AUTOMATED COUNT: 41.9 FL (ref 35.9–50)
GFR SERPLBLD CREATININE-BSD FMLA CKD-EPI: 46 ML/MIN/1.73 M 2
GLOBULIN SER CALC-MCNC: 4 G/DL (ref 1.9–3.5)
GLUCOSE BLD STRIP.AUTO-MCNC: 118 MG/DL (ref 65–99)
GLUCOSE BLD STRIP.AUTO-MCNC: 124 MG/DL (ref 65–99)
GLUCOSE BLD STRIP.AUTO-MCNC: 216 MG/DL (ref 65–99)
GLUCOSE SERPL-MCNC: 260 MG/DL (ref 65–99)
GLUCOSE UR STRIP.AUTO-MCNC: >=1000 MG/DL
HCT VFR BLD AUTO: 50.1 % (ref 42–52)
HGB BLD-MCNC: 16.8 G/DL (ref 14–18)
HYALINE CASTS #/AREA URNS LPF: ABNORMAL /LPF
IMM GRANULOCYTES # BLD AUTO: 0.3 K/UL (ref 0–0.11)
IMM GRANULOCYTES NFR BLD AUTO: 1.6 % (ref 0–0.9)
KETONES UR STRIP.AUTO-MCNC: 15 MG/DL
LACTATE SERPL-SCNC: 1.1 MMOL/L (ref 0.5–2)
LACTATE SERPL-SCNC: 1.4 MMOL/L (ref 0.5–2)
LACTATE SERPL-SCNC: 1.9 MMOL/L (ref 0.5–2)
LEUKOCYTE ESTERASE UR QL STRIP.AUTO: NEGATIVE
LIPASE SERPL-CCNC: 252 U/L (ref 11–82)
LYMPHOCYTES # BLD AUTO: 0.77 K/UL (ref 1–4.8)
LYMPHOCYTES NFR BLD: 4 % (ref 22–41)
MCH RBC QN AUTO: 27.7 PG (ref 27–33)
MCHC RBC AUTO-ENTMCNC: 33.5 G/DL (ref 32.3–36.5)
MCV RBC AUTO: 82.5 FL (ref 81.4–97.8)
MICRO URNS: ABNORMAL
MONOCYTES # BLD AUTO: 2.06 K/UL (ref 0–0.85)
MONOCYTES NFR BLD AUTO: 10.8 % (ref 0–13.4)
NEUTROPHILS # BLD AUTO: 15.79 K/UL (ref 1.82–7.42)
NEUTROPHILS NFR BLD: 82.8 % (ref 44–72)
NITRITE UR QL STRIP.AUTO: NEGATIVE
NRBC # BLD AUTO: 0 K/UL
NRBC BLD-RTO: 0 /100 WBC (ref 0–0.2)
PH UR STRIP.AUTO: 5 [PH] (ref 5–8)
PLATELET # BLD AUTO: 281 K/UL (ref 164–446)
PMV BLD AUTO: 10.5 FL (ref 9–12.9)
POTASSIUM SERPL-SCNC: 4.4 MMOL/L (ref 3.6–5.5)
PROT SERPL-MCNC: 7.5 G/DL (ref 6–8.2)
PROT UR QL STRIP: 100 MG/DL
RBC # BLD AUTO: 6.07 M/UL (ref 4.7–6.1)
RBC # URNS HPF: ABNORMAL /HPF
RBC UR QL AUTO: ABNORMAL
SODIUM SERPL-SCNC: 135 MMOL/L (ref 135–145)
SP GR UR STRIP.AUTO: 1.02
UROBILINOGEN UR STRIP.AUTO-MCNC: 1 MG/DL
WBC # BLD AUTO: 19.1 K/UL (ref 4.8–10.8)
WBC #/AREA URNS HPF: ABNORMAL /HPF

## 2024-03-18 PROCEDURE — 700117 HCHG RX CONTRAST REV CODE 255: Performed by: EMERGENCY MEDICINE

## 2024-03-18 PROCEDURE — 96375 TX/PRO/DX INJ NEW DRUG ADDON: CPT

## 2024-03-18 PROCEDURE — 83690 ASSAY OF LIPASE: CPT

## 2024-03-18 PROCEDURE — 700111 HCHG RX REV CODE 636 W/ 250 OVERRIDE (IP): Mod: JZ | Performed by: SURGERY

## 2024-03-18 PROCEDURE — 36415 COLL VENOUS BLD VENIPUNCTURE: CPT

## 2024-03-18 PROCEDURE — 87040 BLOOD CULTURE FOR BACTERIA: CPT | Mod: 91

## 2024-03-18 PROCEDURE — 71045 X-RAY EXAM CHEST 1 VIEW: CPT

## 2024-03-18 PROCEDURE — 96372 THER/PROPH/DIAG INJ SC/IM: CPT

## 2024-03-18 PROCEDURE — 83605 ASSAY OF LACTIC ACID: CPT

## 2024-03-18 PROCEDURE — 76705 ECHO EXAM OF ABDOMEN: CPT

## 2024-03-18 PROCEDURE — 96365 THER/PROPH/DIAG IV INF INIT: CPT

## 2024-03-18 PROCEDURE — 82962 GLUCOSE BLOOD TEST: CPT

## 2024-03-18 PROCEDURE — 700111 HCHG RX REV CODE 636 W/ 250 OVERRIDE (IP): Mod: JZ | Performed by: EMERGENCY MEDICINE

## 2024-03-18 PROCEDURE — 700105 HCHG RX REV CODE 258: Performed by: EMERGENCY MEDICINE

## 2024-03-18 PROCEDURE — 81001 URINALYSIS AUTO W/SCOPE: CPT

## 2024-03-18 PROCEDURE — 770001 HCHG ROOM/CARE - MED/SURG/GYN PRIV*

## 2024-03-18 PROCEDURE — 99222 1ST HOSP IP/OBS MODERATE 55: CPT | Mod: AI | Performed by: SURGERY

## 2024-03-18 PROCEDURE — 96366 THER/PROPH/DIAG IV INF ADDON: CPT

## 2024-03-18 PROCEDURE — 74177 CT ABD & PELVIS W/CONTRAST: CPT

## 2024-03-18 PROCEDURE — 700102 HCHG RX REV CODE 250 W/ 637 OVERRIDE(OP): Performed by: SURGERY

## 2024-03-18 PROCEDURE — A9270 NON-COVERED ITEM OR SERVICE: HCPCS | Performed by: SURGERY

## 2024-03-18 PROCEDURE — 99222 1ST HOSP IP/OBS MODERATE 55: CPT | Performed by: INTERNAL MEDICINE

## 2024-03-18 PROCEDURE — 85025 COMPLETE CBC W/AUTO DIFF WBC: CPT

## 2024-03-18 PROCEDURE — 80053 COMPREHEN METABOLIC PANEL: CPT

## 2024-03-18 PROCEDURE — 99285 EMERGENCY DEPT VISIT HI MDM: CPT

## 2024-03-18 PROCEDURE — 93005 ELECTROCARDIOGRAM TRACING: CPT | Performed by: EMERGENCY MEDICINE

## 2024-03-18 PROCEDURE — 700105 HCHG RX REV CODE 258: Performed by: SURGERY

## 2024-03-18 RX ORDER — AMOXICILLIN 250 MG
1 CAPSULE ORAL
Status: DISCONTINUED | OUTPATIENT
Start: 2024-03-18 | End: 2024-03-24 | Stop reason: HOSPADM

## 2024-03-18 RX ORDER — SODIUM CHLORIDE, SODIUM LACTATE, POTASSIUM CHLORIDE, AND CALCIUM CHLORIDE .6; .31; .03; .02 G/100ML; G/100ML; G/100ML; G/100ML
30 INJECTION, SOLUTION INTRAVENOUS ONCE
Status: COMPLETED | OUTPATIENT
Start: 2024-03-18 | End: 2024-03-18

## 2024-03-18 RX ORDER — SODIUM CHLORIDE, SODIUM LACTATE, POTASSIUM CHLORIDE, CALCIUM CHLORIDE 600; 310; 30; 20 MG/100ML; MG/100ML; MG/100ML; MG/100ML
INJECTION, SOLUTION INTRAVENOUS CONTINUOUS
Status: DISCONTINUED | OUTPATIENT
Start: 2024-03-18 | End: 2024-03-24 | Stop reason: HOSPADM

## 2024-03-18 RX ORDER — ACETAMINOPHEN 500 MG
1000 TABLET ORAL EVERY 6 HOURS PRN
Status: DISCONTINUED | OUTPATIENT
Start: 2024-03-23 | End: 2024-03-24 | Stop reason: HOSPADM

## 2024-03-18 RX ORDER — DEXTROSE MONOHYDRATE 25 G/50ML
25 INJECTION, SOLUTION INTRAVENOUS
Status: DISCONTINUED | OUTPATIENT
Start: 2024-03-18 | End: 2024-03-19

## 2024-03-18 RX ORDER — ASPIRIN 325 MG
325-650 TABLET ORAL EVERY 6 HOURS PRN
COMMUNITY
End: 2024-04-09

## 2024-03-18 RX ORDER — BISACODYL 10 MG
10 SUPPOSITORY, RECTAL RECTAL
Status: DISCONTINUED | OUTPATIENT
Start: 2024-03-18 | End: 2024-03-24 | Stop reason: HOSPADM

## 2024-03-18 RX ORDER — MORPHINE SULFATE 4 MG/ML
4 INJECTION INTRAVENOUS ONCE
Status: COMPLETED | OUTPATIENT
Start: 2024-03-18 | End: 2024-03-18

## 2024-03-18 RX ORDER — ONDANSETRON 2 MG/ML
4 INJECTION INTRAMUSCULAR; INTRAVENOUS EVERY 4 HOURS PRN
Status: DISCONTINUED | OUTPATIENT
Start: 2024-03-18 | End: 2024-03-24 | Stop reason: HOSPADM

## 2024-03-18 RX ORDER — AMOXICILLIN 250 MG
1 CAPSULE ORAL NIGHTLY
Status: DISCONTINUED | OUTPATIENT
Start: 2024-03-18 | End: 2024-03-24 | Stop reason: HOSPADM

## 2024-03-18 RX ORDER — ACETAMINOPHEN 500 MG
1000 TABLET ORAL EVERY 6 HOURS
Status: DISPENSED | OUTPATIENT
Start: 2024-03-18 | End: 2024-03-23

## 2024-03-18 RX ORDER — FAMOTIDINE 20 MG/1
20 TABLET, FILM COATED ORAL DAILY
Status: DISCONTINUED | OUTPATIENT
Start: 2024-03-18 | End: 2024-03-22

## 2024-03-18 RX ORDER — INSULIN ASPART 100 [IU]/ML
25 INJECTION, SOLUTION INTRAVENOUS; SUBCUTANEOUS
COMMUNITY

## 2024-03-18 RX ORDER — DOCUSATE SODIUM 100 MG/1
100 CAPSULE, LIQUID FILLED ORAL 2 TIMES DAILY
Status: DISCONTINUED | OUTPATIENT
Start: 2024-03-18 | End: 2024-03-24 | Stop reason: HOSPADM

## 2024-03-18 RX ORDER — ENOXAPARIN SODIUM 100 MG/ML
40 INJECTION SUBCUTANEOUS DAILY
Status: DISCONTINUED | OUTPATIENT
Start: 2024-03-19 | End: 2024-03-24 | Stop reason: HOSPADM

## 2024-03-18 RX ORDER — OXYCODONE HYDROCHLORIDE 5 MG/1
5 TABLET ORAL
Status: DISCONTINUED | OUTPATIENT
Start: 2024-03-18 | End: 2024-03-20

## 2024-03-18 RX ORDER — ENEMA 19; 7 G/133ML; G/133ML
1 ENEMA RECTAL
Status: DISCONTINUED | OUTPATIENT
Start: 2024-03-18 | End: 2024-03-24 | Stop reason: HOSPADM

## 2024-03-18 RX ORDER — OXYCODONE HYDROCHLORIDE 5 MG/1
10 TABLET ORAL
Status: DISCONTINUED | OUTPATIENT
Start: 2024-03-18 | End: 2024-03-20

## 2024-03-18 RX ORDER — ONDANSETRON 2 MG/ML
4 INJECTION INTRAMUSCULAR; INTRAVENOUS ONCE
Status: COMPLETED | OUTPATIENT
Start: 2024-03-18 | End: 2024-03-18

## 2024-03-18 RX ORDER — HYDROMORPHONE HYDROCHLORIDE 1 MG/ML
0.5 INJECTION, SOLUTION INTRAMUSCULAR; INTRAVENOUS; SUBCUTANEOUS
Status: DISCONTINUED | OUTPATIENT
Start: 2024-03-18 | End: 2024-03-20

## 2024-03-18 RX ORDER — POLYETHYLENE GLYCOL 3350 17 G/17G
1 POWDER, FOR SOLUTION ORAL 2 TIMES DAILY
Status: DISCONTINUED | OUTPATIENT
Start: 2024-03-18 | End: 2024-03-24 | Stop reason: HOSPADM

## 2024-03-18 RX ORDER — INSULIN GLARGINE 300 U/ML
75 INJECTION, SOLUTION SUBCUTANEOUS DAILY
COMMUNITY

## 2024-03-18 RX ADMIN — IOHEXOL 100 ML: 350 INJECTION, SOLUTION INTRAVENOUS at 14:23

## 2024-03-18 RX ADMIN — SODIUM CHLORIDE, POTASSIUM CHLORIDE, SODIUM LACTATE AND CALCIUM CHLORIDE: 600; 310; 30; 20 INJECTION, SOLUTION INTRAVENOUS at 15:46

## 2024-03-18 RX ADMIN — MORPHINE SULFATE 4 MG: 4 INJECTION, SOLUTION INTRAMUSCULAR; INTRAVENOUS at 10:51

## 2024-03-18 RX ADMIN — FAMOTIDINE 20 MG: 10 INJECTION, SOLUTION INTRAVENOUS at 18:05

## 2024-03-18 RX ADMIN — ACETAMINOPHEN 1000 MG: 500 TABLET, FILM COATED ORAL at 12:53

## 2024-03-18 RX ADMIN — DOCUSATE SODIUM 100 MG: 100 CAPSULE, LIQUID FILLED ORAL at 18:05

## 2024-03-18 RX ADMIN — SODIUM CHLORIDE, POTASSIUM CHLORIDE, SODIUM LACTATE AND CALCIUM CHLORIDE: 600; 310; 30; 20 INJECTION, SOLUTION INTRAVENOUS at 21:59

## 2024-03-18 RX ADMIN — OXYCODONE 5 MG: 5 TABLET ORAL at 15:45

## 2024-03-18 RX ADMIN — PIPERACILLIN AND TAZOBACTAM 3.38 G: 3; .375 INJECTION, POWDER, FOR SOLUTION INTRAVENOUS at 21:26

## 2024-03-18 RX ADMIN — ONDANSETRON 4 MG: 2 INJECTION INTRAMUSCULAR; INTRAVENOUS at 10:51

## 2024-03-18 RX ADMIN — PIPERACILLIN AND TAZOBACTAM 3.38 G: 3; .375 INJECTION, POWDER, FOR SOLUTION INTRAVENOUS at 10:43

## 2024-03-18 RX ADMIN — DOCUSATE SODIUM 50 MG AND SENNOSIDES 8.6 MG 1 TABLET: 8.6; 5 TABLET, FILM COATED ORAL at 21:00

## 2024-03-18 RX ADMIN — SODIUM CHLORIDE, POTASSIUM CHLORIDE, SODIUM LACTATE AND CALCIUM CHLORIDE 3090 ML: 600; 310; 30; 20 INJECTION, SOLUTION INTRAVENOUS at 10:44

## 2024-03-18 RX ADMIN — INSULIN HUMAN 2 UNITS: 100 INJECTION, SOLUTION PARENTERAL at 13:32

## 2024-03-18 RX ADMIN — ACETAMINOPHEN 1000 MG: 500 TABLET, FILM COATED ORAL at 18:05

## 2024-03-18 RX ADMIN — POLYETHYLENE GLYCOL 3350 1 PACKET: 17 POWDER, FOR SOLUTION ORAL at 18:05

## 2024-03-18 RX ADMIN — PIPERACILLIN AND TAZOBACTAM 3.38 G: 3; .375 INJECTION, POWDER, FOR SOLUTION INTRAVENOUS at 12:53

## 2024-03-18 ASSESSMENT — COGNITIVE AND FUNCTIONAL STATUS - GENERAL
DAILY ACTIVITIY SCORE: 24
SUGGESTED CMS G CODE MODIFIER DAILY ACTIVITY: CH
MOVING TO AND FROM BED TO CHAIR: A LITTLE
STANDING UP FROM CHAIR USING ARMS: A LITTLE
SUGGESTED CMS G CODE MODIFIER MOBILITY: CJ
MOBILITY SCORE: 21
CLIMB 3 TO 5 STEPS WITH RAILING: A LITTLE

## 2024-03-18 ASSESSMENT — PAIN DESCRIPTION - PAIN TYPE: TYPE: ACUTE PAIN

## 2024-03-18 ASSESSMENT — FIBROSIS 4 INDEX: FIB4 SCORE: 1.37

## 2024-03-18 NOTE — ED NOTES
Bedside report received from off going RN/tech: Colleen, assumed care of patient.  POC discussed with patient. Call light within reach, all needs addressed at this time.       Fall risk interventions in place: Move the patient closer to the nurse's station, Patient's personal possessions are with in their safe reach, Place socks on patient, Place fall risk sign on patient's door, Give patient urinal if applicable, Keep floor surfaces clean and dry, and Accompanied to restroom (all applicable per [facility] Fall risk assessment)   Continuous monitoring: Cardiac Leads, Pulse Ox, or Blood Pressure  IVF/IV medications: Infusion per MAR (List Med(s)) LR  Oxygen: How many liters 2L, Traced the line to wall oxygen, and No oxygen tank in room  Bedside sitter: Not Applicable   Isolation: Not Applicable

## 2024-03-18 NOTE — CONSULTS
Date of Consultation:  3/18/2024    Patient: : Manolo Puri  MRN: [id number]    Referring Physician:  Dr Jameson     GI:Clifford Chatterjee M.D.     Reason for Consultation: Gallstone pancreatitis    History of Present Illness:   83-year-old male with several days of abdominal pain, nausea, vomiting.  Patient evaluated in the emergency room this morning.  Patient has hyperbilirubinemia and an elevated lipase.  He has an obstructive biochemical liver profile.  CT scan with contrast performed.  Findings consistent with interstitial edematous pancreatitis.  CBD does not demonstrate stone.  Patient feeling quite a bit better since having received pain medications.  Surgery admitting.  Patient started on Zosyn and given lactated Ringer's bolus along with continued moderate hydration.  Patient has a history of gallstone pancreatitis dating back to last year.  No interventions at that time were pursued per patient request.      Past Medical History:   Diagnosis Date    Hospital discharge follow-up 7/5/2022    He was admitted for 3 days to the hospital in late June/early July 2022 due to chest pain with associated nausea and vomiting.  He underwent cardiac stress testing which was negative for ischemia.  Initial right upper quadrant ultrasound showed hepatomegaly with fatty liver, follow-up MRCP showed borderline splenomegaly but no clear gallbladder pathology outside of Cholelithiasis.  GI and general     Cholestatic hepatitis 6/29/2022     Latest Reference Range & Units 06/27/22 11:03 06/28/22 02:32 06/29/22 08:38 06/29/22 13:41 06/30/22 03:04 AST(SGOT) 12 - 45 U/L 460 (H) 421 (H) 278 (H) 229 (H) 175 (H) ALT(SGPT) 2 - 50 U/L 326 (H) 453 (H) 425 (H) 377 (H) 326 (H) Alkaline Phosphatase 30 - 99 U/L 149 (H) 151 (H) 194 (H) 189 (H) 185 (H) Total Bilirubin 0.1 - 1.5 mg/dL 2.4 (H) 4.4 (H) 4.7 (H) 4.0 (H) 2.5 (H) Direct Bilirubin 0.1 - 0.    Elevated liver enzymes 6/27/2022    Cholelithiasis 6/27/2022    Elevated hematocrit  9/3/2017    Atypical chest pain 9/3/2017    Arthritis     Cataract     Diabetes (HCC)     Squamous cell cancer of skin of forearm        Past Surgical History:   Procedure Laterality Date    OTHER      Derm removal of SCC       Family History   Problem Relation Age of Onset    Diabetes Father     Diabetes Maternal Grandfather        Social History     Socioeconomic History    Marital status: Single   Tobacco Use    Smoking status: Never    Smokeless tobacco: Never    Tobacco comments:     continued abstinence   Vaping Use    Vaping Use: Never used   Substance and Sexual Activity    Alcohol use: No    Drug use: No    Sexual activity: Not Currently     Partners: Female       Current Meds (name, sig, last dose):     Current Facility-Administered Medications:     Respiratory Therapy Consult    Pharmacy Consult Request    ondansetron    docusate sodium    senna-docusate    senna-docusate    polyethylene glycol/lytes    magnesium hydroxide    bisacodyl    sodium phosphate    LR    [START ON 3/19/2024] enoxaparin (LOVENOX) injection    acetaminophen **FOLLOWED BY** [START ON 3/23/2024] acetaminophen    oxyCODONE immediate-release **OR** oxyCODONE immediate-release **OR** HYDROmorphone    [DISCONTINUED] piperacillin-tazobactam **AND** piperacillin-tazobactam    famotidine **OR** famotidine    insulin regular **AND** POC blood glucose manual result **AND** NOTIFY MD and PharmD **AND** Administer 20 grams of glucose (approximately 8 ounces of fruit juice) every 15 minutes PRN FSBG less than 70 mg/dL **AND** dextrose bolus    Current Outpatient Medications:     Insulin Aspart FlexPen, 5-14 Units, Subcutaneous, TID AC, 3/17/2024 at PM    Toujeo Max SoloStar, 75 Units, Subcutaneous, DAILY, 3/17/2024 at AM    aspirin, 325-650 mg, Oral, Q6HRS PRN, 3/16/2024 at PRN    Ozempic (2 MG/DOSE), 2 mg, Subcutaneous, Q7 DAYS, 3/18/2024 at AM    dapagliflozin propanediol, 10 mg, Oral, DAILY, 3/17/2024 at AM    metFORMIN ER, 1,000 mg, Oral,  BID, 3/17/2024 at AM      ROS  10 systems reviewed and are negative unless otherwise noted in history of present illness.    Physical Exam:  Vitals:    03/18/24 1230 03/18/24 1358 03/18/24 1359 03/18/24 1400   BP: 137/80      Pulse: 98 96 94 94   Resp: 20      Temp:       TempSrc:       SpO2: 93% 94% 94% 94%   Weight:       Height:           Physical Exam  Vitals and nursing note reviewed.   Constitutional:       General: He is not in acute distress.     Appearance: He is not ill-appearing or toxic-appearing.   HENT:      Mouth/Throat:      Mouth: Mucous membranes are moist.   Eyes:      General: Scleral icterus present.   Cardiovascular:      Rate and Rhythm: Normal rate and regular rhythm.   Pulmonary:      Effort: Pulmonary effort is normal.   Abdominal:      Palpations: Abdomen is soft.      Tenderness: There is abdominal tenderness. There is no guarding or rebound.   Skin:     General: Skin is warm and dry.   Neurological:      General: No focal deficit present.      Mental Status: He is alert and oriented to person, place, and time.   Psychiatric:         Behavior: Behavior normal.         Judgment: Judgment normal.           Labs:  Recent Labs     03/18/24  0855   SODIUM 135   POTASSIUM 4.4   CHLORIDE 97   CO2 21   BUN 43*   CREATININE 1.52*   CALCIUM 9.3     Recent Labs     03/18/24  0855   ALTSGPT 158*   ASTSGOT 79*   ALKPHOSPHAT 276*   TBILIRUBIN 5.1*   LIPASE 252*   GLUCOSE 260*     Recent Labs     03/18/24  0855   WBC 19.1*   NEUTSPOLYS 82.80*   LYMPHOCYTES 4.00*   MONOCYTES 10.80   EOSINOPHILS 0.30   BASOPHILS 0.50   ASTSGOT 79*   ALTSGPT 158*   ALKPHOSPHAT 276*   TBILIRUBIN 5.1*     Recent Labs     03/18/24  0855   RBC 6.07   HEMOGLOBIN 16.8   HEMATOCRIT 50.1   PLATELETCT 281     Recent Results (from the past 24 hour(s))   URINALYSIS    Collection Time: 03/18/24  8:47 AM    Specimen: Urine   Result Value Ref Range    Color DK Yellow     Character Clear     Specific Gravity 1.022 <1.035    Ph 5.0 5.0 -  8.0    Glucose >=1000 (A) Negative mg/dL    Ketones 15 (A) Negative mg/dL    Protein 100 (A) Negative mg/dL    Bilirubin Negative Negative    Urobilinogen, Urine 1.0 Negative    Nitrite Negative Negative    Leukocyte Esterase Negative Negative    Occult Blood Trace (A) Negative    Micro Urine Req Microscopic    URINE MICROSCOPIC (W/UA)    Collection Time: 03/18/24  8:47 AM   Result Value Ref Range    WBC 0-2 (A) /hpf    RBC 2-5 (A) /hpf    Bacteria Negative None /hpf    Epithelial Cells Negative /hpf    Hyaline Cast 0-2 /lpf   CBC WITH DIFFERENTIAL    Collection Time: 03/18/24  8:55 AM   Result Value Ref Range    WBC 19.1 (H) 4.8 - 10.8 K/uL    RBC 6.07 4.70 - 6.10 M/uL    Hemoglobin 16.8 14.0 - 18.0 g/dL    Hematocrit 50.1 42.0 - 52.0 %    MCV 82.5 81.4 - 97.8 fL    MCH 27.7 27.0 - 33.0 pg    MCHC 33.5 32.3 - 36.5 g/dL    RDW 41.9 35.9 - 50.0 fL    Platelet Count 281 164 - 446 K/uL    MPV 10.5 9.0 - 12.9 fL    Neutrophils-Polys 82.80 (H) 44.00 - 72.00 %    Lymphocytes 4.00 (L) 22.00 - 41.00 %    Monocytes 10.80 0.00 - 13.40 %    Eosinophils 0.30 0.00 - 6.90 %    Basophils 0.50 0.00 - 1.80 %    Immature Granulocytes 1.60 (H) 0.00 - 0.90 %    Nucleated RBC 0.00 0.00 - 0.20 /100 WBC    Neutrophils (Absolute) 15.79 (H) 1.82 - 7.42 K/uL    Lymphs (Absolute) 0.77 (L) 1.00 - 4.80 K/uL    Monos (Absolute) 2.06 (H) 0.00 - 0.85 K/uL    Eos (Absolute) 0.05 0.00 - 0.51 K/uL    Baso (Absolute) 0.09 0.00 - 0.12 K/uL    Immature Granulocytes (abs) 0.30 (H) 0.00 - 0.11 K/uL    NRBC (Absolute) 0.00 K/uL   COMP METABOLIC PANEL    Collection Time: 03/18/24  8:55 AM   Result Value Ref Range    Sodium 135 135 - 145 mmol/L    Potassium 4.4 3.6 - 5.5 mmol/L    Chloride 97 96 - 112 mmol/L    Co2 21 20 - 33 mmol/L    Anion Gap 17.0 (H) 7.0 - 16.0    Glucose 260 (H) 65 - 99 mg/dL    Bun 43 (H) 8 - 22 mg/dL    Creatinine 1.52 (H) 0.50 - 1.40 mg/dL    Calcium 9.3 8.5 - 10.5 mg/dL    Correct Calcium 9.7 8.5 - 10.5 mg/dL    AST(SGOT) 79 (H) 12  - 45 U/L    ALT(SGPT) 158 (H) 2 - 50 U/L    Alkaline Phosphatase 276 (H) 30 - 99 U/L    Total Bilirubin 5.1 (H) 0.1 - 1.5 mg/dL    Albumin 3.5 3.2 - 4.9 g/dL    Total Protein 7.5 6.0 - 8.2 g/dL    Globulin 4.0 (H) 1.9 - 3.5 g/dL    A-G Ratio 0.9 g/dL   LIPASE    Collection Time: 24  8:55 AM   Result Value Ref Range    Lipase 252 (H) 11 - 82 U/L   ESTIMATED GFR    Collection Time: 24  8:55 AM   Result Value Ref Range    GFR (CKD-EPI) 46 (A) >60 mL/min/1.73 m 2   Lactic Acid    Collection Time: 24 10:39 AM   Result Value Ref Range    Lactic Acid 1.4 0.5 - 2.0 mmol/L   EKG (NOW)    Collection Time: 24 12:20 PM   Result Value Ref Range    Report       [facility]    Test Date:  2024  Pt Name:    INDIRA FLORES              Department: ER  MRN:        [id number]                      Room:       [facility]  Gender:     Male                         Technician: [id number]  :        [id number]                   Requested By:CORBIN ANDREWS  Order #:    [id number]                    Reading MD:    Measurements  Intervals                                Axis  Rate:       99                           P:          37  NC:         215                          QRS:        -31  QRSD:       101                          T:          57  QT:         368  QTc:        473    Interpretive Statements  Sinus rhythm  Prolonged NC interval  Left axis deviation  Compared to ECG 2022 09:43:59  No significant changes     Lactic Acid    Collection Time: 24 12:45 PM   Result Value Ref Range    Lactic Acid 1.9 0.5 - 2.0 mmol/L   POCT glucose device results    Collection Time: 24  1:22 PM   Result Value Ref Range    POC Glucose, Blood 216 (H) 65 - 99 mg/dL         Imaging:  CT-ABDOMEN-PELVIS WITH  Narrative: 3/18/2024 1:51 PM    HISTORY/REASON FOR EXAM: Right lower quadrant pain    TECHNIQUE/EXAM DESCRIPTION:   CT scan of the abdomen and pelvis with contrast.    Contrast-enhanced  helical scanning was obtained from the diaphragmatic domes through the pubic symphysis following the bolus administration of nonionic contrast without complication.    100 mL of Omnipaque 350 nonionic contrast was administered without complication.    Low dose optimization technique was utilized for this CT exam including automated exposure control and adjustment of the mA and/or kV according to patient size.    COMPARISON: CT scan abdomen/pelvis 2/20/2020    FINDINGS:  Lower Chest: Unremarkable.    Liver: Diffuse decreased attenuation.    Spleen: Unremarkable.    Pancreas: Unremarkable.    Gallbladder: Multiple calcified and uncalcified stones.    Biliary: Nondilated.    Adrenal glands: Normal.    Kidneys: Unremarkable without hydronephrosis.    Bowel: No obstruction or acute inflammation. Scattered colonic diverticula    Lymph nodes: No adenopathy.    Vasculature: Atherosclerotic plaquing of the abdominal aorta and iliac arteries.    Peritoneum: Unremarkable without ascites.    Musculoskeletal: No acute or destructive process.    Pelvis: No adenopathy or free fluid.  Impression: 1.  Hepatic steatosis.    2.  Cholelithiasis.    3.  Colonic diverticulosis.    4.  Mild atherosclerotic plaquing of the abdominal aorta and iliac arteries  US-RUQ  Narrative: 3/18/2024 10:44 AM    HISTORY/REASON FOR EXAM:  Abdominal pain    TECHNIQUE/EXAM DESCRIPTION AND NUMBER OF VIEWS:  Real-time sonography of the liver and biliary tree.    COMPARISON: None    FINDINGS:  The liver is normal in contour with increased echogenicity. There is no evidence of solid mass lesion. The liver measures 15.87 cm.    The gallbladder is normal. There several echogenic foci with acoustic shadowing..  The gallbladder wall thickness measures 6.90 mm. There is no pericholecystic fluid.  The common duct measures 3.60 mm.    The tail of pancreas is obscured by bowel gas.  The visualized aorta is normal in caliber.    Intrahepatic IVC is patent.    The  portal vein is patent with hepatopetal flow. The MPV measures 1.2 cm.    The right kidney measures 11.56 cm.    There is no ascites.  Impression: 1.  Hepatic steatosis.    2.  Cholelithiasis with gallbladder wall thickening suggestive of acute or chronic cholecystitis.  DX-CHEST-PORTABLE (1 VIEW)  Narrative: 3/18/2024 10:13 AM    HISTORY/REASON FOR EXAM:  Sepsis; sepsis.    TECHNIQUE/EXAM DESCRIPTION AND NUMBER OF VIEWS:  Single portable view of the chest.    COMPARISON: 6/27/2022    FINDINGS:  The soft tissues and bony structures are unremarkable.    The heart and mediastinal structures are within normal limits.    Pulmonary vascularity is normal.    The lung fields are clear.    There is no effusion or pneumothorax.  Impression: No acute cardiopulmonary disease evident.        [facility] Data Review:  -Records reviewed and summarized in current documentation  -I personally reviewed and interpreted the laboratory results  -I personally reviewed the radiology images  -I have personally reviewed medications    Hospital Problem List:  Active Hospital Problems    Diagnosis     Gallstone pancreatitis [K85.10]     Total bilirubin, elevated [R17]     Type 2 diabetes mellitus with diabetic polyneuropathy, with long-term current use of insulin (HCC) [E11.42, Z79.4]        Impressions:  83-year-old male with gallstone pancreatitis.  He clinically looks quite well at this point.  CT scan done with contrast supports interstitial edematous pancreatitis.  No significant SIRS criteria.  Patient has a moderate leukocytosis.  Bilirubin is 5.  I have recommended checking bilirubin in the morning.  If his bilirubin remains significantly elevated I would proceed with preoperative ERCP.  Possibly tomorrow.  If bilirubin stays starts to drop significantly overnight then I would recommend cholecystectomy with intraoperative cholangiogram.      Recommendations:  Continue n.p.o.  Possible ERCP tomorrow based on clinical course and a.m. bilirubin  level    Discussed patient condition and risk of morbidity and/or mortality with  Dr. Jameson .  Time spent in chart review, counseling, coordination of care: 45min

## 2024-03-18 NOTE — ED NOTES
Med Rec completed per patient   Allergies reviewed  No ORAL antibiotics in last 30 days       [FreeTextEntry1] : see heme- name given\par see ENT- globus feeling\par \par f/u 1 month to see response to flovent re: cough Posterior Auricular Interpolation Flap Text: A decision was made to reconstruct the defect utilizing an interpolation axial flap and a staged reconstruction.  A telfa template was made of the defect.  This telfa template was then used to outline the posterior auricular interpolation flap.  The donor area for the pedicle flap was then injected with anesthesia.  The flap was excised through the skin and subcutaneous tissue down to the layer of the underlying musculature.  The pedicle flap was carefully excised within this deep plane to maintain its blood supply.  The edges of the donor site were undermined.   The donor site was closed in a primary fashion.  The pedicle was then rotated into position and sutured.  Once the tube was sutured into place, adequate blood supply was confirmed with blanching and refill.  The pedicle was then wrapped with xeroform gauze and dressed appropriately with a telfa and gauze bandage to ensure continued blood supply and protect the attached pedicle.

## 2024-03-18 NOTE — H&P
CHIEF COMPLAINT: Right upper quadrant pain, cholelithiasis, gallstone pancreatitis, and hyperbilirubinemia     HISTORY OF PRESENT ILLNESS: The patient is an 83 year-old White elderly man who presents to the Emergency Department a five-day history of severe epigastric and right upper quadrant  abdominal pain. The pain is associated with nausea, vomiting, anorexia, abdominal distension, and malaise. He reports worsening of nausea and abdominal distention with any food intake since the onset of his symptoms, he has self-limited his diet to liquids only.  He endorses a previous episode of choledocholithiasis approximately 1.5 years ago which resolved without intervention, he declined cholecystectomy at the time. The patient denies any recent or intercurrent illness. The patient denies any history of previous abdominal surgery.    PAST MEDICAL HISTORY:  has a past medical history of Arthritis, Atypical chest pain (9/3/2017), Cataract, Cholelithiasis (6/27/2022), Cholestatic hepatitis (6/29/2022), Diabetes (HCC), Elevated hematocrit (9/3/2017), Elevated liver enzymes (6/27/2022), Hospital discharge follow-up (7/5/2022), and Squamous cell cancer of skin of forearm.    PAST SURGICAL HISTORY: Wide-local excision of melanoma on back.    ALLERGIES: No Known Allergies    CURRENT MEDICATIONS:    Home Medications       Reviewed by Aneudy Camejo (Pharmacy Tech) on 03/18/24 at 1224  Med List Status: Complete     Medication Last Dose Status   aspirin (ASA) 325 MG Tab 3/16/2024 Active   dapagliflozin propanediol (FARXIGA) 10 MG Tab 3/17/2024 Active   Insulin Aspart FlexPen 100 UNIT/ML Solution Pen-injector 3/17/2024 Active   Insulin Glargine, 2 Unit Dial, (TOUJEO MAX SOLOSTAR) 300 UNIT/ML Solution Pen-injector 3/17/2024 Active   metFORMIN ER (GLUCOPHAGE XR) 500 MG TABLET SR 24 HR 3/17/2024 Active   Semaglutide, 2 MG/DOSE, (OZEMPIC, 2 MG/DOSE,) 8 MG/3ML Solution Pen-injector 3/18/2024 Active                    FAMILY HISTORY:  family history includes Diabetes in his father and maternal grandfather.    SOCIAL HISTORY:  reports that he has never smoked. He has never used smokeless tobacco. He reports that he does not drink alcohol and does not use drugs.    REVIEW OF SYSTEMS: Comprehensive review of systems is negative with the exception of the aforementioned HPI, PMH, and PSH bullets in accordance with CMS guidelines.    PHYSICAL EXAMINATION:      Constitutional:     Vital Signs: /80   Pulse 98   Temp 36.4 °C (97.6 °F) (Oral)   Resp 20   Ht 1.829 m (6')   Wt 103 kg (226 lb 3.1 oz)   SpO2 93%    General Appearance: is in no apparent distress.  HEENT: The pupils are equal, round, and reactive to light bilaterally. The extraocular muscles are intact bilaterally. The sclera are icteric. Nares and oropharynx are clear.   Neck: Supple. No adenopathy.  Respiratory:   Inspection: Unlabored respirations, no intercostal retractions, paradoxical motion, or accessory muscle use.   Auscultation: normal.  Cardiovascular:   Inspection: The skin is warm and well purfused.  Auscultation: Sinus tachycardia.   Peripheral Pulses: Normal.   Abdomen:  Inspection: Abdominal inspection reveals no abrasions, contusions, lacerations or penetrating wounds.   Palpation: Palpation is remarkable for moderate tenderness in the epigastric and right upper quadrant  region. No abdominal wall hernias.  Extremities:   Examination of the upper and lower extremities demonstrates no cyanosis edema or clubbing.  Neurologic:   Alert & oriented to person, time and place. Normal motor function. Normal sensory function. No focal deficits noted.    LABORATORY VALUES:   Recent Labs     03/18/24  0855   WBC 19.1*   RBC 6.07   HEMOGLOBIN 16.8   HEMATOCRIT 50.1   MCV 82.5   MCH 27.7   MCHC 33.5   RDW 41.9   PLATELETCT 281   MPV 10.5     Recent Labs     03/18/24  0855   SODIUM 135   POTASSIUM 4.4   CHLORIDE 97   CO2 21   GLUCOSE 260*   BUN 43*   CREATININE 1.52*   CALCIUM 9.3      Recent Labs     03/18/24  0855   ASTSGOT 79*   ALTSGPT 158*   TBILIRUBIN 5.1*   ALKPHOSPHAT 276*   GLOBULIN 4.0*            IMAGING:   US-RUQ   Final Result         1.  Hepatic steatosis.      2.  Cholelithiasis with gallbladder wall thickening suggestive of acute or chronic cholecystitis.      DX-CHEST-PORTABLE (1 VIEW)   Final Result      No acute cardiopulmonary disease evident.      CT-ABDOMEN-PELVIS WITH    (Results Pending)       ASSESSMENT AND PLAN:     * Gallstone pancreatitis- (present on admission)  Assessment & Plan  Five days of epigastric/right upper quadrant abdominal pain associated with nausea, vomiting, abdominal distention, and malaise.  Tender to palpation in the epigastrium and right upper quadrant on exam.  Lipase 252 on initial labs, Tbili 5.1, mild elevation in ALT greater than AST consistent with gallstones pancreatitis and possible choledocholithiasis.  Ultrasound with gallstones, gallbladder wall thickening, no pericholecystic fluid, CBD 3.6 mm in diameter.  Admit to [facility], IV fluids and antibiotics.  Will discuss with GI regarding significantly elevated total bilirubin, trending LFTs and cholecystectomy with IOC vs ERCP.  Will monitor clinical status and labs, ideally would allow for resolution of pancreatitis prior to cholecystectomy.    Total bilirubin, elevated- (present on admission)  Assessment & Plan  Initial bilirubin 5.1.  CBD not significantly dilated at 3.6 mm in diameter.  Will discuss trending serial LFTs vs ERCP or further imagining with GI.    Type 2 diabetes mellitus with diabetic polyneuropathy, with long-term current use of insulin ([facility])- (present on admission)  Assessment & Plan  Chronic condition treated with insulin glargine 75 units SQ every morning, insulin aspart 5-14 units SQ 3 times a day prior to meals, metformin, and dapagliflozin.  Holding maintenance metformin and dapagliflozin. Basilar insulin dose halved given that he is NPO. Insulin sliding scale  coverage.      The patient has right upper quadrant pain, cholelithiasis, gallstone pancreatitis, hyperbilirubinemia, and abnormal liver transaminases. Plan: Laparoscopic cholecystectomy, timing to be determined after discussion with GI/trending LFTs.    The patient will be taken to the operating room for Laparoscopic cholecystectomy. The surgical conduct was discussed in detail. Potential complications including, but not limited to, infection, bleeding, damage to adjacent structures, bile duct injury, need to convert to an open procedure, and anesthetic complications were discussed. Questions were elicited and answered to the patient's satisfaction.  Operative consent signed.     DISPOSITION: Admit [facility] Acute Care Surgery [facility] Service.     ____________________________________     Daniel Jameson M.D.    DD: 3/18/2024  12:01 PM    [facility] Guidelines for Acute Cholecystitis 2018  AAST Grading System for EGS Conditions  ACS NSQIP Surgical Risk Calculator

## 2024-03-18 NOTE — ASSESSMENT & PLAN NOTE
Initial bilirubin 5.1.  CBD not significantly dilated at 3.6 mm in diameter.  Will discuss trending serial LFTs vs ERCP or further imagining with GI.  3/19 T.Bili 3.2, lipase 63  3/23 T. Tru 1.4

## 2024-03-18 NOTE — ED PROVIDER NOTES
ER Provider Note    Scribed for Roman Tracy M.d. by Tyler Silva. 3/18/2024  10:15 AM    Primary Care Provider: Shirley Ozuna D.O.    CHIEF COMPLAINT  Chief Complaint   Patient presents with    RLQ Pain     Patient reports sharp pain with inhilation and increased pain with twisting that started Thursday. Patient states this is the same pain he felt a year ago when he had gallstones.     N/V     Since Thursday      LIMITATION TO HISTORY   Select: : None    HPI/ROS  OUTSIDE HISTORIAN(S):  None    EXTERNAL RECORDS REVIEWED  Outpatient Notes The patient has a history of type two diabetes which is managed by his primary care provider. He last presented to his PCP for diabetes evaluation in October of 2023.    Manolo Puri is a 83 y.o. male who presents to the ED for evaluation of right lower quadrant abdominal pain. Onset 4 days ago. The patient states his pain is sharp/stabbing and has been worsening over the last few days. He has a history of a gallstone approximately 1.5 years ago. He reports experiencing similar pain at this time. However, he did not have his gallbladder removed as the gallstone passed on its own. Given the similar pain he was concerned he may have another gallstone. This prompted him to present to the ED. The patient also reports associated symptoms of shoulder pain, night sweats, nausea, and vomiting. He denies any fevers or chills. No alleviating factors attempted.  The patient has a history of type two diabetes. He denies any history of hypertension or heart issues. He also denies any history of abdominal surgeries. He does not take blood thinners. The patient reports no recreational drug, alcohol, or tobacco use.       PAST MEDICAL HISTORY  Past Medical History:   Diagnosis Date    Arthritis     Atypical chest pain 9/3/2017    Cataract     Cholelithiasis 6/27/2022    Cholestatic hepatitis 6/29/2022     Latest Reference Range & Units 06/27/22 11:03 06/28/22  02:32 06/29/22 08:38 06/29/22 13:41 06/30/22 03:04 AST(SGOT) 12 - 45 U/L 460 (H) 421 (H) 278 (H) 229 (H) 175 (H) ALT(SGPT) 2 - 50 U/L 326 (H) 453 (H) 425 (H) 377 (H) 326 (H) Alkaline Phosphatase 30 - 99 U/L 149 (H) 151 (H) 194 (H) 189 (H) 185 (H) Total Bilirubin 0.1 - 1.5 mg/dL 2.4 (H) 4.4 (H) 4.7 (H) 4.0 (H) 2.5 (H) Direct Bilirubin 0.1 - 0.    Diabetes (HCC)     Elevated hematocrit 9/3/2017    Elevated liver enzymes 6/27/2022    Hospital discharge follow-up 7/5/2022    He was admitted for 3 days to the hospital in late June/early July 2022 due to chest pain with associated nausea and vomiting.  He underwent cardiac stress testing which was negative for ischemia.  Initial right upper quadrant ultrasound showed hepatomegaly with fatty liver, follow-up MRCP showed borderline splenomegaly but no clear gallbladder pathology outside of Cholelithiasis.  GI and general     Squamous cell cancer of skin of forearm        SURGICAL HISTORY  Past Surgical History:   Procedure Laterality Date    OTHER      Derm removal of SCC       FAMILY HISTORY  Family History   Problem Relation Age of Onset    Diabetes Father     Diabetes Maternal Grandfather        SOCIAL HISTORY   reports that he has never smoked. He has never used smokeless tobacco. He reports that he does not drink alcohol and does not use drugs.    CURRENT MEDICATIONS  Previous Medications    ATORVASTATIN (LIPITOR) 20 MG TAB    Take 1 Tablet by mouth every evening.    CONTINUOUS BLOOD GLUC SENSOR (DEXCOM G7 SENSOR) MISC    1 Each every 10 days.    DAPAGLIFLOZIN PROPANEDIOL (FARXIGA) 10 MG TAB    Take 1 Tablet by mouth every day.    INSULIN ASPART (NOVOLOG FLEXPEN) 100 UNIT/ML INJECTION PEN    Inject 10-15 Units under the skin 3 times a day before meals.    INSULIN GLARGINE, 1 UNIT DIAL, (TOUJEO SOLOSTAR) 300 UNIT/ML SOLUTION PEN-INJECTOR    Inject 80 Units under the skin every morning.    INSULIN PEN NEEDLE (PEN NEEDLES) 32G X 4 MM MISC    Use to inject insulin as  directed up to 4 times daily (basal insulin and meal time insulin)    METFORMIN ER (GLUCOPHAGE XR) 500 MG TABLET SR 24 HR    Take 2 Tablets by mouth 2 times a day.    SEMAGLUTIDE, 2 MG/DOSE, (OZEMPIC, 2 MG/DOSE,) 8 MG/3ML SOLUTION PEN-INJECTOR    Inject 2 mg under the skin every 7 days.       ALLERGIES  Patient has no known allergies.    PHYSICAL EXAM  BP (!) 153/102   Pulse (!) 118   Temp 36.4 °C (97.6 °F) (Oral)   Resp 20   Wt 103 kg (226 lb 3.1 oz)   SpO2 98%   BMI 30.68 kg/m²     Constitutional: Well developed, Well nourished, No acute distress, Non-toxic appearance.   HENT: Normocephalic, Atraumatic, Bilateral external ears normal, Oropharynx moist,  Eyes: PERRL, EOMI, Conjunctiva normal, No discharge.   Neck: Normal range of motion, No tenderness, Supple, No stridor.   Cardiovascular: Normal heart rate, Normal rhythm, No murmurs,  Thorax & Lungs: Normal breath sounds, No respiratory distress, No wheezing  Abdomen: Right upper quadrant tenderness.  There is also tenderness in the right periumbilical and right lower quadrant no peritonitis.  Skin: Warm, Dry, No erythema, No rash.   Back: No tenderness, No CVA tenderness.   Musculoskeletal: Good range of motion in all major joints.  Neurologic: Alert, NoNo focal deficits noted.   Psychiatric: Affect normal    DIAGNOSTIC STUDIES & PROCEDURES    Labs:   Results for orders placed or performed during the hospital encounter of 03/18/24   CBC WITH DIFFERENTIAL   Result Value Ref Range    WBC 19.1 (H) 4.8 - 10.8 K/uL    RBC 6.07 4.70 - 6.10 M/uL    Hemoglobin 16.8 14.0 - 18.0 g/dL    Hematocrit 50.1 42.0 - 52.0 %    MCV 82.5 81.4 - 97.8 fL    MCH 27.7 27.0 - 33.0 pg    MCHC 33.5 32.3 - 36.5 g/dL    RDW 41.9 35.9 - 50.0 fL    Platelet Count 281 164 - 446 K/uL    MPV 10.5 9.0 - 12.9 fL    Neutrophils-Polys 82.80 (H) 44.00 - 72.00 %    Lymphocytes 4.00 (L) 22.00 - 41.00 %    Monocytes 10.80 0.00 - 13.40 %    Eosinophils 0.30 0.00 - 6.90 %    Basophils 0.50 0.00 -  1.80 %    Immature Granulocytes 1.60 (H) 0.00 - 0.90 %    Nucleated RBC 0.00 0.00 - 0.20 /100 WBC    Neutrophils (Absolute) 15.79 (H) 1.82 - 7.42 K/uL    Lymphs (Absolute) 0.77 (L) 1.00 - 4.80 K/uL    Monos (Absolute) 2.06 (H) 0.00 - 0.85 K/uL    Eos (Absolute) 0.05 0.00 - 0.51 K/uL    Baso (Absolute) 0.09 0.00 - 0.12 K/uL    Immature Granulocytes (abs) 0.30 (H) 0.00 - 0.11 K/uL    NRBC (Absolute) 0.00 K/uL   COMP METABOLIC PANEL   Result Value Ref Range    Sodium 135 135 - 145 mmol/L    Potassium 4.4 3.6 - 5.5 mmol/L    Chloride 97 96 - 112 mmol/L    Co2 21 20 - 33 mmol/L    Anion Gap 17.0 (H) 7.0 - 16.0    Glucose 260 (H) 65 - 99 mg/dL    Bun 43 (H) 8 - 22 mg/dL    Creatinine 1.52 (H) 0.50 - 1.40 mg/dL    Calcium 9.3 8.5 - 10.5 mg/dL    Correct Calcium 9.7 8.5 - 10.5 mg/dL    AST(SGOT) 79 (H) 12 - 45 U/L    ALT(SGPT) 158 (H) 2 - 50 U/L    Alkaline Phosphatase 276 (H) 30 - 99 U/L    Total Bilirubin 5.1 (H) 0.1 - 1.5 mg/dL    Albumin 3.5 3.2 - 4.9 g/dL    Total Protein 7.5 6.0 - 8.2 g/dL    Globulin 4.0 (H) 1.9 - 3.5 g/dL    A-G Ratio 0.9 g/dL   LIPASE   Result Value Ref Range    Lipase 252 (H) 11 - 82 U/L   URINALYSIS    Specimen: Urine   Result Value Ref Range    Color DK Yellow     Character Clear     Specific Gravity 1.022 <1.035    Ph 5.0 5.0 - 8.0    Glucose >=1000 (A) Negative mg/dL    Ketones 15 (A) Negative mg/dL    Protein 100 (A) Negative mg/dL    Bilirubin Negative Negative    Urobilinogen, Urine 1.0 Negative    Nitrite Negative Negative    Leukocyte Esterase Negative Negative    Occult Blood Trace (A) Negative    Micro Urine Req Microscopic    URINE MICROSCOPIC (W/UA)   Result Value Ref Range    WBC 0-2 (A) /hpf    RBC 2-5 (A) /hpf    Bacteria Negative None /hpf    Epithelial Cells Negative /hpf    Hyaline Cast 0-2 /lpf   ESTIMATED GFR   Result Value Ref Range    GFR (CKD-EPI) 46 (A) >60 mL/min/1.73 m 2   Lactic Acid   Result Value Ref Range    Lactic Acid 1.4 0.5 - 2.0 mmol/L     All labs reviewed by  me.    EKG:   I have independently interpreted this EKG as above.     Radiology:   The attending Emergency Physician has independently interpreted the diagnostic imaging associated with this visit and is awaiting the final reading from the radiologist, which will be displayed below.    Preliminary interpretation is a follows: I reviewed the chest x-ray and the CT and agree with radiologist or potation.  Radiologist interpretation:     CT-ABDOMEN-PELVIS WITH   Final Result      1.  Hepatic steatosis.      2.  Cholelithiasis.      3.  Colonic diverticulosis.      4.  Mild atherosclerotic plaquing of the abdominal aorta and iliac arteries      US-RUQ   Final Result         1.  Hepatic steatosis.      2.  Cholelithiasis with gallbladder wall thickening suggestive of acute or chronic cholecystitis.      DX-CHEST-PORTABLE (1 VIEW)   Final Result      No acute cardiopulmonary disease evident.           COURSE & MEDICAL DECISION MAKING    ED Observation Status? No; Patient does not meet criteria for ED Observation.     INITIAL ASSESSMENT AND PLAN  Care Narrative: Records reviewed to establish patient's baseline labs and vitals.        10:15 AM - Patient seen and evaluated at bedside. Manolo Puri is a 83 y.o. male with a history of gallstones, who presents with right lower quadrant pain for the past 4 days. Patient will be treated with LR infusion 3,090 mL and Zosyn 3.375 g in  mL for his symptoms. Ordered EKG, CT-Abdomen pelvis with, US-RUQ, DX-Chest, Lactic acid, Blood culture x2, CBC with differential, CMP, Lipase, and UA to evaluate. I also discussed plan for probable hospitalization following ED work-up. He understands and agrees to the plan of care. Differential diagnoses include but are not limited to: Cholelithiasis, Cholecystitis, Sepsis, acute pancreatitis, colitis, abscess, mass, malignancy, appendicitis.      .  Antibiotics and fluids per sepsis ordered along with pain meds.  Anticipate  hospitalization.    10:49 AM The patient will be medicated with Morphine injection 4 mg and Zofran injection 4 mg for pain and nausea.     11:54 AM Paged Surgery.      12:00 PM I discussed the patient's case and the above findings with Dr. Jameson (Surgery) who will hospitalize the patient for surgery.     12:13 PM Paged GI.     12:15 PM I discussed the patient's case and the above findings with Dr. Marcum (GI) who agrees to evaluate the patient.    12:40 PM I reevaluated the patient at bedside. I again informed the patient of my plan to admit today given the patient's current presentation and diagnostic study results. Patient verbalizes understanding and support with my plan for admission.      HYDRATION: Based on the patient's presentation of Sepsis the patient was given IV fluids. IV Hydration was used because oral hydration was not adequate alone. Upon recheck following hydration, the patient was improved.    ADDITIONAL PROBLEM LIST AND DISPOSITION  History of gallstones.               DISPOSITION AND DISCUSSIONS  I have discussed management of the patient with the following physicians and SANDEEP's:   Dr. Jameson (Surgery)   Dr. Marcum (GI)     Discussion of management with other [facility] or appropriate source(s): None       Barriers to care at this time, including but not limited to:  None known at this time .         DISPOSITION:  Patient will be hospitalized by Dr. Jameson for surgery in guarded condition.     FINAL IMPRESSION   1. Acute cholecystitis    2. Choledocholithiasis    3. Gallstone pancreatitis    4. Sepsis, due to unspecified organism, unspecified whether acute organ dysfunction present (HCC)         Tyler CRUM (Nohelia), am scribing for, and in the presence of, Roman Tracy M.D..    Electronically signed by: Tyler Silva (Nohelia), 3/18/2024    Roman CRUM M.D. personally performed the services described in this documentation, as scribed by Tyler Silva in my presence, and it is both  accurate and complete.    The note accurately reflects work and decisions made by me.  Roman Tracy M.D.  3/18/2024  12:32 PM

## 2024-03-18 NOTE — ED NOTES
PIV x 2 placed, lactic acid and blood cultures x 2 collected and sent. Medicated per MAR for 4/10 RUQ pain. US at bedside.

## 2024-03-18 NOTE — ED NOTES
Pt reports improvement in pain following morphine admin. Placed on 2L NC for spO2 88% on RA at rest. Bedside report to JOE Davila.

## 2024-03-18 NOTE — ASSESSMENT & PLAN NOTE
Chronic condition treated with insulin glargine 75 units SQ every morning, insulin aspart 5-14 units SQ 3 times a day prior to meals, metformin, and dapagliflozin.  Holding maintenance metformin, dapagliflozin, and basilar insulin as he is NPO. Insulin sliding scale coverage.  3/21 Metformin resumed. Continue sliding scale.

## 2024-03-18 NOTE — ASSESSMENT & PLAN NOTE
Five days of epigastric/right upper quadrant abdominal pain associated with nausea, vomiting, abdominal distention, and malaise.  Tender to palpation in the epigastrium and right upper quadrant on exam.  Lipase 252 on initial labs, Tbili 5.1, mild elevation in ALT greater than AST consistent with gallstones pancreatitis and possible choledocholithiasis.  Ultrasound with gallstones, gallbladder wall thickening, no pericholecystic fluid, CBD 3.6 mm in diameter.  Admit to [facility], IV fluids and antibiotics.  Will discuss with GI regarding significantly elevated total bilirubin, trending LFTs and cholecystectomy with IOC vs ERCP.  Will monitor clinical status and labs, ideally would allow for resolution of pancreatitis prior to cholecystectomy.

## 2024-03-18 NOTE — ED TRIAGE NOTES
Manolo Cantu Khushi  83 y.o. male  Chief Complaint   Patient presents with    RLQ Pain     Patient reports sharp pain with inhilation and increased pain with twisting that started Thursday. Patient states this is the same pain he felt a year ago when he had gallstones.     N/V     Since Thursday        Vitals:    03/18/24 0831   BP: (!) 153/102   Pulse: (!) 118   Resp: 20   Temp: 36.4 °C (97.6 °F)   SpO2: 98%       Triage process explained to patient, apologized for wait time, and returned to [facility].  Pt informed to notify staff of any change in condition.

## 2024-03-19 PROBLEM — N17.9 ACUTE KIDNEY INJURY (HCC): Status: ACTIVE | Noted: 2024-03-19

## 2024-03-19 LAB
ALBUMIN SERPL BCP-MCNC: 2.8 G/DL (ref 3.2–4.9)
ALBUMIN/GLOB SERPL: 0.8 G/DL
ALP SERPL-CCNC: 204 U/L (ref 30–99)
ALT SERPL-CCNC: 86 U/L (ref 2–50)
ANION GAP SERPL CALC-SCNC: 12 MMOL/L (ref 7–16)
AST SERPL-CCNC: 31 U/L (ref 12–45)
BASOPHILS # BLD AUTO: 0.5 % (ref 0–1.8)
BASOPHILS # BLD: 0.07 K/UL (ref 0–0.12)
BILIRUB SERPL-MCNC: 3.2 MG/DL (ref 0.1–1.5)
BUN SERPL-MCNC: 36 MG/DL (ref 8–22)
CALCIUM ALBUM COR SERPL-MCNC: 9.5 MG/DL (ref 8.5–10.5)
CALCIUM SERPL-MCNC: 8.5 MG/DL (ref 8.5–10.5)
CHLORIDE SERPL-SCNC: 102 MMOL/L (ref 96–112)
CO2 SERPL-SCNC: 23 MMOL/L (ref 20–33)
CREAT SERPL-MCNC: 1.43 MG/DL (ref 0.5–1.4)
EOSINOPHIL # BLD AUTO: 0.3 K/UL (ref 0–0.51)
EOSINOPHIL NFR BLD: 2 % (ref 0–6.9)
ERYTHROCYTE [DISTWIDTH] IN BLOOD BY AUTOMATED COUNT: 42 FL (ref 35.9–50)
GFR SERPLBLD CREATININE-BSD FMLA CKD-EPI: 49 ML/MIN/1.73 M 2
GLOBULIN SER CALC-MCNC: 3.5 G/DL (ref 1.9–3.5)
GLUCOSE BLD STRIP.AUTO-MCNC: 116 MG/DL (ref 65–99)
GLUCOSE BLD STRIP.AUTO-MCNC: 121 MG/DL (ref 65–99)
GLUCOSE BLD STRIP.AUTO-MCNC: 144 MG/DL (ref 65–99)
GLUCOSE BLD STRIP.AUTO-MCNC: 205 MG/DL (ref 65–99)
GLUCOSE BLD STRIP.AUTO-MCNC: 92 MG/DL (ref 65–99)
GLUCOSE SERPL-MCNC: 133 MG/DL (ref 65–99)
HCT VFR BLD AUTO: 42.7 % (ref 42–52)
HGB BLD-MCNC: 14.6 G/DL (ref 14–18)
IMM GRANULOCYTES # BLD AUTO: 0.24 K/UL (ref 0–0.11)
IMM GRANULOCYTES NFR BLD AUTO: 1.6 % (ref 0–0.9)
LIPASE SERPL-CCNC: 63 U/L (ref 11–82)
LYMPHOCYTES # BLD AUTO: 0.82 K/UL (ref 1–4.8)
LYMPHOCYTES NFR BLD: 5.4 % (ref 22–41)
MCH RBC QN AUTO: 27.9 PG (ref 27–33)
MCHC RBC AUTO-ENTMCNC: 34.2 G/DL (ref 32.3–36.5)
MCV RBC AUTO: 81.6 FL (ref 81.4–97.8)
MONOCYTES # BLD AUTO: 2 K/UL (ref 0–0.85)
MONOCYTES NFR BLD AUTO: 13.1 % (ref 0–13.4)
NEUTROPHILS # BLD AUTO: 11.84 K/UL (ref 1.82–7.42)
NEUTROPHILS NFR BLD: 77.4 % (ref 44–72)
NRBC # BLD AUTO: 0 K/UL
NRBC BLD-RTO: 0 /100 WBC (ref 0–0.2)
PLATELET # BLD AUTO: 245 K/UL (ref 164–446)
PMV BLD AUTO: 10.5 FL (ref 9–12.9)
POTASSIUM SERPL-SCNC: 3.9 MMOL/L (ref 3.6–5.5)
PROT SERPL-MCNC: 6.3 G/DL (ref 6–8.2)
RBC # BLD AUTO: 5.23 M/UL (ref 4.7–6.1)
SODIUM SERPL-SCNC: 137 MMOL/L (ref 135–145)
WBC # BLD AUTO: 15.3 K/UL (ref 4.8–10.8)

## 2024-03-19 PROCEDURE — 85025 COMPLETE CBC W/AUTO DIFF WBC: CPT

## 2024-03-19 PROCEDURE — 83690 ASSAY OF LIPASE: CPT

## 2024-03-19 PROCEDURE — 700105 HCHG RX REV CODE 258: Performed by: SURGERY

## 2024-03-19 PROCEDURE — 80053 COMPREHEN METABOLIC PANEL: CPT

## 2024-03-19 PROCEDURE — 82962 GLUCOSE BLOOD TEST: CPT

## 2024-03-19 PROCEDURE — 770001 HCHG ROOM/CARE - MED/SURG/GYN PRIV*

## 2024-03-19 PROCEDURE — 36415 COLL VENOUS BLD VENIPUNCTURE: CPT

## 2024-03-19 PROCEDURE — A9270 NON-COVERED ITEM OR SERVICE: HCPCS | Performed by: SURGERY

## 2024-03-19 PROCEDURE — 99232 SBSQ HOSP IP/OBS MODERATE 35: CPT | Performed by: REGISTERED NURSE

## 2024-03-19 PROCEDURE — 700111 HCHG RX REV CODE 636 W/ 250 OVERRIDE (IP): Mod: JZ | Performed by: SURGERY

## 2024-03-19 PROCEDURE — 700102 HCHG RX REV CODE 250 W/ 637 OVERRIDE(OP): Performed by: SURGERY

## 2024-03-19 RX ADMIN — INSULIN HUMAN 2 UNITS: 100 INJECTION, SOLUTION PARENTERAL at 17:17

## 2024-03-19 RX ADMIN — ACETAMINOPHEN 1000 MG: 500 TABLET, FILM COATED ORAL at 12:24

## 2024-03-19 RX ADMIN — DOCUSATE SODIUM 100 MG: 100 CAPSULE, LIQUID FILLED ORAL at 04:49

## 2024-03-19 RX ADMIN — PIPERACILLIN AND TAZOBACTAM 3.38 G: 3; .375 INJECTION, POWDER, FOR SOLUTION INTRAVENOUS at 12:25

## 2024-03-19 RX ADMIN — SODIUM CHLORIDE, POTASSIUM CHLORIDE, SODIUM LACTATE AND CALCIUM CHLORIDE: 600; 310; 30; 20 INJECTION, SOLUTION INTRAVENOUS at 17:54

## 2024-03-19 RX ADMIN — ACETAMINOPHEN 1000 MG: 500 TABLET, FILM COATED ORAL at 04:49

## 2024-03-19 RX ADMIN — ENOXAPARIN SODIUM 40 MG: 100 INJECTION SUBCUTANEOUS at 17:11

## 2024-03-19 RX ADMIN — DOCUSATE SODIUM 50 MG AND SENNOSIDES 8.6 MG 1 TABLET: 8.6; 5 TABLET, FILM COATED ORAL at 20:41

## 2024-03-19 RX ADMIN — POLYETHYLENE GLYCOL 3350 1 PACKET: 17 POWDER, FOR SOLUTION ORAL at 04:49

## 2024-03-19 RX ADMIN — PIPERACILLIN AND TAZOBACTAM 3.38 G: 3; .375 INJECTION, POWDER, FOR SOLUTION INTRAVENOUS at 20:46

## 2024-03-19 RX ADMIN — FAMOTIDINE 20 MG: 20 TABLET, FILM COATED ORAL at 17:11

## 2024-03-19 RX ADMIN — PIPERACILLIN AND TAZOBACTAM 3.38 G: 3; .375 INJECTION, POWDER, FOR SOLUTION INTRAVENOUS at 04:53

## 2024-03-19 RX ADMIN — SODIUM CHLORIDE, POTASSIUM CHLORIDE, SODIUM LACTATE AND CALCIUM CHLORIDE: 600; 310; 30; 20 INJECTION, SOLUTION INTRAVENOUS at 07:53

## 2024-03-19 RX ADMIN — ACETAMINOPHEN 1000 MG: 500 TABLET, FILM COATED ORAL at 17:11

## 2024-03-19 RX ADMIN — POLYETHYLENE GLYCOL 3350 1 PACKET: 17 POWDER, FOR SOLUTION ORAL at 17:11

## 2024-03-19 RX ADMIN — DOCUSATE SODIUM 100 MG: 100 CAPSULE, LIQUID FILLED ORAL at 17:11

## 2024-03-19 RX ADMIN — MAGNESIUM HYDROXIDE 30 ML: 1200 LIQUID ORAL at 04:49

## 2024-03-19 ASSESSMENT — COPD QUESTIONNAIRES
COPD SCREENING SCORE: 2
DURING THE PAST 4 WEEKS HOW MUCH DID YOU FEEL SHORT OF BREATH: NONE/LITTLE OF THE TIME
HAVE YOU SMOKED AT LEAST 100 CIGARETTES IN YOUR ENTIRE LIFE: NO/DON'T KNOW
DO YOU EVER COUGH UP ANY MUCUS OR PHLEGM?: NO/ONLY WITH OCCASIONAL COLDS OR INFECTIONS

## 2024-03-19 ASSESSMENT — PAIN DESCRIPTION - PAIN TYPE: TYPE: ACUTE PAIN

## 2024-03-19 NOTE — CARE PLAN
The patient is Stable - Low risk of patient condition declining or worsening    Shift Goals  Clinical Goals: Pain control  Patient Goals: rest  Family Goals: BETTY    Progress made toward(s) clinical / shift goals:  Pain managed per MAR.    Problem: Knowledge Deficit - Standard  Goal: Patient and family/care givers will demonstrate understanding of plan of care, disease process/condition, diagnostic tests and medications  Description: Target End Date:  1-3 days or as soon as patient condition allows    Document in Patient Education    1.  Patient and family/caregiver oriented to unit, equipment, visitation policy and means for communicating concern  2.  Complete/review Learning Assessment  3.  Assess knowledge level of disease process/condition, treatment plan, diagnostic tests and medications  4.  Explain disease process/condition, treatment plan, diagnostic tests and medications  Outcome: Progressing     Problem: Pain - Standard  Goal: Alleviation of pain or a reduction in pain to the patient’s comfort goal  Description: Target End Date:  Prior to discharge or change in level of care    Document on Vitals flowsheet    1.  Document pain using the appropriate pain scale per order or unit policy  2.  Educate and implement non-pharmacologic comfort measures (i.e. relaxation, distraction, massage, cold/heat therapy, etc.)  3.  Pain management medications as ordered  4.  Reassess pain after pain med administration per policy  5.  If opiods administered assess patient's response to pain medication is appropriate per POSS sedation scale  6.  Follow pain management plan developed in collaboration with patient and interdisciplinary team (including palliative care or pain specialists if applicable)  Outcome: Progressing

## 2024-03-19 NOTE — PROGRESS NOTES
4 Eyes Skin Assessment Completed by JOE Lentz and JOE Azevedo.    Head WDL  Ears WDL  Nose WDL  Mouth WDL  Neck WDL  Breast/Chest WDL  Shoulder Blades WDL  Spine WDL  (R) Arm/Elbow/Hand WDL  (L) Arm/Elbow/Hand WDL  Abdomen WDL  Groin WDL  Scrotum/Coccyx/Buttocks WDL  (R) Leg WDL  (L) Leg WDL  (R) Heel/Foot/Toe WDL  (L) Heel/Foot/Toe WDL          Devices In Places Blood Pressure Cuff, Pulse Ox, and SCD's      Interventions In Place Pillows    Possible Skin Injury No    Pictures Uploaded Into Epic N/A  Wound Consult Placed N/A  RN Wound Prevention Protocol Ordered No

## 2024-03-19 NOTE — PROGRESS NOTES
Bili downtrending supporting passed stone.  No plan for ercp at this time.  Rec Demetrice with IOC.

## 2024-03-19 NOTE — ASSESSMENT & PLAN NOTE
3/19 Lipase normalized, T.nataly trending downward.No indication for ERCP per GI.  -Plan for lap magnus with possible IOC tomorrow am.   3/20 laparoscopic cholecystectomy converted to open.  JOVANA drain in place  3/21 NGT removed.  3/22 Full liquid diet

## 2024-03-19 NOTE — PROGRESS NOTES
Received report from ED, Pt arrived to T406 at 1830. Pt vital sign stable, call light within reach, all questions answered at this time.

## 2024-03-19 NOTE — ASSESSMENT & PLAN NOTE
Gentle IV hydration, avoid nephrotoxins  3/21 creatinine 1.75.   -1 L NS bolus.  3/23 Creatinine 1.31  Seral labs

## 2024-03-19 NOTE — PROGRESS NOTES
Report received from RN, assumed care at 1845  Pt is A0X4, and responds appropriately   Pt declines any SOB, chest pain, new onset of numbness/ tingling  Pt rates pain at 3/10, on a scale of 1-10, pt declines any intervention at this time  Pt is voiding adequately and without hesitancy  Pt has - flatus, + bowel sounds, - BM, PTA  Pt ambulates independently    Pt is NPO sips with medications, pt denies any nausea/vomiting at this time  Plan of care discussed, all questions answered. Explained importance of calling before getting OOB and pt verbalizes understanding. Explained importance of oral care. Call light is within reach, treaded slipper socks on, bed in lowest/ locked position, hourly rounding in place, all needs met at this time

## 2024-03-19 NOTE — PROGRESS NOTES
DATE: 3/19/2024    Hospital Day 2  gallstone pancreatitis .    INTERVAL EVENTS:  Lipase, T.Bili trending downward.  Plan for lap magnus with possible IOC tomorrow am.  Creatinine improving.   Adequate analgesia.  Mild abdominal pain, denies n/v.  Visitor at bedside, questions answered, counseled.     REVIEW OF SYSTEMS:  Review of systems is remarkable for the following abdominal pain. The remainder of the comprehensive ROS is negative with the exception of the aforementioned HPI, PMH, and PSH bullets in accordance with CMS guideline.    PHYSICAL EXAMINATION:  Vital Signs: /80   Pulse 94   Temp 36.1 °C (97 °F) (Temporal)   Resp 20   Ht 1.829 m (6')   Wt 103 kg (226 lb 3.1 oz)   SpO2 90%   Physical Exam  Constitutional:       General: He is not in acute distress.     Appearance: He is not toxic-appearing.   Eyes:      Pupils: Pupils are equal, round, and reactive to light.   Abdominal:      General: Abdomen is protuberant.      Palpations: Abdomen is soft.      Tenderness: There is generalized abdominal tenderness and tenderness in the epigastric area.   Neurological:      General: No focal deficit present.      Mental Status: He is alert.         LABORATORY VALUES:  Recent Labs     03/18/24  0855 03/19/24  0738   WBC 19.1* 15.3*   RBC 6.07 5.23   HEMOGLOBIN 16.8 14.6   HEMATOCRIT 50.1 42.7   MCV 82.5 81.6   MCH 27.7 27.9   MCHC 33.5 34.2   RDW 41.9 42.0   PLATELETCT 281 245   MPV 10.5 10.5     Recent Labs     03/18/24  0855 03/19/24  0738   SODIUM 135 137   POTASSIUM 4.4 3.9   CHLORIDE 97 102   CO2 21 23   GLUCOSE 260* 133*   BUN 43* 36*   CREATININE 1.52* 1.43*   CALCIUM 9.3 8.5     Recent Labs     03/18/24  0855 03/19/24  0738   ASTSGOT 79* 31   ALTSGPT 158* 86*   TBILIRUBIN 5.1* 3.2*   ALKPHOSPHAT 276* 204*   GLOBULIN 4.0* 3.5           IMAGING:  CT-ABDOMEN-PELVIS WITH   Final Result      1.  Hepatic steatosis.      2.  Cholelithiasis.      3.  Colonic diverticulosis.      4.  Mild atherosclerotic  plaquing of the abdominal aorta and iliac arteries      US-RUQ   Final Result         1.  Hepatic steatosis.      2.  Cholelithiasis with gallbladder wall thickening suggestive of acute or chronic cholecystitis.      DX-CHEST-PORTABLE (1 VIEW)   Final Result      No acute cardiopulmonary disease evident.          ASSESSMENT AND PLAN:  * Gallstone pancreatitis- (present on admission)  Assessment & Plan  3/19 Lipase normalized, T.nataly trending downward.No indication for ERCP per GI.  -Plan for lap magnus with possible IOC tomorrow am.     Total bilirubin, elevated- (present on admission)  Assessment & Plan  Initial bilirubin 5.1.  CBD not significantly dilated at 3.6 mm in diameter.  Will discuss trending serial LFTs vs ERCP or further imagining with GI.  3/19 T.Bili 3.2, lipase 63    Acute kidney injury (HCC)  Assessment & Plan  Gentle IV hydration, avoid nephrotoxins  Seral labs.    Type 2 diabetes mellitus with diabetic polyneuropathy, with long-term current use of insulin (HCC)- (present on admission)  Assessment & Plan  Chronic condition treated with insulin glargine 75 units SQ every morning, insulin aspart 5-14 units SQ 3 times a day prior to meals, metformin, and dapagliflozin.  Holding maintenance metformin, dapagliflozin, and basilar insulin as he is NPO. Insulin sliding scale coverage.             ____________________________________     WELLINGTON Phillips.    DD: 3/19/2024  9:14 AM

## 2024-03-19 NOTE — ED NOTES
Transport at bedside to take pt upstairs. Pt has equal unlabored respirations at the time of transport.

## 2024-03-19 NOTE — PROGRESS NOTES
Report received from previous shift RN.  Assessment complete.  Patient resting in bed comfortably, even and unlabored breathing present.  Patient is NPO, awaiting possible surgery.  All needs met at this time. Call light within reach. Hourly rounding in place.

## 2024-03-19 NOTE — CARE PLAN
Problem: Knowledge Deficit - Standard  Goal: Patient and family/care givers will demonstrate understanding of plan of care, disease process/condition, diagnostic tests and medications  Outcome: Progressing     Problem: Pain - Standard  Goal: Alleviation of pain or a reduction in pain to the patient’s comfort goal  Outcome: Progressing         The patient is Stable - Low risk of patient condition declining or worsening    Shift Goals  Clinical Goals: pain control, NPO, FSBG  Patient Goals: rest  Family Goals: BETTY    Progress made toward(s) clinical / shift goals:  Pt reported pain controlled. Pt NPO, reporting no nausea or vomiting this shift. Pt FSBG monitored, no coverage needed per MAR. Pt rested this shift.

## 2024-03-20 ENCOUNTER — ANESTHESIA EVENT (OUTPATIENT)
Dept: SURGERY | Facility: MEDICAL CENTER | Age: 84
End: 2024-03-20
Payer: MEDICARE

## 2024-03-20 ENCOUNTER — ANESTHESIA (OUTPATIENT)
Dept: SURGERY | Facility: MEDICAL CENTER | Age: 84
End: 2024-03-20
Payer: MEDICARE

## 2024-03-20 ENCOUNTER — APPOINTMENT (OUTPATIENT)
Dept: RADIOLOGY | Facility: MEDICAL CENTER | Age: 84
End: 2024-03-20
Attending: SURGERY
Payer: MEDICARE

## 2024-03-20 LAB
ALBUMIN SERPL BCP-MCNC: 2.6 G/DL (ref 3.2–4.9)
ALBUMIN/GLOB SERPL: 0.7 G/DL
ALP SERPL-CCNC: 222 U/L (ref 30–99)
ALT SERPL-CCNC: 58 U/L (ref 2–50)
ANION GAP SERPL CALC-SCNC: 14 MMOL/L (ref 7–16)
AST SERPL-CCNC: 22 U/L (ref 12–45)
BASOPHILS # BLD AUTO: 0.7 % (ref 0–1.8)
BASOPHILS # BLD: 0.1 K/UL (ref 0–0.12)
BILIRUB SERPL-MCNC: 2.6 MG/DL (ref 0.1–1.5)
BUN SERPL-MCNC: 30 MG/DL (ref 8–22)
CALCIUM ALBUM COR SERPL-MCNC: 9.3 MG/DL (ref 8.5–10.5)
CALCIUM SERPL-MCNC: 8.2 MG/DL (ref 8.5–10.5)
CHLORIDE SERPL-SCNC: 103 MMOL/L (ref 96–112)
CO2 SERPL-SCNC: 21 MMOL/L (ref 20–33)
CREAT SERPL-MCNC: 1.44 MG/DL (ref 0.5–1.4)
EOSINOPHIL # BLD AUTO: 0.32 K/UL (ref 0–0.51)
EOSINOPHIL NFR BLD: 2.1 % (ref 0–6.9)
ERYTHROCYTE [DISTWIDTH] IN BLOOD BY AUTOMATED COUNT: 43.7 FL (ref 35.9–50)
GFR SERPLBLD CREATININE-BSD FMLA CKD-EPI: 49 ML/MIN/1.73 M 2
GLOBULIN SER CALC-MCNC: 3.5 G/DL (ref 1.9–3.5)
GLUCOSE BLD STRIP.AUTO-MCNC: 133 MG/DL (ref 65–99)
GLUCOSE BLD STRIP.AUTO-MCNC: 161 MG/DL (ref 65–99)
GLUCOSE BLD STRIP.AUTO-MCNC: 189 MG/DL (ref 65–99)
GLUCOSE BLD STRIP.AUTO-MCNC: 218 MG/DL (ref 65–99)
GLUCOSE BLD STRIP.AUTO-MCNC: 223 MG/DL (ref 65–99)
GLUCOSE SERPL-MCNC: 136 MG/DL (ref 65–99)
HCT VFR BLD AUTO: 42.5 % (ref 42–52)
HGB BLD-MCNC: 14.4 G/DL (ref 14–18)
IMM GRANULOCYTES # BLD AUTO: 0.28 K/UL (ref 0–0.11)
IMM GRANULOCYTES NFR BLD AUTO: 1.9 % (ref 0–0.9)
LYMPHOCYTES # BLD AUTO: 0.8 K/UL (ref 1–4.8)
LYMPHOCYTES NFR BLD: 5.3 % (ref 22–41)
MCH RBC QN AUTO: 28.1 PG (ref 27–33)
MCHC RBC AUTO-ENTMCNC: 33.9 G/DL (ref 32.3–36.5)
MCV RBC AUTO: 82.8 FL (ref 81.4–97.8)
MONOCYTES # BLD AUTO: 2 K/UL (ref 0–0.85)
MONOCYTES NFR BLD AUTO: 13.3 % (ref 0–13.4)
NEUTROPHILS # BLD AUTO: 11.5 K/UL (ref 1.82–7.42)
NEUTROPHILS NFR BLD: 76.7 % (ref 44–72)
NRBC # BLD AUTO: 0 K/UL
NRBC BLD-RTO: 0 /100 WBC (ref 0–0.2)
PATHOLOGY CONSULT NOTE: NORMAL
PLATELET # BLD AUTO: 254 K/UL (ref 164–446)
PMV BLD AUTO: 10.6 FL (ref 9–12.9)
POTASSIUM SERPL-SCNC: 4.1 MMOL/L (ref 3.6–5.5)
PROT SERPL-MCNC: 6.1 G/DL (ref 6–8.2)
RBC # BLD AUTO: 5.13 M/UL (ref 4.7–6.1)
SODIUM SERPL-SCNC: 138 MMOL/L (ref 135–145)
WBC # BLD AUTO: 15 K/UL (ref 4.8–10.8)

## 2024-03-20 PROCEDURE — A9270 NON-COVERED ITEM OR SERVICE: HCPCS | Performed by: SURGERY

## 2024-03-20 PROCEDURE — 700101 HCHG RX REV CODE 250: Performed by: SURGERY

## 2024-03-20 PROCEDURE — 160035 HCHG PACU - 1ST 60 MINS PHASE I: Performed by: SURGERY

## 2024-03-20 PROCEDURE — 0WJG4ZZ INSPECTION OF PERITONEAL CAVITY, PERCUTANEOUS ENDOSCOPIC APPROACH: ICD-10-PCS | Performed by: SURGERY

## 2024-03-20 PROCEDURE — 47562 LAPAROSCOPIC CHOLECYSTECTOMY: CPT | Performed by: SURGERY

## 2024-03-20 PROCEDURE — 160009 HCHG ANES TIME/MIN: Performed by: SURGERY

## 2024-03-20 PROCEDURE — 160029 HCHG SURGERY MINUTES - 1ST 30 MINS LEVEL 4: Performed by: SURGERY

## 2024-03-20 PROCEDURE — 700111 HCHG RX REV CODE 636 W/ 250 OVERRIDE (IP): Performed by: STUDENT IN AN ORGANIZED HEALTH CARE EDUCATION/TRAINING PROGRAM

## 2024-03-20 PROCEDURE — 770001 HCHG ROOM/CARE - MED/SURG/GYN PRIV*

## 2024-03-20 PROCEDURE — 700102 HCHG RX REV CODE 250 W/ 637 OVERRIDE(OP): Performed by: SURGERY

## 2024-03-20 PROCEDURE — 80053 COMPREHEN METABOLIC PANEL: CPT

## 2024-03-20 PROCEDURE — 0FT40ZZ RESECTION OF GALLBLADDER, OPEN APPROACH: ICD-10-PCS | Performed by: SURGERY

## 2024-03-20 PROCEDURE — 700111 HCHG RX REV CODE 636 W/ 250 OVERRIDE (IP): Performed by: SURGERY

## 2024-03-20 PROCEDURE — 700111 HCHG RX REV CODE 636 W/ 250 OVERRIDE (IP): Mod: JZ | Performed by: SURGERY

## 2024-03-20 PROCEDURE — 700111 HCHG RX REV CODE 636 W/ 250 OVERRIDE (IP)

## 2024-03-20 PROCEDURE — 160041 HCHG SURGERY MINUTES - EA ADDL 1 MIN LEVEL 4: Performed by: SURGERY

## 2024-03-20 PROCEDURE — 88304 TISSUE EXAM BY PATHOLOGIST: CPT

## 2024-03-20 PROCEDURE — 700105 HCHG RX REV CODE 258: Performed by: SURGERY

## 2024-03-20 PROCEDURE — 160002 HCHG RECOVERY MINUTES (STAT): Performed by: SURGERY

## 2024-03-20 PROCEDURE — 85025 COMPLETE CBC W/AUTO DIFF WBC: CPT

## 2024-03-20 PROCEDURE — 82962 GLUCOSE BLOOD TEST: CPT

## 2024-03-20 PROCEDURE — 110371 HCHG SHELL REV 272: Performed by: SURGERY

## 2024-03-20 PROCEDURE — 36415 COLL VENOUS BLD VENIPUNCTURE: CPT

## 2024-03-20 PROCEDURE — 160036 HCHG PACU - EA ADDL 30 MINS PHASE I: Performed by: SURGERY

## 2024-03-20 PROCEDURE — 700101 HCHG RX REV CODE 250: Performed by: STUDENT IN AN ORGANIZED HEALTH CARE EDUCATION/TRAINING PROGRAM

## 2024-03-20 PROCEDURE — 160048 HCHG OR STATISTICAL LEVEL 1-5: Performed by: SURGERY

## 2024-03-20 RX ORDER — EPHEDRINE SULFATE 50 MG/ML
INJECTION, SOLUTION INTRAVENOUS PRN
Status: DISCONTINUED | OUTPATIENT
Start: 2024-03-20 | End: 2024-03-20 | Stop reason: SURG

## 2024-03-20 RX ORDER — HYDROMORPHONE HYDROCHLORIDE 1 MG/ML
0.2 INJECTION, SOLUTION INTRAMUSCULAR; INTRAVENOUS; SUBCUTANEOUS
Status: DISCONTINUED | OUTPATIENT
Start: 2024-03-20 | End: 2024-03-20 | Stop reason: HOSPADM

## 2024-03-20 RX ORDER — DIPHENHYDRAMINE HYDROCHLORIDE 50 MG/ML
12.5 INJECTION INTRAMUSCULAR; INTRAVENOUS
Status: DISCONTINUED | OUTPATIENT
Start: 2024-03-20 | End: 2024-03-20 | Stop reason: HOSPADM

## 2024-03-20 RX ORDER — CEFAZOLIN SODIUM 1 G/3ML
INJECTION, POWDER, FOR SOLUTION INTRAMUSCULAR; INTRAVENOUS PRN
Status: DISCONTINUED | OUTPATIENT
Start: 2024-03-20 | End: 2024-03-20 | Stop reason: SURG

## 2024-03-20 RX ORDER — HYDROMORPHONE HYDROCHLORIDE 1 MG/ML
0.4 INJECTION, SOLUTION INTRAMUSCULAR; INTRAVENOUS; SUBCUTANEOUS
Status: DISCONTINUED | OUTPATIENT
Start: 2024-03-20 | End: 2024-03-20 | Stop reason: HOSPADM

## 2024-03-20 RX ORDER — OXYCODONE HCL 5 MG/5 ML
5 SOLUTION, ORAL ORAL
Status: DISCONTINUED | OUTPATIENT
Start: 2024-03-20 | End: 2024-03-20 | Stop reason: HOSPADM

## 2024-03-20 RX ORDER — BUPIVACAINE HYDROCHLORIDE AND EPINEPHRINE 5; 5 MG/ML; UG/ML
INJECTION, SOLUTION EPIDURAL; INTRACAUDAL; PERINEURAL
Status: DISCONTINUED | OUTPATIENT
Start: 2024-03-20 | End: 2024-03-20 | Stop reason: HOSPADM

## 2024-03-20 RX ORDER — SODIUM CHLORIDE, SODIUM LACTATE, POTASSIUM CHLORIDE, CALCIUM CHLORIDE 600; 310; 30; 20 MG/100ML; MG/100ML; MG/100ML; MG/100ML
INJECTION, SOLUTION INTRAVENOUS CONTINUOUS
Status: DISCONTINUED | OUTPATIENT
Start: 2024-03-20 | End: 2024-03-20 | Stop reason: HOSPADM

## 2024-03-20 RX ORDER — ROCURONIUM BROMIDE 10 MG/ML
INJECTION, SOLUTION INTRAVENOUS PRN
Status: DISCONTINUED | OUTPATIENT
Start: 2024-03-20 | End: 2024-03-20 | Stop reason: SURG

## 2024-03-20 RX ORDER — HALOPERIDOL 5 MG/ML
1 INJECTION INTRAMUSCULAR
Status: COMPLETED | OUTPATIENT
Start: 2024-03-20 | End: 2024-03-20

## 2024-03-20 RX ORDER — LIDOCAINE HYDROCHLORIDE 20 MG/ML
INJECTION, SOLUTION EPIDURAL; INFILTRATION; INTRACAUDAL; PERINEURAL PRN
Status: DISCONTINUED | OUTPATIENT
Start: 2024-03-20 | End: 2024-03-20 | Stop reason: SURG

## 2024-03-20 RX ORDER — MEPERIDINE HYDROCHLORIDE 25 MG/ML
12.5 INJECTION INTRAMUSCULAR; INTRAVENOUS; SUBCUTANEOUS
Status: DISCONTINUED | OUTPATIENT
Start: 2024-03-20 | End: 2024-03-20 | Stop reason: HOSPADM

## 2024-03-20 RX ORDER — HYDRALAZINE HYDROCHLORIDE 20 MG/ML
5 INJECTION INTRAMUSCULAR; INTRAVENOUS
Status: DISCONTINUED | OUTPATIENT
Start: 2024-03-20 | End: 2024-03-20 | Stop reason: HOSPADM

## 2024-03-20 RX ORDER — DEXAMETHASONE SODIUM PHOSPHATE 4 MG/ML
INJECTION, SOLUTION INTRA-ARTICULAR; INTRALESIONAL; INTRAMUSCULAR; INTRAVENOUS; SOFT TISSUE PRN
Status: DISCONTINUED | OUTPATIENT
Start: 2024-03-20 | End: 2024-03-20 | Stop reason: SURG

## 2024-03-20 RX ORDER — HYDROMORPHONE HYDROCHLORIDE 2 MG/ML
INJECTION, SOLUTION INTRAMUSCULAR; INTRAVENOUS; SUBCUTANEOUS PRN
Status: DISCONTINUED | OUTPATIENT
Start: 2024-03-20 | End: 2024-03-20 | Stop reason: SURG

## 2024-03-20 RX ORDER — OXYCODONE HCL 5 MG/5 ML
10 SOLUTION, ORAL ORAL
Status: DISCONTINUED | OUTPATIENT
Start: 2024-03-20 | End: 2024-03-20 | Stop reason: HOSPADM

## 2024-03-20 RX ORDER — HYDROMORPHONE HYDROCHLORIDE 1 MG/ML
0.1 INJECTION, SOLUTION INTRAMUSCULAR; INTRAVENOUS; SUBCUTANEOUS
Status: DISCONTINUED | OUTPATIENT
Start: 2024-03-20 | End: 2024-03-20 | Stop reason: HOSPADM

## 2024-03-20 RX ORDER — ONDANSETRON 2 MG/ML
4 INJECTION INTRAMUSCULAR; INTRAVENOUS
Status: COMPLETED | OUTPATIENT
Start: 2024-03-20 | End: 2024-03-20

## 2024-03-20 RX ORDER — EPHEDRINE SULFATE 50 MG/ML
5 INJECTION, SOLUTION INTRAVENOUS
Status: DISCONTINUED | OUTPATIENT
Start: 2024-03-20 | End: 2024-03-20 | Stop reason: HOSPADM

## 2024-03-20 RX ORDER — LABETALOL HYDROCHLORIDE 5 MG/ML
5 INJECTION, SOLUTION INTRAVENOUS
Status: DISCONTINUED | OUTPATIENT
Start: 2024-03-20 | End: 2024-03-20 | Stop reason: HOSPADM

## 2024-03-20 RX ADMIN — SODIUM CHLORIDE, POTASSIUM CHLORIDE, SODIUM LACTATE AND CALCIUM CHLORIDE: 600; 310; 30; 20 INJECTION, SOLUTION INTRAVENOUS at 03:37

## 2024-03-20 RX ADMIN — FENTANYL CITRATE 50 MCG: 50 INJECTION, SOLUTION INTRAMUSCULAR; INTRAVENOUS at 13:48

## 2024-03-20 RX ADMIN — HYDROMORPHONE HYDROCHLORIDE 0.5 MG: 2 INJECTION INTRAMUSCULAR; INTRAVENOUS; SUBCUTANEOUS at 14:03

## 2024-03-20 RX ADMIN — HYDROMORPHONE HYDROCHLORIDE 0.2 MG: 1 INJECTION, SOLUTION INTRAMUSCULAR; INTRAVENOUS; SUBCUTANEOUS at 16:40

## 2024-03-20 RX ADMIN — LIDOCAINE HYDROCHLORIDE 100 MG: 20 INJECTION, SOLUTION EPIDURAL; INFILTRATION; INTRACAUDAL at 11:35

## 2024-03-20 RX ADMIN — CEFAZOLIN 2 G: 1 INJECTION, POWDER, FOR SOLUTION INTRAMUSCULAR; INTRAVENOUS at 11:37

## 2024-03-20 RX ADMIN — ONDANSETRON 4 MG: 2 INJECTION INTRAMUSCULAR; INTRAVENOUS at 15:11

## 2024-03-20 RX ADMIN — ONDANSETRON 4 MG: 2 INJECTION INTRAMUSCULAR; INTRAVENOUS at 14:33

## 2024-03-20 RX ADMIN — FAMOTIDINE 20 MG: 10 INJECTION, SOLUTION INTRAVENOUS at 17:56

## 2024-03-20 RX ADMIN — HYDROMORPHONE HYDROCHLORIDE 0.5 MG: 2 INJECTION INTRAMUSCULAR; INTRAVENOUS; SUBCUTANEOUS at 14:27

## 2024-03-20 RX ADMIN — HYDROMORPHONE HYDROCHLORIDE 0.5 MG: 2 INJECTION INTRAMUSCULAR; INTRAVENOUS; SUBCUTANEOUS at 14:38

## 2024-03-20 RX ADMIN — HYDROMORPHONE HYDROCHLORIDE 0.5 MG: 2 INJECTION INTRAMUSCULAR; INTRAVENOUS; SUBCUTANEOUS at 14:12

## 2024-03-20 RX ADMIN — SODIUM CHLORIDE, POTASSIUM CHLORIDE, SODIUM LACTATE AND CALCIUM CHLORIDE: 600; 310; 30; 20 INJECTION, SOLUTION INTRAVENOUS at 20:29

## 2024-03-20 RX ADMIN — ACETAMINOPHEN 1000 MG: 500 TABLET, FILM COATED ORAL at 04:27

## 2024-03-20 RX ADMIN — SUGAMMADEX 200 MG: 100 INJECTION, SOLUTION INTRAVENOUS at 14:33

## 2024-03-20 RX ADMIN — HYDROMORPHONE HYDROCHLORIDE 0.2 MG: 1 INJECTION, SOLUTION INTRAMUSCULAR; INTRAVENOUS; SUBCUTANEOUS at 16:48

## 2024-03-20 RX ADMIN — INSULIN HUMAN 2 UNITS: 100 INJECTION, SOLUTION PARENTERAL at 22:00

## 2024-03-20 RX ADMIN — EPHEDRINE SULFATE 5 MG: 50 INJECTION, SOLUTION INTRAVENOUS at 11:35

## 2024-03-20 RX ADMIN — ROCURONIUM BROMIDE 70 MG: 50 INJECTION, SOLUTION INTRAVENOUS at 11:36

## 2024-03-20 RX ADMIN — ENOXAPARIN SODIUM 40 MG: 100 INJECTION SUBCUTANEOUS at 17:37

## 2024-03-20 RX ADMIN — FENTANYL CITRATE 50 MCG: 50 INJECTION, SOLUTION INTRAMUSCULAR; INTRAVENOUS at 11:52

## 2024-03-20 RX ADMIN — INSULIN HUMAN 1 UNITS: 100 INJECTION, SOLUTION PARENTERAL at 17:54

## 2024-03-20 RX ADMIN — ONDANSETRON 4 MG: 2 INJECTION INTRAMUSCULAR; INTRAVENOUS at 18:49

## 2024-03-20 RX ADMIN — DEXAMETHASONE SODIUM PHOSPHATE 4 MG: 4 INJECTION INTRA-ARTICULAR; INTRALESIONAL; INTRAMUSCULAR; INTRAVENOUS; SOFT TISSUE at 11:39

## 2024-03-20 RX ADMIN — FENTANYL CITRATE 50 MCG: 50 INJECTION, SOLUTION INTRAMUSCULAR; INTRAVENOUS at 12:05

## 2024-03-20 RX ADMIN — Medication: at 17:37

## 2024-03-20 RX ADMIN — HALOPERIDOL LACTATE 1 MG: 5 INJECTION, SOLUTION INTRAMUSCULAR at 16:15

## 2024-03-20 RX ADMIN — PROPOFOL 200 MG: 10 INJECTION, EMULSION INTRAVENOUS at 11:35

## 2024-03-20 RX ADMIN — FENTANYL CITRATE 25 MCG: 50 INJECTION, SOLUTION INTRAMUSCULAR; INTRAVENOUS at 16:30

## 2024-03-20 RX ADMIN — PIPERACILLIN AND TAZOBACTAM 3.38 G: 3; .375 INJECTION, POWDER, FOR SOLUTION INTRAVENOUS at 20:26

## 2024-03-20 RX ADMIN — FENTANYL CITRATE 25 MCG: 50 INJECTION, SOLUTION INTRAMUSCULAR; INTRAVENOUS at 16:10

## 2024-03-20 RX ADMIN — FENTANYL CITRATE 50 MCG: 50 INJECTION, SOLUTION INTRAMUSCULAR; INTRAVENOUS at 11:30

## 2024-03-20 RX ADMIN — HALOPERIDOL LACTATE 1 MG: 5 INJECTION, SOLUTION INTRAMUSCULAR at 15:22

## 2024-03-20 RX ADMIN — PIPERACILLIN AND TAZOBACTAM 3.38 G: 3; .375 INJECTION, POWDER, FOR SOLUTION INTRAVENOUS at 04:27

## 2024-03-20 ASSESSMENT — PAIN DESCRIPTION - PAIN TYPE
TYPE: SURGICAL PAIN
TYPE: ACUTE PAIN;SURGICAL PAIN

## 2024-03-20 ASSESSMENT — COGNITIVE AND FUNCTIONAL STATUS - GENERAL
TURNING FROM BACK TO SIDE WHILE IN FLAT BAD: A LITTLE
MOBILITY SCORE: 21
CLIMB 3 TO 5 STEPS WITH RAILING: A LITTLE
MOVING FROM LYING ON BACK TO SITTING ON SIDE OF FLAT BED: A LITTLE
DRESSING REGULAR LOWER BODY CLOTHING: A LITTLE
SUGGESTED CMS G CODE MODIFIER MOBILITY: CJ
HELP NEEDED FOR BATHING: A LITTLE
SUGGESTED CMS G CODE MODIFIER DAILY ACTIVITY: CJ
DAILY ACTIVITIY SCORE: 22

## 2024-03-20 NOTE — ANESTHESIA POSTPROCEDURE EVALUATION
Patient: Manolo Puri    Procedure Summary       Date: 03/20/24 Room / Location: [facility] 06 / SURGERY [facility]    Anesthesia Start: 1130 Anesthesia Stop: 1452    Procedure: CHOLECYSTECTOMY OPEN (Abdomen) Diagnosis: (GALLSTONE CHOLEDOCHOLITHIASIS CHRONIC CHOLECYSTITIS)    Surgeons: Daniel Jameson M.D. Responsible Provider: Doc Moe D.O.    Anesthesia Type: general ASA Status: 3            Final Anesthesia Type: general  Last vitals  BP   Blood Pressure : 111/59    Temp   36.1 °C (97 °F)    Pulse   94   Resp   13    SpO2   93 %      Anesthesia Post Evaluation    Patient location during evaluation: PACU  Patient participation: complete - patient participated  Level of consciousness: awake and alert    Airway patency: patent  Anesthetic complications: no  Cardiovascular status: hemodynamically stable  Respiratory status: acceptable  Hydration status: euvolemic    PONV: none          No notable events documented.     Nurse Pain Score: 0 (NPRS)

## 2024-03-20 NOTE — ANESTHESIA PROCEDURE NOTES
Airway    Date/Time: 3/20/2024 11:37 AM    Performed by: Doc Moe D.O.  Authorized by: Doc Moe D.O.    Location:  OR  Urgency:  Elective  Difficult Airway: No    Indications for Airway Management:  Anesthesia      Spontaneous Ventilation: absent    Sedation Level:  Deep  Preoxygenated: Yes    Patient Position:  Sniffing  Final Airway Type:  Endotracheal airway  Final Endotracheal Airway:  ETT  Cuffed: Yes    Technique Used for Successful ETT Placement:  Direct laryngoscopy    Insertion Site:  Oral  Blade Type:  Rosalva  Laryngoscope Blade/Videolaryngoscope Blade Size:  3  ETT Size (mm):  8.0  Measured from:  Teeth  ETT to Teeth (cm):  23  Placement Verified by: auscultation and capnometry    Cormack-Lehane Classification:  Grade I - full view of glottis  Number of Attempts at Approach:  1

## 2024-03-20 NOTE — PROGRESS NOTES
Report received from previous shift RN.  Assessment complete.  Patient resting in bed comfortably, even and unlabored breathing present.  Patient is NPO awaiting surgery.  All needs met at this time. Call light within reach. Hourly rounding in place.

## 2024-03-20 NOTE — OR SURGEON
Immediate Post OP Note    PreOp Diagnosis: Gallstone pancreatitis      PostOp Diagnosis: Gallstones pancreatitis, chronic cholecystitis      Procedure(s):  CHOLECYSTECTOMY OPEN - Wound Class: Clean Contaminated    Surgeon(s):  ELIZABETH Sandoval M.D.    Anesthesiologist/Type of Anesthesia:  Anesthesiologist: Doc Moe D.O./General    Surgical Staff:  Circulator: Estrellita Landrum R.N.  Relief Circulator: Joanna Rivera R.N.  Scrub Person: Ita Dubose; Seferino Cuadra  First Assist: MONTSERRAT GomezNCRISPIN    Specimens removed if any:  ID Type Source Tests Collected by Time Destination   A : gallbladder and stones Tissue Gallbladder PATHOLOGY SPECIMEN Daniel Jameson M.D. 3/20/2024  1:59 PM        Estimated Blood Loss: 300 mL    Findings: Dense adhesions in the right upper quadrant between the liver, gallbladder, and omentum. Thickening of the gallbladder and adjacent tissues consistent with chronic inflammation. Perforation of the gallbladder into the liver with small abscess. Gallbladder hydrops. Intra-operative cholangiogram attempted but unable to maintain catheter in cystic duct through orifice in the gallbladder, stone extracted from cystic duct on transection.     Complications: None        3/20/2024 2:45 PM Daniel Jameson M.D.

## 2024-03-20 NOTE — ANESTHESIA TIME REPORT
Anesthesia Start and Stop Event Times       Date Time Event    3/20/2024 1115 Ready for Procedure     1130 Anesthesia Start     1452 Anesthesia Stop          Responsible Staff  03/20/24      Name Role Begin End    Doc Moe D.O. Anesth 1130 1458          Overtime Reason:  no overtime (within assigned shift)    Comments:

## 2024-03-20 NOTE — PROGRESS NOTES
Report received from RN, assumed care at 1845  Pt is A0X4, and responds appropriately   Pt declines any SOB, chest pain, new onset of numbness/ tingling  Pt declines any pain at this time  Pt is voiding adequately and without hesitancy  Pt has + flatus, + bowel sounds, + BM on 3/19  Pt ambulates independently  Pt is tolerating a clear liquid diet, pt denies any nausea/vomiting, NPO at midnight  Plan of care discussed, all questions answered. Explained importance of calling before getting OOB and pt verbalizes understanding. Explained importance of oral care. Call light is within reach, treaded slipper socks on, bed in lowest/ locked position, hourly rounding in place, all needs met at this time

## 2024-03-20 NOTE — CARE PLAN
Problem: Knowledge Deficit - Standard  Goal: Patient and family/care givers will demonstrate understanding of plan of care, disease process/condition, diagnostic tests and medications  Outcome: Progressing     Problem: Pain - Standard  Goal: Alleviation of pain or a reduction in pain to the patient’s comfort goal  Outcome: Progressing      The patient is Stable - Low risk of patient condition declining or worsening    Shift Goals  Clinical Goals: FSBG, NPO at midnight, rest  Patient Goals: rest  Family Goals: BETTY    Progress made toward(s) clinical / shift goals:  Pt FSBG monitored, no coverage needed. Pt NPO at midnight. Pt rested this shift.

## 2024-03-20 NOTE — PROGRESS NOTES
PREOPERATIVE NOTE: I have seen and examined the patient today.  Critical physical examination findings, laboratory indices, and radiographic studies reviewed.  I recommend laparoscopic, possible open, cholecystectomy with intra-operative cholangiography.    The operative strategy was explained and reviewed. Alternatives discussed. Potential complications including, but not limited to: infection; bleeding; damage to adjacent structures, such as the common bile duct; and anesthetic complications were discussed. Questions were elicited and answered to the patient's satisfaction.  Operative consent signed.        ____________________________________     Daniel Jameson M.D.    DD: 3/20/2024  11:23 AM

## 2024-03-20 NOTE — ANESTHESIA PREPROCEDURE EVALUATION
Case: [id number] Date/Time: 03/20/24 1045    Procedure: CHOLECYSTECTOMY, LAPAROSCOPIC.    Location: [facility] 06 / SURGERY [facility]    Surgeons: Daniel Jameson M.D.            Relevant Problems   CARDIAC   (positive) Hypertension         (positive) Acute kidney injury (HCC)   (positive) Stage 3a chronic kidney disease (CKD) (HCC)      ENDO   (positive) Type 2 diabetes mellitus with diabetic polyneuropathy, with long-term current use of insulin (HCC)       Physical Exam    Airway   Mallampati: II  TM distance: >3 FB  Neck ROM: full       Cardiovascular - normal exam  Rhythm: regular  Rate: normal  (-) murmur     Dental - normal exam           Pulmonary - normal exam  Breath sounds clear to auscultation     Abdominal    Neurological - normal exam                   Anesthesia Plan    ASA 3   ASA physical status 3 criteria: diabetes - poorly controlled    Plan - general       Airway plan will be ETT          Induction: intravenous    Postoperative Plan: Postoperative administration of opioids is intended.    Pertinent diagnostic labs and testing reviewed    Informed Consent:    Anesthetic plan and risks discussed with patient.    Use of blood products discussed with: patient whom consented to blood products.

## 2024-03-20 NOTE — DOCUMENTATION QUERY
"                                                                         [facility]                                                                       Query Response Note      PATIENT:               INDIRA FLORES  ACCT #:                  [id number]  MRN:                     [id number]  :                      1940  ADMIT DATE:       3/18/2024 10:02 AM  DISCH DATE:          RESPONDING  PROVIDER #:        [id number]           QUERY TEXT:    The diagnosis of sepsis has been documented in the ED Provider Notes.    Please clarify the status of sepsis after study:        Sepsis - real or suspected infection plus 2 or more SIRS criteria + organ dysfunction related to sepsis    Temp <96.8 or >101  HR >90  RR >20  WBC <4,000 or > 12,000 or >10% bandemia         The patient's Clinical Indicators include:  ED Provider Notes: \"Antibiotics and fluids per sepsis ordered along with pain meds. ... Sepsis, due to unspecified organism, unspecified whether acute organ dysfunction present\"    H&P: \"gallstones pancreatitis and possible choledocholithiasis\"    3/18 GI Consult: \"No significant SIRS criteria.  Patient has a moderate leukocytosis\"    3/19 Surgery: \"Acute kidney injury\"    3/18 Blood Cultures: No growth to date.  3/18 Labs: WBC 19.1, , CR 1.52 > 1.43 (3/19), Total Bilirubin 5.1, Lactic Acid 1.4 > 1.9 > 1.1  3/18 Vitals: T97.6, -118, RR 20-24, MAP , 93% on 2L NC    Risk Factors: Gallstones pancreatitis, possible choledocholithiasis, DALY  Treatment: IV fluids/bolus, IV Zosyn, plan for lap magnus with possible IOC    Thank You,  Ijeoma Delacruz RN  Clinical    Connect via [email or web address] or [email or web address]  Options provided:   -- Acute kidney injury due to sepsis   -- Sepsis ruled in, (please specify sepsis-related organ dysfunction)   -- Sepsis ruled out   -- Other explanation, (please specify other explanation)   -- Unable to determine      Query created by: Nubia, " Ijeoma on 3/20/2024 6:17 AM    RESPONSE TEXT:    Sepsis ruled out          Electronically signed by:  RASHEL BRAN MD 3/20/2024 7:11 AM

## 2024-03-20 NOTE — OP REPORT
DATE OF OPERATION:   3/20/2024     PREOPERATIVE DIAGNOSIS: gallstone pancreatitis.    POSTOPERATIVE DIAGNOSIS: gallstone pancreatitis and chronic cholecystitis, liver abscess .    PROCEDURE PERFORMED: Attempted laparoscopic cholecystectomy with conversion to open cholecystectomy    SURGEON:    Daniel Jameson M.D.    ASSISTANT:    ELIZABETH Min D.N.P.    ANESTHESIOLOGIST:  Doc Moe D.O.    ANESTHESIA:   General endotracheal anesthesia.    ASA CLASSIFICATION:  III.    INDICATIONS: The patient is an 83 year-old elderly man with clinical and radiographic findings of gallstone pancreatitis. He is taken to the operating room for a laparoscopic cholecystectomy and intraoperative cholangiography.     The complexity of the surgical procedure necessitated additional skilled operative assistance from another surgeon. The assistant was present for a substantial portion of the operation and provided assistance to the operating physician throughout the critical aspects of the procedure. The surgical assistant performed the following: provided assistance with optimal surgical exposure of the operative field and provided high complexity, subspecialty decision making input.     FINDINGS: Dense adhesions in the right upper quadrant between the liver, gallbladder, and omentum. Thickening of the gallbladder and adjacent tissues consistent with chronic inflammation. Perforation of the gallbladder into the liver with small abscess. Gallbladder hydrops. Intra-operative cholangiogram attempted but unable to maintain catheter in cystic duct through orifice in the gallbladder, stone extracted from cystic duct on transection.    WOUND CLASSIFICATION:  Class II, Clean Contaminated.    SPECIMEN:    Gallbladder.    ESTIMATED BLOOD LOSS:  300 mL.    PROCEDURE: Following informed consent, the patient was properly identified, taken to the operating room and placed in supine position where general endotracheal  anesthesia was administered. Intravenous antibiotics were administered by the anesthesiologist in correct time interval. The patient voided prior to surgery. A urinary catheter was not placed. Sequential compression devices were employed. The abdomen was prepped and draped into a sterile field. A timeout was conducted with the full attention and participation of all operating room personnel.    Marcaine 0.5% was used to infiltrate the port sites. An 11 mm periumbilical midline incision was made and the subcutaneous tissues spread bluntly. The fascia was elevated with Kocher clamps and sharply incised. The peritoneum was entered bluntly and a Opal trocar inserted into the abdomen. Carbon dioxide pneumoperitoneum was instilled. A 5 mm 30 degree lens and camera was passed into the peritoneal cavity, initial diagnostic laparoscopy found no evidence of injury at the site of entry into the abdomen. An 11 mm port was placed in the epigastric midline under direct vision. Two 5 mm right subcostal ports were placed under direct vision.     Initial attempts at laparoscopic conduct were aborted secondary to significant inflammation and failure to make satisfactory surgical progress. The procedure was converted to an open laparotomy. A right subcostal incision was made. The muscular fascial layers were divided with electrocautery. The peritoneum was entered sharply and opened to the full extent of the incision. Laparotomy pads were place behind the right lobe of the liver to facilitate optimal surgical exposure. A Bookwalter Retractor System was employed to facilitate optimal exposure. The gallbladder was dissected free from the liver using a top-down technique initially however this proved difficult as the posterior wall came apart as the liver abscess was unroofed. The gallbladder was opened, multiple pigmented gallstones were removed, and the cystic duct orifice identified. An attempted was made to perform an  intra-operative cholangiogram however the catheter could not be passed far into the cystic duct without meeting resistance. Any attempt at injecting contrast resulted in the immediate backflow of contrast into the gallbladder remnant so further attempted at cholangiography were abandoned. Dissection was carried out carefully about the infundibulum until the cystic duct was identified and freed from the adjacent structures. The cystic duct was ligated multiple times with silk sutures proximally and distally then divided. The gallbladder was taken off the liver using a combination of blunt dissection and the LigaSure device, the posterior wall was left in place and fulgurated adjacent to the abscess cavity.  The gallbladder fossa was irrigated. All excess irrigant was evacuated from the abdominal cavity. Hemostasis was satisfactory. A 19 Fr Kevin drain was placed within the gallbladder fossa and secured to the skin at the right lateral port site with a 3-0 non absorbable monofilament suture. The drain was connected to closed bulb suction.    The abdominal contents were returned to the normal anatomic position. The fascia was approximated with a pair of running 0 PDS® Plus Antibacterial sutures in the anterior and posterior fascial layers. The wound was irrigated and suctioned dry. The skin was approximated with interrupted staples.    The patient tolerated the procedure well, and there were no apparent complications. All sponge, needle, and instrument counts were correct on 2 separate occasions. The patient was awakened, extubated, and transferred to  the post anesthesia care unit (PACU) in satisfactory condition.       ____________________________________     Daniel Jameson M.D.    DD: 3/20/2024  2:48 PM

## 2024-03-21 LAB
ALBUMIN SERPL BCP-MCNC: 2.7 G/DL (ref 3.2–4.9)
ALBUMIN/GLOB SERPL: 0.8 G/DL
ALP SERPL-CCNC: 195 U/L (ref 30–99)
ALT SERPL-CCNC: 77 U/L (ref 2–50)
ANION GAP SERPL CALC-SCNC: 21 MMOL/L (ref 7–16)
AST SERPL-CCNC: 54 U/L (ref 12–45)
BASOPHILS # BLD AUTO: 0.7 % (ref 0–1.8)
BASOPHILS # BLD: 0.13 K/UL (ref 0–0.12)
BILIRUB SERPL-MCNC: 1.8 MG/DL (ref 0.1–1.5)
BUN SERPL-MCNC: 39 MG/DL (ref 8–22)
CALCIUM ALBUM COR SERPL-MCNC: 9.1 MG/DL (ref 8.5–10.5)
CALCIUM SERPL-MCNC: 8.1 MG/DL (ref 8.5–10.5)
CHLORIDE SERPL-SCNC: 102 MMOL/L (ref 96–112)
CO2 SERPL-SCNC: 16 MMOL/L (ref 20–33)
CREAT SERPL-MCNC: 1.79 MG/DL (ref 0.5–1.4)
EOSINOPHIL # BLD AUTO: 0.01 K/UL (ref 0–0.51)
EOSINOPHIL NFR BLD: 0.1 % (ref 0–6.9)
ERYTHROCYTE [DISTWIDTH] IN BLOOD BY AUTOMATED COUNT: 46.7 FL (ref 35.9–50)
GFR SERPLBLD CREATININE-BSD FMLA CKD-EPI: 37 ML/MIN/1.73 M 2
GLOBULIN SER CALC-MCNC: 3.3 G/DL (ref 1.9–3.5)
GLUCOSE BLD STRIP.AUTO-MCNC: 201 MG/DL (ref 65–99)
GLUCOSE BLD STRIP.AUTO-MCNC: 205 MG/DL (ref 65–99)
GLUCOSE BLD STRIP.AUTO-MCNC: 210 MG/DL (ref 65–99)
GLUCOSE BLD STRIP.AUTO-MCNC: 239 MG/DL (ref 65–99)
GLUCOSE SERPL-MCNC: 275 MG/DL (ref 65–99)
HCT VFR BLD AUTO: 43.3 % (ref 42–52)
HGB BLD-MCNC: 13.7 G/DL (ref 14–18)
IMM GRANULOCYTES # BLD AUTO: 0.79 K/UL (ref 0–0.11)
IMM GRANULOCYTES NFR BLD AUTO: 4 % (ref 0–0.9)
LYMPHOCYTES # BLD AUTO: 0.7 K/UL (ref 1–4.8)
LYMPHOCYTES NFR BLD: 3.6 % (ref 22–41)
MAGNESIUM SERPL-MCNC: 2.4 MG/DL (ref 1.5–2.5)
MCH RBC QN AUTO: 27.3 PG (ref 27–33)
MCHC RBC AUTO-ENTMCNC: 31.6 G/DL (ref 32.3–36.5)
MCV RBC AUTO: 86.3 FL (ref 81.4–97.8)
MONOCYTES # BLD AUTO: 1.88 K/UL (ref 0–0.85)
MONOCYTES NFR BLD AUTO: 9.6 % (ref 0–13.4)
NEUTROPHILS # BLD AUTO: 16.07 K/UL (ref 1.82–7.42)
NEUTROPHILS NFR BLD: 82 % (ref 44–72)
NRBC # BLD AUTO: 0 K/UL
NRBC BLD-RTO: 0 /100 WBC (ref 0–0.2)
PHOSPHATE SERPL-MCNC: 4.4 MG/DL (ref 2.5–4.5)
PLATELET # BLD AUTO: 319 K/UL (ref 164–446)
PMV BLD AUTO: 10.8 FL (ref 9–12.9)
POTASSIUM SERPL-SCNC: 4.9 MMOL/L (ref 3.6–5.5)
PROT SERPL-MCNC: 6 G/DL (ref 6–8.2)
RBC # BLD AUTO: 5.02 M/UL (ref 4.7–6.1)
SODIUM SERPL-SCNC: 139 MMOL/L (ref 135–145)
WBC # BLD AUTO: 19.6 K/UL (ref 4.8–10.8)

## 2024-03-21 PROCEDURE — 80053 COMPREHEN METABOLIC PANEL: CPT

## 2024-03-21 PROCEDURE — 85025 COMPLETE CBC W/AUTO DIFF WBC: CPT

## 2024-03-21 PROCEDURE — 36415 COLL VENOUS BLD VENIPUNCTURE: CPT

## 2024-03-21 PROCEDURE — 83735 ASSAY OF MAGNESIUM: CPT

## 2024-03-21 PROCEDURE — 84100 ASSAY OF PHOSPHORUS: CPT

## 2024-03-21 PROCEDURE — 700111 HCHG RX REV CODE 636 W/ 250 OVERRIDE (IP): Mod: JZ | Performed by: SURGERY

## 2024-03-21 PROCEDURE — 99024 POSTOP FOLLOW-UP VISIT: CPT

## 2024-03-21 PROCEDURE — 700105 HCHG RX REV CODE 258: Performed by: SURGERY

## 2024-03-21 PROCEDURE — 770001 HCHG ROOM/CARE - MED/SURG/GYN PRIV*

## 2024-03-21 PROCEDURE — 99232 SBSQ HOSP IP/OBS MODERATE 35: CPT | Performed by: NURSE PRACTITIONER

## 2024-03-21 PROCEDURE — 82962 GLUCOSE BLOOD TEST: CPT | Mod: 91

## 2024-03-21 RX ORDER — SODIUM CHLORIDE 9 MG/ML
1000 INJECTION, SOLUTION INTRAVENOUS ONCE
Status: COMPLETED | OUTPATIENT
Start: 2024-03-21 | End: 2024-03-21

## 2024-03-21 RX ADMIN — INSULIN HUMAN 2 UNITS: 100 INJECTION, SOLUTION PARENTERAL at 23:24

## 2024-03-21 RX ADMIN — SODIUM CHLORIDE, POTASSIUM CHLORIDE, SODIUM LACTATE AND CALCIUM CHLORIDE: 600; 310; 30; 20 INJECTION, SOLUTION INTRAVENOUS at 18:35

## 2024-03-21 RX ADMIN — PIPERACILLIN AND TAZOBACTAM 3.38 G: 3; .375 INJECTION, POWDER, FOR SOLUTION INTRAVENOUS at 12:16

## 2024-03-21 RX ADMIN — ONDANSETRON 4 MG: 2 INJECTION INTRAMUSCULAR; INTRAVENOUS at 18:38

## 2024-03-21 RX ADMIN — FAMOTIDINE 20 MG: 10 INJECTION, SOLUTION INTRAVENOUS at 17:23

## 2024-03-21 RX ADMIN — PIPERACILLIN AND TAZOBACTAM 3.38 G: 3; .375 INJECTION, POWDER, FOR SOLUTION INTRAVENOUS at 04:22

## 2024-03-21 RX ADMIN — PIPERACILLIN AND TAZOBACTAM 3.38 G: 3; .375 INJECTION, POWDER, FOR SOLUTION INTRAVENOUS at 21:16

## 2024-03-21 RX ADMIN — INSULIN HUMAN 2 UNITS: 100 INJECTION, SOLUTION PARENTERAL at 12:19

## 2024-03-21 RX ADMIN — INSULIN HUMAN 2 UNITS: 100 INJECTION, SOLUTION PARENTERAL at 17:28

## 2024-03-21 RX ADMIN — ENOXAPARIN SODIUM 40 MG: 100 INJECTION SUBCUTANEOUS at 17:24

## 2024-03-21 RX ADMIN — INSULIN HUMAN 2 UNITS: 100 INJECTION, SOLUTION PARENTERAL at 05:28

## 2024-03-21 RX ADMIN — SODIUM CHLORIDE 1000 ML: 9 INJECTION, SOLUTION INTRAVENOUS at 07:53

## 2024-03-21 ASSESSMENT — PATIENT HEALTH QUESTIONNAIRE - PHQ9
1. LITTLE INTEREST OR PLEASURE IN DOING THINGS: NOT AT ALL
SUM OF ALL RESPONSES TO PHQ9 QUESTIONS 1 AND 2: 0
2. FEELING DOWN, DEPRESSED, IRRITABLE, OR HOPELESS: NOT AT ALL

## 2024-03-21 ASSESSMENT — PAIN DESCRIPTION - PAIN TYPE
TYPE: ACUTE PAIN
TYPE: SURGICAL PAIN
TYPE: ACUTE PAIN
TYPE: SURGICAL PAIN
TYPE: ACUTE PAIN;SURGICAL PAIN
TYPE: ACUTE PAIN
TYPE: ACUTE PAIN

## 2024-03-21 ASSESSMENT — LIFESTYLE VARIABLES
TOTAL SCORE: 0
EVER FELT BAD OR GUILTY ABOUT YOUR DRINKING: NO
CONSUMPTION TOTAL: NEGATIVE
DOES PATIENT WANT TO STOP DRINKING: NO
HOW MANY TIMES IN THE PAST YEAR HAVE YOU HAD 5 OR MORE DRINKS IN A DAY: 0
HAVE PEOPLE ANNOYED YOU BY CRITICIZING YOUR DRINKING: NO
AVERAGE NUMBER OF DAYS PER WEEK YOU HAVE A DRINK CONTAINING ALCOHOL: 0
ON A TYPICAL DAY WHEN YOU DRINK ALCOHOL HOW MANY DRINKS DO YOU HAVE: 0
ALCOHOL_USE: NO
HAVE YOU EVER FELT YOU SHOULD CUT DOWN ON YOUR DRINKING: NO
TOTAL SCORE: 0
TOTAL SCORE: 0
EVER HAD A DRINK FIRST THING IN THE MORNING TO STEADY YOUR NERVES TO GET RID OF A HANGOVER: NO

## 2024-03-21 ASSESSMENT — ENCOUNTER SYMPTOMS
MYALGIAS: 0
FALLS: 0
CHILLS: 0
FLANK PAIN: 0
CONSTIPATION: 0
FEVER: 0
NERVOUS/ANXIOUS: 0
SHORTNESS OF BREATH: 0
DIARRHEA: 0
BLOOD IN STOOL: 0
HEADACHES: 0
WEAKNESS: 1
VOMITING: 0
SORE THROAT: 0
NAUSEA: 0
ABDOMINAL PAIN: 0
COUGH: 0

## 2024-03-21 NOTE — PROGRESS NOTES
Patient back to T406 from PACU.   Surgical incision to R abdomen with dressing in place, CDI.   RUQ JOVANA drain to bulb suction.   X1 Lap site to LLQ with G/T.   Dilaudid PCA running per MAR.   Spo2 > 90% on 2L oxymask.

## 2024-03-21 NOTE — PROGRESS NOTES
Report received from RN, assumed care at 1845  Pt is A0X4, and responds appropriately   Pt declines any SOB, chest pain, new onset of numbness/ tingling  R nare NG tube to low continuous suction  Pt rates pain at 5/10, on a scale of 1-10, pt medicated per bolus button  Pt is voiding adequately and without hesitancy  Pt has - flatus, + bowel sounds, - BM, 3/20 prior to surgery  Pt ambulates with a standby assist   RLQ JOVANA drain with sanguinous output  Pt is NPO, pt denies any nausea/vomiting  Plan of care discussed, all questions answered. Explained importance of calling before getting OOB and pt verbalizes understanding. Explained importance of oral care. Call light is within reach, treaded slipper socks on, bed in lowest/ locked position, hourly rounding in place, all needs met at this time

## 2024-03-21 NOTE — PROGRESS NOTES
Report received from previous shift RN.  Assessment complete.  Patient resting in bed comfortably, even and unlabored breathing present.  Surgical incision to abdomen with dressing in place, CDI. R JOVANA drain to bulb suction, CDI.   PCA running per MAR.   IS provided, education given.   All needs met at this time. Call light within reach. Hourly rounding in place.

## 2024-03-21 NOTE — OR NURSING
1442: Pt arrived via gurney with Dr. Jameson, anesthesia, and RN. Report received. See flowsheet for VS. Dressing CDI. JOVANA drain in place. NG attached to low continuous suction. Orders reviewed and initiated.   1717: Report called to JOE Smith. All questions answered. VSS. Dressing CDI. JOVANA drain in place. No patient distress. Pain medicated per MAR. Alert and oriented x4. Pt transported to room in stable condition on 2L NC with no personal belongings.

## 2024-03-21 NOTE — CARE PLAN
The patient is Watcher - Medium risk of patient condition declining or worsening    Shift Goals  Clinical Goals: pain control, NGT management  Patient Goals: updates, pain control, ambulation, rest  Family Goals: BETTY    Progress made toward(s) clinical / shift goals:  Pain managed per MAR.       Problem: Knowledge Deficit - Standard  Goal: Patient and family/care givers will demonstrate understanding of plan of care, disease process/condition, diagnostic tests and medications  Description: Target End Date:  1-3 days or as soon as patient condition allows    Document in Patient Education    1.  Patient and family/caregiver oriented to unit, equipment, visitation policy and means for communicating concern  2.  Complete/review Learning Assessment  3.  Assess knowledge level of disease process/condition, treatment plan, diagnostic tests and medications  4.  Explain disease process/condition, treatment plan, diagnostic tests and medications  Outcome: Progressing     Problem: Pain - Standard  Goal: Alleviation of pain or a reduction in pain to the patient’s comfort goal  Description: Target End Date:  Prior to discharge or change in level of care    Document on Vitals flowsheet    1.  Document pain using the appropriate pain scale per order or unit policy  2.  Educate and implement non-pharmacologic comfort measures (i.e. relaxation, distraction, massage, cold/heat therapy, etc.)  3.  Pain management medications as ordered  4.  Reassess pain after pain med administration per policy  5.  If opiods administered assess patient's response to pain medication is appropriate per POSS sedation scale  6.  Follow pain management plan developed in collaboration with patient and interdisciplinary team (including palliative care or pain specialists if applicable)  Outcome: Progressing

## 2024-03-21 NOTE — PROGRESS NOTES
Gastroenterology Progress Note               Author:  SIMBA Ospina Date & Time Created: 3/21/2024 4:45 PM       Patient ID:  Name:             Manolo Puri  YOB: 1940  Age:                 83 y.o.  male  MRN:               [id number]        Medical Decision Making, by Problem:  Active Hospital Problems    Diagnosis     Acute kidney injury (HCC) [N17.9]     Gallstone pancreatitis [K85.10]     Total bilirubin, elevated [R17]     Type 2 diabetes mellitus with diabetic polyneuropathy, with long-term current use of insulin (HCC) [E11.42, Z79.4]            Presenting Chief Complaint:  Gallstone pancreatitis     History of Present Illness:   83-year-old male with several days of abdominal pain, nausea, vomiting.  Patient evaluated in the emergency room this morning.  Patient has hyperbilirubinemia and an elevated lipase.  He has an obstructive biochemical liver profile.  CT scan with contrast performed.  Findings consistent with interstitial edematous pancreatitis.  CBD does not demonstrate stone.  Patient feeling quite a bit better since having received pain medications.  Surgery admitting.  Patient started on Zosyn and given lactated Ringer's bolus along with continued moderate hydration.  Patient has a history of gallstone pancreatitis dating back to last year.  No interventions at that time were pursued per patient request.      Interval History:  3/21/2024: Patient seen at bedside.  He is postprocedure day #1 s/p laparoscopic converted to open cholecystectomy with drain placement.  He reports feeling well without nausea, vomiting, denies abdominal pain.  Serosanguineous drainage noted to right upper quadrant JOVANA drain.  NG tube in place.  AST mildly elevated 54, ALT 77, alk phos down trended 95.  Total bilirubin continues to downtrend to 1.8 this morning.      Hospital Medications:  Current Facility-Administered Medications   Medication Dose Frequency Provider Last Rate Last  Admin    HYDROmorphone (Dilaudid) 0.2 mg/mL in 50 mL NS (PCA)   Continuous Sara Ulloa D.N.P.   Rate Verify at 03/21/24 0654    And    Pharmacy Consult Request ...Pain Management Review 1 Each  1 Each PHARMACY TO DOSE Sara Ulloa D.N.P.        insulin regular (HumuLIN R,NovoLIN R) injection  1-6 Units Q6HRS Daniel Jameson M.D.   2 Units at 03/21/24 1219    And    dextrose 10 % BOLUS 25 g  25 g Q15 MIN PRN Daniel Jameson M.D.        Respiratory Therapy Consult   Continuous RT Daniel Jameson M.D.        ondansetron (Zofran) syringe/vial injection 4 mg  4 mg Q4HRS PRN Daniel Jameson M.D.   4 mg at 03/20/24 1849    docusate sodium (Colace) capsule 100 mg  100 mg BID Daniel Jameson M.D.   100 mg at 03/19/24 1711    senna-docusate (Pericolace Or Senokot S) 8.6-50 MG per tablet 1 Tablet  1 Tablet Nightly Daniel Jameson M.D.   1 Tablet at 03/19/24 2041    senna-docusate (Pericolace Or Senokot S) 8.6-50 MG per tablet 1 Tablet  1 Tablet Q24HRS PRN Daniel Jameson M.D.        polyethylene glycol/lytes (Miralax) Packet 1 Packet  1 Packet BID Daniel Jameson M.D.   1 Packet at 03/19/24 1711    magnesium hydroxide (Milk Of Magnesia) suspension 30 mL  30 mL DAILY Daniel Jameson M.D.   30 mL at 03/19/24 0449    bisacodyl (Dulcolax) suppository 10 mg  10 mg Q24HRS PRN Daniel Jameson M.D.        sodium phosphate (Fleet) enema 133 mL  1 Each Once PRN Daniel Jamseon M.D.        LR infusion   Continuous Daniel Jameson M.D. 100 mL/hr at 03/20/24 2029 New Bag at 03/20/24 2029    enoxaparin (Lovenox) inj 40 mg  40 mg DAILY AT 1800 Daniel Jameson M.D.   40 mg at 03/20/24 1737    acetaminophen (Tylenol) tablet 1,000 mg  1,000 mg Q6HRS Daniel Jameson M.D.   1,000 mg at 03/20/24 0427    Followed by    [START ON 3/23/2024] acetaminophen (Tylenol) tablet 1,000 mg  1,000 mg Q6HRS PRN Daniel Jameson M.D.        piperacillin-tazobactam (Zosyn) 3.375 g in  mL IVPB  3.375 g Q8HRS Daniel Jameson M.D.   Stopped at 03/21/24 1616    famotidine (Pepcid) tablet 20 mg   20 mg DAILY AT 1800 Daniel Jameson M.D.   20 mg at 03/19/24 1711    Or    famotidine (Pepcid) injection 20 mg  20 mg DAILY AT 1800 Daniel Jameson M.D.   20 mg at 03/20/24 1756   Last reviewed on 3/18/2024 12:24 PM by Anjali Raines, T       Review of Systems:  Review of Systems   Constitutional:  Positive for malaise/fatigue. Negative for chills and fever.   HENT:  Negative for congestion and sore throat.    Respiratory:  Negative for cough and shortness of breath.    Cardiovascular:  Negative for chest pain and leg swelling.   Gastrointestinal:  Negative for abdominal pain, blood in stool, constipation, diarrhea, nausea and vomiting.   Genitourinary:  Negative for dysuria and flank pain.   Musculoskeletal:  Negative for falls and myalgias.   Neurological:  Positive for weakness. Negative for headaches.   Psychiatric/Behavioral:  The patient is not nervous/anxious.    All other systems reviewed and are negative.        Vital signs:  Weight/BMI: Body mass index is 30.68 kg/m².  BP (!) 151/97   Pulse 91   Temp 36.6 °C (97.9 °F) (Temporal)   Resp 17   Ht 1.829 m (6')   Wt 103 kg (226 lb 3.1 oz)   SpO2 95%   Vitals:    03/21/24 0316 03/21/24 0813 03/21/24 1127 03/21/24 1538   BP: 138/83 (!) 132/91 (!) 142/94 (!) 151/97   Pulse: 97 94 (!) 101 91   Resp: 18 16 16 17   Temp: 36.3 °C (97.3 °F) 36.6 °C (97.9 °F) 36.7 °C (98.1 °F) 36.6 °C (97.9 °F)   TempSrc: Temporal Temporal Temporal Temporal   SpO2: 95% 96% 93% 95%   Weight:       Height:         Oxygen Therapy:  Pulse Oximetry: 95 %, O2 (LPM): 2, O2 Delivery Device: Silicone Nasal Cannula    Intake/Output Summary (Last 24 hours) at 3/21/2024 1645  Last data filed at 3/21/2024 1542  Gross per 24 hour   Intake 9.2 ml   Output 1275 ml   Net -1265.8 ml         Physical Exam:  Physical Exam  Vitals and nursing note reviewed.   Constitutional:       General: He is awake. He is not in acute distress.     Appearance: He is obese. He is ill-appearing. He is not toxic-appearing.    HENT:      Head: Normocephalic.      Nose: Nose normal. No congestion.      Comments: NG tube in place to low suction.  No output noted.     Mouth/Throat:      Mouth: Mucous membranes are moist.      Pharynx: Oropharynx is clear. No oropharyngeal exudate.   Eyes:      General: No scleral icterus.     Extraocular Movements: Extraocular movements intact.      Conjunctiva/sclera: Conjunctivae normal.   Cardiovascular:      Rate and Rhythm: Normal rate and regular rhythm.      Pulses: Normal pulses.      Heart sounds: Normal heart sounds. No murmur heard.  Pulmonary:      Effort: Pulmonary effort is normal. No respiratory distress.      Breath sounds: Normal breath sounds.   Abdominal:      General: Abdomen is flat. Bowel sounds are normal. There is no distension.      Palpations: Abdomen is soft.      Tenderness: There is abdominal tenderness (Mild right upper quadrant tenderness with palpation.). There is no guarding.      Comments: Surgical dressing to right upper quadrant clean dry and intact.  Periumbilical laparoscopic incision dressing clean dry and intact.  Right upper quadrant JOVANA drain with ~100mL serosanguineous output   Musculoskeletal:      Right lower leg: No edema.      Left lower leg: No edema.   Skin:     General: Skin is warm and dry.      Capillary Refill: Capillary refill takes less than 2 seconds.      Coloration: Skin is not jaundiced.      Findings: No bruising.   Neurological:      General: No focal deficit present.      Mental Status: He is alert and oriented to person, place, and time. Mental status is at baseline.      Motor: No weakness.   Psychiatric:         Mood and Affect: Mood normal.         Behavior: Behavior is cooperative.         Thought Content: Thought content normal.         Judgment: Judgment normal.             Labs:  Recent Labs     03/19/24  0738 03/20/24  0529 03/21/24  0149   SODIUM 137 138 139   POTASSIUM 3.9 4.1 4.9   CHLORIDE 102 103 102   CO2 23 21 16*   BUN 36* 30*  39*   CREATININE 1.43* 1.44* 1.79*   MAGNESIUM  --   --  2.4   PHOSPHORUS  --   --  4.4   CALCIUM 8.5 8.2* 8.1*     Recent Labs     03/19/24  0738 03/20/24  0529 03/21/24  0149   ALTSGPT 86* 58* 77*   ASTSGOT 31 22 54*   ALKPHOSPHAT 204* 222* 195*   TBILIRUBIN 3.2* 2.6* 1.8*   LIPASE 63  --   --    GLUCOSE 133* 136* 275*     Recent Labs     03/19/24  0738 03/20/24  0529 03/21/24  0149   WBC 15.3* 15.0* 19.6*   NEUTSPOLYS 77.40* 76.70* 82.00*   LYMPHOCYTES 5.40* 5.30* 3.60*   MONOCYTES 13.10 13.30 9.60   EOSINOPHILS 2.00 2.10 0.10   BASOPHILS 0.50 0.70 0.70   ASTSGOT 31 22 54*   ALTSGPT 86* 58* 77*   ALKPHOSPHAT 204* 222* 195*   TBILIRUBIN 3.2* 2.6* 1.8*     Recent Labs     03/19/24  0738 03/20/24  0529 03/21/24  0149   RBC 5.23 5.13 5.02   HEMOGLOBIN 14.6 14.4 13.7*   HEMATOCRIT 42.7 42.5 43.3   PLATELETCT 245 254 319     Recent Results (from the past 24 hour(s))   POCT glucose device results    Collection Time: 03/20/24  5:53 PM   Result Value Ref Range    POC Glucose, Blood 189 (H) 65 - 99 mg/dL   POCT glucose device results    Collection Time: 03/20/24  8:05 PM   Result Value Ref Range    POC Glucose, Blood 223 (H) 65 - 99 mg/dL   POCT glucose device results    Collection Time: 03/20/24  9:56 PM   Result Value Ref Range    POC Glucose, Blood 218 (H) 65 - 99 mg/dL   CBC with Differential: Tomorrow AM    Collection Time: 03/21/24  1:49 AM   Result Value Ref Range    WBC 19.6 (H) 4.8 - 10.8 K/uL    RBC 5.02 4.70 - 6.10 M/uL    Hemoglobin 13.7 (L) 14.0 - 18.0 g/dL    Hematocrit 43.3 42.0 - 52.0 %    MCV 86.3 81.4 - 97.8 fL    MCH 27.3 27.0 - 33.0 pg    MCHC 31.6 (L) 32.3 - 36.5 g/dL    RDW 46.7 35.9 - 50.0 fL    Platelet Count 319 164 - 446 K/uL    MPV 10.8 9.0 - 12.9 fL    Neutrophils-Polys 82.00 (H) 44.00 - 72.00 %    Lymphocytes 3.60 (L) 22.00 - 41.00 %    Monocytes 9.60 0.00 - 13.40 %    Eosinophils 0.10 0.00 - 6.90 %    Basophils 0.70 0.00 - 1.80 %    Immature Granulocytes 4.00 (H) 0.00 - 0.90 %    Nucleated RBC  0.00 0.00 - 0.20 /100 WBC    Neutrophils (Absolute) 16.07 (H) 1.82 - 7.42 K/uL    Lymphs (Absolute) 0.70 (L) 1.00 - 4.80 K/uL    Monos (Absolute) 1.88 (H) 0.00 - 0.85 K/uL    Eos (Absolute) 0.01 0.00 - 0.51 K/uL    Baso (Absolute) 0.13 (H) 0.00 - 0.12 K/uL    Immature Granulocytes (abs) 0.79 (H) 0.00 - 0.11 K/uL    NRBC (Absolute) 0.00 K/uL   Magnesium: Every Monday and Thursday AM    Collection Time: 03/21/24  1:49 AM   Result Value Ref Range    Magnesium 2.4 1.5 - 2.5 mg/dL   Phosphorus: Every Monday and Thursday AM    Collection Time: 03/21/24  1:49 AM   Result Value Ref Range    Phosphorus 4.4 2.5 - 4.5 mg/dL   Comp Metabolic Panel (CMP): Tomorrow AM    Collection Time: 03/21/24  1:49 AM   Result Value Ref Range    Sodium 139 135 - 145 mmol/L    Potassium 4.9 3.6 - 5.5 mmol/L    Chloride 102 96 - 112 mmol/L    Co2 16 (L) 20 - 33 mmol/L    Anion Gap 21.0 (H) 7.0 - 16.0    Glucose 275 (H) 65 - 99 mg/dL    Bun 39 (H) 8 - 22 mg/dL    Creatinine 1.79 (H) 0.50 - 1.40 mg/dL    Calcium 8.1 (L) 8.5 - 10.5 mg/dL    Correct Calcium 9.1 8.5 - 10.5 mg/dL    AST(SGOT) 54 (H) 12 - 45 U/L    ALT(SGPT) 77 (H) 2 - 50 U/L    Alkaline Phosphatase 195 (H) 30 - 99 U/L    Total Bilirubin 1.8 (H) 0.1 - 1.5 mg/dL    Albumin 2.7 (L) 3.2 - 4.9 g/dL    Total Protein 6.0 6.0 - 8.2 g/dL    Globulin 3.3 1.9 - 3.5 g/dL    A-G Ratio 0.8 g/dL   ESTIMATED GFR    Collection Time: 03/21/24  1:49 AM   Result Value Ref Range    GFR (CKD-EPI) 37 (A) >60 mL/min/1.73 m 2   POCT glucose device results    Collection Time: 03/21/24  5:26 AM   Result Value Ref Range    POC Glucose, Blood 239 (H) 65 - 99 mg/dL   POCT glucose device results    Collection Time: 03/21/24 12:17 PM   Result Value Ref Range    POC Glucose, Blood 205 (H) 65 - 99 mg/dL       Radiology Review:  CT-ABDOMEN-PELVIS WITH   Final Result      1.  Hepatic steatosis.      2.  Cholelithiasis.      3.  Colonic diverticulosis.      4.  Mild atherosclerotic plaquing of the abdominal aorta and  iliac arteries      US-RUQ   Final Result         1.  Hepatic steatosis.      2.  Cholelithiasis with gallbladder wall thickening suggestive of acute or chronic cholecystitis.      DX-CHEST-PORTABLE (1 VIEW)   Final Result      No acute cardiopulmonary disease evident.            MDM (Data Review):   -Records reviewed and summarized in current documentation  -I personally reviewed and interpreted the laboratory results  -I personally reviewed the radiology images    Assessment/Recommendations:  Gallstone pancreatitis  Transaminitis  Hyperbilirubinemia-downtrending  S/p laparoscopic converted to open cholecystectomy  Type 2 diabetes mellitus    MDM:  Is an 83-year-old male with a past medical history as listed above who presented to [facility] on 3/18/2024 with gallstone pancreatitis.  Initial bilirubin 5.  CT abdomen pelvis with finding supportive of interstitial edematous pancreatitis.  Patient went for laparoscopic cholecystectomy but was converted to open cholecystectomy due to gallbladder perforation into the liver with small abscess.  IOC was unable to be completed.  JOVANA drain in place.  Postoperatively, patient AAOx4.  Reports feeling well without nausea, vomiting.  Mild right upper quadrant abdominal pain with palpation.  There is approximately 100 mL of serosanguineous fluid in JOVANA drain.  Does not appear to have bile.  Due to intraoperative complications, patient at risk for bile leak.  Low suspicion for bile leak as JOVANA drain does not appear to have bilious output.  Will follow for another day to ensure no bile leak.  Bilirubin continues to decrease to 1.8 this morning.  Suspect the patient has passed stone.    Plan:  Trend LFTs  Monitor JOVANA drain output  Diet per surgical team    Discussed with patient, Dr. Chatterjee and Sara Gonzales Ulloa Surgical APRN    Core Quality Measures   Reviewed items::  Labs, Medications and Radiology reports reviewed

## 2024-03-21 NOTE — PROGRESS NOTES
DATE: 3/21/2024    Post Operative Day  1 laparoscopic cholecystectomy converted to open.    INTERVAL EVENTS:  Doing well post surgery.  Adequate pain control-on PCA pump  Denies flatus.  JOVANA drain with serous sang output  LFTs and Tbili stable.  Slight increase in creatinine - 1 liter bolus administered.    PHYSICAL EXAMINATION:  Vital Signs: BP (!) 132/91   Pulse 94   Temp 36.6 °C (97.9 °F) (Temporal)   Resp 16   Ht 1.829 m (6')   Wt 103 kg (226 lb 3.1 oz)   SpO2 96%     Awake and alert.  Family at bedside.  Oxy mask 2 L.  Respiratory rate even and unlabored.  Abdomen soft and minimal tympanic present  Surgical dressing C/D/I  Right upper quadrant JOVANA drain with serous sang output, 110 cc.  NGT to LCWS -no output overnight.      LABORATORY VALUES:  Recent Labs     03/19/24  0738 03/20/24  0529 03/21/24  0149   WBC 15.3* 15.0* 19.6*   RBC 5.23 5.13 5.02   HEMOGLOBIN 14.6 14.4 13.7*   HEMATOCRIT 42.7 42.5 43.3   MCV 81.6 82.8 86.3   MCH 27.9 28.1 27.3   MCHC 34.2 33.9 31.6*   RDW 42.0 43.7 46.7   PLATELETCT 245 254 319   MPV 10.5 10.6 10.8     Recent Labs     03/19/24  0738 03/20/24  0529 03/21/24  0149   SODIUM 137 138 139   POTASSIUM 3.9 4.1 4.9   CHLORIDE 102 103 102   CO2 23 21 16*   GLUCOSE 133* 136* 275*   BUN 36* 30* 39*   CREATININE 1.43* 1.44* 1.79*   CALCIUM 8.5 8.2* 8.1*     Recent Labs     03/19/24  0738 03/20/24  0529 03/21/24  0149   ASTSGOT 31 22 54*   ALTSGPT 86* 58* 77*   TBILIRUBIN 3.2* 2.6* 1.8*   ALKPHOSPHAT 204* 222* 195*   GLOBULIN 3.5 3.5 3.3            IMAGING:  CT-ABDOMEN-PELVIS WITH   Final Result      1.  Hepatic steatosis.      2.  Cholelithiasis.      3.  Colonic diverticulosis.      4.  Mild atherosclerotic plaquing of the abdominal aorta and iliac arteries      US-RUQ   Final Result         1.  Hepatic steatosis.      2.  Cholelithiasis with gallbladder wall thickening suggestive of acute or chronic cholecystitis.      DX-CHEST-PORTABLE (1 VIEW)   Final Result      No acute  cardiopulmonary disease evident.          ASSESSMENT AND PLAN:  * Gallstone pancreatitis- (present on admission)  Assessment & Plan  3/19 Lipase normalized, T.nataly trending downward.No indication for ERCP per GI.  -Plan for lap magnus with possible IOC tomorrow am.   3/20 laparoscopic cholecystectomy converted to open.  JOVANA drain in place    Total bilirubin, elevated- (present on admission)  Assessment & Plan  Initial bilirubin 5.1.  CBD not significantly dilated at 3.6 mm in diameter.  Will discuss trending serial LFTs vs ERCP or further imagining with GI.  3/19 T.Bili 3.2, lipase 63    Acute kidney injury (HCC)  Assessment & Plan  Gentle IV hydration, avoid nephrotoxins  3/21 creatinine 1.75.   -1 L NS bolus.  Seral labs.    Type 2 diabetes mellitus with diabetic polyneuropathy, with long-term current use of insulin (HCC)- (present on admission)  Assessment & Plan  Chronic condition treated with insulin glargine 75 units SQ every morning, insulin aspart 5-14 units SQ 3 times a day prior to meals, metformin, and dapagliflozin.  Holding maintenance metformin, dapagliflozin, and basilar insulin as he is NPO. Insulin sliding scale coverage.           ____________________________________     ANSHU GomezP.    DD: 3/21/2024  10:34 AM

## 2024-03-22 ENCOUNTER — APPOINTMENT (OUTPATIENT)
Dept: RADIOLOGY | Facility: MEDICAL CENTER | Age: 84
End: 2024-03-22
Payer: MEDICARE

## 2024-03-22 LAB
ALBUMIN SERPL BCP-MCNC: 2.5 G/DL (ref 3.2–4.9)
ALBUMIN/GLOB SERPL: 0.7 G/DL
ALP SERPL-CCNC: 300 U/L (ref 30–99)
ALT SERPL-CCNC: 50 U/L (ref 2–50)
ANION GAP SERPL CALC-SCNC: 13 MMOL/L (ref 7–16)
AST SERPL-CCNC: 32 U/L (ref 12–45)
BASOPHILS # BLD AUTO: 1.1 % (ref 0–1.8)
BASOPHILS # BLD: 0.16 K/UL (ref 0–0.12)
BILIRUB SERPL-MCNC: 1.6 MG/DL (ref 0.1–1.5)
BUN SERPL-MCNC: 33 MG/DL (ref 8–22)
CALCIUM ALBUM COR SERPL-MCNC: 9.3 MG/DL (ref 8.5–10.5)
CALCIUM SERPL-MCNC: 8.1 MG/DL (ref 8.5–10.5)
CHLORIDE SERPL-SCNC: 110 MMOL/L (ref 96–112)
CO2 SERPL-SCNC: 20 MMOL/L (ref 20–33)
CREAT SERPL-MCNC: 1.48 MG/DL (ref 0.5–1.4)
EOSINOPHIL # BLD AUTO: 0.39 K/UL (ref 0–0.51)
EOSINOPHIL NFR BLD: 2.8 % (ref 0–6.9)
ERYTHROCYTE [DISTWIDTH] IN BLOOD BY AUTOMATED COUNT: 49.7 FL (ref 35.9–50)
GFR SERPLBLD CREATININE-BSD FMLA CKD-EPI: 47 ML/MIN/1.73 M 2
GLOBULIN SER CALC-MCNC: 3.5 G/DL (ref 1.9–3.5)
GLUCOSE BLD STRIP.AUTO-MCNC: 189 MG/DL (ref 65–99)
GLUCOSE BLD STRIP.AUTO-MCNC: 207 MG/DL (ref 65–99)
GLUCOSE BLD STRIP.AUTO-MCNC: 275 MG/DL (ref 65–99)
GLUCOSE SERPL-MCNC: 218 MG/DL (ref 65–99)
HCT VFR BLD AUTO: 42.3 % (ref 42–52)
HGB BLD-MCNC: 13.1 G/DL (ref 14–18)
IMM GRANULOCYTES # BLD AUTO: 0.55 K/UL (ref 0–0.11)
IMM GRANULOCYTES NFR BLD AUTO: 3.9 % (ref 0–0.9)
LYMPHOCYTES # BLD AUTO: 1.17 K/UL (ref 1–4.8)
LYMPHOCYTES NFR BLD: 8.3 % (ref 22–41)
MCH RBC QN AUTO: 27.6 PG (ref 27–33)
MCHC RBC AUTO-ENTMCNC: 31 G/DL (ref 32.3–36.5)
MCV RBC AUTO: 89.1 FL (ref 81.4–97.8)
MONOCYTES # BLD AUTO: 2.03 K/UL (ref 0–0.85)
MONOCYTES NFR BLD AUTO: 14.4 % (ref 0–13.4)
NEUTROPHILS # BLD AUTO: 9.78 K/UL (ref 1.82–7.42)
NEUTROPHILS NFR BLD: 69.5 % (ref 44–72)
NRBC # BLD AUTO: 0 K/UL
NRBC BLD-RTO: 0 /100 WBC (ref 0–0.2)
PLATELET # BLD AUTO: 293 K/UL (ref 164–446)
PMV BLD AUTO: 10.3 FL (ref 9–12.9)
POTASSIUM SERPL-SCNC: 4.7 MMOL/L (ref 3.6–5.5)
PROT SERPL-MCNC: 6 G/DL (ref 6–8.2)
RBC # BLD AUTO: 4.75 M/UL (ref 4.7–6.1)
SODIUM SERPL-SCNC: 143 MMOL/L (ref 135–145)
WBC # BLD AUTO: 14.1 K/UL (ref 4.8–10.8)

## 2024-03-22 PROCEDURE — 85025 COMPLETE CBC W/AUTO DIFF WBC: CPT

## 2024-03-22 PROCEDURE — 770001 HCHG ROOM/CARE - MED/SURG/GYN PRIV*

## 2024-03-22 PROCEDURE — 700105 HCHG RX REV CODE 258: Performed by: SURGERY

## 2024-03-22 PROCEDURE — A9270 NON-COVERED ITEM OR SERVICE: HCPCS

## 2024-03-22 PROCEDURE — 99024 POSTOP FOLLOW-UP VISIT: CPT

## 2024-03-22 PROCEDURE — 700111 HCHG RX REV CODE 636 W/ 250 OVERRIDE (IP)

## 2024-03-22 PROCEDURE — 700102 HCHG RX REV CODE 250 W/ 637 OVERRIDE(OP)

## 2024-03-22 PROCEDURE — 82962 GLUCOSE BLOOD TEST: CPT | Mod: 91

## 2024-03-22 PROCEDURE — 36415 COLL VENOUS BLD VENIPUNCTURE: CPT

## 2024-03-22 PROCEDURE — 99232 SBSQ HOSP IP/OBS MODERATE 35: CPT | Performed by: NURSE PRACTITIONER

## 2024-03-22 PROCEDURE — 700111 HCHG RX REV CODE 636 W/ 250 OVERRIDE (IP): Mod: JZ | Performed by: SURGERY

## 2024-03-22 PROCEDURE — 80053 COMPREHEN METABOLIC PANEL: CPT

## 2024-03-22 PROCEDURE — A9270 NON-COVERED ITEM OR SERVICE: HCPCS | Performed by: SURGERY

## 2024-03-22 PROCEDURE — 700102 HCHG RX REV CODE 250 W/ 637 OVERRIDE(OP): Performed by: SURGERY

## 2024-03-22 PROCEDURE — 71045 X-RAY EXAM CHEST 1 VIEW: CPT

## 2024-03-22 PROCEDURE — 700101 HCHG RX REV CODE 250

## 2024-03-22 RX ORDER — METFORMIN HYDROCHLORIDE 500 MG/1
500 TABLET, EXTENDED RELEASE ORAL 2 TIMES DAILY
Status: DISCONTINUED | OUTPATIENT
Start: 2024-03-22 | End: 2024-03-24 | Stop reason: HOSPADM

## 2024-03-22 RX ORDER — LISINOPRIL 5 MG/1
5 TABLET ORAL
Status: DISCONTINUED | OUTPATIENT
Start: 2024-03-23 | End: 2024-03-24

## 2024-03-22 RX ORDER — OXYCODONE HYDROCHLORIDE 5 MG/1
5 TABLET ORAL
Status: DISCONTINUED | OUTPATIENT
Start: 2024-03-22 | End: 2024-03-24 | Stop reason: HOSPADM

## 2024-03-22 RX ORDER — LIDOCAINE 4 G/G
1 PATCH TOPICAL EVERY 24 HOURS
Status: DISCONTINUED | OUTPATIENT
Start: 2024-03-22 | End: 2024-03-24 | Stop reason: HOSPADM

## 2024-03-22 RX ORDER — OXYCODONE HYDROCHLORIDE 5 MG/1
2.5 TABLET ORAL
Status: DISCONTINUED | OUTPATIENT
Start: 2024-03-22 | End: 2024-03-24 | Stop reason: HOSPADM

## 2024-03-22 RX ADMIN — DOCUSATE SODIUM 100 MG: 100 CAPSULE, LIQUID FILLED ORAL at 17:15

## 2024-03-22 RX ADMIN — ACETAMINOPHEN 1000 MG: 500 TABLET, FILM COATED ORAL at 17:15

## 2024-03-22 RX ADMIN — PIPERACILLIN AND TAZOBACTAM 3.38 G: 3; .375 INJECTION, POWDER, FOR SOLUTION INTRAVENOUS at 12:58

## 2024-03-22 RX ADMIN — DOCUSATE SODIUM 50 MG AND SENNOSIDES 8.6 MG 1 TABLET: 8.6; 5 TABLET, FILM COATED ORAL at 20:44

## 2024-03-22 RX ADMIN — METFORMIN HYDROCHLORIDE 500 MG: 500 TABLET, EXTENDED RELEASE ORAL at 17:15

## 2024-03-22 RX ADMIN — PIPERACILLIN AND TAZOBACTAM 3.38 G: 3; .375 INJECTION, POWDER, FOR SOLUTION INTRAVENOUS at 05:19

## 2024-03-22 RX ADMIN — Medication: at 05:49

## 2024-03-22 RX ADMIN — ENOXAPARIN SODIUM 40 MG: 100 INJECTION SUBCUTANEOUS at 17:15

## 2024-03-22 RX ADMIN — SODIUM CHLORIDE, POTASSIUM CHLORIDE, SODIUM LACTATE AND CALCIUM CHLORIDE: 600; 310; 30; 20 INJECTION, SOLUTION INTRAVENOUS at 15:33

## 2024-03-22 RX ADMIN — LIDOCAINE 1 PATCH: 560 PATCH PERCUTANEOUS; TOPICAL; TRANSDERMAL at 14:40

## 2024-03-22 RX ADMIN — INSULIN HUMAN 3 UNITS: 100 INJECTION, SOLUTION PARENTERAL at 17:20

## 2024-03-22 RX ADMIN — POLYETHYLENE GLYCOL 3350 1 PACKET: 17 POWDER, FOR SOLUTION ORAL at 17:15

## 2024-03-22 RX ADMIN — FAMOTIDINE 20 MG: 20 TABLET, FILM COATED ORAL at 17:15

## 2024-03-22 RX ADMIN — BISACODYL 10 MG: 10 SUPPOSITORY RECTAL at 18:17

## 2024-03-22 RX ADMIN — INSULIN HUMAN 2 UNITS: 100 INJECTION, SOLUTION PARENTERAL at 13:04

## 2024-03-22 RX ADMIN — INSULIN HUMAN 2 UNITS: 100 INJECTION, SOLUTION PARENTERAL at 23:34

## 2024-03-22 RX ADMIN — ACETAMINOPHEN 1000 MG: 500 TABLET, FILM COATED ORAL at 23:32

## 2024-03-22 RX ADMIN — OXYCODONE 5 MG: 5 TABLET ORAL at 23:37

## 2024-03-22 RX ADMIN — ACETAMINOPHEN 1000 MG: 500 TABLET, FILM COATED ORAL at 12:51

## 2024-03-22 RX ADMIN — PIPERACILLIN AND TAZOBACTAM 3.38 G: 3; .375 INJECTION, POWDER, FOR SOLUTION INTRAVENOUS at 20:48

## 2024-03-22 RX ADMIN — INSULIN HUMAN 1 UNITS: 100 INJECTION, SOLUTION PARENTERAL at 05:23

## 2024-03-22 ASSESSMENT — ENCOUNTER SYMPTOMS
DIARRHEA: 0
CHILLS: 0
FLANK PAIN: 0
VOMITING: 0
CONSTIPATION: 0
NAUSEA: 0
BLOOD IN STOOL: 0
WEAKNESS: 1
FALLS: 0
SORE THROAT: 0
COUGH: 0
NERVOUS/ANXIOUS: 0
MYALGIAS: 0
SHORTNESS OF BREATH: 0
HEADACHES: 0
FEVER: 0
ABDOMINAL PAIN: 0

## 2024-03-22 ASSESSMENT — COGNITIVE AND FUNCTIONAL STATUS - GENERAL
TOILETING: A LITTLE
HELP NEEDED FOR BATHING: A LITTLE
MOVING FROM LYING ON BACK TO SITTING ON SIDE OF FLAT BED: A LITTLE
STANDING UP FROM CHAIR USING ARMS: A LITTLE
TURNING FROM BACK TO SIDE WHILE IN FLAT BAD: A LITTLE
SUGGESTED CMS G CODE MODIFIER DAILY ACTIVITY: CK
DRESSING REGULAR UPPER BODY CLOTHING: A LITTLE
TOILETING: A LITTLE
MOVING TO AND FROM BED TO CHAIR: A LITTLE
CLIMB 3 TO 5 STEPS WITH RAILING: A LITTLE
DRESSING REGULAR LOWER BODY CLOTHING: A LITTLE
EATING MEALS: A LITTLE
MOVING TO AND FROM BED TO CHAIR: A LITTLE
EATING MEALS: A LITTLE
PERSONAL GROOMING: A LITTLE
MOVING FROM LYING ON BACK TO SITTING ON SIDE OF FLAT BED: A LITTLE
PERSONAL GROOMING: A LITTLE
WALKING IN HOSPITAL ROOM: A LITTLE
TURNING FROM BACK TO SIDE WHILE IN FLAT BAD: A LITTLE
DRESSING REGULAR LOWER BODY CLOTHING: A LITTLE
HELP NEEDED FOR BATHING: A LITTLE
DAILY ACTIVITIY SCORE: 18
SUGGESTED CMS G CODE MODIFIER DAILY ACTIVITY: CK
WALKING IN HOSPITAL ROOM: A LITTLE
DAILY ACTIVITIY SCORE: 18
DRESSING REGULAR UPPER BODY CLOTHING: A LITTLE

## 2024-03-22 ASSESSMENT — PAIN DESCRIPTION - PAIN TYPE
TYPE: ACUTE PAIN

## 2024-03-22 NOTE — PROGRESS NOTES
Received report from JOE Smith and assumed care of patient (pt) at shift change.    Assessment completed.  Pt is A&O x 4. Pt reports 5/10 pain on his abdomen.  Pt sitting up on chair.     Vitals signs BP (!) 152/95   Pulse (!) 108   Temp 36.6 °C (97.9 °F) (Temporal)   Resp 18   Ht 1.829 m (6')   Wt 103 kg (226 lb 3.1 oz)   SpO2 90%      PCA rate verified.  Sadi drain with serous output. + flatus    Fall precaution in place:  Bed is in lowest, locked position, call light and belongings are within reach. Pt educated to call for assistance when OOB.  Plan of care discussed and all questions answered. All other needs met at this time.

## 2024-03-22 NOTE — CARE PLAN
The patient is Stable - Low risk of patient condition declining or worsening    Shift Goals  Clinical Goals: pain control, ATB, ambulation  Patient Goals: Rest and pain control  Family Goals: No family at bedside    Progress made toward(s) clinical / shift goals:  Bolus button administration for pain control with relief.  Patient able to ambulate with standby assistance and no complaints.  Education provided on discharge plan.      Patient is not progressing towards the following goals: N/A

## 2024-03-22 NOTE — PROGRESS NOTES
DATE: 3/22/2024    Post Operative Day 2 laparoscopic cholecystectomy converted to open.     INTERVAL EVENTS:  No overt change in assessment.  +flatus.  JOVANA drain with serous sang output, non bilious.  NGT removed yesterday - coiled in mouth ?    PHYSICAL EXAMINATION:  Vital Signs: BP (!) 153/87   Pulse (!) 112   Temp 36.2 °C (97.2 °F) (Temporal)   Resp 18   Ht 1.829 m (6')   Wt 103 kg (226 lb 3.1 oz)   SpO2 90%     Awake and alert. Sitting in chair.  Respiratory rate even and unlabored. 1-2 L nasal cannula.  Abdomen soft  RUQ JOVANA drain with serous sang output, 90 cc.        LABORATORY VALUES:  Recent Labs     03/20/24  0529 03/21/24  0149 03/22/24  0508   WBC 15.0* 19.6* 14.1*   RBC 5.13 5.02 4.75   HEMOGLOBIN 14.4 13.7* 13.1*   HEMATOCRIT 42.5 43.3 42.3   MCV 82.8 86.3 89.1   MCH 28.1 27.3 27.6   MCHC 33.9 31.6* 31.0*   RDW 43.7 46.7 49.7   PLATELETCT 254 319 293   MPV 10.6 10.8 10.3     Recent Labs     03/20/24  0529 03/21/24  0149 03/22/24  0508   SODIUM 138 139 143   POTASSIUM 4.1 4.9 4.7   CHLORIDE 103 102 110   CO2 21 16* 20   GLUCOSE 136* 275* 218*   BUN 30* 39* 33*   CREATININE 1.44* 1.79* 1.48*   CALCIUM 8.2* 8.1* 8.1*     Recent Labs     03/20/24  0529 03/21/24  0149 03/22/24  0508   ASTSGOT 22 54* 32   ALTSGPT 58* 77* 50   TBILIRUBIN 2.6* 1.8* 1.6*   ALKPHOSPHAT 222* 195* 300*   GLOBULIN 3.5 3.3 3.5         ASSESSMENT AND PLAN:  * Gallstone pancreatitis- (present on admission)  Assessment & Plan  3/19 Lipase normalized, T.nataly trending downward.No indication for ERCP per GI.  -Plan for lap magnus with possible IOC tomorrow am.   3/20 laparoscopic cholecystectomy converted to open.  JOVANA drain in place    Total bilirubin, elevated- (present on admission)  Assessment & Plan  Initial bilirubin 5.1.  CBD not significantly dilated at 3.6 mm in diameter.  Will discuss trending serial LFTs vs ERCP or further imagining with GI.  3/19 T.Bili 3.2, lipase 63    Acute kidney injury (HCC)  Assessment &  Plan  Gentle IV hydration, avoid nephrotoxins  3/21 creatinine 1.75.   -1 L NS bolus.  Seral labs.    Type 2 diabetes mellitus with diabetic polyneuropathy, with long-term current use of insulin ([facility])- (present on admission)  Assessment & Plan  Chronic condition treated with insulin glargine 75 units SQ every morning, insulin aspart 5-14 units SQ 3 times a day prior to meals, metformin, and dapagliflozin.  Holding maintenance metformin, dapagliflozin, and basilar insulin as he is NPO. Insulin sliding scale coverage.      - Advance diet to full liquid diet. Stop with any nausea or vomiting. Encourage slow eating.  - Continue JOVANA drain.  - Encourage mobilization and pulmonary hygiene         ____________________________________     Sara Ulloa D.N.P.    DD: 3/22/2024  8:22 AM

## 2024-03-22 NOTE — CARE PLAN
The patient is Stable - Low risk of patient condition declining or worsening    Shift Goals  Clinical Goals: IV abx, pain control, trend labs, and monitor Sadi output  Patient Goals: Rest and pain control  Family Goals: No family at bedside    Progress made toward(s) clinical / shift goals:    Problem: Knowledge Deficit - Standard  Goal: Patient and family/care givers will demonstrate understanding of plan of care, disease process/condition, diagnostic tests and medications  Description: Target End Date:  1-3 days or as soon as patient condition allows    Document in Patient Education    1.  Patient and family/caregiver oriented to unit, equipment, visitation policy and means for communicating concern  2.  Complete/review Learning Assessment  3.  Assess knowledge level of disease process/condition, treatment plan, diagnostic tests and medications  4.  Explain disease process/condition, treatment plan, diagnostic tests and medications  3/21/2024 2351 by Adrianna Cartagena, R.N.  Outcome: Progressing     Problem: Pain - Standard  Goal: Alleviation of pain or a reduction in pain to the patient’s comfort goal  Description: Target End Date:  Prior to discharge or change in level of care    Document on Vitals flowsheet    1.  Document pain using the appropriate pain scale per order or unit policy  2.  Educate and implement non-pharmacologic comfort measures (i.e. relaxation, distraction, massage, cold/heat therapy, etc.)  3.  Pain management medications as ordered  4.  Reassess pain after pain med administration per policy  5.  If opiods administered assess patient's response to pain medication is appropriate per POSS sedation scale  6.  Follow pain management plan developed in collaboration with patient and interdisciplinary team (including palliative care or pain specialists if applicable)  3/21/2024 2351 by Adrianna Cartagena, R.N.  Outcome: Progressing       Problem: Hemodynamics  Goal: Patient's hemodynamics,  fluid balance and neurologic status will be stable or improve  Description: Target End Date:  Prior to discharge or change in level of care    Document on Assessment and I/O flowsheet templates    1.  Monitor vital signs, pulse oximetry and cardiac monitor per provider order and/or policy  2.  Maintain blood pressure per provider order  3.  Hemodynamic monitoring per provider order  4.  Manage IV fluids and IV infusions  5.  Monitor intake and output  6.  Daily weights per unit policy or provider order  7.  Assess peripheral pulses and capillary refill  8.  Assess color and body temperature  9.  Position patient for maximum circulation/cardiac output  10. Monitor for signs/symptoms of excessive bleeding  11. Assess mental status, restlessness and changes in level of consciousness  12. Monitor temperature and report fever or hypothermia to provider immediately. Consideration of targeted temperature management.  Outcome: Progressing       Patient is not progressing towards the following goals:

## 2024-03-23 LAB
ALBUMIN SERPL BCP-MCNC: 2.2 G/DL (ref 3.2–4.9)
ALBUMIN/GLOB SERPL: 0.7 G/DL
ALP SERPL-CCNC: 381 U/L (ref 30–99)
ALT SERPL-CCNC: 42 U/L (ref 2–50)
ANION GAP SERPL CALC-SCNC: 9 MMOL/L (ref 7–16)
AST SERPL-CCNC: 26 U/L (ref 12–45)
BACTERIA BLD CULT: NORMAL
BACTERIA BLD CULT: NORMAL
BASOPHILS # BLD AUTO: 0 % (ref 0–1.8)
BASOPHILS # BLD: 0 K/UL (ref 0–0.12)
BILIRUB SERPL-MCNC: 1.4 MG/DL (ref 0.1–1.5)
BUN SERPL-MCNC: 22 MG/DL (ref 8–22)
CALCIUM ALBUM COR SERPL-MCNC: 9.3 MG/DL (ref 8.5–10.5)
CALCIUM SERPL-MCNC: 7.9 MG/DL (ref 8.5–10.5)
CHLORIDE SERPL-SCNC: 108 MMOL/L (ref 96–112)
CO2 SERPL-SCNC: 24 MMOL/L (ref 20–33)
CREAT SERPL-MCNC: 1.31 MG/DL (ref 0.5–1.4)
EOSINOPHIL # BLD AUTO: 0.76 K/UL (ref 0–0.51)
EOSINOPHIL NFR BLD: 6 % (ref 0–6.9)
ERYTHROCYTE [DISTWIDTH] IN BLOOD BY AUTOMATED COUNT: 47.3 FL (ref 35.9–50)
GFR SERPLBLD CREATININE-BSD FMLA CKD-EPI: 55 ML/MIN/1.73 M 2
GLOBULIN SER CALC-MCNC: 3.3 G/DL (ref 1.9–3.5)
GLUCOSE BLD STRIP.AUTO-MCNC: 165 MG/DL (ref 65–99)
GLUCOSE BLD STRIP.AUTO-MCNC: 207 MG/DL (ref 65–99)
GLUCOSE BLD STRIP.AUTO-MCNC: 219 MG/DL (ref 65–99)
GLUCOSE BLD STRIP.AUTO-MCNC: 242 MG/DL (ref 65–99)
GLUCOSE SERPL-MCNC: 210 MG/DL (ref 65–99)
HCT VFR BLD AUTO: 37.6 % (ref 42–52)
HGB BLD-MCNC: 11.9 G/DL (ref 14–18)
LYMPHOCYTES # BLD AUTO: 0.64 K/UL (ref 1–4.8)
LYMPHOCYTES NFR BLD: 5.1 % (ref 22–41)
MANUAL DIFF BLD: NORMAL
MCH RBC QN AUTO: 27.5 PG (ref 27–33)
MCHC RBC AUTO-ENTMCNC: 31.6 G/DL (ref 32.3–36.5)
MCV RBC AUTO: 86.8 FL (ref 81.4–97.8)
METAMYELOCYTES NFR BLD MANUAL: 2.6 %
MICROCYTES BLD QL SMEAR: ABNORMAL
MONOCYTES # BLD AUTO: 0.76 K/UL (ref 0–0.85)
MONOCYTES NFR BLD AUTO: 6 % (ref 0–13.4)
MORPHOLOGY BLD-IMP: NORMAL
MYELOCYTES NFR BLD MANUAL: 1.7 %
NEUTROPHILS # BLD AUTO: 9.9 K/UL (ref 1.82–7.42)
NEUTROPHILS NFR BLD: 77.8 % (ref 44–72)
NEUTS BAND NFR BLD MANUAL: 0.8 % (ref 0–10)
NRBC # BLD AUTO: 0 K/UL
NRBC BLD-RTO: 0 /100 WBC (ref 0–0.2)
PLATELET # BLD AUTO: 300 K/UL (ref 164–446)
PLATELET BLD QL SMEAR: NORMAL
PMV BLD AUTO: 10.3 FL (ref 9–12.9)
POTASSIUM SERPL-SCNC: 4.2 MMOL/L (ref 3.6–5.5)
PROT SERPL-MCNC: 5.5 G/DL (ref 6–8.2)
RBC # BLD AUTO: 4.33 M/UL (ref 4.7–6.1)
RBC BLD AUTO: PRESENT
SIGNIFICANT IND 70042: NORMAL
SIGNIFICANT IND 70042: NORMAL
SITE SITE: NORMAL
SITE SITE: NORMAL
SODIUM SERPL-SCNC: 141 MMOL/L (ref 135–145)
SOURCE SOURCE: NORMAL
SOURCE SOURCE: NORMAL
TOXIC GRANULES BLD QL SMEAR: NORMAL
WBC # BLD AUTO: 12.6 K/UL (ref 4.8–10.8)

## 2024-03-23 PROCEDURE — 700102 HCHG RX REV CODE 250 W/ 637 OVERRIDE(OP)

## 2024-03-23 PROCEDURE — 82962 GLUCOSE BLOOD TEST: CPT

## 2024-03-23 PROCEDURE — 770001 HCHG ROOM/CARE - MED/SURG/GYN PRIV*

## 2024-03-23 PROCEDURE — A9270 NON-COVERED ITEM OR SERVICE: HCPCS

## 2024-03-23 PROCEDURE — 85027 COMPLETE CBC AUTOMATED: CPT

## 2024-03-23 PROCEDURE — 99024 POSTOP FOLLOW-UP VISIT: CPT

## 2024-03-23 PROCEDURE — 700111 HCHG RX REV CODE 636 W/ 250 OVERRIDE (IP)

## 2024-03-23 PROCEDURE — 85007 BL SMEAR W/DIFF WBC COUNT: CPT

## 2024-03-23 PROCEDURE — 700111 HCHG RX REV CODE 636 W/ 250 OVERRIDE (IP): Mod: JZ | Performed by: SURGERY

## 2024-03-23 PROCEDURE — 36415 COLL VENOUS BLD VENIPUNCTURE: CPT

## 2024-03-23 PROCEDURE — 700101 HCHG RX REV CODE 250

## 2024-03-23 PROCEDURE — 80053 COMPREHEN METABOLIC PANEL: CPT

## 2024-03-23 PROCEDURE — 700105 HCHG RX REV CODE 258

## 2024-03-23 PROCEDURE — 700102 HCHG RX REV CODE 250 W/ 637 OVERRIDE(OP): Performed by: SURGERY

## 2024-03-23 PROCEDURE — 700105 HCHG RX REV CODE 258: Performed by: SURGERY

## 2024-03-23 PROCEDURE — A9270 NON-COVERED ITEM OR SERVICE: HCPCS | Performed by: SURGERY

## 2024-03-23 RX ORDER — LABETALOL HYDROCHLORIDE 5 MG/ML
10 INJECTION, SOLUTION INTRAVENOUS EVERY 4 HOURS PRN
Status: DISCONTINUED | OUTPATIENT
Start: 2024-03-23 | End: 2024-03-24 | Stop reason: HOSPADM

## 2024-03-23 RX ADMIN — OXYCODONE 5 MG: 5 TABLET ORAL at 10:35

## 2024-03-23 RX ADMIN — INSULIN HUMAN 2 UNITS: 100 INJECTION, SOLUTION PARENTERAL at 17:18

## 2024-03-23 RX ADMIN — PIPERACILLIN AND TAZOBACTAM 3.38 G: 3; .375 INJECTION, POWDER, FOR SOLUTION INTRAVENOUS at 04:31

## 2024-03-23 RX ADMIN — SODIUM CHLORIDE, POTASSIUM CHLORIDE, SODIUM LACTATE AND CALCIUM CHLORIDE: 600; 310; 30; 20 INJECTION, SOLUTION INTRAVENOUS at 14:12

## 2024-03-23 RX ADMIN — DOCUSATE SODIUM 100 MG: 100 CAPSULE, LIQUID FILLED ORAL at 17:15

## 2024-03-23 RX ADMIN — LABETALOL HYDROCHLORIDE 10 MG: 5 INJECTION INTRAVENOUS at 17:23

## 2024-03-23 RX ADMIN — MAGNESIUM HYDROXIDE 30 ML: 1200 LIQUID ORAL at 04:52

## 2024-03-23 RX ADMIN — METFORMIN HYDROCHLORIDE 500 MG: 500 TABLET, EXTENDED RELEASE ORAL at 17:16

## 2024-03-23 RX ADMIN — LISINOPRIL 5 MG: 5 TABLET ORAL at 05:01

## 2024-03-23 RX ADMIN — ONDANSETRON 4 MG: 2 INJECTION INTRAMUSCULAR; INTRAVENOUS at 10:32

## 2024-03-23 RX ADMIN — DOCUSATE SODIUM 100 MG: 100 CAPSULE, LIQUID FILLED ORAL at 04:53

## 2024-03-23 RX ADMIN — ACETAMINOPHEN 1000 MG: 500 TABLET, FILM COATED ORAL at 04:52

## 2024-03-23 RX ADMIN — METFORMIN HYDROCHLORIDE 500 MG: 500 TABLET, EXTENDED RELEASE ORAL at 04:53

## 2024-03-23 RX ADMIN — LIDOCAINE 1 PATCH: 560 PATCH PERCUTANEOUS; TOPICAL; TRANSDERMAL at 14:08

## 2024-03-23 RX ADMIN — POLYETHYLENE GLYCOL 3350 1 PACKET: 17 POWDER, FOR SOLUTION ORAL at 04:52

## 2024-03-23 RX ADMIN — INSULIN HUMAN 2 UNITS: 100 INJECTION, SOLUTION PARENTERAL at 11:14

## 2024-03-23 RX ADMIN — POLYETHYLENE GLYCOL 3350 1 PACKET: 17 POWDER, FOR SOLUTION ORAL at 17:15

## 2024-03-23 RX ADMIN — OXYCODONE 5 MG: 5 TABLET ORAL at 19:14

## 2024-03-23 RX ADMIN — INSULIN HUMAN 1 UNITS: 100 INJECTION, SOLUTION PARENTERAL at 04:58

## 2024-03-23 RX ADMIN — ENOXAPARIN SODIUM 40 MG: 100 INJECTION SUBCUTANEOUS at 17:15

## 2024-03-23 RX ADMIN — SODIUM CHLORIDE, POTASSIUM CHLORIDE, SODIUM LACTATE AND CALCIUM CHLORIDE: 600; 310; 30; 20 INJECTION, SOLUTION INTRAVENOUS at 02:31

## 2024-03-23 ASSESSMENT — COGNITIVE AND FUNCTIONAL STATUS - GENERAL
MOBILITY SCORE: 23
DRESSING REGULAR LOWER BODY CLOTHING: A LITTLE
SUGGESTED CMS G CODE MODIFIER MOBILITY: CI
DRESSING REGULAR UPPER BODY CLOTHING: A LITTLE
SUGGESTED CMS G CODE MODIFIER DAILY ACTIVITY: CJ
HELP NEEDED FOR BATHING: A LITTLE
CLIMB 3 TO 5 STEPS WITH RAILING: A LITTLE
DAILY ACTIVITIY SCORE: 21

## 2024-03-23 ASSESSMENT — PAIN DESCRIPTION - PAIN TYPE
TYPE: ACUTE PAIN

## 2024-03-23 NOTE — PROGRESS NOTES
Bedside report received.  Assessment complete.  A&O x 4. Patient calls appropriately.  Patient ambulates with standby assist. Patient has 5/10 pain. Pain managed with bolus button administration and nonpharmacologic interventions.    Denies N&V. Tolerating full liquid diet.  Skin per flowsheets. Distended abdomen noted upon palpation.   + void, + flatus, Last  BM on  03/18.   Patient denies SOB.  SCD's in place.  Patient is pleasant and cooperative to plan of care.  Review plan with of care with patient. Call light and personal belongings within reach. Hourly rounding in place. All needs met at this time.

## 2024-03-23 NOTE — CARE PLAN
The patient is Stable - Low risk of patient condition declining or worsening    Shift Goals: Pain, Abx, Elimination  Clinical Goals: Pain, Abx, BM  Patient Goals: Pain, BM, Rest  Family Goals: No family at bedside    Progress made toward(s) clinical / shift goals:  Pt resting comfortably, pain well controlled with current medication.  This RN discussed plan of care and barriers to discharge with Pt, who verbalized understanding.  Pt tolerating IV fluids and PO intake with good urine output and stable vitals.

## 2024-03-23 NOTE — PROGRESS NOTES
Bedside report received.  Assessment complete.  A&O x 4. Patient calls appropriately.  Patient ambulates with standby assist. Patient has 5/10 pain. Pain managed with prescribed medications and nonpharmacologic interventions.    Denies N&V. Tolerating full liquid diet.  Skin per flowsheets. Soft, rounded, semi firm abdomen noted upon palpation.   + void, + flatus, + BM(smear).   Patient denies SOB.  SCD's in place.  Patient is pleasant and cooperative to plan of care.  Review plan with of care with patient. Call light and personal belongings within reach. Hourly rounding in place. All needs met at this time.

## 2024-03-23 NOTE — PROGRESS NOTES
DATE: 3/23/2024    Post Operative Day 3 laparoscopic cholecystectomy converted to open.     INTERVAL EVENTS:  Adequate pain control after discontinuation of PCA.  Reports a smear last night  Tolerated full liquid diet.  JOVANA drain serosang, non bilious output.    PHYSICAL EXAMINATION:  Vital Signs: BP (!) 159/95   Pulse 90   Temp 36.4 °C (97.5 °F) (Temporal)   Resp 18   Ht 1.829 m (6')   Wt 103 kg (226 lb 3.1 oz)   SpO2 93%     Awake and alert.  Respiratory rate even and unlabored. Tolerating room air.  Abdomen soft and nontender. Less distended today.  Surgical incision well approximated with staples. No signs of infection.  RUQ JOVANA drain with serosang, non bilious output, 95 cc output in 24 hrs.    LABORATORY VALUES:  Recent Labs     03/21/24 0149 03/22/24  0508 03/23/24  0326   WBC 19.6* 14.1* 12.6*   RBC 5.02 4.75 4.33*   HEMOGLOBIN 13.7* 13.1* 11.9*   HEMATOCRIT 43.3 42.3 37.6*   MCV 86.3 89.1 86.8   MCH 27.3 27.6 27.5   MCHC 31.6* 31.0* 31.6*   RDW 46.7 49.7 47.3   PLATELETCT 319 293 300   MPV 10.8 10.3 10.3     Recent Labs     03/21/24 0149 03/22/24  0508 03/23/24  0326   SODIUM 139 143 141   POTASSIUM 4.9 4.7 4.2   CHLORIDE 102 110 108   CO2 16* 20 24   GLUCOSE 275* 218* 210*   BUN 39* 33* 22   CREATININE 1.79* 1.48* 1.31   CALCIUM 8.1* 8.1* 7.9*     Recent Labs     03/21/24  0149 03/22/24  0508 03/23/24  0326   ASTSGOT 54* 32 26   ALTSGPT 77* 50 42   TBILIRUBIN 1.8* 1.6* 1.4   ALKPHOSPHAT 195* 300* 381*   GLOBULIN 3.3 3.5 3.3       ASSESSMENT AND PLAN:  * Gallstone pancreatitis- (present on admission)  Assessment & Plan  3/19 Lipase normalized, T.nataly trending downward.No indication for ERCP per GI.  -Plan for lap magnus with possible IOC tomorrow am.   3/20 laparoscopic cholecystectomy converted to open.  JOVANA drain in place  3/21 NGT removed.  3/22 Full liquid diet    Total bilirubin, elevated- (present on admission)  Assessment & Plan  Initial bilirubin 5.1.  CBD not significantly dilated at 3.6 mm  in diameter.  Will discuss trending serial LFTs vs ERCP or further imagining with GI.  3/19 T.Bili 3.2, lipase 63  3/23 T. Tru 1.4    Acute kidney injury (HCC)  Assessment & Plan  Gentle IV hydration, avoid nephrotoxins  3/21 creatinine 1.75.   -1 L NS bolus.  3/23 Creatinine 1.31  Seral labs    Type 2 diabetes mellitus with diabetic polyneuropathy, with long-term current use of insulin (HCC)- (present on admission)  Assessment & Plan  Chronic condition treated with insulin glargine 75 units SQ every morning, insulin aspart 5-14 units SQ 3 times a day prior to meals, metformin, and dapagliflozin.  Holding maintenance metformin, dapagliflozin, and basilar insulin as he is NPO. Insulin sliding scale coverage.  3/21 Metformin resumed. Continue sliding scale.      - Continue full liquid diet. Encourage slow eating.  - Continue JOVANA drain.  - Encourage mobilization and pulmonary hygiene.       ____________________________________     Sara Ulloa D.N.P.    DD: 3/23/2024  7:10 AM

## 2024-03-23 NOTE — PROGRESS NOTES
Assumed care of patient at 1845. Bedside report received from Mary DIAZ. Assessment complete.  AA&Ox4. Denies CP/SOB.  Reporting 5/10 pain. Declined intervention at this time.  Educated patient regarding pharmacologic and non pharmacologic modalities for pain management.  Skin per flowsheets  Tolerating Full Liquid diet. Denies N/V.  + void. + Flatus. Last BM PTA  Pt ambulates with SBA and FWW  All needs met at this time. Call light within reach. Pt calls appropriately. Bed low and locked, non skid socks in place. Hourly rounding in place.

## 2024-03-23 NOTE — CARE PLAN
The patient is Stable - Low risk of patient condition declining or worsening    Shift Goals  Clinical Goals: pain control, ambulation, ATB  Patient Goals: Pain, BM, Rest  Family Goals: No family at bedside    Progress made toward(s) clinical / shift goals:   PRN medication administered for complaints of pain with relief.  Encouraged importance of ambulation and OOB activities.  Educated on plan of care, questions answered.      Patient is not progressing towards the following goals: N/A

## 2024-03-24 ENCOUNTER — PHARMACY VISIT (OUTPATIENT)
Dept: PHARMACY | Facility: MEDICAL CENTER | Age: 84
End: 2024-03-24
Payer: COMMERCIAL

## 2024-03-24 VITALS
RESPIRATION RATE: 18 BRPM | WEIGHT: 226.19 LBS | TEMPERATURE: 98.1 F | DIASTOLIC BLOOD PRESSURE: 96 MMHG | HEIGHT: 72 IN | OXYGEN SATURATION: 91 % | SYSTOLIC BLOOD PRESSURE: 153 MMHG | BODY MASS INDEX: 30.64 KG/M2 | HEART RATE: 98 BPM

## 2024-03-24 LAB
ALBUMIN SERPL BCP-MCNC: 2.5 G/DL (ref 3.2–4.9)
ALBUMIN/GLOB SERPL: 0.8 G/DL
ALP SERPL-CCNC: 383 U/L (ref 30–99)
ALT SERPL-CCNC: 33 U/L (ref 2–50)
ANION GAP SERPL CALC-SCNC: 8 MMOL/L (ref 7–16)
AST SERPL-CCNC: 18 U/L (ref 12–45)
BASOPHILS # BLD AUTO: 0.9 % (ref 0–1.8)
BASOPHILS # BLD: 0.11 K/UL (ref 0–0.12)
BILIRUB SERPL-MCNC: 0.9 MG/DL (ref 0.1–1.5)
BUN SERPL-MCNC: 18 MG/DL (ref 8–22)
CALCIUM ALBUM COR SERPL-MCNC: 9.2 MG/DL (ref 8.5–10.5)
CALCIUM SERPL-MCNC: 8 MG/DL (ref 8.5–10.5)
CHLORIDE SERPL-SCNC: 105 MMOL/L (ref 96–112)
CO2 SERPL-SCNC: 24 MMOL/L (ref 20–33)
CREAT SERPL-MCNC: 1.17 MG/DL (ref 0.5–1.4)
EOSINOPHIL # BLD AUTO: 0.44 K/UL (ref 0–0.51)
EOSINOPHIL NFR BLD: 3.5 % (ref 0–6.9)
ERYTHROCYTE [DISTWIDTH] IN BLOOD BY AUTOMATED COUNT: 45 FL (ref 35.9–50)
GFR SERPLBLD CREATININE-BSD FMLA CKD-EPI: 62 ML/MIN/1.73 M 2
GLOBULIN SER CALC-MCNC: 3.2 G/DL (ref 1.9–3.5)
GLUCOSE BLD STRIP.AUTO-MCNC: 191 MG/DL (ref 65–99)
GLUCOSE BLD STRIP.AUTO-MCNC: 202 MG/DL (ref 65–99)
GLUCOSE BLD STRIP.AUTO-MCNC: 298 MG/DL (ref 65–99)
GLUCOSE SERPL-MCNC: 239 MG/DL (ref 65–99)
HCT VFR BLD AUTO: 36.8 % (ref 42–52)
HGB BLD-MCNC: 12 G/DL (ref 14–18)
LYMPHOCYTES # BLD AUTO: 0.54 K/UL (ref 1–4.8)
LYMPHOCYTES NFR BLD: 4.3 % (ref 22–41)
MANUAL DIFF BLD: NORMAL
MCH RBC QN AUTO: 27.6 PG (ref 27–33)
MCHC RBC AUTO-ENTMCNC: 32.6 G/DL (ref 32.3–36.5)
MCV RBC AUTO: 84.6 FL (ref 81.4–97.8)
METAMYELOCYTES NFR BLD MANUAL: 3.4 %
MICROCYTES BLD QL SMEAR: ABNORMAL
MONOCYTES # BLD AUTO: 0.54 K/UL (ref 0–0.85)
MONOCYTES NFR BLD AUTO: 4.3 % (ref 0–13.4)
MORPHOLOGY BLD-IMP: NORMAL
MYELOCYTES NFR BLD MANUAL: 2.6 %
NEUTROPHILS # BLD AUTO: 10.13 K/UL (ref 1.82–7.42)
NEUTROPHILS NFR BLD: 81 % (ref 44–72)
NRBC # BLD AUTO: 0 K/UL
NRBC BLD-RTO: 0 /100 WBC (ref 0–0.2)
PLATELET # BLD AUTO: 319 K/UL (ref 164–446)
PLATELET BLD QL SMEAR: NORMAL
PMV BLD AUTO: 10.3 FL (ref 9–12.9)
POTASSIUM SERPL-SCNC: 4.1 MMOL/L (ref 3.6–5.5)
PROT SERPL-MCNC: 5.7 G/DL (ref 6–8.2)
RBC # BLD AUTO: 4.35 M/UL (ref 4.7–6.1)
RBC BLD AUTO: PRESENT
SODIUM SERPL-SCNC: 137 MMOL/L (ref 135–145)
WBC # BLD AUTO: 12.5 K/UL (ref 4.8–10.8)

## 2024-03-24 PROCEDURE — A9270 NON-COVERED ITEM OR SERVICE: HCPCS

## 2024-03-24 PROCEDURE — 99024 POSTOP FOLLOW-UP VISIT: CPT

## 2024-03-24 PROCEDURE — RXMED WILLOW AMBULATORY MEDICATION CHARGE

## 2024-03-24 PROCEDURE — 85007 BL SMEAR W/DIFF WBC COUNT: CPT

## 2024-03-24 PROCEDURE — 700101 HCHG RX REV CODE 250

## 2024-03-24 PROCEDURE — 80053 COMPREHEN METABOLIC PANEL: CPT

## 2024-03-24 PROCEDURE — 700102 HCHG RX REV CODE 250 W/ 637 OVERRIDE(OP)

## 2024-03-24 PROCEDURE — 82962 GLUCOSE BLOOD TEST: CPT

## 2024-03-24 PROCEDURE — 36415 COLL VENOUS BLD VENIPUNCTURE: CPT

## 2024-03-24 PROCEDURE — 85027 COMPLETE CBC AUTOMATED: CPT

## 2024-03-24 RX ORDER — OXYCODONE HYDROCHLORIDE 5 MG/1
5 TABLET ORAL EVERY 4 HOURS PRN
Qty: 5 TABLET | Refills: 0 | Status: SHIPPED | OUTPATIENT
Start: 2024-03-24 | End: 2024-03-26

## 2024-03-24 RX ORDER — LISINOPRIL 10 MG/1
10 TABLET ORAL
Status: DISCONTINUED | OUTPATIENT
Start: 2024-03-25 | End: 2024-03-24 | Stop reason: HOSPADM

## 2024-03-24 RX ORDER — ACETAMINOPHEN 500 MG
1000 TABLET ORAL EVERY 6 HOURS PRN
COMMUNITY
Start: 2024-03-24

## 2024-03-24 RX ORDER — AMOXICILLIN 250 MG
1 CAPSULE ORAL DAILY
COMMUNITY
Start: 2024-03-24 | End: 2024-04-09 | Stop reason: SDUPTHER

## 2024-03-24 RX ORDER — IBUPROFEN 600 MG/1
600 TABLET ORAL EVERY 6 HOURS PRN
COMMUNITY
Start: 2024-03-24 | End: 2024-04-09

## 2024-03-24 RX ORDER — LISINOPRIL 10 MG/1
10 TABLET ORAL DAILY
Qty: 30 TABLET | Refills: 0 | Status: SHIPPED | OUTPATIENT
Start: 2024-03-25 | End: 2024-04-24

## 2024-03-24 RX ADMIN — INSULIN HUMAN 2 UNITS: 100 INJECTION, SOLUTION PARENTERAL at 00:00

## 2024-03-24 RX ADMIN — METFORMIN HYDROCHLORIDE 500 MG: 500 TABLET, EXTENDED RELEASE ORAL at 04:46

## 2024-03-24 RX ADMIN — INSULIN HUMAN 3 UNITS: 100 INJECTION, SOLUTION PARENTERAL at 11:23

## 2024-03-24 RX ADMIN — INSULIN HUMAN 1 UNITS: 100 INJECTION, SOLUTION PARENTERAL at 04:53

## 2024-03-24 RX ADMIN — LIDOCAINE 1 PATCH: 560 PATCH PERCUTANEOUS; TOPICAL; TRANSDERMAL at 13:06

## 2024-03-24 RX ADMIN — LISINOPRIL 5 MG: 5 TABLET ORAL at 04:46

## 2024-03-24 RX ADMIN — LABETALOL HYDROCHLORIDE 10 MG: 5 INJECTION INTRAVENOUS at 08:12

## 2024-03-24 ASSESSMENT — PAIN DESCRIPTION - PAIN TYPE
TYPE: ACUTE PAIN

## 2024-03-24 NOTE — PROGRESS NOTES
Patient discharged home per MD orders. Patient ambulating upself, on RA saturating >90%.  Discharge education provided, incisional care, follow up appointments, medication safety, patient demonstrated and verbalized understanding. All questions answered. Tolerating diet. Voiding without difficulty. IV removed. All patient belongings with patient at this time. Patient educated to call MD or return to ED for concerns. Patient transported via wheelchair and was accompanied by daughter. Going home via private car.

## 2024-03-24 NOTE — PROGRESS NOTES
Assumed care of patient at 1845. Bedside report received from Mary DIAZ. Assessment complete.  AA&Ox4. Denies CP/SOB.  Reporting 6/10 pain. Medicated per MAR.   Educated patient regarding pharmacologic and non pharmacologic modalities for pain management.  Skin per flowsheets  Tolerating Soft/Diabetic diet. Denies N/V.  + void. + BM. Last BM 3/23  Pt ambulates with SBA and FWW  All needs met at this time. Call light within reach. Pt calls appropriately. Bed low and locked, non skid socks in place. Hourly rounding in place.

## 2024-03-24 NOTE — CARE PLAN
The patient is Stable - Low risk of patient condition declining or worsening    Shift Goals  Clinical Goals: pain control, JOVANA drain, ambulation  Patient Goals: Pain, Rest  Family Goals: No family at bedside    Progress made toward(s) clinical / shift goals:   PRN medication administered for complaints of pain with relief. JOVANA drain output documented. Encouraged patient to ambulate. Education provided on plan of care.      Patient is not progressing towards the following goals: N/A

## 2024-03-24 NOTE — PROGRESS NOTES
DATE: 3/24/2024    Post Operative Day 4 laparoscopic cholecystectomy converted to open.     Adequate pain control.  Tolerating GI soft diet  Multiple bowel movements yesterday  JOVANA drain with serous sang, nonbilious output.    A/Ox4  Respiratory rate even and unlabored.  Abdomen is soft, appropriate tenderness at surgical incision site  Surgical incision well-approximated with staples, no signs of infection.  Open to air.    Cleared for discharge home.  Meds to beds sent.  Discussed prescriptions and follow-up with the patient.  I encouraged him to visit his primary care physician and to discuss the recent hypertension that he has been having in the hospital.  All questions were answered.

## 2024-03-24 NOTE — CARE PLAN
The patient is Stable - Low risk of patient condition declining or worsening    Shift Goals: Pain, Drain, Safety  Clinical Goals: Pain, Drain, Safety  Patient Goals: Pain, Rest  Family Goals: No family at bedside    Progress made toward(s) clinical / shift goals:  Pt resting comfortably in bed, pain well controlled with current medication.  This RN discussed plan of care and barriers to discharge with Pt, who verbalized understanding.  Pt tolerating IV fluids and PO intake with stable vitals.

## 2024-03-24 NOTE — PROGRESS NOTES
Bedside report received.  Assessment complete.  A&O x 4. Patient calls appropriately.  Patient ambulates with standby assist. Patient has 4/10 pain. Pain managed with prescribed medications and nonpharmacologic interventions.    Denies N&V. Tolerating soft diabetic diet.  Skin per flowsheets. Soft, rounded abdomen noted upon palpation.   + void, + flatus, + BM  Patient denies SOB.  SCD's in place.  Patient is pleasant and cooperative to plan of care.  Review plan with of care with patient. Call light and personal belongings within reach. Hourly rounding in place. All needs met at this time.

## 2024-03-24 NOTE — DISCHARGE INSTRUCTIONS
Post Operative Discharge Instructions:    1. DIET: Upon discharge from the hospital, you may resume your normal preoperative diet, unless specifically directed otherwise. Depending on how you are feeling and whether you have nausea or not, you may wish to stay with a bland diet for the first few days. However, you can advance this as quickly as you feel ready.    2. ACTIVITIES: After discharge from the hospital, you may resume full routine activities; however, there should be no heavy lifting (greater than 20 pounds or a bag of groceries) and no strenuous activities for at least 2 weeks. The duration may be longer, depending on your surgical procedure. Routine activities of daily living are acceptable. Activity level should be addressed at your post-op follow up appointment.    3. DRIVING: You may drive whenever you are off pain medications and are able to perform the activities needed to drive, i.e., turning, bending, twisting, etc.    4. BATHING: You may get the wound wet at any time after leaving the hospital. You may shower, but do not submerge in a bath for at least two weeks.  If you have wound dressings, they may come off after 48 hours.  If you have skin glue to the wound, this will fall off on its own, do not pick at it.  If you have Steri-Strips to the wound, these will fall off on their own, do not pick at them. You may trim the edges if needed.    5. BOWEL FUNCTION:   After surgery, it is not uncommon for patients to develop either frequent or loose stools after meals. This usually corrects itself after a few days, to a few weeks. If this occurs, do not worry; this will resolve on its own.  Constipation is much more common than loose stools. The cause is the combination of pain medication and decreased activity level and possibly the nature of the surgical procedure performed. If you feel this is occurring, you may use an over-the-counter treatment such as MiraLAX (or Milk of Magnesia, Ex-Lax,  Senokot, etc.) until the problem has resolved. Drink plenty of water and try to wean off narcotic pain medications as soon as is comfortable for you.    6. PAIN MEDICATION:   You will be given a prescription for pain medication at discharge. Please take these as directed. It is important to remember not to take medications on an empty stomach as this may cause nausea.  You may also take over the counter acetaminophen and/or NSAIDS (ibuprofen, Aleve, Advil, Motrin) per the package instructions.  You may also use ice to the wound to decrease pain and swelling. You may alternate 20 minutes on and 20 minutes off with the ice for the first 24-48 hours. Make sure you place a washcloth or towel between the ice pack and your skin.  Please note that narcotic pain medication cannot be refilled unless you are seen by a doctor. Make sure you call the office if you are running low on medication or if the dose you have been prescribed is not working well for you.    7.CALL THE [facility] AT [phone] IF YOU HAVE:  (1) Fevers to more than 101F, (2) Unusual chest or leg pain, (3) Drainage or fluid from incision that may be foul smelling, increased tenderness or soreness at the wound or the wound edges are no longer together, redness or swelling at the incision site. Do not hesitate to call with any other questions.    8. Staples should be removed on 4/2/2024 in our [facility] clinic.  Please make an appointment.  Keep area clean and dry.  Do not put a dressing on it.  Look for signs of infection that include erythema, drainage and edema.

## 2024-03-24 NOTE — DISCHARGE SUMMARY
DISCHARGE  SUMMARY    DATE OF ADMISSION: 3/18/2024    DATE OF DISCHARGE: 3/24/2024    DISCHARGE DIAGNOSIS:  Principal Problem:    Gallstone pancreatitis      Overview: Five days of epigastric/right upper quadrant abdominal pain associated       with nausea, vomiting, abdominal distention, and malaise.      Tender to palpation in the epigastrium and right upper quadrant on exam.      Lipase 252 on initial labs, Tbili 5.1, mild elevation in ALT greater than       AST consistent with gallstones pancreatitis and possible       choledocholithiasis.      Ultrasound with gallstones, gallbladder wall thickening, no       pericholecystic fluid, CBD 3.6 mm in diameter.      Admit to [facility], IV fluids and antibiotics.      Will discuss with GI regarding significantly elevated total bilirubin,       trending LFTs and cholecystectomy with IOC vs ERCP.  Active Problems:    Total bilirubin, elevated    Acute kidney injury (HCC)    Type 2 diabetes mellitus with diabetic polyneuropathy, with long-term current use of insulin (Formerly [facility])      Overview:  Latest Reference Range & Units 02/15/24 10:40       Glycohemoglobin 5.8 % 9.6 !             He was diagnosed around age 62 with type 2 diabetes, not sure how long he       had prediabetes beforehand.  He was initially taking metfomin and has been       on 70/30 insulin since age 74, currently using 52 U twice daily. A1c has       ranged 8.7-14 since 2018.             Agreeable to changing regimen due to significant worsening of A1c and       kidney function.            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 Now with CKD and       macroalbuminuria.            Current regimen: Toujeo 80 units, Novolog 10 units TID AC, metformin 1000       mg BID, Farxiga 10 mg daily, and Ozempic 2 mg weekly  Resolved Problems:    * No resolved hospital problems. *      CONSULTATIONS:  Clifford Chatterjee, gastroenterology    PROCEDURES:  Laparoscopic cholecystectomy with conversion to open  cholecystectomy, with 3/20/2024 performed by Dr. Daniel brand    BRIEF HPI and HOSPITAL COURSE:  83-year-old male presented to the emergency department with a 5-day history of severe epigastric and right upper quadrant abdominal pain.  He stated that the pain was associated with nausea, vomiting, anorexia, abdominal distention and malaise.  He reported worsening nausea and abdominal distention with any food intake and has self limited his diet to liquids only.  He endorsed a previous episode of choledocholithiasis approximately 1.5 years ago which resolved without intervention, he declined cholecystectomy at that time.  On admission, he had a lipase of 252, Total bilirubin of 5.1 and mild elevation in ALT greater than AST.  Ultrasound demonstrated gallstones, gallbladder wall thickening and common biliary duct with a 3.6 mm in diameter.  Patient was admitted to the hospital, received IV fluids and antibiotics.  Gastroenterology was consulted regarding a possible ERCP versus cholecystectomy with intraoperative cholangiogram.  Patient's total bilirubin started to trend downward therefore the patient was taken to the operating suite for a laparoscopic cholecystectomy which was converted to an open cholecystectomy on 3/20/2024.  A Lai-Catalan drain was left in place and drainage output remained serosanguineous and nonbilious.  That was removed on the day of discharge.  During his hospital stay, the patient became hypertensive therefore he was initiated on lisinopril.  I have asked him to follow-up with his primary care physician for further discussion and possible workup of the hypertension.  On the day of discharge, the patient was a Reina Coma Score 15 with no focal neurological findings.  He was tolerating a GI soft diet.  He was afebrile and nontoxic in appearance.  He had multiple bowel movements and ambulatory up with self.    DISPOSITION:   Discharged home in stable condition on 3/24/2024. The patient and family  were counseled and questions were answered. Specifically, signs and symptoms of infection, respiratory decompensation and persistent or worsening pain were discussed and the patient agrees to seek medical attention if any of these develop.    DISCHARGE MEDICATIONS:  The patients controlled substance history was reviewed and a controlled substance use informed consent (if applicable) was provided by [facility] and the patient has been prescribed.     Medication List        START taking these medications        Instructions   acetaminophen 500 MG Tabs  Commonly known as: Tylenol   Take 2 Tablets by mouth every 6 hours as needed for Moderate Pain. Take as directed on the bottle. Take as needed for pain  Dose: 1,000 mg     ibuprofen 600 MG Tabs  Commonly known as: Motrin   Take 1 Tablet by mouth every 6 hours as needed for Moderate Pain. Take as directed on the bottle.  Take as needed for pain.  Dose: 600 mg     lisinopril 10 MG Tabs  Start taking on: March 25, 2024  Commonly known as: Prinivil   Take 1 Tablet by mouth every day for 30 days.  Dose: 10 mg     oxyCODONE immediate-release 5 MG Tabs  Commonly known as: Roxicodone   Take 1 Tablet by mouth every four hours as needed for Severe Pain for up to 5 doses.  Dose: 5 mg     senna-docusate 8.6-50 MG Tabs  Commonly known as: Pericolace Or Senokot S   Take 1 Tablet by mouth every day. Take as directed on the bottle.  Take while on narcotics to avoid constipation.  Dose: 1 Tablet            CONTINUE taking these medications        Instructions   aspirin 325 MG Tabs  Commonly known as: Asa   Take 325-650 mg by mouth every 6 hours as needed. Indications: Pain  Dose: 325-650 mg     dapagliflozin propanediol 10 MG Tabs  Commonly known as: Farxiga   Doctor's comments: This prescription is transmitted by a pharmacist under the authority of a collaborative practice agreement.  Take 1 Tablet by mouth every day.  Dose: 10 mg     Insulin Aspart FlexPen 100  UNIT/ML Sopn   Inject 5-14 Units under the skin 3 times a day before meals. Sliding Scale  Dose: 5-14 Units     metFORMIN  MG Tb24  Commonly known as: Glucophage XR   Take 2 Tablets by mouth 2 times a day.  Dose: 1,000 mg     Ozempic (2 MG/DOSE) 8 MG/3ML Sopn  Generic drug: Semaglutide (2 MG/DOSE)   Doctor's comments: Please dispense sample  Inject 2 mg under the skin every 7 days.  Dose: 2 mg     Toujeo Max SoloStar 300 UNIT/ML Sopn  Generic drug: Insulin Glargine (2 Unit Dial)   Inject 75 Units under the skin every day.  Dose: 75 Units            You will be given a prescription for pain medication at discharge. Please take these as directed. It is important to remember not to take medications on an empty stomach as this may cause nausea.  You may also take over the counter acetaminophen and/or NSAIDS (ibuprofen, Aleve, Advil, Motrin) per the package instructions.  You may also use ice to the wound to decrease pain and swelling. You may alternate 20 minutes on and 20 minutes off with the ice for the first 24-48 hours. Make sure you place a washcloth or towel between the ice pack and your skin.  Please note that narcotic pain medication cannot be refilled unless you are seen by a doctor. Make sure you call the office if you are running low on medication or if the dose you have been prescribed is not working well for you.    ACTIVITY:  After discharge from the hospital, you may resume full routine activities; however, there should be no heavy lifting (greater than 20 pounds or a bag of groceries) and no strenuous activities for at least 2 weeks. The duration may be longer, depending on your surgical procedure. Routine activities of daily living are acceptable. Activity level should be addressed at your post-op follow up appointment. You may drive whenever you are off pain medications and are able to perform the activities needed to drive, i.e., turning, bending, twisting, etc.    WOUND CARE:  You may shower,  but do not submerge in a bath for at least two weeks. If you have wound dressings, they may come off after 48 hours. If you have skin glue to the wound, this will fall off on its own, do not pick at it. If you have steri strips to the wound, these will fall off on their own, do not pick at them, may trim the edges if needed.    DIET:  Upon discharge from the hospital, you may resume your normal preoperative diet, unless specifically directed otherwise. Depending on how you are feeling and whether you have nausea or not, you may wish to stay with a bland diet for the first few days. However, you can advance this as quickly as you feel ready.      FOLLOW UP:  [facility]  [id number]  [place] [address]  [phone]    Follow up on 4/2/2024  Follow up in the ACS clinic for staple removal and wound recheck. Clinic days are on Tuesday and Friday. Call the office for an appt.    Shirley Ozuna D.O.  [address]  [place] [id number] [phone]    Follow up  Follow-up in 1 to 2 weeks to inform them of your surgery and hospital admission.  Additionally I would like for you to discuss the hypertension that you are having in the hospital.    Call the office if you have: (1) Fevers to more than 101F, (2) Unusual chest or leg pain, (3) Drainage or fluid from incision that may be foul smelling, increased tenderness or soreness at the wound or the wound edges are no longer together, redness or swelling at the incision site. Do not hesitate to call with any other questions.    TIME SPENT ON DISCHARGE: 35 minutes      ____________________________________________  Sara Ulloa D.N.P.    DD: 3/24/2024 1:55 PM

## 2024-03-25 ENCOUNTER — PATIENT OUTREACH (OUTPATIENT)
Dept: MEDICAL GROUP | Facility: PHYSICIAN GROUP | Age: 84
End: 2024-03-25
Payer: MEDICARE

## 2024-03-25 ENCOUNTER — TELEPHONE (OUTPATIENT)
Dept: MEDICAL GROUP | Facility: PHYSICIAN GROUP | Age: 84
End: 2024-03-25
Payer: MEDICARE

## 2024-03-25 NOTE — TELEPHONE ENCOUNTER
1. Caller Name: Manolo Pepe Swanson                          Call Back Number: [phone] (home) [phone] (work)        How would the patient prefer to be contacted with a response: Phone call OK to leave a detailed message    Patient called   Had cholecystectomy on 3/20/24   Hospital only gave him 5 pills of oxycodone.  He states he is taking 3-4 a day     Our CSA was from 2/28/22    I'll call to see if he can come in on Wednesday or Thursday. TCM     Waiting to hear back from patient

## 2024-03-26 ENCOUNTER — TELEPHONE (OUTPATIENT)
Dept: HEALTH INFORMATION MANAGEMENT | Facility: OTHER | Age: 84
End: 2024-03-26

## 2024-03-26 NOTE — TELEPHONE ENCOUNTER
Left message with daughter including callback information for our department as well as [facility] ([phone]) with expressed need for father to be seen by surgeon for evaluation of his surgical site.

## 2024-03-26 NOTE — PROGRESS NOTES
Transitional Care Management  TCM Outreach Date and Time: Filed (3/25/2024  9:32 AM)    Discharge Questions  Actual Discharge Date: 03/24/24  Now that you are home, how are you feeling?: Fair (Patient reports large amount of drainage from incision site)  Did you receive any new prescriptions?: Yes (Prescribed tylenol, ibuprofen, lisinopril, Roxicodone, and senna-docusate.)  Were you able to get them filled?: Yes  Meds to Bed or Pharmacy filled?: Meds to Bed  Do you have any questions about your current medications or new medications (Review Med Rec)?: No  Did you have any durable medical equipment ordered?: No  Do you have a follow up appointment scheduled with your PCP?: Yes  Appointment Date: 03/26/24 (Appears to have missed his appointment, call dropped before I could verify)  Appointment Time: 0820  Any issues or paperwork you wish to discuss with your PCP?: Yes (Please specify) (Incisional drainage)  Are you (patient) able to get to the appointment?: No, Contact [facility] (Sent message to [facility])  If Home Health was ordered, have they contacted you (Patient): Not Applicable  Did you have enough support after your last discharge?: -- (Unable to assess, however, I believe patient may need more support. Discussing concerns with CHW/SW.)  Does this patient qualify for the CCM program?: Yes    Transitional Care  Number of attempts made to contact patient: 2  Current or previous attempts completed within two business days of discharge? : Yes  Provided education regarding treatment plan, medications, self-management, ADLs?: Yes  Has patient completed an Advanced Directive?: No  Has the Care Manager's phone number provided?: No  Is there anything else I can help you with?: Yes (Please specify) (Patient is reporting a large amount of non-bloody drainage from incision site. The drain is no longer in place, but patient reports gauze dressing is saturated with drainage frequently and also saturating his shirt. Advising he be seen  in office ASAP)    Discharge Summary  Chief Complaint: RLQ pain, nausea and vomiting  Admitting Diagnosis: Gallstone pancreatitis  Discharge Diagnosis: Gallstone pancreatitis

## 2024-03-27 ENCOUNTER — HOME HEALTH ADMISSION (OUTPATIENT)
Dept: HOME HEALTH SERVICES | Facility: HOME HEALTHCARE | Age: 84
End: 2024-03-27
Payer: MEDICARE

## 2024-03-27 ENCOUNTER — APPOINTMENT (OUTPATIENT)
Dept: MEDICAL GROUP | Facility: PHYSICIAN GROUP | Age: 84
End: 2024-03-27
Payer: MEDICARE

## 2024-03-27 DIAGNOSIS — K85.10 GALLSTONE PANCREATITIS: ICD-10-CM

## 2024-03-27 DIAGNOSIS — E11.42 TYPE 2 DIABETES MELLITUS WITH DIABETIC POLYNEUROPATHY, WITH LONG-TERM CURRENT USE OF INSULIN (HCC): ICD-10-CM

## 2024-03-27 DIAGNOSIS — Z79.4 TYPE 2 DIABETES MELLITUS WITH DIABETIC POLYNEUROPATHY, WITH LONG-TERM CURRENT USE OF INSULIN (HCC): ICD-10-CM

## 2024-03-27 DIAGNOSIS — N18.31 STAGE 3A CHRONIC KIDNEY DISEASE (CKD): ICD-10-CM

## 2024-03-28 ENCOUNTER — TELEPHONE (OUTPATIENT)
Dept: VASCULAR LAB | Facility: MEDICAL CENTER | Age: 84
End: 2024-03-28

## 2024-03-28 NOTE — TELEPHONE ENCOUNTER
Called pt regarding missed pharmacotherapy appt - no answer. LVM asking pt to c/b to reschedule.     Will f/u at a later time.    Lemuel Montanez, IleanaD, BCACP

## 2024-03-30 ENCOUNTER — HOME CARE VISIT (OUTPATIENT)
Dept: HOME HEALTH SERVICES | Facility: HOME HEALTHCARE | Age: 84
End: 2024-03-30
Payer: MEDICARE

## 2024-03-30 PROCEDURE — 665005 NO-PAY RAP - HOME HEALTH

## 2024-03-30 PROCEDURE — G0299 HHS/HOSPICE OF RN EA 15 MIN: HCPCS

## 2024-03-30 ASSESSMENT — FIBROSIS 4 INDEX: FIB4 SCORE: 0.8

## 2024-03-31 VITALS
WEIGHT: 214 LBS | TEMPERATURE: 98 F | HEIGHT: 72 IN | OXYGEN SATURATION: 92 % | HEART RATE: 84 BPM | BODY MASS INDEX: 28.99 KG/M2 | RESPIRATION RATE: 18 BRPM | SYSTOLIC BLOOD PRESSURE: 132 MMHG | DIASTOLIC BLOOD PRESSURE: 70 MMHG

## 2024-03-31 ASSESSMENT — ENCOUNTER SYMPTOMS
LOWEST PAIN SEVERITY IN PAST 24 HOURS: 1/10
PAIN: 1
VOMITING: DENIES
NAUSEA: DENIES
PAIN SEVERITY GOAL: 0/10
PERSON REPORTING PAIN: PATIENT
PAIN LOCATION - PAIN QUALITY: DULL, ACHY
HIGHEST PAIN SEVERITY IN PAST 24 HOURS: 4/10
PAIN LOCATION - RELIEVING FACTORS: PAIN MEDICATION
BOWEL PATTERN NORMAL: 1
STOOL FREQUENCY: DAILY
PAIN LOCATION - PAIN FREQUENCY: FREQUENT
PAIN LOCATION - PAIN SEVERITY: 4/10
SUBJECTIVE PAIN PROGRESSION: GRADUALLY IMPROVING
FATIGUES EASILY: 1
HOARSE VOICE: 1
ASSOCIATED SYMPTOMS: DENIES
LAST BOWEL MOVEMENT: 66929
ABDOMINAL PAIN: 1

## 2024-03-31 ASSESSMENT — ACTIVITIES OF DAILY LIVING (ADL)
AMBULATION ASSISTANCE: INDEPENDENT
CURRENT_FUNCTION: INDEPENDENT
ORAL_CARE_CURRENT_FUNCTION: INDEPENDENT
USING THE TELPHONE: INDEPENDENT
TELEPHONE USE ASSESSED: 1
PHYSICAL TRANSFERS ASSESSED: 1
ORAL_CARE_ASSESSED: 1
AMBULATION ASSISTANCE: 1

## 2024-04-01 ENCOUNTER — DOCUMENTATION (OUTPATIENT)
Dept: MEDICAL GROUP | Facility: PHYSICIAN GROUP | Age: 84
End: 2024-04-01

## 2024-04-01 ENCOUNTER — HOME CARE VISIT (OUTPATIENT)
Dept: HOME HEALTH SERVICES | Facility: HOME HEALTHCARE | Age: 84
End: 2024-04-01
Payer: MEDICARE

## 2024-04-01 ENCOUNTER — APPOINTMENT (OUTPATIENT)
Dept: MEDICAL GROUP | Facility: PHYSICIAN GROUP | Age: 84
End: 2024-04-01
Payer: MEDICARE

## 2024-04-01 VITALS
OXYGEN SATURATION: 95 % | WEIGHT: 213.2 LBS | BODY MASS INDEX: 28.88 KG/M2 | SYSTOLIC BLOOD PRESSURE: 106 MMHG | DIASTOLIC BLOOD PRESSURE: 62 MMHG | HEART RATE: 96 BPM | HEIGHT: 72 IN | TEMPERATURE: 96.4 F | RESPIRATION RATE: 16 BRPM

## 2024-04-01 DIAGNOSIS — L76.82 INCISIONAL PAIN: ICD-10-CM

## 2024-04-01 DIAGNOSIS — R49.0 HOARSENESS OF VOICE: ICD-10-CM

## 2024-04-01 DIAGNOSIS — Z09 HOSPITAL DISCHARGE FOLLOW-UP: ICD-10-CM

## 2024-04-01 DIAGNOSIS — R10.11 RIGHT UPPER QUADRANT ABDOMINAL PAIN: ICD-10-CM

## 2024-04-01 DIAGNOSIS — D50.9 IRON DEFICIENCY ANEMIA, UNSPECIFIED IRON DEFICIENCY ANEMIA TYPE: ICD-10-CM

## 2024-04-01 RX ORDER — OXYCODONE HYDROCHLORIDE 5 MG/1
5 TABLET ORAL EVERY 12 HOURS PRN
Qty: 14 TABLET | Refills: 0 | Status: SHIPPED | OUTPATIENT
Start: 2024-04-01 | End: 2024-04-08

## 2024-04-01 ASSESSMENT — FIBROSIS 4 INDEX: FIB4 SCORE: 0.8

## 2024-04-01 NOTE — PROGRESS NOTES
Subjective:     Manolo Puri is a 83 y.o. male who presents for Hospital Follow-up.    HPI:   Recently hospitalized for acute cholecystitis with abscess and gallstone pancreatitis.    Problem   Hospital Discharge Follow-Up    He was hospitalized with gallstone pancreatitis and cholecystitis from March 18 through March 24.  He had attempts at laparoscopic cholecystectomy however due to distention of gallbladder he had to be converted to open approach.  He had JOVANA drain placed as well due to abscess near the liver, this was pulled before discharge.  He continues to have significant abnormalities with blood sugars at home as he is recovering as well as ongoing pain.  Home health is starting this week with him as he continues to feel weak and fatigued.  Lisinopril was added as a new medicine due to elevated blood pressure while he was in the hospital.  He has follow-up with surgery tomorrow to have the staples removed.     Incisional Pain    He continues to have intermittent pain in the abdomen and around the large incision. He was discharged with 5 tablets of oxycodone 5 mg for post-operative pain which has run out and he continues to have pain. Needs to be careful about NSAIDs due to CKD and Tylenol due to worsening liver enzymes.  Plans to follow-up with general surgery tomorrow to have staples removed and recheck.     Hoarseness of Voice    He reports hoarseness since intubation and hospitalization for recent gallstone pancreatitis requiring operative repair.  He also had an NG tube with some kinking issues placed.  Pain has improved somewhat however voice still quite hoarse.  Plans to try salt water gargles.     Iron Deficiency Anemia    She has iron deficiency anemia, likely postoperative from blood loss with surgery versus dilutional effects from IV fluid while he was hospitalized.           Current medicines (including reconciliation performed today)  Current Outpatient Medications   Medication Sig  Dispense Refill    oxyCODONE immediate-release (ROXICODONE) 5 MG Tab Take 1 Tablet by mouth every 12 hours as needed for Severe Pain (breakthrough abdominal pain, pain > 7/10) for up to 7 days. 14 Tablet 0    acetaminophen (TYLENOL) 500 MG Tab Take 2 Tablets by mouth every 6 hours as needed for Moderate Pain. Take as directed on the bottle. Take as needed for pain      lisinopril (PRINIVIL) 10 MG Tab Take 1 Tablet by mouth every day for 30 days. (Patient taking differently: Take 10 mg by mouth every day. REPORTS NOT TAKING IT) 30 Tablet 0    senna-docusate (PERICOLACE OR SENOKOT S) 8.6-50 MG Tab Take 1 Tablet by mouth every day. Take as directed on the bottle.  Take while on narcotics to avoid constipation.      ibuprofen (MOTRIN) 600 MG Tab Take 1 Tablet by mouth every 6 hours as needed for Moderate Pain. Take as directed on the bottle.  Take as needed for pain.      Insulin Aspart FlexPen 100 UNIT/ML Solution Pen-injector Inject 5-14 Units under the skin 3 times a day before meals. Sliding Scale   NOVOLOG FLEX PEN  Indications: Type 2 Diabetes      Insulin Glargine, 2 Unit Dial, (TOUJEO MAX SOLOSTAR) 300 UNIT/ML Solution Pen-injector Inject 75 Units under the skin every day. Indications: Type 2 Diabetes      aspirin (ASA) 325 MG Tab Take 325-650 mg by mouth every 6 hours as needed. Indications: Pain      Semaglutide, 2 MG/DOSE, (OZEMPIC, 2 MG/DOSE,) 8 MG/3ML Solution Pen-injector Inject 2 mg under the skin every 7 days. 3 mL 0    dapagliflozin propanediol (FARXIGA) 10 MG Tab Take 1 Tablet by mouth every day. 30 Tablet 11    metFORMIN ER (GLUCOPHAGE XR) 500 MG TABLET SR 24 HR Take 2 Tablets by mouth 2 times a day. 400 Tablet 3     No current facility-administered medications for this visit.       Allergies:   Patient has no known allergies.    Social History     Tobacco Use    Smoking status: Never    Smokeless tobacco: Never    Tobacco comments:     continued abstinence   Vaping Use    Vaping Use: Never used    Substance Use Topics    Alcohol use: No    Drug use: No       ROS:  Abdominal discomfort, fatigue, wet cough, hoarseness    Objective:     Vitals:    04/01/24 1115   BP: 106/62   BP Location: Right arm   Patient Position: Sitting   BP Cuff Size: Adult   Pulse: 96   Resp: 16   Temp: (!) 35.8 °C (96.4 °F)   TempSrc: Temporal   SpO2: 95%   Weight: 96.7 kg (213 lb 3.2 oz)   Height: 1.829 m (6')     Body mass index is 28.92 kg/m².    Physical Exam:  Physical Exam  Constitutional:       General: He is not in acute distress.     Appearance: He is not toxic-appearing.      Comments: Pallor, appears fatigued, alert   HENT:      Right Ear: External ear normal.      Left Ear: External ear normal.   Eyes:      General: No scleral icterus.     Conjunctiva/sclera: Conjunctivae normal.   Cardiovascular:      Rate and Rhythm: Normal rate and regular rhythm.      Pulses: Normal pulses.   Pulmonary:      Effort: Pulmonary effort is normal. No respiratory distress.      Breath sounds: No wheezing.      Comments: Decreased breath sounds right lung base  Abdominal:      General: There is distension.      Palpations: Abdomen is soft.      Tenderness: There is abdominal tenderness.   Musculoskeletal:      Right lower leg: No edema.      Left lower leg: No edema.   Skin:     General: Skin is warm and dry.      Findings: No rash.      Comments: Incisions on abdomen appear clean/dry/intact with no drainage at this time with staples in place   Psychiatric:         Mood and Affect: Mood normal.         Behavior: Behavior normal.         Thought Content: Thought content normal.         Judgment: Judgment normal.           Assessment and Plan:   Problem List Items Addressed This Visit       Hoarseness of voice     New decompensated issue, occurred after intubation and hospitalization for gallstone pancreatitis and surgical repair.  Will place referral to ENT in case it does not improve with salt water gargles over the coming weeks.  Also can  consider speech/swallow therapy with a home health team.         Relevant Orders    Referral to ENT    Hospital discharge follow-up     Decompensated problem, reviewed hospital course and follow-up recommendations, check labs later this week to ensure kidney function liver enzymes remain stable so we can further adjust medicines.  He will also plan to get in with the pharmacist at [facility] again to continue adjusting his diabetes regiment.  He will follow-up with general surgery tomorrow to have staples removed and I will give him additional oxycodone for analgesia due to significant incisional pain and ongoing discomfort in the abdomen.         Incisional pain     No issues since surgery, have staples removed tomorrow, continues to have a lot of discomfort and worries as he continues to increase physical therapy and have staples removed that pain will deteriorate.  Will send in additional oxycodone 5 mg as needed for pain scale severity greater than 7 out of 10.  He needs to be careful with NSAIDs and Tylenol due to able recent abnormalities on kidney liver enzymes, with plan of home health redraw later this week. Discussed constipation risk in detail and he voiced understanding.         Relevant Medications    oxyCODONE immediate-release (ROXICODONE) 5 MG Tab    Other Relevant Orders    Consent for Opiate Prescription (Completed)    Comp Metabolic Panel    Iron deficiency anemia     New problem, likely combination of acute blood loss acute anemia and dilutional effects.  Recheck blood counts and iron levels to ensure ongoing stability later this week.         Relevant Orders    CBC WITH DIFFERENTIAL    Comp Metabolic Panel    IRON/TOTAL IRON BIND    FERRITIN     Other Visit Diagnoses       Right upper quadrant abdominal pain        Relevant Medications    oxyCODONE immediate-release (ROXICODONE) 5 MG Tab    Other Relevant Orders    Comp Metabolic Panel            - Chart and discharge summary were reviewed.    - Hospitalization and results reviewed with patient.   - Medications reviewed including instructions regarding high risk medications, dosing and side effects.  - Recommended Services: Referral to Home Health  - Advance directive/POLST on file?  No  Full code but needs to complete AD    Follow-up:Return in about 6 weeks (around 5/13/2024).    Face-to-face transitional care management services with HIGH (today's visit is within days post discharge & LACE+ score 59+) medical decision complexity were provided.     LACE+ Historical Score Over Time (0-28: Low, 29-58: Medium, 59+: High): 71      I spent a total of 42 minutes with record review, exam, communication with the patient, communication with other providers, and documentation of this encounter.    Shirley Ozuna, DO  Geriatric and Internal Medicine  [facility]

## 2024-04-01 NOTE — PROGRESS NOTES
Medication chart review for [facility] services    Received referral from [facility].   Medications reviewed  compared with discharge summary if available.  Discharge summary date, if applicable:   3/24    Current medication list per [facility]     Medication list one, patient is currently taking    Current Outpatient Medications:     acetaminophen, 1,000 mg, Oral, Q6HRS PRN    lisinopril, 10 mg, Oral, DAILY (Patient taking differently: 10 mg, Oral, DAILY, REPORTS NOT TAKING IT)    senna-docusate, 1 Tablet, Oral, DAILY    ibuprofen, 600 mg, Oral, Q6HRS PRN    Insulin Aspart FlexPen, 5-14 Units, Subcutaneous, TID AC    Toujeo Max SoloStar, 75 Units, Subcutaneous, DAILY    aspirin, 325-650 mg, Oral, Q6HRS PRN    Ozempic (2 MG/DOSE), 2 mg, Subcutaneous, Q7 DAYS    dapagliflozin propanediol, 10 mg, Oral, DAILY    metFORMIN ER, 1,000 mg, Oral, BID      Medication list two, drugs that the patient has been prescribed or recommended to take by their healthcare provider on discharge summary  START taking these medications         Instructions   acetaminophen 500 MG Tabs  Commonly known as: Tylenol    Take 2 Tablets by mouth every 6 hours as needed for Moderate Pain. Take as directed on the bottle. Take as needed for pain  Dose: 1,000 mg      ibuprofen 600 MG Tabs  Commonly known as: Motrin    Take 1 Tablet by mouth every 6 hours as needed for Moderate Pain. Take as directed on the bottle.  Take as needed for pain.  Dose: 600 mg      lisinopril 10 MG Tabs  Start taking on: March 25, 2024  Commonly known as: Prinivil    Take 1 Tablet by mouth every day for 30 days.  Dose: 10 mg      oxyCODONE immediate-release 5 MG Tabs  Commonly known as: Roxicodone    Take 1 Tablet by mouth every four hours as needed for Severe Pain for up to 5 doses.  Dose: 5 mg      senna-docusate 8.6-50 MG Tabs  Commonly known as: Pericolace Or Senokot S    Take 1 Tablet by mouth every day. Take as directed on the bottle.  Take while on  narcotics to avoid constipation.  Dose: 1 Tablet                CONTINUE taking these medications         Instructions   aspirin 325 MG Tabs  Commonly known as: Asa    Take 325-650 mg by mouth every 6 hours as needed. Indications: Pain  Dose: 325-650 mg      dapagliflozin propanediol 10 MG Tabs  Commonly known as: Farxiga    Doctor's comments: This prescription is transmitted by a pharmacist under the authority of a collaborative practice agreement.  Take 1 Tablet by mouth every day.  Dose: 10 mg      Insulin Aspart FlexPen 100 UNIT/ML Sopn    Inject 5-14 Units under the skin 3 times a day before meals. Sliding Scale  Dose: 5-14 Units      metFORMIN  MG Tb24  Commonly known as: Glucophage XR    Take 2 Tablets by mouth 2 times a day.  Dose: 1,000 mg      Ozempic (2 MG/DOSE) 8 MG/3ML Sopn  Generic drug: Semaglutide (2 MG/DOSE)    Doctor's comments: Please dispense sample  Inject 2 mg under the skin every 7 days.  Dose: 2 mg      Toujeo Max SoloStar 300 UNIT/ML Sopn  Generic drug: Insulin Glargine (2 Unit Dial)    Inject 75 Units under the skin every day.  Dose: 75 Units       No Known Allergies    Labs     Lab Results   Component Value Date/Time    SODIUM 137 03/24/2024 03:17 AM    POTASSIUM 4.1 03/24/2024 03:17 AM    CHLORIDE 105 03/24/2024 03:17 AM    CO2 24 03/24/2024 03:17 AM    GLUCOSE 239 (H) 03/24/2024 03:17 AM    BUN 18 03/24/2024 03:17 AM    CREATININE 1.17 03/24/2024 03:17 AM     Lab Results   Component Value Date/Time    ALKPHOSPHAT 383 (H) 03/24/2024 03:17 AM    ASTSGOT 18 03/24/2024 03:17 AM    ALTSGPT 33 03/24/2024 03:17 AM    TBILIRUBIN 0.9 03/24/2024 03:17 AM    INR 0.87 10/17/2019 06:43 PM    ALBUMIN 2.5 (L) 03/24/2024 03:17 AM        Assessment for clinically significant drug interactions, drug omissions/additions, duplicative therapies.            MONTSERRAT FrankOJudith  [address]  [place] [address]  Fax: [phone]    [facility]  Phone [phone] fax [phone]    This note was created using voice recognition software (Dragon). The accuracy of the dictation is limited by the abilities of the software. I have reviewed the note prior to signing, however some errors in grammar and context are still possible. If you have any questions related to this note please do not hesitate to contact our office.

## 2024-04-02 ENCOUNTER — HOME CARE VISIT (OUTPATIENT)
Dept: HOME HEALTH SERVICES | Facility: HOME HEALTHCARE | Age: 84
End: 2024-04-02
Payer: MEDICARE

## 2024-04-02 ENCOUNTER — OFFICE VISIT (OUTPATIENT)
Dept: SURGERY | Facility: MEDICAL CENTER | Age: 84
End: 2024-04-02
Attending: SURGERY
Payer: MEDICARE

## 2024-04-02 VITALS
SYSTOLIC BLOOD PRESSURE: 130 MMHG | OXYGEN SATURATION: 96 % | BODY MASS INDEX: 28.58 KG/M2 | HEIGHT: 72 IN | DIASTOLIC BLOOD PRESSURE: 76 MMHG | WEIGHT: 211 LBS | RESPIRATION RATE: 16 BRPM | HEART RATE: 76 BPM

## 2024-04-02 DIAGNOSIS — K85.10 GALLSTONE PANCREATITIS: ICD-10-CM

## 2024-04-02 PROCEDURE — 99024 POSTOP FOLLOW-UP VISIT: CPT | Performed by: SURGERY

## 2024-04-02 ASSESSMENT — FIBROSIS 4 INDEX: FIB4 SCORE: 0.8

## 2024-04-02 ASSESSMENT — ENCOUNTER SYMPTOMS
NAUSEA: 0
ABDOMINAL PAIN: 0

## 2024-04-02 NOTE — ASSESSMENT & PLAN NOTE
Decompensated problem, reviewed hospital course and follow-up recommendations, check labs later this week to ensure kidney function liver enzymes remain stable so we can further adjust medicines.  He will also plan to get in with the pharmacist at [facility] again to continue adjusting his diabetes regiment.  He will follow-up with general surgery tomorrow to have staples removed and I will give him additional oxycodone for analgesia due to significant incisional pain and ongoing discomfort in the abdomen.

## 2024-04-02 NOTE — ASSESSMENT & PLAN NOTE
No issues since surgery, have staples removed tomorrow, continues to have a lot of discomfort and worries as he continues to increase physical therapy and have staples removed that pain will deteriorate.  Will send in additional oxycodone 5 mg as needed for pain scale severity greater than 7 out of 10.  He needs to be careful with NSAIDs and Tylenol due to able recent abnormalities on kidney liver enzymes, with plan of home health redraw later this week. Discussed constipation risk in detail and he voiced understanding.

## 2024-04-02 NOTE — ASSESSMENT & PLAN NOTE
New problem, likely combination of acute blood loss acute anemia and dilutional effects.  Recheck blood counts and iron levels to ensure ongoing stability later this week.

## 2024-04-02 NOTE — ASSESSMENT & PLAN NOTE
New decompensated issue, occurred after intubation and hospitalization for gallstone pancreatitis and surgical repair.  Will place referral to ENT in case it does not improve with salt water gargles over the coming weeks.  Also can consider speech/swallow therapy with a home health team.

## 2024-04-02 NOTE — PROGRESS NOTES
Subjective     Manolo Puri is a 83 y.o. male who presents SP lap converted to open magnus            HPI Since DC having some fatigue. Tolerating diet. Afebrile. Complaining of sore throat.    Review of Systems   Gastrointestinal:  Negative for abdominal pain and nausea.                Objective     There were no vitals taken for this visit.     Physical Exam  HENT:      Mouth/Throat:      Mouth: Mucous membranes are moist.      Comments: Small eschar on Left posterior pharynx  Pulmonary:      Effort: Pulmonary effort is normal.   Abdominal:      General: There is no distension.      Palpations: Abdomen is soft.      Tenderness: There is no abdominal tenderness.      Comments: Incision healing well  Staples removed.   Musculoskeletal:         General: Normal range of motion.   Skin:     General: Skin is warm.   Neurological:      General: No focal deficit present.      Mental Status: He is alert.                Pathology - Acute gangrenous magnus with cholelithiasis             Assessment & Plan        SP lap converted to open Magnus    DC staples  PRN lozenges for sore throat  FU PRN

## 2024-04-03 ENCOUNTER — HOME CARE VISIT (OUTPATIENT)
Dept: HOME HEALTH SERVICES | Facility: HOME HEALTHCARE | Age: 84
End: 2024-04-03
Payer: MEDICARE

## 2024-04-03 PROCEDURE — G0151 HHCP-SERV OF PT,EA 15 MIN: HCPCS

## 2024-04-04 VITALS
DIASTOLIC BLOOD PRESSURE: 82 MMHG | OXYGEN SATURATION: 93 % | SYSTOLIC BLOOD PRESSURE: 143 MMHG | RESPIRATION RATE: 18 BRPM | HEART RATE: 79 BPM | TEMPERATURE: 98.2 F

## 2024-04-04 ASSESSMENT — ENCOUNTER SYMPTOMS
PAIN LOCATION - PAIN SEVERITY: 2/10
PAIN LOCATION - PAIN FREQUENCY: CONSTANT
PAIN LOCATION - PAIN QUALITY: ACHE, SORE
PAIN LOCATION - PAIN DURATION: DAILY
PERSON REPORTING PAIN: PATIENT
PAIN: 1
PAIN LOCATION: ABDOMEN

## 2024-04-05 ENCOUNTER — HOME CARE VISIT (OUTPATIENT)
Dept: HOME HEALTH SERVICES | Facility: HOME HEALTHCARE | Age: 84
End: 2024-04-05
Payer: MEDICARE

## 2024-04-05 PROCEDURE — G0495 RN CARE TRAIN/EDU IN HH: HCPCS

## 2024-04-07 VITALS
OXYGEN SATURATION: 93 % | SYSTOLIC BLOOD PRESSURE: 124 MMHG | RESPIRATION RATE: 18 BRPM | DIASTOLIC BLOOD PRESSURE: 70 MMHG | HEART RATE: 74 BPM | TEMPERATURE: 97.7 F

## 2024-04-07 ASSESSMENT — ENCOUNTER SYMPTOMS
FATIGUES EASILY: 1
LIMITED RANGE OF MOTION: 1
ABDOMINAL PAIN: 1
MUSCLE WEAKNESS: 1
PAIN SEVERITY GOAL: 0/10
PAIN LOCATION - EXACERBATING FACTORS: MOVEMENT
LAST BOWEL MOVEMENT: 66935
PAIN LOCATION - PAIN SEVERITY: 2/10
DRY SKIN: 1
SUBJECTIVE PAIN PROGRESSION: UNCHANGED
PERSON REPORTING PAIN: PATIENT
PAIN: 1
PAIN LOCATION - PAIN QUALITY: ACHY
STOOL FREQUENCY: LESS THAN DAILY
PAIN LOCATION - PAIN FREQUENCY: INTERMITTENT
HIGHEST PAIN SEVERITY IN PAST 24 HOURS: 3/10
BOWEL PATTERN NORMAL: 1
LOWEST PAIN SEVERITY IN PAST 24 HOURS: 2/10
FATIGUE: 1
PAIN LOCATION - RELIEVING FACTORS: PAIN MED

## 2024-04-07 ASSESSMENT — PATIENT HEALTH QUESTIONNAIRE - PHQ9: CLINICAL INTERPRETATION OF PHQ2 SCORE: 0

## 2024-04-08 ENCOUNTER — HOME CARE VISIT (OUTPATIENT)
Dept: HOME HEALTH SERVICES | Facility: HOME HEALTHCARE | Age: 84
End: 2024-04-08
Payer: MEDICARE

## 2024-04-08 PROCEDURE — G0151 HHCP-SERV OF PT,EA 15 MIN: HCPCS

## 2024-04-08 ASSESSMENT — ACTIVITIES OF DAILY LIVING (ADL)
AMBULATION_DISTANCE/DURATION_TOLERATED: 2 X 60 FT
AMBULATION ASSISTANCE ON FLAT SURFACES: 1

## 2024-04-09 ENCOUNTER — HOME CARE VISIT (OUTPATIENT)
Dept: HOME HEALTH SERVICES | Facility: HOME HEALTHCARE | Age: 84
End: 2024-04-09
Payer: MEDICARE

## 2024-04-09 VITALS
HEART RATE: 83 BPM | TEMPERATURE: 98.2 F | DIASTOLIC BLOOD PRESSURE: 73 MMHG | OXYGEN SATURATION: 95 % | SYSTOLIC BLOOD PRESSURE: 141 MMHG | RESPIRATION RATE: 18 BRPM

## 2024-04-09 PROCEDURE — G0155 HHCP-SVS OF CSW,EA 15 MIN: HCPCS

## 2024-04-09 PROCEDURE — G0299 HHS/HOSPICE OF RN EA 15 MIN: HCPCS

## 2024-04-09 RX ORDER — AMOXICILLIN 250 MG
1 CAPSULE ORAL
Qty: 100 TABLET | Refills: 3 | Status: SHIPPED | OUTPATIENT
Start: 2024-04-09

## 2024-04-09 ASSESSMENT — ENCOUNTER SYMPTOMS
SPUTUM CONSISTENCY: THICK
SPUTUM COLOR: YELLOW
PAIN: 1
SHORTNESS OF BREATH: 1
PERSON REPORTING PAIN: PATIENT
PAIN LOCATION: ABDOMEN
SPUTUM PRODUCTION: 1
PAIN LOCATION - PAIN DURATION: DAILY
PAIN LOCATION - PAIN SEVERITY: 2/10
PAIN LOCATION - PAIN FREQUENCY: CONSTANT
CHEST TIGHTNESS: 1
DYSPNEA ACTIVITY LEVEL: AFTER AMBULATING MORE THAN 20 FT
PAIN LOCATION - PAIN QUALITY: ACHE/SORE

## 2024-04-09 ASSESSMENT — ACTIVITIES OF DAILY LIVING (ADL)
OASIS_M1830: 03
LAUNDRY_REQUIRES_ASSISTANCE: 1
AMBULATION_REQUIRES_ASSISTANCE: 1

## 2024-04-09 NOTE — PROGRESS NOTES
HV to residence. Permitted entry by patient. Patient denied falls or changes in medication. Reports pain of 2/10 right abdomen above incision. MSW reviewed reason or visit, guardianship for wife. Patient would like to learn what resources are available for him as he cares for his spouse who has nonverbal dementia. Educated patient on purpose of guardianship, ADSD Waiver, Alzheimer's Association. ADSD Waiver provided for independent follow up and Alzheimer's Association contact information. Patient is considering how to pay family to provide care. Patient denied having any additional needs and is agreeable to MSW eval only.     Falls: o    DME: toiletseat safety frame    Community resources: n/a    Advance Directive: not file    Oxygen: n/a    Discipline Frequency: 1 visit, patient agreeable    Follow Up: n/a

## 2024-04-09 NOTE — CASE COMMUNICATION
Quality Review for SOC OASIS by MONTSERRAT Motta, RN on  April 8, 2024      Edits completed by MONTSERRAT Motta RN:  1.  and  diagnosis coding updated per chart review.  2. Changed  to 3/30/24 per the LSOC order  3. Changed  to short stay acute hospital and  to 3/24/24, date of inpatient discharge per Epic  4. Changed Pneumonia vaccine to yes per Epic  5. Per PT note dated 4/3/24 stating patient needs supervision f or safety, the following changes were made: changed to 3;  changed to 3; [id number] E changed to 4; Changed [id number] D, E, F to 4  6. Changed  and  to 2 per response to [id number] G  7. Changed  to 4/3/24 per the collaboration convention  8. Checked indication noted for hypoglycemic on  per MAR  9.  and  are 3 per ambulation status  10. Changed nutritional requirement to diabetic diet  11. Completed F2F inf ormation

## 2024-04-09 NOTE — CASE COMMUNICATION
I agree with the changes made.  ----- Message -----  From: Rosangela Motta R.N.  Sent: 4/9/2024   5:49 AM PDT  To: Cara Lara R.N.      Quality Review for SOC OASIS by MONTSERRAT Motta, RN on  April 8, 2024      Edits completed by MONTSERRAT Motta, RN:  1.  and  diagnosis coding updated per chart review.  2. Changed  to 3/30/24 per the LSOC order  3. Changed  to short stay acute hospital and  to 3/24/24, date  of inpatient discharge per Epic  4. Changed Pneumonia vaccine to yes per Epic  5. Per PT note dated 4/3/24 stating patient needs supervision for safety, the following changes were made: changed to 3;  changed to 3; [id number] E changed to 4; Changed [id number] D, E, F to 4  6. Changed  and  to 2 per response to [id number] G  7. Changed  to 4/3/24 per the collaboration convention  8. Checked indication noted for hypoglycemi c on  per MAR  9.  and  are 3 per ambulation status  10. Changed nutritional requirement to diabetic diet  11. Completed F2F information

## 2024-04-10 ENCOUNTER — TELEPHONE (OUTPATIENT)
Dept: VASCULAR LAB | Facility: MEDICAL CENTER | Age: 84
End: 2024-04-10
Payer: MEDICARE

## 2024-04-10 ENCOUNTER — HOME CARE VISIT (OUTPATIENT)
Dept: HOME HEALTH SERVICES | Facility: HOME HEALTHCARE | Age: 84
End: 2024-04-10
Payer: MEDICARE

## 2024-04-10 VITALS
HEART RATE: 68 BPM | DIASTOLIC BLOOD PRESSURE: 80 MMHG | OXYGEN SATURATION: 5 % | SYSTOLIC BLOOD PRESSURE: 132 MMHG | TEMPERATURE: 97.8 F | RESPIRATION RATE: 18 BRPM

## 2024-04-10 ASSESSMENT — ENCOUNTER SYMPTOMS
HYPERTENSION: 1
SPUTUM AMOUNT: MODERATE
NAUSEA: NO
SORE THROAT: 1
COUGH: 1
LAST BOWEL MOVEMENT: 66939
DENIES PAIN: 1
VOMITING: NO
PERSON REPORTING PAIN: PATIENT
DYSPNEA ON EXERTION: 1
MUSCLE WEAKNESS: 1
TROUBLE SWALLOWING: 1

## 2024-04-10 ASSESSMENT — PATIENT HEALTH QUESTIONNAIRE - PHQ9: CLINICAL INTERPRETATION OF PHQ2 SCORE: 0

## 2024-04-10 NOTE — TELEPHONE ENCOUNTER
Called pt regarding missed pharmacotherapy appt - r/s f/u for 4/25/24.    Lemuel Montanez, IleanaD, BCACP

## 2024-04-11 DIAGNOSIS — R05.1 ACUTE COUGH: ICD-10-CM

## 2024-04-12 ENCOUNTER — HOME CARE VISIT (OUTPATIENT)
Dept: HOME HEALTH SERVICES | Facility: HOME HEALTHCARE | Age: 84
End: 2024-04-12
Payer: MEDICARE

## 2024-04-12 ENCOUNTER — HOSPITAL ENCOUNTER (OUTPATIENT)
Dept: RADIOLOGY | Facility: MEDICAL CENTER | Age: 84
End: 2024-04-12
Attending: INTERNAL MEDICINE
Payer: MEDICARE

## 2024-04-12 ENCOUNTER — HOSPITAL ENCOUNTER (OUTPATIENT)
Facility: MEDICAL CENTER | Age: 84
End: 2024-04-12
Attending: INTERNAL MEDICINE
Payer: MEDICARE

## 2024-04-12 DIAGNOSIS — R05.1 ACUTE COUGH: ICD-10-CM

## 2024-04-12 LAB
ALBUMIN SERPL BCP-MCNC: 3.9 G/DL (ref 3.2–4.9)
ALBUMIN/GLOB SERPL: 1.1 G/DL
ALP SERPL-CCNC: 159 U/L (ref 30–99)
ALT SERPL-CCNC: 24 U/L (ref 2–50)
ANION GAP SERPL CALC-SCNC: 14 MMOL/L (ref 7–16)
AST SERPL-CCNC: 27 U/L (ref 12–45)
BASOPHILS # BLD AUTO: 1 % (ref 0–1.8)
BASOPHILS # BLD: 0.09 K/UL (ref 0–0.12)
BILIRUB SERPL-MCNC: 0.6 MG/DL (ref 0.1–1.5)
BUN SERPL-MCNC: 25 MG/DL (ref 8–22)
CALCIUM ALBUM COR SERPL-MCNC: 9.4 MG/DL (ref 8.5–10.5)
CALCIUM SERPL-MCNC: 9.3 MG/DL (ref 8.5–10.5)
CHLORIDE SERPL-SCNC: 98 MMOL/L (ref 96–112)
CO2 SERPL-SCNC: 21 MMOL/L (ref 20–33)
CREAT SERPL-MCNC: 1.27 MG/DL (ref 0.5–1.4)
EOSINOPHIL # BLD AUTO: 0.5 K/UL (ref 0–0.51)
EOSINOPHIL NFR BLD: 5.4 % (ref 0–6.9)
ERYTHROCYTE [DISTWIDTH] IN BLOOD BY AUTOMATED COUNT: 42.9 FL (ref 35.9–50)
FERRITIN SERPL-MCNC: 224 NG/ML (ref 22–322)
GFR SERPLBLD CREATININE-BSD FMLA CKD-EPI: 57 ML/MIN/1.73 M 2
GLOBULIN SER CALC-MCNC: 3.5 G/DL (ref 1.9–3.5)
GLUCOSE SERPL-MCNC: 285 MG/DL (ref 65–99)
HCT VFR BLD AUTO: 46.8 % (ref 42–52)
HGB BLD-MCNC: 15.2 G/DL (ref 14–18)
IMM GRANULOCYTES # BLD AUTO: 0.16 K/UL (ref 0–0.11)
IMM GRANULOCYTES NFR BLD AUTO: 1.7 % (ref 0–0.9)
IRON SATN MFR SERPL: 13 % (ref 15–55)
IRON SERPL-MCNC: 39 UG/DL (ref 50–180)
LYMPHOCYTES # BLD AUTO: 1.84 K/UL (ref 1–4.8)
LYMPHOCYTES NFR BLD: 20 % (ref 22–41)
MCH RBC QN AUTO: 28 PG (ref 27–33)
MCHC RBC AUTO-ENTMCNC: 32.5 G/DL (ref 32.3–36.5)
MCV RBC AUTO: 86.2 FL (ref 81.4–97.8)
MONOCYTES # BLD AUTO: 1.15 K/UL (ref 0–0.85)
MONOCYTES NFR BLD AUTO: 12.5 % (ref 0–13.4)
NEUTROPHILS # BLD AUTO: 5.48 K/UL (ref 1.82–7.42)
NEUTROPHILS NFR BLD: 59.4 % (ref 44–72)
NRBC # BLD AUTO: 0 K/UL
NRBC BLD-RTO: 0 /100 WBC (ref 0–0.2)
PLATELET # BLD AUTO: 251 K/UL (ref 164–446)
PMV BLD AUTO: 11.8 FL (ref 9–12.9)
POTASSIUM SERPL-SCNC: 5.2 MMOL/L (ref 3.6–5.5)
PROT SERPL-MCNC: 7.4 G/DL (ref 6–8.2)
RBC # BLD AUTO: 5.43 M/UL (ref 4.7–6.1)
SODIUM SERPL-SCNC: 133 MMOL/L (ref 135–145)
TIBC SERPL-MCNC: 289 UG/DL (ref 250–450)
UIBC SERPL-MCNC: 250 UG/DL (ref 110–370)
WBC # BLD AUTO: 9.2 K/UL (ref 4.8–10.8)

## 2024-04-12 PROCEDURE — 71046 X-RAY EXAM CHEST 2 VIEWS: CPT

## 2024-04-12 PROCEDURE — 82728 ASSAY OF FERRITIN: CPT

## 2024-04-12 PROCEDURE — 80053 COMPREHEN METABOLIC PANEL: CPT

## 2024-04-12 PROCEDURE — 83540 ASSAY OF IRON: CPT

## 2024-04-12 PROCEDURE — 85025 COMPLETE CBC W/AUTO DIFF WBC: CPT

## 2024-04-12 PROCEDURE — G0151 HHCP-SERV OF PT,EA 15 MIN: HCPCS

## 2024-04-12 PROCEDURE — 83550 IRON BINDING TEST: CPT

## 2024-04-12 PROCEDURE — G0299 HHS/HOSPICE OF RN EA 15 MIN: HCPCS

## 2024-04-14 VITALS
HEART RATE: 88 BPM | SYSTOLIC BLOOD PRESSURE: 138 MMHG | RESPIRATION RATE: 16 BRPM | TEMPERATURE: 98.2 F | OXYGEN SATURATION: 94 % | DIASTOLIC BLOOD PRESSURE: 77 MMHG

## 2024-04-14 ASSESSMENT — ENCOUNTER SYMPTOMS
PAIN LOCATION - PAIN DURATION: DAILY
ABDOMINAL PAIN: 1
PAIN SEVERITY GOAL: 0/10
COUGH: 1
PERSON REPORTING PAIN: PATIENT
PAIN LOCATION - RELIEVING FACTORS: REST,PAIN MED
NAUSEA: DENIES
PAIN: 1
MUSCLE WEAKNESS: 1
LOWEST PAIN SEVERITY IN PAST 24 HOURS: 2/10
SUBJECTIVE PAIN PROGRESSION: WAXING AND WANING
LIMITED RANGE OF MOTION: 1
PAIN LOCATION - PAIN SEVERITY: 2/10
STOOL FREQUENCY: LESS THAN DAILY
FATIGUES EASILY: 1
FATIGUE: 1
LAST BOWEL MOVEMENT: 66942
HIGHEST PAIN SEVERITY IN PAST 24 HOURS: 5/10
BOWEL PATTERN NORMAL: 1
COUGH CHARACTERISTICS: PRODUCTIVE
PAIN LOCATION - EXACERBATING FACTORS: MOVEMENT
PAIN LOCATION - PAIN FREQUENCY: INTERMITTENT

## 2024-04-14 ASSESSMENT — PATIENT HEALTH QUESTIONNAIRE - PHQ9: CLINICAL INTERPRETATION OF PHQ2 SCORE: 0

## 2024-04-15 VITALS
OXYGEN SATURATION: 94 % | HEART RATE: 88 BPM | RESPIRATION RATE: 16 BRPM | SYSTOLIC BLOOD PRESSURE: 138 MMHG | DIASTOLIC BLOOD PRESSURE: 77 MMHG | TEMPERATURE: 98.2 F

## 2024-04-15 ASSESSMENT — ENCOUNTER SYMPTOMS
PAIN LOCATION: ABDOMEN
PAIN LOCATION - PAIN SEVERITY: 2/10
PAIN LOCATION - PAIN DURATION: DAILY
PAIN LOCATION - PAIN FREQUENCY: CONSTANT
PAIN LOCATION - PAIN QUALITY: SORE
PAIN: 1
PERSON REPORTING PAIN: PATIENT
PAIN LOCATION - EXACERBATING FACTORS: STRETCHING

## 2024-04-17 ENCOUNTER — HOME CARE VISIT (OUTPATIENT)
Dept: HOME HEALTH SERVICES | Facility: HOME HEALTHCARE | Age: 84
End: 2024-04-17
Payer: MEDICARE

## 2024-04-17 PROCEDURE — G0299 HHS/HOSPICE OF RN EA 15 MIN: HCPCS

## 2024-04-18 ENCOUNTER — HOME CARE VISIT (OUTPATIENT)
Dept: HOME HEALTH SERVICES | Facility: HOME HEALTHCARE | Age: 84
End: 2024-04-18
Payer: MEDICARE

## 2024-04-18 PROCEDURE — G0151 HHCP-SERV OF PT,EA 15 MIN: HCPCS

## 2024-04-19 VITALS
SYSTOLIC BLOOD PRESSURE: 130 MMHG | DIASTOLIC BLOOD PRESSURE: 72 MMHG | RESPIRATION RATE: 16 BRPM | TEMPERATURE: 97.8 F | HEART RATE: 88 BPM | OXYGEN SATURATION: 98 %

## 2024-04-19 VITALS
HEART RATE: 83 BPM | OXYGEN SATURATION: 94 % | TEMPERATURE: 98.4 F | RESPIRATION RATE: 18 BRPM | DIASTOLIC BLOOD PRESSURE: 88 MMHG | SYSTOLIC BLOOD PRESSURE: 141 MMHG

## 2024-04-19 ASSESSMENT — ENCOUNTER SYMPTOMS
PAIN LOCATION - PAIN QUALITY: SORE
VOMITING: PT DENIES
PERSON REPORTING PAIN: PATIENT
PERSON REPORTING PAIN: PATIENT
PAIN: 1
PAIN LOCATION - PAIN QUALITY: ACHY
PAIN SEVERITY GOAL: 0/10
HYPERTENSION: 1
PAIN LOCATION - PAIN DURATION: DAILY
PAIN: 1
MUSCLE WEAKNESS: 1
LAST BOWEL MOVEMENT: 66947
PAIN LOCATION - PAIN SEVERITY: 1/10
PAIN LOCATION - PAIN FREQUENCY: CONSTANT
LOWEST PAIN SEVERITY IN PAST 24 HOURS: 0/10
NAUSEA: PT DENIES
PAIN LOCATION - RELIEVING FACTORS: REPOSITION
SUBJECTIVE PAIN PROGRESSION: GRADUALLY IMPROVING
PAIN LOCATION - PAIN DURATION: COMES AND GOES
PAIN LOCATION - PAIN FREQUENCY: INFREQUENT
PAIN LOCATION: ABDOMEN
HIGHEST PAIN SEVERITY IN PAST 24 HOURS: 1/10
PAIN LOCATION - PAIN SEVERITY: 1/10

## 2024-04-19 ASSESSMENT — SOCIAL DETERMINANTS OF HEALTH (SDOH): IS CONCERNED ABOUT INCOME: N

## 2024-04-19 ASSESSMENT — ACTIVITIES OF DAILY LIVING (ADL)
AMBULATION ASSISTANCE: STAND BY ASSIST
CURRENT_FUNCTION: STAND BY ASSIST

## 2024-04-19 ASSESSMENT — PATIENT HEALTH QUESTIONNAIRE - PHQ9: CLINICAL INTERPRETATION OF PHQ2 SCORE: 0

## 2024-04-23 ENCOUNTER — HOME CARE VISIT (OUTPATIENT)
Dept: HOME HEALTH SERVICES | Facility: HOME HEALTHCARE | Age: 84
End: 2024-04-23
Payer: MEDICARE

## 2024-04-23 PROCEDURE — G0151 HHCP-SERV OF PT,EA 15 MIN: HCPCS

## 2024-04-29 VITALS
DIASTOLIC BLOOD PRESSURE: 78 MMHG | HEART RATE: 80 BPM | RESPIRATION RATE: 18 BRPM | TEMPERATURE: 98 F | OXYGEN SATURATION: 95 % | SYSTOLIC BLOOD PRESSURE: 131 MMHG

## 2024-04-29 ASSESSMENT — ENCOUNTER SYMPTOMS
DENIES PAIN: 1
PERSON REPORTING PAIN: PATIENT

## 2024-04-29 ASSESSMENT — ACTIVITIES OF DAILY LIVING (ADL)
HOME_HEALTH_OASIS: 00
OASIS_M1830: 00

## 2024-04-30 ENCOUNTER — HOME CARE VISIT (OUTPATIENT)
Dept: HOME HEALTH SERVICES | Facility: HOME HEALTHCARE | Age: 84
End: 2024-04-30
Payer: MEDICARE

## 2024-04-30 NOTE — CASE COMMUNICATION
Quality Review Completed for MS OASIS by REBECCA Roth RN on 4/30/2024:     Edits completed by REBECCA Roth RN:  1.  is NA to Pressure ulcer prevention per care plan interventions  2.  unchecked B and checked A

## 2024-05-14 ENCOUNTER — OFFICE VISIT (OUTPATIENT)
Dept: SURGERY | Facility: MEDICAL CENTER | Age: 84
End: 2024-05-14
Attending: STUDENT IN AN ORGANIZED HEALTH CARE EDUCATION/TRAINING PROGRAM
Payer: MEDICARE

## 2024-05-14 VITALS
OXYGEN SATURATION: 97 % | HEIGHT: 72 IN | SYSTOLIC BLOOD PRESSURE: 120 MMHG | BODY MASS INDEX: 29.12 KG/M2 | WEIGHT: 215 LBS | RESPIRATION RATE: 16 BRPM | HEART RATE: 78 BPM | DIASTOLIC BLOOD PRESSURE: 70 MMHG

## 2024-05-14 DIAGNOSIS — Z90.49 STATUS POST CHOLECYSTECTOMY: ICD-10-CM

## 2024-05-14 PROCEDURE — 99024 POSTOP FOLLOW-UP VISIT: CPT | Performed by: PHYSICIAN ASSISTANT

## 2024-05-14 ASSESSMENT — FIBROSIS 4 INDEX: FIB4 SCORE: 1.78

## 2024-05-14 NOTE — PROGRESS NOTES
HISTORY OF PRESENT ILLNESS: The patient is an 83 year-old elderly man who returns to the [facility] for routine follow-up after undergoing an attempted laparoscopic cholecystectomy with conversion to open cholecystectomy for gallstone pancreatitis by Daniel Jameson MD on 3/20/24. He was discharged from the hospital on 3/24/24.   Seen in ACS clinic on 4/2/24 for staple removal and discharged with PRN follow up.   Today he presents for wound check. Patient reports small scab over umbilical incision that has been slow to heal. No sharonda erythema, pain, swelling, or drainage.     CURRENT MEDICATIONS:  Current Outpatient Medications   Medication Sig    senna-docusate (PERICOLACE OR SENOKOT S) 8.6-50 MG Tab Take 1 Tablet by mouth 1 time a day as needed for Constipation. Indications: Constipation    acetaminophen (TYLENOL) 500 MG Tab Take 2 Tablets by mouth every 6 hours as needed for Moderate Pain. Take as directed on the bottle. Take as needed for pain    Insulin Aspart FlexPen 100 UNIT/ML Solution Pen-injector Inject 25 Units under the skin 3 times a day before meals. NOVOLOG FLEX PEN  10 at Breakfast  10 at lunch   5 at dinner   Indications: Type 2 Diabetes    Insulin Glargine, 2 Unit Dial, (TOUJEO MAX SOLOSTAR) 300 UNIT/ML Solution Pen-injector Inject 75 Units under the skin every day. Indications: Type 2 Diabetes    Semaglutide, 2 MG/DOSE, (OZEMPIC, 2 MG/DOSE,) 8 MG/3ML Solution Pen-injector Inject 2 mg under the skin every 7 days.    dapagliflozin propanediol (FARXIGA) 10 MG Tab Take 1 Tablet by mouth every day.    metFORMIN ER (GLUCOPHAGE XR) 500 MG TABLET SR 24 HR Take 2 Tablets by mouth 2 times a day.       PHYSICAL EXAMINATION:  Vital Signs: There were no vitals taken for this visit.  Physical Exam  Vitals and nursing note reviewed.   Constitutional:       General: He is not in acute distress.     Appearance: He is not ill-appearing.   Cardiovascular:      Rate and Rhythm: Normal rate.    Pulmonary:      Effort: Pulmonary effort is normal. No respiratory distress.   Abdominal:      General: Abdomen is protuberant. There is no distension.      Tenderness: There is no abdominal tenderness.      Comments: Umbilical incision with healing <5mm overlying excoriation. No induration, fluctuance, warmth, discharge    Neurological:      Mental Status: He is alert.         LABORATORY VALUES:  Lab Results   Component Value Date/Time    WBC 9.2 04/12/2024 10:55 AM    RBC 5.43 04/12/2024 10:55 AM    HEMOGLOBIN 15.2 04/12/2024 10:55 AM    HEMATOCRIT 46.8 04/12/2024 10:55 AM    MCV 86.2 04/12/2024 10:55 AM    MCH 28.0 04/12/2024 10:55 AM    MCHC 32.5 04/12/2024 10:55 AM    MPV 11.8 04/12/2024 10:55 AM    NEUTSPOLYS 59.40 04/12/2024 10:55 AM    LYMPHOCYTES 20.00 (L) 04/12/2024 10:55 AM    MONOCYTES 12.50 04/12/2024 10:55 AM    EOSINOPHILS 5.40 04/12/2024 10:55 AM    BASOPHILS 1.00 04/12/2024 10:55 AM     Lab Results   Component Value Date/Time    SODIUM 133 (L) 04/12/2024 10:55 AM    POTASSIUM 5.2 04/12/2024 10:55 AM    CHLORIDE 98 04/12/2024 10:55 AM    CO2 21 04/12/2024 10:55 AM    GLUCOSE 285 (H) 04/12/2024 10:55 AM    BUN 25 (H) 04/12/2024 10:55 AM    CREATININE 1.27 04/12/2024 10:55 AM     Lab Results   Component Value Date/Time    PROTHROMBTM 12.0 10/17/2019 06:43 PM    INR 0.87 10/17/2019 06:43 PM       IMAGING:  No orders to display       PATHOLOGY: Final pathology demonstrated Gallbladder and stones:          Acute gangrenous cholecystitis.          Cholelithiasis.     ASSESSMENT AND PLAN:  Satisfactory progress following a laparoscopic cholecystectomy.  Final postoperative instructions provided. Discharge from the [facility]. Follow up as needed.  Discussed return precautions including signs of infection.        ____________________________________     Teri Armstrong P.A.-C.    DD: 5/14/2024  2:21 PM

## 2024-05-15 DIAGNOSIS — Z79.4 TYPE 2 DIABETES MELLITUS WITH DIABETIC POLYNEUROPATHY, WITH LONG-TERM CURRENT USE OF INSULIN (HCC): ICD-10-CM

## 2024-05-15 DIAGNOSIS — E11.42 TYPE 2 DIABETES MELLITUS WITH DIABETIC POLYNEUROPATHY, WITH LONG-TERM CURRENT USE OF INSULIN (HCC): ICD-10-CM

## 2024-05-15 DIAGNOSIS — Z79.4 LONG-TERM INSULIN USE (HCC): ICD-10-CM

## 2024-05-15 RX ORDER — INSULIN GLARGINE 300 U/ML
75 INJECTION, SOLUTION SUBCUTANEOUS DAILY
Qty: 9 ML | Refills: 3 | Status: SHIPPED | OUTPATIENT
Start: 2024-05-15

## 2024-05-15 NOTE — TELEPHONE ENCOUNTER
Received request via: Patient    Was the patient seen in the last year in this department? Yes    Does the patient have an active prescription (recently filled or refills available) for medication(s) requested? No    Pharmacy Name: [facility]    Does the patient have detention Plus and need 100 day supply (blood pressure, diabetes and cholesterol meds only)? Yes, quantity updated to 100 days

## 2024-07-16 ENCOUNTER — TELEPHONE (OUTPATIENT)
Dept: MEDICAL GROUP | Facility: PHYSICIAN GROUP | Age: 84
End: 2024-07-16
Payer: MEDICARE

## 2024-07-16 DIAGNOSIS — Z79.4 LONG-TERM INSULIN USE (HCC): ICD-10-CM

## 2024-07-16 DIAGNOSIS — D50.9 IRON DEFICIENCY ANEMIA, UNSPECIFIED IRON DEFICIENCY ANEMIA TYPE: ICD-10-CM

## 2024-07-16 DIAGNOSIS — E78.5 HYPERLIPIDEMIA ASSOCIATED WITH TYPE 2 DIABETES MELLITUS (HCC): ICD-10-CM

## 2024-07-16 DIAGNOSIS — E11.69 HYPERLIPIDEMIA ASSOCIATED WITH TYPE 2 DIABETES MELLITUS (HCC): ICD-10-CM

## 2024-07-16 DIAGNOSIS — E11.29 TYPE 2 DIABETES MELLITUS WITH MICROALBUMINURIA, WITH LONG-TERM CURRENT USE OF INSULIN (HCC): ICD-10-CM

## 2024-07-16 DIAGNOSIS — E11.42 TYPE 2 DIABETES MELLITUS WITH DIABETIC POLYNEUROPATHY, WITH LONG-TERM CURRENT USE OF INSULIN (HCC): ICD-10-CM

## 2024-07-16 DIAGNOSIS — N18.31 STAGE 3A CHRONIC KIDNEY DISEASE (CKD): ICD-10-CM

## 2024-07-16 DIAGNOSIS — Z79.4 TYPE 2 DIABETES MELLITUS WITH DIABETIC POLYNEUROPATHY, WITH LONG-TERM CURRENT USE OF INSULIN (HCC): ICD-10-CM

## 2024-07-16 DIAGNOSIS — Z79.4 TYPE 2 DIABETES MELLITUS WITH MICROALBUMINURIA, WITH LONG-TERM CURRENT USE OF INSULIN (HCC): ICD-10-CM

## 2024-07-16 DIAGNOSIS — R80.9 TYPE 2 DIABETES MELLITUS WITH MICROALBUMINURIA, WITH LONG-TERM CURRENT USE OF INSULIN (HCC): ICD-10-CM

## 2024-07-16 RX ORDER — ACYCLOVIR 400 MG/1
1 TABLET ORAL
Qty: 9 EACH | Refills: 3 | Status: SHIPPED | OUTPATIENT
Start: 2024-07-16

## 2024-07-16 RX ORDER — ATORVASTATIN CALCIUM 20 MG/1
TABLET, FILM COATED ORAL
COMMUNITY
Start: 2024-05-11

## 2024-08-08 ENCOUNTER — APPOINTMENT (OUTPATIENT)
Dept: MEDICAL GROUP | Facility: PHYSICIAN GROUP | Age: 84
End: 2024-08-08
Payer: MEDICARE

## 2024-09-18 DIAGNOSIS — Z79.4 LONG-TERM INSULIN USE (HCC): ICD-10-CM

## 2024-09-18 DIAGNOSIS — E11.42 TYPE 2 DIABETES MELLITUS WITH DIABETIC POLYNEUROPATHY, WITH LONG-TERM CURRENT USE OF INSULIN (HCC): ICD-10-CM

## 2024-09-18 DIAGNOSIS — Z79.4 TYPE 2 DIABETES MELLITUS WITH DIABETIC POLYNEUROPATHY, WITH LONG-TERM CURRENT USE OF INSULIN (HCC): ICD-10-CM

## 2024-09-18 RX ORDER — INSULIN GLARGINE 300 U/ML
75 INJECTION, SOLUTION SUBCUTANEOUS DAILY
Qty: 9 ML | Refills: 3 | Status: SHIPPED | OUTPATIENT
Start: 2024-09-18

## 2024-09-18 NOTE — TELEPHONE ENCOUNTER
Received request via: Patient    Was the patient seen in the last year in this department? Yes    Does the patient have an active prescription (recently filled or refills available) for medication(s) requested? No    Pharmacy Name: [facility]     Does the patient have alf Plus and need 100-day supply? (This applies to ALL medications) Yes, quantity updated to 100 days

## 2024-10-01 ENCOUNTER — APPOINTMENT (OUTPATIENT)
Dept: MEDICAL GROUP | Facility: PHYSICIAN GROUP | Age: 84
End: 2024-10-01
Payer: MEDICARE

## 2024-10-08 DIAGNOSIS — E11.42 TYPE 2 DIABETES MELLITUS WITH DIABETIC POLYNEUROPATHY, WITH LONG-TERM CURRENT USE OF INSULIN (HCC): ICD-10-CM

## 2024-10-08 DIAGNOSIS — Z79.4 TYPE 2 DIABETES MELLITUS WITH DIABETIC POLYNEUROPATHY, WITH LONG-TERM CURRENT USE OF INSULIN (HCC): ICD-10-CM

## 2024-10-08 RX ORDER — DAPAGLIFLOZIN 10 MG/1
10 TABLET, FILM COATED ORAL DAILY
Qty: 30 TABLET | Refills: 0 | Status: SHIPPED | OUTPATIENT
Start: 2024-10-08

## 2024-11-14 ENCOUNTER — TELEPHONE (OUTPATIENT)
Dept: FAMILY PLANNING/WOMEN'S HEALTH CLINIC | Facility: PHYSICIAN GROUP | Age: 84
End: 2024-11-14
Payer: MEDICARE

## 2024-12-17 DIAGNOSIS — Z79.4 TYPE 2 DIABETES MELLITUS WITH MICROALBUMINURIA, WITH LONG-TERM CURRENT USE OF INSULIN (HCC): ICD-10-CM

## 2024-12-17 DIAGNOSIS — E11.42 TYPE 2 DIABETES MELLITUS WITH DIABETIC POLYNEUROPATHY, WITH LONG-TERM CURRENT USE OF INSULIN (HCC): ICD-10-CM

## 2024-12-17 DIAGNOSIS — Z79.4 TYPE 2 DIABETES MELLITUS WITH DIABETIC POLYNEUROPATHY, WITH LONG-TERM CURRENT USE OF INSULIN (HCC): ICD-10-CM

## 2024-12-17 DIAGNOSIS — E11.29 TYPE 2 DIABETES MELLITUS WITH MICROALBUMINURIA, WITH LONG-TERM CURRENT USE OF INSULIN (HCC): ICD-10-CM

## 2024-12-17 DIAGNOSIS — R80.9 TYPE 2 DIABETES MELLITUS WITH MICROALBUMINURIA, WITH LONG-TERM CURRENT USE OF INSULIN (HCC): ICD-10-CM

## 2024-12-17 RX ORDER — SEMAGLUTIDE 2.68 MG/ML
INJECTION, SOLUTION SUBCUTANEOUS
Qty: 3 ML | Refills: 0 | Status: SHIPPED | OUTPATIENT
Start: 2024-12-17

## 2024-12-17 RX ORDER — METFORMIN HYDROCHLORIDE 500 MG/1
1000 TABLET, EXTENDED RELEASE ORAL 2 TIMES DAILY
Qty: 400 TABLET | Refills: 0 | Status: SHIPPED | OUTPATIENT
Start: 2024-12-17

## 2024-12-17 NOTE — TELEPHONE ENCOUNTER
Received request via: Pharmacy    Was the patient seen in the last year in this department? Yes    Does the patient have an active prescription (recently filled or refills available) for medication(s) requested? No    Pharmacy Name: [facility]    Does the patient have retirement Plus and need 100-day supply? (This applies to ALL medications) Yes, quantity updated to 100 days

## 2025-01-09 ENCOUNTER — OFFICE VISIT (OUTPATIENT)
Dept: MEDICAL GROUP | Facility: PHYSICIAN GROUP | Age: 85
End: 2025-01-09
Payer: MEDICARE

## 2025-01-09 ENCOUNTER — TELEPHONE (OUTPATIENT)
Dept: MEDICAL GROUP | Facility: PHYSICIAN GROUP | Age: 85
End: 2025-01-09
Payer: MEDICARE

## 2025-01-09 ENCOUNTER — HOSPITAL ENCOUNTER (OUTPATIENT)
Dept: LAB | Facility: MEDICAL CENTER | Age: 85
End: 2025-01-09
Attending: INTERNAL MEDICINE
Payer: MEDICARE

## 2025-01-09 VITALS
HEART RATE: 104 BPM | BODY MASS INDEX: 30.96 KG/M2 | WEIGHT: 228.6 LBS | TEMPERATURE: 97.4 F | SYSTOLIC BLOOD PRESSURE: 130 MMHG | DIASTOLIC BLOOD PRESSURE: 72 MMHG | OXYGEN SATURATION: 95 % | HEIGHT: 72 IN

## 2025-01-09 DIAGNOSIS — N18.31 STAGE 3A CHRONIC KIDNEY DISEASE (CKD): ICD-10-CM

## 2025-01-09 DIAGNOSIS — E11.3213 BOTH EYES AFFECTED BY MILD NONPROLIFERATIVE DIABETIC RETINOPATHY WITH MACULAR EDEMA, ASSOCIATED WITH TYPE 2 DIABETES MELLITUS (HCC): ICD-10-CM

## 2025-01-09 DIAGNOSIS — E78.5 HYPERLIPIDEMIA ASSOCIATED WITH TYPE 2 DIABETES MELLITUS (HCC): ICD-10-CM

## 2025-01-09 DIAGNOSIS — D50.9 IRON DEFICIENCY ANEMIA, UNSPECIFIED IRON DEFICIENCY ANEMIA TYPE: ICD-10-CM

## 2025-01-09 DIAGNOSIS — Z79.4 LONG-TERM INSULIN USE (HCC): ICD-10-CM

## 2025-01-09 DIAGNOSIS — E11.69 HYPERLIPIDEMIA ASSOCIATED WITH TYPE 2 DIABETES MELLITUS (HCC): ICD-10-CM

## 2025-01-09 DIAGNOSIS — N18.32 STAGE 3B CHRONIC KIDNEY DISEASE (CKD): ICD-10-CM

## 2025-01-09 DIAGNOSIS — Z79.4 TYPE 2 DIABETES MELLITUS WITH MICROALBUMINURIA, WITH LONG-TERM CURRENT USE OF INSULIN (HCC): ICD-10-CM

## 2025-01-09 DIAGNOSIS — E11.42 TYPE 2 DIABETES MELLITUS WITH DIABETIC POLYNEUROPATHY, WITH LONG-TERM CURRENT USE OF INSULIN (HCC): ICD-10-CM

## 2025-01-09 DIAGNOSIS — Z79.4 TYPE 2 DIABETES MELLITUS WITH DIABETIC POLYNEUROPATHY, WITH LONG-TERM CURRENT USE OF INSULIN (HCC): ICD-10-CM

## 2025-01-09 DIAGNOSIS — R80.9 TYPE 2 DIABETES MELLITUS WITH MICROALBUMINURIA, WITH LONG-TERM CURRENT USE OF INSULIN (HCC): ICD-10-CM

## 2025-01-09 DIAGNOSIS — E11.29 TYPE 2 DIABETES MELLITUS WITH MICROALBUMINURIA, WITH LONG-TERM CURRENT USE OF INSULIN (HCC): ICD-10-CM

## 2025-01-09 DIAGNOSIS — R00.0 TACHYCARDIA: ICD-10-CM

## 2025-01-09 LAB
25(OH)D3 SERPL-MCNC: 29 NG/ML (ref 30–100)
ALBUMIN SERPL BCP-MCNC: 4.2 G/DL (ref 3.2–4.9)
ALBUMIN/GLOB SERPL: 1.3 G/DL
ALP SERPL-CCNC: 80 U/L (ref 30–99)
ALT SERPL-CCNC: 26 U/L (ref 2–50)
ANION GAP SERPL CALC-SCNC: 11 MMOL/L (ref 7–16)
AST SERPL-CCNC: 21 U/L (ref 12–45)
BASOPHILS # BLD AUTO: 0.9 % (ref 0–1.8)
BASOPHILS # BLD: 0.07 K/UL (ref 0–0.12)
BILIRUB SERPL-MCNC: 0.5 MG/DL (ref 0.1–1.5)
BUN SERPL-MCNC: 38 MG/DL (ref 8–22)
CALCIUM ALBUM COR SERPL-MCNC: 9.6 MG/DL (ref 8.5–10.5)
CALCIUM SERPL-MCNC: 9.8 MG/DL (ref 8.5–10.5)
CHLORIDE SERPL-SCNC: 102 MMOL/L (ref 96–112)
CHOLEST SERPL-MCNC: 226 MG/DL (ref 100–199)
CO2 SERPL-SCNC: 27 MMOL/L (ref 20–33)
CREAT SERPL-MCNC: 1.67 MG/DL (ref 0.5–1.4)
EOSINOPHIL # BLD AUTO: 0.47 K/UL (ref 0–0.51)
EOSINOPHIL NFR BLD: 5.8 % (ref 0–6.9)
ERYTHROCYTE [DISTWIDTH] IN BLOOD BY AUTOMATED COUNT: 40.9 FL (ref 35.9–50)
EST. AVERAGE GLUCOSE BLD GHB EST-MCNC: 289 MG/DL
FASTING STATUS PATIENT QL REPORTED: NORMAL
FERRITIN SERPL-MCNC: 84.5 NG/ML (ref 22–322)
GFR SERPLBLD CREATININE-BSD FMLA CKD-EPI: 41 ML/MIN/1.73 M 2
GLOBULIN SER CALC-MCNC: 3.3 G/DL (ref 1.9–3.5)
GLUCOSE SERPL-MCNC: 191 MG/DL (ref 65–99)
HBA1C MFR BLD: 11.7 % (ref 4–5.6)
HBA1C MFR BLD: 12.5 % (ref ?–5.8)
HCT VFR BLD AUTO: 50.2 % (ref 42–52)
HDLC SERPL-MCNC: 40 MG/DL
HGB BLD-MCNC: 16.4 G/DL (ref 14–18)
IMM GRANULOCYTES # BLD AUTO: 0.19 K/UL (ref 0–0.11)
IMM GRANULOCYTES NFR BLD AUTO: 2.3 % (ref 0–0.9)
IRON SATN MFR SERPL: 26 % (ref 15–55)
IRON SERPL-MCNC: 79 UG/DL (ref 50–180)
LDLC SERPL CALC-MCNC: 154 MG/DL
LYMPHOCYTES # BLD AUTO: 1.79 K/UL (ref 1–4.8)
LYMPHOCYTES NFR BLD: 22 % (ref 22–41)
MCH RBC QN AUTO: 27.8 PG (ref 27–33)
MCHC RBC AUTO-ENTMCNC: 32.7 G/DL (ref 32.3–36.5)
MCV RBC AUTO: 85.2 FL (ref 81.4–97.8)
MONOCYTES # BLD AUTO: 0.91 K/UL (ref 0–0.85)
MONOCYTES NFR BLD AUTO: 11.2 % (ref 0–13.4)
NEUTROPHILS # BLD AUTO: 4.72 K/UL (ref 1.82–7.42)
NEUTROPHILS NFR BLD: 57.8 % (ref 44–72)
NRBC # BLD AUTO: 0 K/UL
NRBC BLD-RTO: 0 /100 WBC (ref 0–0.2)
PLATELET # BLD AUTO: 274 K/UL (ref 164–446)
PMV BLD AUTO: 11 FL (ref 9–12.9)
POCT INT CON NEG: NEGATIVE
POCT INT CON POS: POSITIVE
POTASSIUM SERPL-SCNC: 4.7 MMOL/L (ref 3.6–5.5)
PROT SERPL-MCNC: 7.5 G/DL (ref 6–8.2)
RBC # BLD AUTO: 5.89 M/UL (ref 4.7–6.1)
SODIUM SERPL-SCNC: 140 MMOL/L (ref 135–145)
T4 FREE SERPL-MCNC: 1.05 NG/DL (ref 0.93–1.7)
TIBC SERPL-MCNC: 305 UG/DL (ref 250–450)
TRIGL SERPL-MCNC: 159 MG/DL (ref 0–149)
TSH SERPL-ACNC: 1.85 UIU/ML (ref 0.35–5.5)
UIBC SERPL-MCNC: 226 UG/DL (ref 110–370)
VIT B12 SERPL-MCNC: >4000 PG/ML (ref 211–911)
WBC # BLD AUTO: 8.2 K/UL (ref 4.8–10.8)

## 2025-01-09 PROCEDURE — 80053 COMPREHEN METABOLIC PANEL: CPT

## 2025-01-09 PROCEDURE — 3075F SYST BP GE 130 - 139MM HG: CPT | Performed by: INTERNAL MEDICINE

## 2025-01-09 PROCEDURE — 83550 IRON BINDING TEST: CPT

## 2025-01-09 PROCEDURE — 99214 OFFICE O/P EST MOD 30 MIN: CPT | Performed by: INTERNAL MEDICINE

## 2025-01-09 PROCEDURE — G2211 COMPLEX E/M VISIT ADD ON: HCPCS | Performed by: INTERNAL MEDICINE

## 2025-01-09 PROCEDURE — 84443 ASSAY THYROID STIM HORMONE: CPT

## 2025-01-09 PROCEDURE — 83540 ASSAY OF IRON: CPT

## 2025-01-09 PROCEDURE — 82043 UR ALBUMIN QUANTITATIVE: CPT

## 2025-01-09 PROCEDURE — 3078F DIAST BP <80 MM HG: CPT | Performed by: INTERNAL MEDICINE

## 2025-01-09 PROCEDURE — 82570 ASSAY OF URINE CREATININE: CPT

## 2025-01-09 PROCEDURE — 80061 LIPID PANEL: CPT

## 2025-01-09 PROCEDURE — 82728 ASSAY OF FERRITIN: CPT

## 2025-01-09 PROCEDURE — 82607 VITAMIN B-12: CPT

## 2025-01-09 PROCEDURE — 83036 HEMOGLOBIN GLYCOSYLATED A1C: CPT

## 2025-01-09 PROCEDURE — 36415 COLL VENOUS BLD VENIPUNCTURE: CPT

## 2025-01-09 PROCEDURE — 85025 COMPLETE CBC W/AUTO DIFF WBC: CPT

## 2025-01-09 PROCEDURE — 84439 ASSAY OF FREE THYROXINE: CPT

## 2025-01-09 PROCEDURE — 82306 VITAMIN D 25 HYDROXY: CPT

## 2025-01-09 ASSESSMENT — FIBROSIS 4 INDEX: FIB4 SCORE: 1.8

## 2025-01-09 ASSESSMENT — PATIENT HEALTH QUESTIONNAIRE - PHQ9: CLINICAL INTERPRETATION OF PHQ2 SCORE: 0

## 2025-01-09 NOTE — LETTER
[employer]  Shirley Ozuna D.O.  [address]  [place] [address]  Fax: [phone]   Authorization for Release/Disclosure of   Protected Health Information   Name: INDIRA FLORES : 1940 SSN: [id number]   Address: [address] [place] [address]  [place] [address] Phone:    [phone] (work)   I authorize the entity listed below to release/disclose the PHI below to:   [employer]/Shirley Ozuna D.O. and Shirley Ozuna D.O.   Provider or Entity Name:  Dr. Alfonso- ophthalmology   Address   City, [facility]   Phone:      Fax:     Reason for request: continuity of care   Information to be released:    [  ] LAST COLONOSCOPY,  including any PATH REPORT and follow-up  [  ] LAST FIT/COLOGUARD RESULT [  ] LAST DEXA  [  ] LAST MAMMOGRAM  [  ] LAST PAP  [  ] LAST LABS [ x ] RETINA EXAM REPORT  [  ] IMMUNIZATION RECORDS  [ x ] Release all info      [  ] Check here and initial the line next to each item to release ALL health information INCLUDING  _____ Care and treatment for drug and / or alcohol abuse  _____ HIV testing, infection status, or AIDS  _____ Genetic Testing    DATES OF SERVICE OR TIME PERIOD TO BE DISCLOSED: _____________  I understand and acknowledge that:  * This Authorization may be revoked at any time by you in writing, except if your health information has already been used or disclosed.  * Your health information that will be used or disclosed as a result of you signing this authorization could be re-disclosed by the recipient. If this occurs, your re-disclosed health information may no longer be protected by State or Federal laws.  * You may refuse to sign this Authorization. Your refusal will not affect your ability to obtain treatment.  * This Authorization becomes effective upon signing and will  on (date) __________.      If no date is indicated, this Authorization will  one (1) year from the signature date.    Name: Indira Cantu  Khushi  Signature: Date:   1/9/2025     PLEASE FAX REQUESTED RECORDS BACK TO: [phone]

## 2025-01-09 NOTE — TELEPHONE ENCOUNTER
1. Caller Name: Manolo Pepe Swanson                          Call Back Number: [phone] (home) [phone] (work)        How would the patient prefer to be contacted with a response: Phone call OK to leave a detailed message    Patient left a voice mail yesterday about being dizzy when he stands up and when he is in bed laying down.  Called him back and left a message about 3pm held appt. For him for today and to please call back and let me know he can make it in.   Have not gotten confirmation yet

## 2025-01-09 NOTE — PROGRESS NOTES
Subjective:   Chief Complaint/History of Present Illness:  Manolo Puri is a 84 y.o. male established patient who presents today to discuss medical problems as listed below. Manolo is unaccompanied for today's visit.    History of Present Illness  The patient presents for evaluation of dizziness, diabetes, and cholesterol management.    He reports experiencing new onset of dizziness, which began last week. The dizziness initially presented upon standing from a supine position in bed and has since occurred intermittently while lying flat. He manages these episodes by sitting on the edge of the bed until the dizziness subsides, typically within a few minutes. The dizziness does not recur throughout the day unless he rises from a reclined position after watching television. The severity of the dizziness is most pronounced in the morning. He has been hydrating with water and reports no recent illnesses. He also reports no palpitations, shortness of breath, or chest tightness. His last EKG was performed during a hospital admission for gallstones. He has not experienced any leg swelling, injury, or recent long-distance travel. He experienced a fall approximately one month ago, landing on his shoulders and head, but did not lose consciousness. He has been avoiding walking due to the dizziness.    He continues to take his diabetes medications, including Toujeo once daily, metformin 1000 mg twice daily with meals, Ozempic 2 mg weekly, and Farxiga. He also takes NovoLog insulin in the morning. He is uncertain about the frequency of his Toujeo administration. He is under the care of an ophthalmologist, Dr. Alfonso, and receives injections every 8 to 12 weeks. He last saw his ophthalmologist in December 2024 and is scheduled for a follow-up in February 2025. He reports no foot ulcers or fissures and maintains his toenails himself. He experiences intermittent tingling in the top of his left foot but reports no  changes in sensation while walking.    He has not been taking his Lipitor (atorvastatin) as prescribed.    Supplemental Information  He has been experiencing frequent urination, approximately every 10 minutes. He uses Q-tips for ear hygiene and expresses concern about potential wax impaction. He has lost weight since his hospital discharge, dropping to 205 pounds, and reports an increase in abdominal girth.    MEDICATIONS  Current: Toujeo, metformin, Ozempic, Farxiga, NovoLog, Lipitor    Current Medications:  Current Outpatient Medications Ordered in Epic   Medication Sig Dispense Refill    OZEMPIC, 2 MG/DOSE, 8 MG/3ML Solution Pen-injector INJECT 2MG SUBCUTANEOUSLY ONCE WEEKLY 3 mL 0    metFORMIN ER (GLUCOPHAGE XR) 500 MG TABLET SR 24 HR Take 2 tablets by mouth twice daily 400 Tablet 0    FARXIGA 10 MG Tab Take 1 tablet by mouth once daily 30 Tablet 0    Insulin Glargine, 2 Unit Dial, (TOUJEO MAX SOLOSTAR) 300 UNIT/ML Solution Pen-injector Inject 75 Units under the skin every day. Indications: Type 2 Diabetes 9 mL 3    glucose blood strip 1 Each by Other route as needed. DEXCOM 7 Sensor      Continuous Glucose Sensor (DEXCOM G7 SENSOR) Misc 1 Each every 10 days. 9 Each 3    senna-docusate (PERICOLACE OR SENOKOT S) 8.6-50 MG Tab Take 1 Tablet by mouth 1 time a day as needed for Constipation. Indications: Constipation 100 Tablet 3    acetaminophen (TYLENOL) 500 MG Tab Take 2 Tablets by mouth every 6 hours as needed for Moderate Pain. Take as directed on the bottle. Take as needed for pain      Insulin Aspart FlexPen 100 UNIT/ML Solution Pen-injector Inject 25 Units under the skin 3 times a day before meals. NOVOLOG FLEX PEN  10 at Breakfast  10 at lunch   5 at dinner   Indications: Type 2 Diabetes      atorvastatin (LIPITOR) 20 MG Tab  (Patient not taking: Reported on 1/9/2025)       No current [facility]-ordered facility-administered medications on file.          Objective:   Physical Exam:    Vitals: /72   Pulse  (!) 104   Temp 36.3 °C (97.4 °F) (Temporal)   Ht 1.829 m (6')   Wt 104 kg (228 lb 9.6 oz)   SpO2 95%    BMI: Body mass index is 31 kg/m².  Physical Exam  Constitutional:       General: He is not in acute distress.     Appearance: Normal appearance. He is not ill-appearing.   HENT:      Right Ear: Ear canal and external ear normal. There is no impacted cerumen.      Left Ear: Ear canal and external ear normal. There is no impacted cerumen.   Eyes:      General: No scleral icterus.     Conjunctiva/sclera: Conjunctivae normal.      Comments: No nystagmus   Cardiovascular:      Rate and Rhythm: Regular rhythm. Tachycardia present.      Pulses: Normal pulses.   Pulmonary:      Effort: Pulmonary effort is normal. No respiratory distress.      Breath sounds: Normal breath sounds. No wheezing or rhonchi.   Abdominal:      General: Bowel sounds are normal. There is distension.      Palpations: Abdomen is soft.      Tenderness: There is no abdominal tenderness.   Musculoskeletal:      Right lower leg: No edema.      Left lower leg: No edema.   Skin:     General: Skin is warm and dry.      Findings: No rash.   Neurological:      Gait: Gait abnormal.   Psychiatric:         Mood and Affect: Mood normal.         Behavior: Behavior normal.         Thought Content: Thought content normal.         Judgment: Judgment normal.           Results  Laboratory Studies  A1c is 12.5. Blood count shows no indication of infection or anemia.    Assessment & Plan  1. Dizziness.  The patient's dizziness could be attributed to various factors, including abnormal heart rhythm, vertigo, subdural hematoma (fall last month), or dehydration from uncontrolled diabetes.  His heart rate is elevated, which may indicate deconditioning. However, his oxygen saturation remains within normal limits, borderline at 90-92%. His blood pressure is stable, but his heart rate is elevated, which could potentially lead to dizziness. His A1c level is significantly  elevated at 12.5, suggesting poorly controlled diabetes, which could also contribute to his symptoms. His blood count does not indicate any infection or anemia. He has gained approximately 15 pounds since May 2024. An EKG will be conducted today to rule out any cardiac-related causes of his dizziness. CT shows sinus tachycardiac with 1st degree block. A CT scan of his head will be considered to exclude the possibility of a subdural hematoma if other etiology is not clear. He will continue his current medication regimen, including Toujeo once daily, metformin 1000 mg twice daily with meals, Ozempic 2 mg weekly, and Farxiga. He is advised to take NovoLog insulin with meals, ideally three times daily. He is also advised to resume his Lipitor (atorvastatin) at night.    2. Diabetes Mellitus type 2, uncontrolled with hyperlipidemia  3. Dyslipidemia associated with DM2  4. Long term insulin use  5. 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 Stage 3b CKD  His A1c is significantly elevated at 12.5, indicating poorly controlled diabetes. He will continue his current medication regimen, including Toujeo once daily, metformin 1000 mg twice daily with meals, Ozempic 2 mg weekly, and Farxiga. He is advised to take NovoLog insulin with meals, ideally three times daily. He is also advised to resume his Lipitor (atorvastatin) at night. Regular follow-ups with his eye doctor are recommended, with the next appointment scheduled in February. If his blood sugar levels do not improve, switching to Mounjaro or tirzepatide may be considered. Refer back to pharmacotherapy group to resume management. Foot exam at our follow up next week. Retinal exam records requested.       Follow-up  The patient is scheduled for a follow-up visit on Wednesday, 01/15/2025, at 1:00 PM.      Assessment and Plan:   Manolo is a 84 y.o. male with the following:  Problem List Items Addressed This Visit       Both eyes affected by mild  nonproliferative diabetic retinopathy with macular edema, associated with type 2 diabetes mellitus (HCC)    Hyperlipidemia associated with type 2 diabetes mellitus (HCC)    Long-term insulin use (HCC)    Stage 3b chronic kidney disease (CKD)    Type 2 diabetes mellitus with diabetic polyneuropathy, with long-term current use of insulin (HCC)    Relevant Orders    POCT  A1C (Completed)    Referral to Pharmacotherapy Service     Other Visit Diagnoses       Tachycardia        Relevant Orders    EKG - Clinic Performed               RTC: Return in about 1 week (around 1/16/2025).    I spent a total of 34 minutes with record review, exam, communication with the patient, communication with other providers, and documentation of this encounter.    Verbal consent was acquired by the patient to use [employer] ambient listening note generation during this visit Yes     Billing : secondary to the complexity of this patient's illnesses and their interactions.  All problems listed were discussed during the office visit, medications were evaluated and complexities were discussed as well as plan for the future.      PLEASE NOTE: This dictation was created using voice recognition software. I have made every reasonable attempt to correct obvious errors, but I expect that there are errors of grammar and possibly content that I did not discover before finalizing the note.      Shirley Ozuna, DO  Geriatric and Internal Medicine  [facility]

## 2025-01-10 LAB
CREAT UR-MCNC: 53.06 MG/DL
MICROALBUMIN UR-MCNC: 62.3 MG/DL
MICROALBUMIN/CREAT UR: 1174 MG/G (ref 0–30)

## 2025-01-14 ENCOUNTER — TELEPHONE (OUTPATIENT)
Dept: MEDICAL GROUP | Facility: PHYSICIAN GROUP | Age: 85
End: 2025-01-14
Payer: MEDICARE

## 2025-01-14 NOTE — TELEPHONE ENCOUNTER
1. Caller Name: Manolo Pepe Swanson                          Call Back Number: [phone] (work)        How would the patient prefer to be contacted with a response: Phone call OK to leave a detailed message    Called and spoke to patient  Dr. Ozuna will go more in depth at tomorrow's visit

## 2025-01-14 NOTE — TELEPHONE ENCOUNTER
----- Message from Physician Shirley Ozuna D.O. sent at 1/13/2025  9:33 PM PST -----  Please call, does not check mychart.    Labs are notable for reduced kidney function and a lot of excess protein in the urine test. He needs to call the pharmacy group to get back in to help with adjusting his medications. Their number to schedule is [phone]. If he is still having the same symptoms then we may need to do some additional testing to make sure there is nothing cardiac or neurologic going on creating his symptoms. I believe we have follow up on Wednesday afternoon.  ----- Message -----  From: [facility], Lab  Sent: 1/9/2025   3:13 PM PST  To: Shirley Ozuna D.O.

## 2025-01-15 ENCOUNTER — OFFICE VISIT (OUTPATIENT)
Dept: MEDICAL GROUP | Facility: PHYSICIAN GROUP | Age: 85
End: 2025-01-15
Payer: MEDICARE

## 2025-01-15 VITALS
SYSTOLIC BLOOD PRESSURE: 118 MMHG | HEIGHT: 72 IN | BODY MASS INDEX: 31.02 KG/M2 | TEMPERATURE: 98 F | HEART RATE: 106 BPM | RESPIRATION RATE: 16 BRPM | WEIGHT: 229 LBS | OXYGEN SATURATION: 94 % | DIASTOLIC BLOOD PRESSURE: 68 MMHG

## 2025-01-15 DIAGNOSIS — E11.69 HYPERLIPIDEMIA ASSOCIATED WITH TYPE 2 DIABETES MELLITUS (HCC): ICD-10-CM

## 2025-01-15 DIAGNOSIS — E53.8 B12 DEFICIENCY: ICD-10-CM

## 2025-01-15 DIAGNOSIS — R00.0 TACHYCARDIA: ICD-10-CM

## 2025-01-15 DIAGNOSIS — R42 VERTIGO: ICD-10-CM

## 2025-01-15 DIAGNOSIS — E11.3213 BOTH EYES AFFECTED BY MILD NONPROLIFERATIVE DIABETIC RETINOPATHY WITH MACULAR EDEMA, ASSOCIATED WITH TYPE 2 DIABETES MELLITUS (HCC): ICD-10-CM

## 2025-01-15 DIAGNOSIS — N17.9 AKI (ACUTE KIDNEY INJURY) (HCC): ICD-10-CM

## 2025-01-15 DIAGNOSIS — E13.9 DIABETES 1.5, MANAGED AS TYPE 2 (HCC): ICD-10-CM

## 2025-01-15 DIAGNOSIS — E78.5 HYPERLIPIDEMIA ASSOCIATED WITH TYPE 2 DIABETES MELLITUS (HCC): ICD-10-CM

## 2025-01-15 DIAGNOSIS — R80.9 PROTEINURIA, UNSPECIFIED TYPE: ICD-10-CM

## 2025-01-15 PROCEDURE — 3074F SYST BP LT 130 MM HG: CPT | Performed by: INTERNAL MEDICINE

## 2025-01-15 PROCEDURE — 99215 OFFICE O/P EST HI 40 MIN: CPT | Performed by: INTERNAL MEDICINE

## 2025-01-15 PROCEDURE — 3078F DIAST BP <80 MM HG: CPT | Performed by: INTERNAL MEDICINE

## 2025-01-15 RX ORDER — MECLIZINE HYDROCHLORIDE 25 MG/1
25 TABLET ORAL 3 TIMES DAILY PRN
Qty: 30 TABLET | Refills: 1 | Status: SHIPPED | OUTPATIENT
Start: 2025-01-15

## 2025-01-15 RX ORDER — ATORVASTATIN CALCIUM 40 MG/1
40 TABLET, FILM COATED ORAL DAILY
Qty: 100 TABLET | Refills: 3 | Status: SHIPPED | OUTPATIENT
Start: 2025-01-15

## 2025-01-15 ASSESSMENT — FIBROSIS 4 INDEX: FIB4 SCORE: 1.23

## 2025-01-15 NOTE — PATIENT INSTRUCTIONS
Do SPEP (blood test) to rule out myeloma and do renal (kidney) ultrasound now to rule out enlarged prostate causing worsening kidney function.     mSpoke Username: [email or web address]   Password: [email or web address]    Landen French,     We received a referral from Dr Ozuna for diabetes management. Please call our clinic at [phone] or general scheduling at [phone] to schedule an appointment.      You may also use the link attached to schedule.     Thank you!  Tasks For Patient     Appointment: Follow up Pharmacotherapy Visit

## 2025-01-15 NOTE — LETTER
[employer]  Shirley Ozuna D.O.  [address]  [place] [address]  Fax: [phone]   Authorization for Release/Disclosure of   Protected Health Information   Name: INDIRA PURI : 1940 SSN: [id number]   Address: [address] [place] [address]  [place] [address] Phone:    [phone] (work)   I authorize the entity listed below to release/disclose the PHI below to:   [employer]/Shirley Ozuna D.O. and Shirley Ozuna D.O.   Provider or Entity Name:  Dr. [facility]      Address   City, [facility], [facility]   Phone:      Fax:     Reason for request: continuity of care   Information to be released:    [  ] LAST COLONOSCOPY,  including any PATH REPORT and follow-up  [  ] LAST FIT/COLOGUARD RESULT [  ] LAST DEXA  [  ] LAST MAMMOGRAM  [  ] LAST PAP  [  ] LAST LABS [  ] RETINA EXAM REPORT  [  ] IMMUNIZATION RECORDS  [  ] Release all info      [  ] Check here and initial the line next to each item to release ALL health information INCLUDING  _____ Care and treatment for drug and / or alcohol abuse  _____ HIV testing, infection status, or AIDS  _____ Genetic Testing    DATES OF SERVICE OR TIME PERIOD TO BE DISCLOSED: _____________  I understand and acknowledge that:  * This Authorization may be revoked at any time by you in writing, except if your health information has already been used or disclosed.  * Your health information that will be used or disclosed as a result of you signing this authorization could be re-disclosed by the recipient. If this occurs, your re-disclosed health information may no longer be protected by State or Federal laws.  * You may refuse to sign this Authorization. Your refusal will not affect your ability to obtain treatment.  * This Authorization becomes effective upon signing and will  on (date) __________.      If no date is indicated, this Authorization will  one (1) year from the signature date.    Name: Indira Puri  Signature:  Date:   1/15/2025     PLEASE FAX REQUESTED RECORDS BACK TO: [phone]

## 2025-01-15 NOTE — PROGRESS NOTES
Subjective:   Chief Complaint/History of Present Illness:  Manolo Puri is a 84 y.o. male established patient who presents today to discuss medical problems as listed below. Sharad is unaccompanied for today's visit.    History of Present Illness  The patient presents for evaluation of diabetes, dizziness, and elevated heart rate.    He has been living with diabetes for over a decade. He experienced a period of non-compliance with his medication regimen following a surgical procedure, which may have resulted in suboptimal therapeutic effects. He recently initiated a new box of Ozempic, currently on the second pen. He reports no recent hypoglycemic episodes but notes occasional hyperglycemia with blood glucose levels reaching 400. He utilizes a Dexcom continuous glucose monitor, which is covered by his insurance. He reports normal bowel movements.    He experiences dizziness upon waking or standing, which resolves after approximately 30 seconds. He does not experience dizziness while walking or turning his head during ambulation. However, he reports occasional dizziness at night when turning his head in bed. This is his first experience with vertigo-like symptoms. His ears were examined and found to be free of cerumen impaction.    He consumes Zipfizz energy drinks, which contain high levels of B12, and drinks milk frequently. He also consumes lemon-lime Gatorade.    Supplemental Information  He reports frequent urination but has not undergone any prostate biopsy or procedure.    FAMILY HISTORY  His son had a bone marrow transplant due to polycythemia vera, which converted to CML.    MEDICATIONS  Current: metformin, Ozempic, Farxiga       Current Medications:  Current Outpatient Medications Ordered in Epic   Medication Sig Dispense Refill    Tirzepatide (MOUNJARO) 7.5 MG/0.5ML Solution Auto-injector Inject 7.5 mg under the skin every 7 days. 2 mL 1    meclizine (ANTIVERT) 25 MG Tab Take 1 Tablet by mouth 3  times a day as needed for Vertigo. 30 Tablet 1    atorvastatin (LIPITOR) 40 MG Tab Take 1 Tablet by mouth every day. 100 Tablet 3    metFORMIN ER (GLUCOPHAGE XR) 500 MG TABLET SR 24 HR Take 2 tablets by mouth twice daily 400 Tablet 0    FARXIGA 10 MG Tab Take 1 tablet by mouth once daily 30 Tablet 0    Insulin Glargine, 2 Unit Dial, (TOUJEO MAX SOLOSTAR) 300 UNIT/ML Solution Pen-injector Inject 75 Units under the skin every day. Indications: Type 2 Diabetes 9 mL 3    glucose blood strip 1 Each by Other route as needed. DEXCOM 7 Sensor      Continuous Glucose Sensor (DEXCOM G7 SENSOR) Misc 1 Each every 10 days. 9 Each 3    senna-docusate (PERICOLACE OR SENOKOT S) 8.6-50 MG Tab Take 1 Tablet by mouth 1 time a day as needed for Constipation. Indications: Constipation 100 Tablet 3    acetaminophen (TYLENOL) 500 MG Tab Take 2 Tablets by mouth every 6 hours as needed for Moderate Pain. Take as directed on the bottle. Take as needed for pain      Insulin Aspart FlexPen 100 UNIT/ML Solution Pen-injector Inject 25 Units under the skin 3 times a day before meals. NOVOLOG FLEX PEN  10 at Breakfast  10 at lunch   5 at dinner   Indications: Type 2 Diabetes       No current Epic-ordered facility-administered medications on file.          Objective:   Physical Exam:    Vitals: /68 (BP Location: Right arm, Patient Position: Sitting, BP Cuff Size: Adult)   Pulse (!) 106   Temp 36.7 °C (98 °F) (Temporal)   Resp 16   Ht 1.829 m (6')   Wt 104 kg (229 lb)   SpO2 94%    BMI: Body mass index is 31.06 kg/m².  Physical Exam  Constitutional:       Appearance: He is not ill-appearing or toxic-appearing.   HENT:      Right Ear: External ear normal.      Left Ear: External ear normal.   Eyes:      General: No scleral icterus.     Conjunctiva/sclera: Conjunctivae normal.   Cardiovascular:      Rate and Rhythm: Regular rhythm. Tachycardia present.      Pulses: Normal pulses.   Pulmonary:      Effort: Pulmonary effort is normal. No  respiratory distress.      Breath sounds: Normal breath sounds. No wheezing or rhonchi.   Abdominal:      General: Bowel sounds are normal. There is distension.      Palpations: Abdomen is soft.      Tenderness: There is no abdominal tenderness.   Musculoskeletal:      Right lower leg: No edema.      Left lower leg: No edema.   Skin:     General: Skin is warm and dry.      Findings: No rash.   Neurological:      Gait: Gait abnormal.   Psychiatric:         Mood and Affect: Mood normal.         Behavior: Behavior normal.         Thought Content: Thought content normal.         Judgment: Judgment normal.        Monofilament testing with a 10 gram force: sensation intact: decreased bilaterally  Visual Inspection: Feet without maceration, ulcers, fissures.  Pedal pulses: intact bilaterally       Results  Laboratory Studies  GFR is 41%. Protein in urine is over 1000 mg/g. B12 level is greater than 4000.    Assessment & Plan  1. 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  2. Dyslipidemia complicating DM  3. Hypertension complicating DM  His renal function has deteriorated to stage 3B, bordering on stage 4, likely due to persistent hyperglycemia. The presence of proteinuria suggests renal damage from elevated blood glucose levels. His GFR was 41%, and proteinuria was over 1000 mg/g. He was informed that dialysis is not imminent but could be necessary in the future if his condition continues to decline. He was reassured that his renal function could potentially improve to the mid-40s or low 50s with optimal diabetes management and hydration. He was advised to maintain regular bowel movements and adequate hydration. He was also advised to limit milk intake due to its high carbohydrate content. A referral to a pharmacist was provided for further guidance on medication management. The initiation of Mounjaro 7.5 mg weekly was discussed as a potential  replacement for Ozempic 2 mg weekly, pending insurance approval. He was advised to continue using the Dexcom monitor to safely adjust insulin levels and monitor for hypoglycemia. Resume atorvastatin and increase to 40 mg daily. Chronic ongoing problem, will hold off adding ARB until we have results of SPEP and renal US.    4. Dizziness.  5. Vertigo.  His dizziness is likely attributable to vertigo, possibly due to fluid and electrolyte imbalances from diabetes and renal issues. The possibility of a stroke was considered but deemed unlikely given the transient nature of his symptoms. He was advised to take meclizine 25 mg as needed for vertigo episodes, particularly before bedtime if symptoms persist.    6. Tachycardia due to   7. Elevated vitamin B12  His elevated heart rate could be due to dehydration from frequent urination secondary to hyperglycemia, or it could be a side effect of high B12 levels from energy drink consumption. His B12 level was significantly elevated at over 4000 pg/mL. He was advised to discontinue energy drinks and increase water intake. He was also advised to consider low-sugar electrolyte drinks like Pedialyte or Gatorade as alternatives to energy drinks.    8. DALY on CKD stage 3  9. Proteinuria   New decompensated issue, DALY likely from uncontrolled diabetes but due to associated proteinuria will check SPEP to rule out myeloma as well as renal US to ensure no post-obstructive uropathy contributing. Need to get diabetes under better control.    Follow-up  The patient will follow up in 3 months.      Assessment and Plan:   Manolo is a 84 y.o. male with the following:  Problem List Items Addressed This Visit       Both eyes affected by mild nonproliferative diabetic retinopathy with macular edema, associated with type 2 diabetes mellitus (HCC)    Relevant Medications    Tirzepatide (MOUNJARO) 7.5 MG/0.5ML Solution Auto-injector    Hyperlipidemia associated with type 2 diabetes mellitus (HCC)     Relevant Medications    Tirzepatide (MOUNJARO) 7.5 MG/0.5ML Solution Auto-injector    atorvastatin (LIPITOR) 40 MG Tab     Other Visit Diagnoses       Diabetes 1.5, managed as type 2 (HCC)        Relevant Medications    Tirzepatide (MOUNJARO) 7.5 MG/0.5ML Solution Auto-injector    Other Relevant Orders    Diabetic Monofilament LE Exam (Completed)    Vertigo        Relevant Medications    meclizine (ANTIVERT) 25 MG Tab    DALY (acute kidney injury) ([facility])        Relevant Orders    US-RENAL    SPEP W/REFLEX TO DEX, A, G, M    B12 deficiency        Tachycardia        Proteinuria, unspecified type                   RTC: Return in about 6 weeks (around 2/26/2025).    I spent a total of 42 minutes with record review, exam, communication with the patient, communication with other providers, and documentation of this encounter.    Verbal consent was acquired by the patient to use RO[employer] ambient listening note generation during this visit Yes       PLEASE NOTE: This dictation was created using voice recognition software. I have made every reasonable attempt to correct obvious errors, but I expect that there are errors of grammar and possibly content that I did not discover before finalizing the note.      Shirley Ozuna, DO  Geriatric and Internal Medicine  [facility]

## 2025-01-17 ENCOUNTER — TELEPHONE (OUTPATIENT)
Dept: HEALTH INFORMATION MANAGEMENT | Facility: OTHER | Age: 85
End: 2025-01-17
Payer: MEDICARE

## 2025-01-17 DIAGNOSIS — E11.42 TYPE 2 DIABETES MELLITUS WITH DIABETIC POLYNEUROPATHY, WITH LONG-TERM CURRENT USE OF INSULIN (HCC): ICD-10-CM

## 2025-01-17 DIAGNOSIS — Z79.4 TYPE 2 DIABETES MELLITUS WITH DIABETIC POLYNEUROPATHY, WITH LONG-TERM CURRENT USE OF INSULIN (HCC): ICD-10-CM

## 2025-01-19 RX ORDER — DAPAGLIFLOZIN 10 MG/1
10 TABLET, FILM COATED ORAL DAILY
Qty: 100 TABLET | Refills: 3 | Status: SHIPPED | OUTPATIENT
Start: 2025-01-19

## 2025-02-13 ENCOUNTER — APPOINTMENT (OUTPATIENT)
Dept: RADIOLOGY | Facility: MEDICAL CENTER | Age: 85
End: 2025-02-13
Attending: INTERNAL MEDICINE
Payer: MEDICARE

## 2025-02-13 DIAGNOSIS — N17.9 AKI (ACUTE KIDNEY INJURY) (HCC): ICD-10-CM

## 2025-02-13 PROCEDURE — 76775 US EXAM ABDO BACK WALL LIM: CPT

## 2025-02-19 ENCOUNTER — RESULTS FOLLOW-UP (OUTPATIENT)
Dept: MEDICAL GROUP | Facility: PHYSICIAN GROUP | Age: 85
End: 2025-02-19

## 2025-02-20 ENCOUNTER — APPOINTMENT (OUTPATIENT)
Dept: MEDICAL GROUP | Facility: MEDICAL CENTER | Age: 85
End: 2025-02-20
Payer: MEDICARE

## 2025-02-24 DIAGNOSIS — Z79.4 TYPE 2 DIABETES MELLITUS WITH DIABETIC POLYNEUROPATHY, WITH LONG-TERM CURRENT USE OF INSULIN (HCC): ICD-10-CM

## 2025-02-24 DIAGNOSIS — E11.42 TYPE 2 DIABETES MELLITUS WITH DIABETIC POLYNEUROPATHY, WITH LONG-TERM CURRENT USE OF INSULIN (HCC): ICD-10-CM

## 2025-02-24 RX ORDER — INSULIN ASPART 100 [IU]/ML
5-10 INJECTION, SOLUTION INTRAVENOUS; SUBCUTANEOUS
Qty: 3 EACH | Refills: 3 | Status: SHIPPED | OUTPATIENT
Start: 2025-02-24

## 2025-02-25 NOTE — TELEPHONE ENCOUNTER
Received request via: Patient    Was the patient seen in the last year in this department? Yes    Does the patient have an active prescription (recently filled or refills available) for medication(s) requested? No    Pharmacy Name: [facility]     Does the patient have prison Plus and need 100-day supply? (This applies to ALL medications) Yes, quantity updated to 100 days

## 2025-02-27 ENCOUNTER — TELEPHONE (OUTPATIENT)
Dept: HEALTH INFORMATION MANAGEMENT | Facility: OTHER | Age: 85
End: 2025-02-27
Payer: MEDICARE

## 2025-03-22 NOTE — CARE PLAN
Please call pt with results. Problem: Venous Thromboembolism (VTW)/Deep Vein Thrombosis (DVT) Prevention:  Goal: Patient will participate in Venous Thrombosis (VTE)/Deep Vein Thrombosis (DVT)Prevention Measures  Outcome: PROGRESSING AS EXPECTED  Pt receiving lovenox for VTE.

## 2025-03-31 ENCOUNTER — APPOINTMENT (OUTPATIENT)
Dept: MEDICAL GROUP | Facility: MEDICAL CENTER | Age: 85
End: 2025-03-31
Payer: MEDICARE

## 2025-03-31 NOTE — PROGRESS NOTES
Patient Consult Note    TIME IN: ***  TIME OUT: ***    Primary care physician: Shirley Ozuna D.O.    Reason for Consult: Management of Uncontrolled Type 2 Diabetes    Date Referral Placed: ***    HPI:  Manolo Puri is a 84 y.o. old patient who comes in today for evaluation of above stated problem.    Allergies  Patient has no known allergies.    Current Diabetes Medication Regimen  Metformin IR/ER: 1000 mg BID   GLP-1 Agent: Ozempic 2 mg subQ once weekly   SGLT-2 Inhibitor: Farxiga 10 mg once daily      Basal Insulin: Toujeo 72 units once daily   Intermediate Acting Insulin: Novolog 70/30 30 units prior to breakfast/dinner      Previous Diabetes Medications and Reason for Discontinuation   Jardiance - Too expensive   Trulicity - Too expensive    Potential Barriers to Care:  Adherence: {ACTIONS; DENIES/REPORTS:38862} missed doses***  Side effects: {NONE:46820}  Affordability: ***    SMBG  Pt has home glucometer and proper testing technique - ***    Pt reports blood sugars:   Before Breakfast: ***  Before Lunch: ***  Before Dinner: ***  Before Bedtime: ***  Other times: ***    Hypoglycemia  Hypoglycemia awareness: {YES/NO:20266}  Nocturnal hypoglycemia: {NONE:17468}  Hypoglycemia:  {NDKHYPOGLYCEMIA:20516}  Pt's treatment of Hypoglycemia  Discussed 15:15 Rule    Lifestyle  Current Exercise - ***    Dietary - ***  Breakfast - ***  Lunch - ***  Dinner - ***  Snacks - ***  Drinks - ***    Labs  Lab Results   Component Value Date/Time    HBA1C 12.5 (A) 01/09/2025 03:12 PM    HBA1C 11.7 (H) 01/09/2025 08:37 AM    HBA1C 9.6 (A) 02/15/2024 10:40 AM      Lab Results   Component Value Date/Time    SODIUM 140 01/09/2025 08:37 AM    POTASSIUM 4.7 01/09/2025 08:37 AM    CHLORIDE 102 01/09/2025 08:37 AM    CO2 27 01/09/2025 08:37 AM    GLUCOSE 191 (H) 01/09/2025 08:37 AM    BUN 38 (H) 01/09/2025 08:37 AM    CREATININE 1.67 (H) 01/09/2025 08:37 AM     Lab Results   Component Value Date/Time    ALKPHOSPHAT 80  "01/09/2025 08:37 AM    ASTSGOT 21 01/09/2025 08:37 AM    ALTSGPT 26 01/09/2025 08:37 AM    TBILIRUBIN 0.5 01/09/2025 08:37 AM    INR 0.87 10/17/2019 06:43 PM    ALBUMIN 4.2 01/09/2025 08:37 AM      Lab Results   Component Value Date/Time    CHOLSTRLTOT 226 (H) 01/09/2025 08:37 AM     (H) 01/09/2025 08:37 AM    HDL 40 01/09/2025 08:37 AM    TRIGLYCERIDE 159 (H) 01/09/2025 08:37 AM       Lab Results   Component Value Date/Time    MALBCRT 1174 (H) 01/09/2025 08:37 AM    MICROALBUR 62.3 01/09/2025 08:37 AM       Physical Examination:  Vital signs: There were no vitals taken for this visit. There is no height or weight on file to calculate BMI.    Assessment and Plan:    1. DM2  Basic physiology of DMII was explained to patient as well as microvascular/macrovascular complications. The importance of increasing physical activity to improve diabetes control was discussed with the patient. Patient was also educated on changing diet and making better choices to help control blood sugar. ***(Remove from note for follow-up visits)***  Discussed Goals: FBG 80 - 130, 2hPP < 180, a1c < {a1c goal:54545::\"7.0%\"}  Last a1c drawn on *** was ***%, which is {goal/not:32111} and has {a1c progress:25233} since the previous reading  ***    - Medication changes:  ***   - Continue:  ***     - Lifestyle changes:  Exercise Goal - ***  Dietary Goal - ***    - Preventative management:  REC DM Score: ***  Care gaps addressed:   {DM REC Measures:86267}  Care gaps updated in Health Maintenance    Follow Up:  {FOLLOWUP:83245}    An Schmitt, Srinivasan    CC:   Shirley Ozuna D.O.    "

## 2025-04-08 DIAGNOSIS — R80.9 TYPE 2 DIABETES MELLITUS WITH MICROALBUMINURIA, WITH LONG-TERM CURRENT USE OF INSULIN (HCC): ICD-10-CM

## 2025-04-08 DIAGNOSIS — E11.42 TYPE 2 DIABETES MELLITUS WITH DIABETIC POLYNEUROPATHY, WITH LONG-TERM CURRENT USE OF INSULIN (HCC): ICD-10-CM

## 2025-04-08 DIAGNOSIS — E11.29 TYPE 2 DIABETES MELLITUS WITH MICROALBUMINURIA, WITH LONG-TERM CURRENT USE OF INSULIN (HCC): ICD-10-CM

## 2025-04-08 DIAGNOSIS — Z79.4 LONG-TERM INSULIN USE (HCC): ICD-10-CM

## 2025-04-08 DIAGNOSIS — Z79.4 TYPE 2 DIABETES MELLITUS WITH MICROALBUMINURIA, WITH LONG-TERM CURRENT USE OF INSULIN (HCC): ICD-10-CM

## 2025-04-08 DIAGNOSIS — Z79.4 TYPE 2 DIABETES MELLITUS WITH DIABETIC POLYNEUROPATHY, WITH LONG-TERM CURRENT USE OF INSULIN (HCC): ICD-10-CM

## 2025-04-08 RX ORDER — INSULIN ASPART 100 [IU]/ML
5-10 INJECTION, SOLUTION INTRAVENOUS; SUBCUTANEOUS
Qty: 3 EACH | Refills: 3 | Status: SHIPPED | OUTPATIENT
Start: 2025-04-08

## 2025-04-08 RX ORDER — METFORMIN HYDROCHLORIDE 500 MG/1
1000 TABLET, EXTENDED RELEASE ORAL 2 TIMES DAILY
Qty: 400 TABLET | Refills: 0 | Status: SHIPPED | OUTPATIENT
Start: 2025-04-08

## 2025-04-08 RX ORDER — ACYCLOVIR 400 MG/1
1 TABLET ORAL
Qty: 9 EACH | Refills: 3 | Status: SHIPPED | OUTPATIENT
Start: 2025-04-08 | End: 2025-04-24

## 2025-04-24 ENCOUNTER — OFFICE VISIT (OUTPATIENT)
Dept: MEDICAL GROUP | Facility: PHYSICIAN GROUP | Age: 85
End: 2025-04-24
Payer: MEDICARE

## 2025-04-24 VITALS
DIASTOLIC BLOOD PRESSURE: 80 MMHG | HEART RATE: 87 BPM | TEMPERATURE: 97.4 F | HEIGHT: 72 IN | SYSTOLIC BLOOD PRESSURE: 134 MMHG | WEIGHT: 232.2 LBS | OXYGEN SATURATION: 96 % | BODY MASS INDEX: 31.45 KG/M2

## 2025-04-24 DIAGNOSIS — E11.69 HYPERLIPIDEMIA ASSOCIATED WITH TYPE 2 DIABETES MELLITUS (HCC): ICD-10-CM

## 2025-04-24 DIAGNOSIS — E11.21 MACROALBUMINURIC DIABETIC NEPHROPATHY (HCC): ICD-10-CM

## 2025-04-24 DIAGNOSIS — Z87.19 HISTORY OF DENTAL PROBLEMS: ICD-10-CM

## 2025-04-24 DIAGNOSIS — E78.5 HYPERLIPIDEMIA ASSOCIATED WITH TYPE 2 DIABETES MELLITUS (HCC): ICD-10-CM

## 2025-04-24 DIAGNOSIS — N18.32 STAGE 3B CHRONIC KIDNEY DISEASE (CKD): ICD-10-CM

## 2025-04-24 DIAGNOSIS — Z79.4 TYPE 2 DIABETES MELLITUS WITH DIABETIC POLYNEUROPATHY, WITH LONG-TERM CURRENT USE OF INSULIN (HCC): ICD-10-CM

## 2025-04-24 DIAGNOSIS — E11.42 TYPE 2 DIABETES MELLITUS WITH DIABETIC POLYNEUROPATHY, WITH LONG-TERM CURRENT USE OF INSULIN (HCC): ICD-10-CM

## 2025-04-24 LAB
HBA1C MFR BLD: 11.7 % (ref ?–5.8)
POCT INT CON NEG: NEGATIVE
POCT INT CON POS: POSITIVE

## 2025-04-24 PROCEDURE — 83036 HEMOGLOBIN GLYCOSYLATED A1C: CPT | Performed by: INTERNAL MEDICINE

## 2025-04-24 PROCEDURE — G2211 COMPLEX E/M VISIT ADD ON: HCPCS | Performed by: INTERNAL MEDICINE

## 2025-04-24 PROCEDURE — 3075F SYST BP GE 130 - 139MM HG: CPT | Performed by: INTERNAL MEDICINE

## 2025-04-24 PROCEDURE — 99214 OFFICE O/P EST MOD 30 MIN: CPT | Performed by: INTERNAL MEDICINE

## 2025-04-24 PROCEDURE — 3079F DIAST BP 80-89 MM HG: CPT | Performed by: INTERNAL MEDICINE

## 2025-04-24 RX ORDER — HYDROCHLOROTHIAZIDE 12.5 MG/1
1 CAPSULE ORAL
Qty: 6 EACH | Refills: 3 | Status: SHIPPED | OUTPATIENT
Start: 2025-04-24

## 2025-04-24 RX ORDER — IBUPROFEN 600 MG/1
600 TABLET, FILM COATED ORAL EVERY 6 HOURS PRN
COMMUNITY
Start: 2025-04-21 | End: 2025-04-24

## 2025-04-24 RX ORDER — AMOXICILLIN 500 MG/1
500 CAPSULE ORAL 3 TIMES DAILY
COMMUNITY
Start: 2025-04-21

## 2025-04-24 ASSESSMENT — FIBROSIS 4 INDEX: FIB4 SCORE: 1.23

## 2025-04-24 NOTE — PATIENT INSTRUCTIONS
- Each month we are going to be increasing the once per week injection of tirzepatide/Mounjaro. You watch out for worsening of heart burn, nausea, and/or constipation.  - we are going to switch from Dexcom to Ifrah because of insurance preference  - do your blood work before our next visit

## 2025-04-24 NOTE — PROGRESS NOTES
Subjective:   Chief Complaint/History of Present Illness:  Manolo Puri is a 84 y.o. male established patient who presents today to discuss medical problems as listed below. Sharad is unaccompanied for today's visit.    History of Present Illness  The patient presents for evaluation of diabetes mellitus and post-dental extraction status.    He is currently on a regimen of Mounjaro 7.5 mg, administered as a weekly injection, which is well tolerated without any adverse effects such as heartburn or constipation. A continuous glucose monitor, Dexcom, is utilized to manage blood sugar levels, which have consistently been elevated. No recent hypoglycemic episodes have been experienced. Approximately 4 weeks' supply of Mounjaro remains, and the injection is typically administered on Wednesdays. Metformin is also taken as part of the diabetes management plan. A previous issue with insurance reimbursement for one of his medications is recalled, though the specific medication is not remembered. Dexcom is preferred over Ifrah due to its ability to provide continuous readings, and Ifrah has not been used for several years. Three pens of short-acting insulin remain, which should last at least a week, and an adequate supply of pen needles is available. The current insulin regimen includes 10 units of short-acting insulin per meal and 75 units of Toujeo once daily. An improvement in neurological symptoms, including dizziness and balance issues, is reported, attributed to better control of blood sugar levels.    A recent tooth extraction on the lower left side was performed, and amoxicillin and ibuprofen were prescribed. No pain from the extraction site is reported.     Current Medications:  Current Outpatient Medications Ordered in Epic   Medication Sig Dispense Refill    amoxicillin (AMOXIL) 500 MG Cap Take 500 mg by mouth 3 times a day.      Tirzepatide 10 MG/0.5ML Solution Auto-injector Inject 10 mg under the skin  every 7 days for 30 days. 2 mL 0    [START ON 5/24/2025] Tirzepatide 12.5 MG/0.5ML Solution Auto-injector Inject 12.5 mg under the skin every 7 days for 30 days. 2 mL 0    [START ON 6/23/2025] Tirzepatide 15 MG/0.5ML Solution Auto-injector Inject 15 mg under the skin every 7 days for 30 days. 2 mL 3    insulin aspart (NOVOLOG) 100 UNIT/ML injection PEN Inject 10 Units under the skin 3 times a day before meals. 9 mL 3    Continuous Glucose Sensor (FREESTYLE CONNIE 3 PLUS SENSOR) Misc 1 Each every 15 days. 6 Each 3    metFORMIN ER (GLUCOPHAGE XR) 500 MG TABLET SR 24 HR Take 2 Tablets by mouth 2 times a day. 400 Tablet 0    Insulin Aspart FlexPen 100 UNIT/ML Solution Pen-injector Inject 5-10 Units under the skin 3 times a day before meals. NOVOLOG FLEX PEN 10 at Breakfast 10 at lunch 5 at dinner  Indications: Type 2 Diabetes 3 Each 3    FARXIGA 10 MG Tab Take 1 tablet by mouth once daily 100 Tablet 3    atorvastatin (LIPITOR) 40 MG Tab Take 1 Tablet by mouth every day. 100 Tablet 3    Insulin Glargine, 2 Unit Dial, (TOUJEO MAX SOLOSTAR) 300 UNIT/ML Solution Pen-injector Inject 75 Units under the skin every day. Indications: Type 2 Diabetes 9 mL 3    glucose blood strip 1 Each by Other route as needed. DEXCOM 7 Sensor      senna-docusate (PERICOLACE OR SENOKOT S) 8.6-50 MG Tab Take 1 Tablet by mouth 1 time a day as needed for Constipation. Indications: Constipation 100 Tablet 3    acetaminophen (TYLENOL) 500 MG Tab Take 2 Tablets by mouth every 6 hours as needed for Moderate Pain. Take as directed on the bottle. Take as needed for pain       No current Epic-ordered facility-administered medications on file.          Objective:   Physical Exam:    Vitals: /80   Pulse 87   Temp 36.3 °C (97.4 °F) (Temporal)   Ht 1.829 m (6')   Wt 105 kg (232 lb 3.2 oz)   SpO2 96%    BMI: Body mass index is 31.49 kg/m².  Physical Exam  Constitutional:       General: He is not in acute distress.     Appearance: Normal appearance.  He is not ill-appearing.   HENT:      Right Ear: Ear canal and external ear normal. There is no impacted cerumen.      Left Ear: Ear canal and external ear normal. There is no impacted cerumen.   Eyes:      General: No scleral icterus.     Conjunctiva/sclera: Conjunctivae normal.   Cardiovascular:      Rate and Rhythm: Normal rate and regular rhythm.      Pulses: Normal pulses.   Pulmonary:      Effort: Pulmonary effort is normal. No respiratory distress.      Breath sounds: Normal breath sounds. No wheezing or rhonchi.   Abdominal:      General: Bowel sounds are normal. There is distension.      Palpations: Abdomen is soft.   Musculoskeletal:      Right lower leg: No edema.      Left lower leg: No edema.   Skin:     General: Skin is warm and dry.   Neurological:      Gait: Gait normal.   Psychiatric:         Mood and Affect: Mood normal.         Behavior: Behavior normal.         Thought Content: Thought content normal.         Judgment: Judgment normal.           Results  Labs   - A1c: 10    Assessment & Plan  1. 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  2. CKD stage 3b  3. Hyperlipidemia associated with DM2  4. Diabetic macroalbuminuria   - His A1c level has improved from 12.5 to approximately 11.7 over the past 90 days, indicating a positive response to the current treatment regimen. However, there is still room for further improvement.  - His kidney function was suboptimal in January 2025, but it is anticipated that with better control of his blood sugar levels, his kidney function will either improve or stabilize. His heart rate has also decreased, which is reassuring.  - The dosage of tirzepatide (Mounjaro) will be increased to 10 mg for the next month, and if well-tolerated, it will be further increased to 12.5 mg in the subsequent month. He has been advised to monitor for any side effects such as heartburn, nausea, or constipation, and to report any severe  symptoms promptly.  - A switch from Dexcom to Ifrah sensors will be made due to insurance preference. His short-acting insulin will be adjusted to NovoLog to align with his insurance requirements. A recheck of his blood work and urine will be conducted prior to his next visit to assess his kidney function and protein levels in the urine.    5. Post-dental extraction status.  - He had a tooth extraction on the back left bottom and was prescribed amoxicillin and ibuprofen.  - He reports no issues with the antibiotics.  - He has been advised to replace ibuprofen with Tylenol as soon as he can tolerate it to minimize potential kidney impact.  - Follow-up labs will be ordered to monitor kidney function and protein levels in the urine.    Follow-up  The patient is scheduled for a follow-up visit in 3 months.      Assessment and Plan:   Manolo is a 84 y.o. male with the following:  Problem List Items Addressed This Visit       Hyperlipidemia associated with type 2 diabetes mellitus (HCC)    Relevant Medications    Tirzepatide 10 MG/0.5ML Solution Auto-injector    Tirzepatide 12.5 MG/0.5ML Solution Auto-injector (Start on 5/24/2025)    Tirzepatide 15 MG/0.5ML Solution Auto-injector (Start on 6/23/2025)    insulin aspart (NOVOLOG) 100 UNIT/ML injection PEN    Other Relevant Orders    FREE THYROXINE    TSH    VITAMIN B12    VITAMIN D,25 HYDROXY (DEFICIENCY)    Lipid Profile    Comp Metabolic Panel    CBC WITH DIFFERENTIAL    Macroalbuminuric diabetic nephropathy (HCC)    Relevant Medications    Tirzepatide 10 MG/0.5ML Solution Auto-injector    Tirzepatide 12.5 MG/0.5ML Solution Auto-injector (Start on 5/24/2025)    Tirzepatide 15 MG/0.5ML Solution Auto-injector (Start on 6/23/2025)    insulin aspart (NOVOLOG) 100 UNIT/ML injection PEN    Stage 3b chronic kidney disease (CKD)    Relevant Orders    SPEP W/REFLEX TO DEX, A, G, M    Type 2 diabetes mellitus with diabetic polyneuropathy, with long-term current use of insulin  (HCC)    Relevant Medications    Tirzepatide 10 MG/0.5ML Solution Auto-injector    Tirzepatide 12.5 MG/0.5ML Solution Auto-injector (Start on 5/24/2025)    Tirzepatide 15 MG/0.5ML Solution Auto-injector (Start on 6/23/2025)    insulin aspart (NOVOLOG) 100 UNIT/ML injection PEN    Continuous Glucose Sensor (FREESTYLE CONNIE 3 PLUS SENSOR) Misc    Other Relevant Orders    POCT  A1C (Completed)    HEMOGLOBIN A1C    MICROALBUMIN CREAT RATIO URINE     Other Visit Diagnoses         Dental extraction                   RTC: Return in about 3 months (around 7/24/2025).    I spent a total of 32 minutes with record review, exam, communication with the patient, communication with other providers, and documentation of this encounter.    Verbal consent was acquired by the patient to use [employer] ambient listening note generation during this visit Yes     Billing : secondary to the complexity of this patient's illnesses and their interactions.  All problems listed were discussed during the office visit, medications were evaluated and complexities were discussed as well as plan for the future    PLEASE NOTE: This dictation was created using voice recognition software. I have made every reasonable attempt to correct obvious errors, but I expect that there are errors of grammar and possibly content that I did not discover before finalizing the note.      Shirley Ozuna, DO  Geriatric and Internal Medicine  [facility]

## 2025-05-14 ENCOUNTER — HOSPITAL ENCOUNTER (OUTPATIENT)
Dept: LAB | Facility: MEDICAL CENTER | Age: 85
End: 2025-05-14
Attending: INTERNAL MEDICINE
Payer: MEDICARE

## 2025-05-14 DIAGNOSIS — N17.9 AKI (ACUTE KIDNEY INJURY) (HCC): ICD-10-CM

## 2025-05-14 DIAGNOSIS — N18.32 STAGE 3B CHRONIC KIDNEY DISEASE (CKD): ICD-10-CM

## 2025-05-14 DIAGNOSIS — E11.42 TYPE 2 DIABETES MELLITUS WITH DIABETIC POLYNEUROPATHY, WITH LONG-TERM CURRENT USE OF INSULIN (HCC): ICD-10-CM

## 2025-05-14 DIAGNOSIS — E11.69 HYPERLIPIDEMIA ASSOCIATED WITH TYPE 2 DIABETES MELLITUS (HCC): ICD-10-CM

## 2025-05-14 DIAGNOSIS — E78.5 HYPERLIPIDEMIA ASSOCIATED WITH TYPE 2 DIABETES MELLITUS (HCC): ICD-10-CM

## 2025-05-14 DIAGNOSIS — Z79.4 TYPE 2 DIABETES MELLITUS WITH DIABETIC POLYNEUROPATHY, WITH LONG-TERM CURRENT USE OF INSULIN (HCC): ICD-10-CM

## 2025-05-14 LAB
25(OH)D3 SERPL-MCNC: 22 NG/ML (ref 30–100)
ALBUMIN SERPL BCP-MCNC: 3.8 G/DL (ref 3.2–4.9)
ALBUMIN/GLOB SERPL: 1.1 G/DL
ALP SERPL-CCNC: 96 U/L (ref 30–99)
ALT SERPL-CCNC: 30 U/L (ref 2–50)
ANION GAP SERPL CALC-SCNC: 8 MMOL/L (ref 7–16)
AST SERPL-CCNC: 24 U/L (ref 12–45)
BASOPHILS # BLD AUTO: 1.6 % (ref 0–1.8)
BASOPHILS # BLD: 0.16 K/UL (ref 0–0.12)
BILIRUB SERPL-MCNC: 0.5 MG/DL (ref 0.1–1.5)
BUN SERPL-MCNC: 46 MG/DL (ref 8–22)
CALCIUM ALBUM COR SERPL-MCNC: 9.8 MG/DL (ref 8.5–10.5)
CALCIUM SERPL-MCNC: 9.6 MG/DL (ref 8.5–10.5)
CHLORIDE SERPL-SCNC: 101 MMOL/L (ref 96–112)
CHOLEST SERPL-MCNC: 213 MG/DL (ref 100–199)
CO2 SERPL-SCNC: 26 MMOL/L (ref 20–33)
CREAT SERPL-MCNC: 1.74 MG/DL (ref 0.5–1.4)
CREAT UR-MCNC: 80.6 MG/DL
EOSINOPHIL # BLD AUTO: 0.34 K/UL (ref 0–0.51)
EOSINOPHIL NFR BLD: 3.4 % (ref 0–6.9)
ERYTHROCYTE [DISTWIDTH] IN BLOOD BY AUTOMATED COUNT: 40.4 FL (ref 35.9–50)
EST. AVERAGE GLUCOSE BLD GHB EST-MCNC: 283 MG/DL
GFR SERPLBLD CREATININE-BSD FMLA CKD-EPI: 39 ML/MIN/1.73 M 2
GLOBULIN SER CALC-MCNC: 3.6 G/DL (ref 1.9–3.5)
GLUCOSE SERPL-MCNC: 324 MG/DL (ref 65–99)
HBA1C MFR BLD: 11.5 % (ref 4–5.6)
HCT VFR BLD AUTO: 50.7 % (ref 42–52)
HDLC SERPL-MCNC: 34 MG/DL
HGB BLD-MCNC: 16.4 G/DL (ref 14–18)
IMM GRANULOCYTES # BLD AUTO: 0.23 K/UL (ref 0–0.11)
IMM GRANULOCYTES NFR BLD AUTO: 2.3 % (ref 0–0.9)
LDLC SERPL CALC-MCNC: 125 MG/DL
LYMPHOCYTES # BLD AUTO: 1.87 K/UL (ref 1–4.8)
LYMPHOCYTES NFR BLD: 18.5 % (ref 22–41)
MCH RBC QN AUTO: 27.7 PG (ref 27–33)
MCHC RBC AUTO-ENTMCNC: 32.3 G/DL (ref 32.3–36.5)
MCV RBC AUTO: 85.5 FL (ref 81.4–97.8)
MICROALBUMIN UR-MCNC: 131 MG/DL
MICROALBUMIN/CREAT UR: 1625 MG/G (ref 0–30)
MONOCYTES # BLD AUTO: 1.05 K/UL (ref 0–0.85)
MONOCYTES NFR BLD AUTO: 10.4 % (ref 0–13.4)
NEUTROPHILS # BLD AUTO: 6.44 K/UL (ref 1.82–7.42)
NEUTROPHILS NFR BLD: 63.8 % (ref 44–72)
NRBC # BLD AUTO: 0 K/UL
NRBC BLD-RTO: 0 /100 WBC (ref 0–0.2)
PLATELET # BLD AUTO: 287 K/UL (ref 164–446)
PMV BLD AUTO: 10.9 FL (ref 9–12.9)
POTASSIUM SERPL-SCNC: 5.4 MMOL/L (ref 3.6–5.5)
PROT SERPL-MCNC: 7.4 G/DL (ref 6–8.2)
RBC # BLD AUTO: 5.93 M/UL (ref 4.7–6.1)
SODIUM SERPL-SCNC: 135 MMOL/L (ref 135–145)
T4 FREE SERPL-MCNC: 1 NG/DL (ref 0.93–1.7)
TRIGL SERPL-MCNC: 272 MG/DL (ref 0–149)
TSH SERPL-ACNC: 1.75 UIU/ML (ref 0.38–5.33)
VIT B12 SERPL-MCNC: 3806 PG/ML (ref 211–911)
WBC # BLD AUTO: 10.1 K/UL (ref 4.8–10.8)

## 2025-05-14 PROCEDURE — 84439 ASSAY OF FREE THYROXINE: CPT

## 2025-05-14 PROCEDURE — 80053 COMPREHEN METABOLIC PANEL: CPT

## 2025-05-14 PROCEDURE — 82570 ASSAY OF URINE CREATININE: CPT

## 2025-05-14 PROCEDURE — 36415 COLL VENOUS BLD VENIPUNCTURE: CPT

## 2025-05-14 PROCEDURE — 84155 ASSAY OF PROTEIN SERUM: CPT | Mod: 59

## 2025-05-14 PROCEDURE — 84443 ASSAY THYROID STIM HORMONE: CPT

## 2025-05-14 PROCEDURE — 82043 UR ALBUMIN QUANTITATIVE: CPT

## 2025-05-14 PROCEDURE — 85025 COMPLETE CBC W/AUTO DIFF WBC: CPT

## 2025-05-14 PROCEDURE — 83036 HEMOGLOBIN GLYCOSYLATED A1C: CPT

## 2025-05-14 PROCEDURE — 82306 VITAMIN D 25 HYDROXY: CPT

## 2025-05-14 PROCEDURE — 80061 LIPID PANEL: CPT

## 2025-05-14 PROCEDURE — 84165 PROTEIN E-PHORESIS SERUM: CPT

## 2025-05-14 PROCEDURE — 82607 VITAMIN B-12: CPT

## 2025-05-15 ENCOUNTER — RESULTS FOLLOW-UP (OUTPATIENT)
Dept: MEDICAL GROUP | Facility: PHYSICIAN GROUP | Age: 85
End: 2025-05-15

## 2025-05-16 ENCOUNTER — OFFICE VISIT (OUTPATIENT)
Dept: MEDICAL GROUP | Facility: PHYSICIAN GROUP | Age: 85
End: 2025-05-16
Payer: MEDICARE

## 2025-05-16 VITALS
DIASTOLIC BLOOD PRESSURE: 76 MMHG | SYSTOLIC BLOOD PRESSURE: 130 MMHG | HEART RATE: 89 BPM | BODY MASS INDEX: 30.8 KG/M2 | RESPIRATION RATE: 18 BRPM | HEIGHT: 72 IN | WEIGHT: 227.4 LBS | TEMPERATURE: 98 F | OXYGEN SATURATION: 94 %

## 2025-05-16 DIAGNOSIS — E11.42 TYPE 2 DIABETES MELLITUS WITH DIABETIC POLYNEUROPATHY, WITH LONG-TERM CURRENT USE OF INSULIN (HCC): Primary | ICD-10-CM

## 2025-05-16 DIAGNOSIS — Z79.4 TYPE 2 DIABETES MELLITUS WITH DIABETIC POLYNEUROPATHY, WITH LONG-TERM CURRENT USE OF INSULIN (HCC): Primary | ICD-10-CM

## 2025-05-16 DIAGNOSIS — N18.32 STAGE 3B CHRONIC KIDNEY DISEASE (CKD): ICD-10-CM

## 2025-05-16 PROCEDURE — 3078F DIAST BP <80 MM HG: CPT | Performed by: INTERNAL MEDICINE

## 2025-05-16 PROCEDURE — 1125F AMNT PAIN NOTED PAIN PRSNT: CPT | Performed by: INTERNAL MEDICINE

## 2025-05-16 PROCEDURE — 3075F SYST BP GE 130 - 139MM HG: CPT | Performed by: INTERNAL MEDICINE

## 2025-05-16 PROCEDURE — 99214 OFFICE O/P EST MOD 30 MIN: CPT | Performed by: INTERNAL MEDICINE

## 2025-05-16 ASSESSMENT — PAIN SCALES - GENERAL: PAINLEVEL_OUTOF10: 2=MINIMAL-SLIGHT

## 2025-05-16 ASSESSMENT — FIBROSIS 4 INDEX: FIB4 SCORE: 1.25

## 2025-05-16 NOTE — PROGRESS NOTES
CC: Lab work,    HPI:  Manolo presents with the following:  Patient of Dr. Ozuna    1. Stage 3b chronic kidney disease (CKD)  Chronic.  Worsening.  Patient very concerned about recent kidney function decline.  Also noticed that he had left-sided hip pain which may be related to his kidneys.  Current GFR 39, creatinine 1.74.  In reviewing his lab work, there has been a steady decline of his kidney function since September 2023.  In June 2022, GFR within normal limits.  Patient does not take excessive over-the-counter NSAIDs.  When discussing medications, patient stated that he started Ozempic about a year and a half ago and recently changed over to Mounjaro.  Patient has been on Farxiga as well about the same time frame.  Patient has been on metformin for many many years.  Ozempic was started by pharmacy on August 31, 2023 for uncontrolled T2DM.  Following labs in September 2023 showed a GFR declined to 49.  There has been a consistent steady decline since that time.  Microalbumin creatinine ratio 1625.  Patient completed an ultrasound of the kidneys which was benign.    Patient did not complete SPEP as ordered by PCP.  Not followed by nephrology.    2. Type 2 diabetes mellitus with diabetic polyneuropathy, with long-term current use of insulin (HCC)  Chronic.  Unstable.  Patient's diabetes management is followed by PCP in pharmacology.  Patient recently discontinued Ozempic and started Mounjaro and is titrating upwards.  He continues on Toujeo 75 units daily with 10 units of short acting insulin with meals.  Continues on metformin and Farxiga.  Current hemoglobin A1c slightly improved at 11.5.      Patient Active Problem List    Diagnosis Date Noted    Macroalbuminuric diabetic nephropathy (HCC) 04/24/2025    Hospital discharge follow-up 04/01/2024    Incisional pain 04/01/2024    Hoarseness of voice 04/01/2024    Iron deficiency anemia 04/01/2024    Acute kidney injury (HCC) 03/19/2024    Gallstone  pancreatitis 03/18/2024    Total bilirubin, elevated 03/18/2024    Mild cerebral atrophy (HCC) 10/17/2023    Stage 3b chronic kidney disease (CKD) 10/17/2023    Long-term insulin use ([facility]) 08/29/2023    Both eyes affected by mild nonproliferative diabetic retinopathy with macular edema, associated with type 2 diabetes mellitus ([facility]) 08/29/2023    Positive for macroalbuminuria 08/29/2023    Hypertension 06/27/2022    Closed fracture of one rib of right side 02/28/2022    Other atopic dermatitis 04/02/2020    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 04/02/2020    History of pericarditis 04/02/2020    History of melanoma 04/02/2020    History of diplopia 04/02/2020    History of shingles 04/02/2020    Caregiver burden 04/02/2020    Unsteady gait 10/09/2018    Type 2 diabetes mellitus with diabetic polyneuropathy, with long-term current use of insulin ([facility]) 09/03/2017    Hyperlipidemia associated with type 2 diabetes mellitus ([facility]) 09/03/2017       Current Medications[1]      Allergies as of 05/16/2025    (No Known Allergies)        Social History     Socioeconomic History    Marital status: Single     Spouse name: Not on file    Number of children: Not on file    Years of education: Not on file    Highest education level: Not on file   Occupational History    Not on file   Tobacco Use    Smoking status: Never    Smokeless tobacco: Never    Tobacco comments:     continued abstinence   Vaping Use    Vaping status: Never Used   Substance and Sexual Activity    Alcohol use: No    Drug use: No    Sexual activity: Not Currently     Partners: Female   Other Topics Concern    Not on file   Social History Narrative    Not on file     Social Drivers of Health     Financial Resource Strain: Not on file   Food Insecurity: Not on file   Transportation Needs: Not on file   Physical Activity: Not on file   Stress: Not on file   Social Connections: Feeling Socially Integrated (4/23/2024)     OASIS : Social Isolation     Frequency of experiencing loneliness or isolation: Never   Intimate Partner Violence: Not on file   Housing Stability: Not on file       Family History   Problem Relation Age of Onset    Diabetes Father     Diabetes Maternal Grandfather        Past Surgical History[2]    ROS:  Denies any Headache,Chest pain,  Shortness of breath,  Abdominal pain, Changes of bowel or bladder, Lower ext edema, Fevers, Nights sweats, Weight Changes, Focal weakness or numbness.  All other systems are negative.    /76 (BP Location: Right arm, Patient Position: Sitting)   Pulse 89   Temp 36.7 °C (98 °F) (Temporal)   Resp 18   Ht 1.829 m (6')   Wt 103 kg (227 lb 6.4 oz)   SpO2 94%   BMI 30.84 kg/m²      Constitutional: Alert, no distress, well-groomed.  Skin: Warm, dry, good turgor, no rashes in visible areas.  Eye: Equal, round and reactive, conjunctiva clear, lids normal.  ENMT: Lips without lesions, good dentition, moist mucous membranes.  Neck: Trachea midline, no masses, no thyromegaly.  Respiratory: Unlabored respiratory effort, no cough.  Abdomen: Soft, no gross masses.  MSK: Normal gait, moves all extremities.  Neuro: Grossly non-focal. No cranial nerve deficit. Strength and sensation intact.   Psych: Alert and oriented x3, normal affect and mood.      Assessment and Plan.   84 y.o. male presenting with the following.     1. Stage 3b chronic kidney disease (CKD)  Chronic.  Worsening.  Follow-up labs ordered for patient to complete.    Close follow-up with PCP to review labs and UA.  Reviewed lab work with patient.  Medications reviewed.    - URINALYSIS,CULTURE IF INDICATED; Future  - Monoclonal Protein Study; Future    2. Type 2 diabetes mellitus with diabetic polyneuropathy, with long-term current use of insulin (HCC) (Primary)  Chronic.  Uncontrolled.  Patient to complete lab work as ordered.  Follow-up with PCP and pharmacotherapy.  Continue current plan of care at this time.    My  total time spent caring for the patient on the day of the encounter was 32 minutes.   This does not include time spent on separately billable procedures/tests.           [1]   Current Outpatient Medications   Medication Sig Dispense Refill    amoxicillin (AMOXIL) 500 MG Cap Take 500 mg by mouth 3 times a day.      Tirzepatide 10 MG/0.5ML Solution Auto-injector Inject 10 mg under the skin every 7 days for 30 days. 2 mL 0    [START ON 5/24/2025] Tirzepatide 12.5 MG/0.5ML Solution Auto-injector Inject 12.5 mg under the skin every 7 days for 30 days. 2 mL 0    [START ON 6/23/2025] Tirzepatide 15 MG/0.5ML Solution Auto-injector Inject 15 mg under the skin every 7 days for 30 days. 2 mL 3    insulin aspart (NOVOLOG) 100 UNIT/ML injection PEN Inject 10 Units under the skin 3 times a day before meals. 9 mL 3    Continuous Glucose Sensor (FREESTYLE CONNIE 3 PLUS SENSOR) Misc 1 Each every 15 days. 6 Each 3    metFORMIN ER (GLUCOPHAGE XR) 500 MG TABLET SR 24 HR Take 2 Tablets by mouth 2 times a day. 400 Tablet 0    Insulin Aspart FlexPen 100 UNIT/ML Solution Pen-injector Inject 5-10 Units under the skin 3 times a day before meals. NOVOLOG FLEX PEN 10 at Breakfast 10 at lunch 5 at dinner  Indications: Type 2 Diabetes 3 Each 3    FARXIGA 10 MG Tab Take 1 tablet by mouth once daily 100 Tablet 3    atorvastatin (LIPITOR) 40 MG Tab Take 1 Tablet by mouth every day. 100 Tablet 3    Insulin Glargine, 2 Unit Dial, (TOUJEO MAX SOLOSTAR) 300 UNIT/ML Solution Pen-injector Inject 75 Units under the skin every day. Indications: Type 2 Diabetes 9 mL 3    glucose blood strip 1 Each by Other route as needed. DEXCOM 7 Sensor      senna-docusate (PERICOLACE OR SENOKOT S) 8.6-50 MG Tab Take 1 Tablet by mouth 1 time a day as needed for Constipation. Indications: Constipation 100 Tablet 3    acetaminophen (TYLENOL) 500 MG Tab Take 2 Tablets by mouth every 6 hours as needed for Moderate Pain. Take as directed on the bottle. Take as needed for pain        No current facility-administered medications for this visit.   [2]   Past Surgical History:  Procedure Laterality Date    NABOR BY LAPAROSCOPY N/A 3/20/2024    Procedure: CHOLECYSTECTOMY OPEN;  Surgeon: Daniel Jameson M.D.;  Location: SURGERY [facility];  Service: Trauma    OTHER      Derm removal of SCC

## 2025-05-18 LAB
ALBUMIN SERPL ELPH-MCNC: 3.84 G/DL (ref 3.75–5.01)
ALPHA1 GLOB SERPL ELPH-MCNC: 0.27 G/DL (ref 0.19–0.46)
ALPHA2 GLOB SERPL ELPH-MCNC: 0.99 G/DL (ref 0.48–1.05)
B-GLOBULIN SERPL ELPH-MCNC: 0.96 G/DL (ref 0.48–1.1)
GAMMA GLOB SERPL ELPH-MCNC: 1.13 G/DL (ref 0.62–1.51)
INTERPRETATION SERPL IFE-IMP: NORMAL
MONOCLON BAND OBS SERPL: NORMAL
MONOCLONAL PROTEIN NL11656: NORMAL G/DL
PATHOLOGY STUDY: NORMAL
PROT SERPL-MCNC: 7.2 G/DL (ref 6.3–8.2)

## 2025-05-22 ENCOUNTER — TELEPHONE (OUTPATIENT)
Dept: FAMILY PLANNING/WOMEN'S HEALTH CLINIC | Facility: PHYSICIAN GROUP | Age: 85
End: 2025-05-22

## 2025-05-22 ENCOUNTER — OFFICE VISIT (OUTPATIENT)
Dept: MEDICAL GROUP | Facility: PHYSICIAN GROUP | Age: 85
End: 2025-05-22
Payer: MEDICARE

## 2025-05-22 VITALS
TEMPERATURE: 97.7 F | SYSTOLIC BLOOD PRESSURE: 158 MMHG | OXYGEN SATURATION: 96 % | BODY MASS INDEX: 31.21 KG/M2 | HEART RATE: 97 BPM | WEIGHT: 230.4 LBS | DIASTOLIC BLOOD PRESSURE: 96 MMHG | HEIGHT: 72 IN

## 2025-05-22 DIAGNOSIS — E11.29 TYPE 2 DIABETES MELLITUS WITH MICROALBUMINURIA, WITH LONG-TERM CURRENT USE OF INSULIN (HCC): ICD-10-CM

## 2025-05-22 DIAGNOSIS — Z79.4 TYPE 2 DIABETES MELLITUS WITH MICROALBUMINURIA, WITH LONG-TERM CURRENT USE OF INSULIN (HCC): ICD-10-CM

## 2025-05-22 DIAGNOSIS — R80.9 TYPE 2 DIABETES MELLITUS WITH MICROALBUMINURIA, WITH LONG-TERM CURRENT USE OF INSULIN (HCC): ICD-10-CM

## 2025-05-22 DIAGNOSIS — E11.42 TYPE 2 DIABETES MELLITUS WITH DIABETIC POLYNEUROPATHY, WITH LONG-TERM CURRENT USE OF INSULIN (HCC): ICD-10-CM

## 2025-05-22 DIAGNOSIS — E11.21 MACROALBUMINURIC DIABETIC NEPHROPATHY (HCC): ICD-10-CM

## 2025-05-22 DIAGNOSIS — G45.9 TIA (TRANSIENT ISCHEMIC ATTACK): Primary | ICD-10-CM

## 2025-05-22 DIAGNOSIS — N18.32 STAGE 3B CHRONIC KIDNEY DISEASE (CKD): ICD-10-CM

## 2025-05-22 DIAGNOSIS — Z79.4 TYPE 2 DIABETES MELLITUS WITH DIABETIC POLYNEUROPATHY, WITH LONG-TERM CURRENT USE OF INSULIN (HCC): ICD-10-CM

## 2025-05-22 RX ORDER — LISINOPRIL 10 MG/1
10 TABLET ORAL DAILY
Qty: 100 TABLET | Refills: 3 | Status: SHIPPED | OUTPATIENT
Start: 2025-05-22 | End: 2026-06-26

## 2025-05-22 RX ORDER — ASPIRIN 81 MG/1
81 TABLET ORAL DAILY
COMMUNITY

## 2025-05-22 RX ORDER — CLOPIDOGREL BISULFATE 75 MG/1
75 TABLET ORAL DAILY
Qty: 21 TABLET | Refills: 0 | Status: SHIPPED | OUTPATIENT
Start: 2025-05-22 | End: 2025-06-12

## 2025-05-22 RX ORDER — INSULIN ASPART 100 [IU]/ML
5-10 INJECTION, SOLUTION INTRAVENOUS; SUBCUTANEOUS
Qty: 3 EACH | Refills: 3 | Status: SHIPPED | OUTPATIENT
Start: 2025-05-22

## 2025-05-22 RX ORDER — METFORMIN HYDROCHLORIDE 500 MG/1
1000 TABLET, EXTENDED RELEASE ORAL 2 TIMES DAILY
Qty: 400 TABLET | Refills: 3 | Status: SHIPPED | OUTPATIENT
Start: 2025-05-22

## 2025-05-22 ASSESSMENT — FIBROSIS 4 INDEX: FIB4 SCORE: 1.25

## 2025-05-22 NOTE — TELEPHONE ENCOUNTER
3rd BP check 158/96-logged in chart.   I was able to get some ear wax out but not all-small ear canals.   Daughter Rere is concerned that insulin is supposed to be tid ac but dad only eats 2 meals a day. Also, concerned that if he hasn't been compliant and now he will be with 7 medications, could it be overload and what if BS drops too low?

## 2025-05-23 NOTE — PROGRESS NOTES
Subjective:   Chief Complaint/History of Present Illness:  Manolo Puri is a 84 y.o. male established patient who presents today to discuss medical problems as listed below. Manolo is accompanied by his daughter, Meghan.    History of Present Illness  The patient presents for evaluation of left ear discomfort, diabetes mellitus management, transient ischemic attack, and blood pressure management.    He reports discomfort in his left ear, which he attributes to an incident where he inadvertently pushed a Q-tip into his eardrum while attempting to clean it.    He is currently on a regimen of NovoLog and metformin for his diabetes, both of which require refills. Despite not having taken metformin for several weeks, his glucose levels have remained within acceptable ranges. He has been inconsistent with his NovoLog administration, often missing doses during lunch or dinner, but ensuring a dose in the morning. He has not taken NovoLog for the past 1 to 2 weeks. He also takes Toujeo once daily and Mounjaro weekly. He recently refilled his Mounjaro prescription. He expresses concern about the potential impact of Ozempic on his kidney function. He is considering lifestyle modifications, such as reducing carbohydrate and sugar intake, to potentially decrease his medication requirements.    He experienced a transient episode of speech difficulty yesterday, which resolved by evening. During this episode, he was unable to articulate his thoughts clearly, but he was able to drive home without incident. His sister reported that he was unable to form coherent sentences and his words were jumbled. However, he did not exhibit any facial droop, weakness, numbness, or tingling. His continuous glucose monitor (CGM) recorded a reading of 165 during this episode. He did not attempt to write or text during this episode. He has no history of transient ischemic attacks (TIAs) and has never taken aspirin. He took a baby aspirin  last night.    He has not been taking atorvastatin regularly.    He has not been taking lisinopril.     Current Medications:  Current Medications and Prescriptions Ordered in Epic[1]       Objective:   Physical Exam:    Vitals: BP (!) 158/96 (BP Location: Right arm, Patient Position: Sitting, BP Cuff Size: Large adult)   Pulse 97   Temp 36.5 °C (97.7 °F) (Temporal)   Ht 1.829 m (6')   Wt 105 kg (230 lb 6.4 oz)   SpO2 96%    BMI: Body mass index is 31.25 kg/m².  Physical Exam  Constitutional:       General: He is not in acute distress.     Appearance: Normal appearance. He is not ill-appearing.   HENT:      Right Ear: External ear normal.      Left Ear: External ear normal. There is impacted cerumen.   Eyes:      General: No scleral icterus.     Conjunctiva/sclera: Conjunctivae normal.   Cardiovascular:      Rate and Rhythm: Normal rate and regular rhythm.      Pulses: Normal pulses.   Pulmonary:      Effort: Pulmonary effort is normal. No respiratory distress.      Breath sounds: Normal breath sounds. No wheezing or rhonchi.   Abdominal:      General: Bowel sounds are normal. There is distension.      Palpations: Abdomen is soft.   Skin:     General: Skin is warm and dry.   Neurological:      Gait: Gait normal.   Psychiatric:         Mood and Affect: Mood normal.         Behavior: Behavior normal.         Thought Content: Thought content normal.         Judgment: Judgment normal.           Results  Labs   - A1c: 11   - Average blood sugars: 280s   - Kidney function: 39%   - LDL cholesterol: 120s    Assessment & Plan  1. Impacted cerumen in the left ear.  - The left ear is completely impacted with cerumen.  - A lavage will be performed using a mixture of hydrogen peroxide, oil drops, and warm water to loosen and flush out the impacted cerumen.  - The medical assistant will be notified to perform the lavage at the end of the visit.    2. 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  - His kidney function has been gradually declining since 2022, with a current GFR of 39. 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  - Refills for NovoLog and metformin have been provided. He is instructed to bring his CGM device to the next visit for a detailed review.  - He is advised to consult with a pharmacist for further guidance on managing his diabetes. He is encouraged to adopt a healthier diet and incorporate regular exercise into his routine.    3. Transient Ischemic Attack (TIA).  - He experienced a TIA yesterday, which resolved by evening. ABCD2 score of 7.  - His blood pressure is elevated today, which could be a protective response to the TIA.  - He is advised to continue taking baby aspirin 81 mg daily and Plavix for 3 weeks. Continue atorvastatin 40 mg daily.  - He is advised to seek immediate medical attention if he experiences any symptoms suggestive of a stroke or TIA.    4. Hypertension  - He has not been taking lisinopril, unclear why.  - A prescription for lisinopril 10 mg has been provided.  - Blood pressure will be rechecked at follow up in a few weeks.  - He is advised to continue monitoring his blood pressure regularly.    5. Dyslipidemia  - He has not been taking atorvastatin regularly.  - A prescription for atorvastatin 40 mg daily has been provided.  - He is advised to take atorvastatin consistently to manage his cholesterol levels.  - His cholesterol levels have been above goal these past several years, indicating the need for consistent medication use.    Follow-up  - The patient will follow up in 2 weeks.  - He is instructed to double-check all his medications at home and bring any old or current medications to the next visit for a detailed review.  - Regular follow-ups will be scheduled to optimize his medication regimen and  manage his conditions effectively.      Assessment and Plan:   Manolo is a 84 y.o. male with the following:  Problem List Items Addressed This Visit       Macroalbuminuric diabetic nephropathy (HCC)    Relevant Medications    metFORMIN ER (GLUCOPHAGE XR) 500 MG TABLET SR 24 HR    lisinopril (PRINIVIL) 10 MG Tab    Insulin Aspart FlexPen 100 UNIT/ML Solution Pen-injector    Stage 3b chronic kidney disease (CKD)    Relevant Medications    lisinopril (PRINIVIL) 10 MG Tab    Type 2 diabetes mellitus with diabetic polyneuropathy, with long-term current use of insulin (HCC)    Relevant Medications    metFORMIN ER (GLUCOPHAGE XR) 500 MG TABLET SR 24 HR    Insulin Aspart FlexPen 100 UNIT/ML Solution Pen-injector     Other Visit Diagnoses         TIA (transient ischemic attack)    -  Primary    Relevant Medications    clopidogrel (PLAVIX) 75 MG Tab    lisinopril (PRINIVIL) 10 MG Tab      Type 2 diabetes mellitus with microalbuminuria, with long-term current use of insulin (HCC)        Relevant Medications    metFORMIN ER (GLUCOPHAGE XR) 500 MG TABLET SR 24 HR    Insulin Aspart FlexPen 100 UNIT/ML Solution Pen-injector               RTC: Return in about 2 weeks (around 6/5/2025).    I spent a total of 38 minutes with record review, exam, communication with the patient, communication with other providers, and documentation of this encounter.    Verbal consent was acquired by the patient to use [facility] ambient listening note generation during this visit Yes     Billing : secondary to the complexity of this patient's illnesses and their interactions.  All problems listed were discussed during the office visit, medications were evaluated and complexities were discussed as well as plan for the future    PLEASE NOTE: This dictation was created using voice recognition software. I have made every reasonable attempt to correct obvious errors, but I expect that there are errors of grammar and possibly content that I did not  discover before finalizing the note.      Shirley Ozuna,   Geriatric and Internal Medicine  [facility]          [1]   Current Outpatient Medications Ordered in Epic   Medication Sig Dispense Refill    metFORMIN ER (GLUCOPHAGE XR) 500 MG TABLET SR 24 HR Take 2 Tablets by mouth 2 times a day. 400 Tablet 3    aspirin 81 MG EC tablet Take 81 mg by mouth every day.      clopidogrel (PLAVIX) 75 MG Tab Take 1 Tablet by mouth every day for 21 days. 21 Tablet 0    lisinopril (PRINIVIL) 10 MG Tab Take 1 Tablet by mouth every day. 100 Tablet 3    Insulin Aspart FlexPen 100 UNIT/ML Solution Pen-injector Inject 5-10 Units under the skin 3 times a day before meals. NOVOLOG FLEX PEN 10 at Breakfast 10 at lunch 5 at dinner  Indications: Type 2 Diabetes 3 Each 3    Tirzepatide 10 MG/0.5ML Solution Auto-injector Inject 10 mg under the skin every 7 days for 30 days. 2 mL 0    insulin aspart (NOVOLOG) 100 UNIT/ML injection PEN Inject 10 Units under the skin 3 times a day before meals. 9 mL 3    Continuous Glucose Sensor (FREESTYLE CONNIE 3 PLUS SENSOR) Misc 1 Each every 15 days. 6 Each 3    FARXIGA 10 MG Tab Take 1 tablet by mouth once daily 100 Tablet 3    atorvastatin (LIPITOR) 40 MG Tab Take 1 Tablet by mouth every day. 100 Tablet 3    glucose blood strip 1 Each by Other route as needed. DEXCOM 7 Sensor      acetaminophen (TYLENOL) 500 MG Tab Take 2 Tablets by mouth every 6 hours as needed for Moderate Pain. Take as directed on the bottle. Take as needed for pain      amoxicillin (AMOXIL) 500 MG Cap Take 500 mg by mouth 3 times a day. (Patient not taking: Reported on 5/22/2025)      [START ON 5/24/2025] Tirzepatide 12.5 MG/0.5ML Solution Auto-injector Inject 12.5 mg under the skin every 7 days for 30 days. (Patient not taking: Reported on 5/22/2025) 2 mL 0    [START ON 6/23/2025] Tirzepatide 15 MG/0.5ML Solution Auto-injector Inject 15 mg under the skin every 7 days for 30 days. (Patient not taking: Reported on  5/22/2025) 2 mL 3    Insulin Glargine, 2 Unit Dial, (TOUJEO MAX SOLOSTAR) 300 UNIT/ML Solution Pen-injector Inject 75 Units under the skin every day. Indications: Type 2 Diabetes (Patient not taking: Reported on 5/22/2025) 9 mL 3    senna-docusate (PERICOLACE OR SENOKOT S) 8.6-50 MG Tab Take 1 Tablet by mouth 1 time a day as needed for Constipation. Indications: Constipation (Patient not taking: Reported on 5/22/2025) 100 Tablet 3     No current Epic-ordered facility-administered medications on file.

## 2025-05-30 ENCOUNTER — OFFICE VISIT (OUTPATIENT)
Dept: URGENT CARE | Facility: CLINIC | Age: 85
End: 2025-05-30
Payer: MEDICARE

## 2025-05-30 VITALS
RESPIRATION RATE: 20 BRPM | HEART RATE: 105 BPM | TEMPERATURE: 97.8 F | WEIGHT: 220.3 LBS | HEIGHT: 72 IN | BODY MASS INDEX: 29.84 KG/M2 | SYSTOLIC BLOOD PRESSURE: 132 MMHG | OXYGEN SATURATION: 97 % | DIASTOLIC BLOOD PRESSURE: 78 MMHG

## 2025-05-30 DIAGNOSIS — S90.421A BLISTER OF GREAT TOE OF RIGHT FOOT, INITIAL ENCOUNTER: ICD-10-CM

## 2025-05-30 DIAGNOSIS — E11.42 TYPE 2 DIABETES MELLITUS WITH DIABETIC POLYNEUROPATHY, WITH LONG-TERM CURRENT USE OF INSULIN (HCC): Primary | ICD-10-CM

## 2025-05-30 DIAGNOSIS — Z79.4 LONG-TERM INSULIN USE (HCC): ICD-10-CM

## 2025-05-30 DIAGNOSIS — Z79.4 TYPE 2 DIABETES MELLITUS WITH DIABETIC POLYNEUROPATHY, WITH LONG-TERM CURRENT USE OF INSULIN (HCC): Primary | ICD-10-CM

## 2025-05-30 DIAGNOSIS — L03.119 CELLULITIS OF FOOT: ICD-10-CM

## 2025-05-30 ASSESSMENT — FIBROSIS 4 INDEX: FIB4 SCORE: 1.25

## 2025-05-30 NOTE — PROGRESS NOTES
Manolo Puri is a 84 y.o. male who presents for Foot Swelling (R foot 5 days )      HPI  This is a new problem. Manolo Puri is a 84 y.o. patient who presents to urgent care with c/o: Blister formed on right great toe 5 days ago. Wear shoes without socks frequently. Now foot is red and swollen. Treatment tried: topical antibiotic ointment. He has hx of DM with insulin use. Hx of neuropathy. No other aggravating or alleviating factors.  Denies any other concerns at this time.       ROS See HPI    Allergies:     Allergies[1]    PMSFS Hx:  Past Medical History[2]  Past Surgical History[3]  Family History   Problem Relation Age of Onset    Diabetes Father     Diabetes Maternal Grandfather      Social History     Tobacco Use    Smoking status: Never    Smokeless tobacco: Never    Tobacco comments:     continued abstinence   Substance Use Topics    Alcohol use: No         Problems:   Problem List[4]    Medications:   Medications Ordered Prior to Encounter[5]     Objective:     /78   Pulse (!) 105   Temp 36.6 °C (97.8 °F) (Temporal)   Resp 20   Ht 1.829 m (6')   Wt 99.9 kg (220 lb 4.8 oz)   SpO2 97%   BMI 29.88 kg/m²     Physical Exam  Vitals and nursing note reviewed.   Constitutional:       Appearance: He is well-developed.   Cardiovascular:      Rate and Rhythm: Normal rate and regular rhythm.      Pulses: Normal pulses.   Pulmonary:      Effort: Pulmonary effort is normal. No respiratory distress.      Breath sounds: Normal breath sounds.   Musculoskeletal:      Cervical back: Normal range of motion.   Skin:     General: Skin is warm.      Capillary Refill: Capillary refill takes less than 2 seconds.      Findings: Erythema present. No rash.   Neurological:      Mental Status: He is alert and oriented to person, place, and time.   Psychiatric:         Behavior: Behavior normal.         Thought Content: Thought content normal.               Renal dosing required due to patients  underlying diabetes/  kidney disease:   The Cockcroft-Gault calculator used to estimate CrCl. Estimated to be 46 ml/min using CR of 1.74  from 05/14/2025     Assessment /Associated Orders:      1. Type 2 diabetes mellitus with diabetic polyneuropathy, with long-term current use of insulin (HCC)  amoxicillin-clavulanate (AUGMENTIN) 875-125 MG Tab      2. Long-term insulin use (HCC)        3. Cellulitis of foot  Referral to Wound Clinic    amoxicillin-clavulanate (AUGMENTIN) 875-125 MG Tab      4. Blister of great toe of right foot, initial encounter  Referral to Wound Clinic    amoxicillin-clavulanate (AUGMENTIN) 875-125 MG Tab            Medical Decision Making:    Manolo Puri is a very pleasant 84 y.o. male who is clinically stable at today's acute urgent care visit. Presents with acute problem/ concern today.    No acute distress is noted at the time of the visit.  VSS. Appropriate for outpatient care at this time.     Educated in proper administration of  prescription medication(s) ordered today including safety, possible SE, risks, benefits, rationale and alternatives to therapy.   Elevation of leg prn   Resume all prior  RX medications. Take as prescribed.       Through shared decision making a discussion of the Dx and DDx, management options (risks,benefits, and alternatives to planned treatment), natural progression, supportive care and indications for immediate follow-up discussed. Expressed understanding and the treatment plan was agreed upon.    Questions were encouraged and answered     Follow Up:   Return to urgent care prn if new or worsening sx or if there is no improvement in condition prn.    Educated in Red flags and indications to immediately call 911 or present to the Emergency Department.       Time I spent evaluating Manolo Puri in urgent care today was 30  minutes. This time includes preparing for visit, reviewing any pertinent notes or test results, exam, obtaining  HPI, interpretation of lab tests,counseling/education, medication management ( RX and/ or OTC)  and documentation as indicated above.Time does not include separately billable procedures if noted .       Please note that this dictation was created using voice recognition software. I have worked with consultants from the vendor as well as technical experts from [facility] to optimize the interface. I have made every reasonable attempt to correct obvious errors, but I expect that there are errors of grammar and possibly content that I did not discover before finalizing the note.  This note was electronically signed by provider           [1] No Known Allergies  [2]   Past Medical History:  Diagnosis Date    Arthritis     Atypical chest pain 9/3/2017    Cataract     Cholelithiasis 6/27/2022    Cholestatic hepatitis 6/29/2022     Latest Reference Range & Units 06/27/22 11:03 06/28/22 02:32 06/29/22 08:38 06/29/22 13:41 06/30/22 03:04 AST(SGOT) 12 - 45 U/L 460 (H) 421 (H) 278 (H) 229 (H) 175 (H) ALT(SGPT) 2 - 50 U/L 326 (H) 453 (H) 425 (H) 377 (H) 326 (H) Alkaline Phosphatase 30 - 99 U/L 149 (H) 151 (H) 194 (H) 189 (H) 185 (H) Total Bilirubin 0.1 - 1.5 mg/dL 2.4 (H) 4.4 (H) 4.7 (H) 4.0 (H) 2.5 (H) Direct Bilirubin 0.1 - 0.    Diabetes (HCC)     Elevated hematocrit 9/3/2017    Elevated liver enzymes 6/27/2022    Hospital discharge follow-up 7/5/2022    He was admitted for 3 days to the hospital in late Denise/early July 2022 due to chest pain with associated nausea and vomiting.  He underwent cardiac stress testing which was negative for ischemia.  Initial right upper quadrant ultrasound showed hepatomegaly with fatty liver, follow-up MRCP showed borderline splenomegaly but no clear gallbladder pathology outside of Cholelithiasis.  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    History of pericarditis    History of melanoma    History of diplopia    History of shingles    Caregiver burden    Closed fracture of one rib of right side    Hypertension    Long-term insulin use (HCC)    Both eyes affected by mild nonproliferative diabetic retinopathy with macular edema, associated with type 2 diabetes mellitus (HCC)    Positive for macroalbuminuria    Mild cerebral atrophy (HCC)    Stage 3b chronic kidney disease (CKD)    Gallstone pancreatitis    Total bilirubin, elevated    Acute kidney injury (HCC)    Hospital discharge follow-up    Incisional pain    Hoarseness of voice    Iron deficiency anemia    Macroalbuminuric diabetic nephropathy (HCC)   [5]   Current Outpatient Medications on File Prior to Visit   Medication Sig Dispense Refill    metFORMIN ER (GLUCOPHAGE XR) 500 MG TABLET SR 24 HR Take 2 Tablets by mouth 2 times a day. 400 Tablet 3    aspirin 81 MG EC tablet Take 81 mg by mouth every day.      clopidogrel (PLAVIX) 75 MG Tab Take 1 Tablet by mouth every day for 21 days. 21 Tablet 0    lisinopril (PRINIVIL) 10 MG Tab Take 1 Tablet by mouth every day. 100 Tablet 3    Insulin Aspart FlexPen 100 UNIT/ML Solution Pen-injector Inject 5-10 Units under the skin 3 times a day before meals. NOVOLOG FLEX PEN 10 at Breakfast 10 at lunch 5 at dinner  Indications: Type 2 Diabetes 3 Each 3    amoxicillin (AMOXIL) 500 MG Cap Take 500 mg by mouth 3 times a day.      Tirzepatide 12.5 MG/0.5ML Solution Auto-injector Inject 12.5 mg under the skin every 7  days for 30 days. 2 mL 0    [START ON 6/23/2025] Tirzepatide 15 MG/0.5ML Solution Auto-injector Inject 15 mg under the skin every 7 days for 30 days. 2 mL 3    insulin aspart (NOVOLOG) 100 UNIT/ML injection PEN Inject 10 Units under the skin 3 times a day before meals. 9 mL 3    Continuous Glucose Sensor (FREESTYLE CONNIE 3 PLUS SENSOR) Misc 1 Each every 15 days. 6 Each 3    FARXIGA 10 MG Tab Take 1 tablet by mouth once daily 100 Tablet 3    atorvastatin (LIPITOR) 40 MG Tab Take 1 Tablet by mouth every day. 100 Tablet 3    Insulin Glargine, 2 Unit Dial, (TOUJEO MAX SOLOSTAR) 300 UNIT/ML Solution Pen-injector Inject 75 Units under the skin every day. Indications: Type 2 Diabetes 9 mL 3    glucose blood strip 1 Each by Other route as needed. DEXCOM 7 Sensor      senna-docusate (PERICOLACE OR SENOKOT S) 8.6-50 MG Tab Take 1 Tablet by mouth 1 time a day as needed for Constipation. Indications: Constipation 100 Tablet 3    acetaminophen (TYLENOL) 500 MG Tab Take 2 Tablets by mouth every 6 hours as needed for Moderate Pain. Take as directed on the bottle. Take as needed for pain       No current facility-administered medications on file prior to visit.

## 2025-06-05 ENCOUNTER — APPOINTMENT (OUTPATIENT)
Dept: MEDICAL GROUP | Facility: PHYSICIAN GROUP | Age: 85
End: 2025-06-05
Payer: MEDICARE

## 2025-06-05 ENCOUNTER — HOSPITAL ENCOUNTER (OUTPATIENT)
Dept: RADIOLOGY | Facility: MEDICAL CENTER | Age: 85
End: 2025-06-05
Attending: INTERNAL MEDICINE
Payer: MEDICARE

## 2025-06-05 ENCOUNTER — RESULTS FOLLOW-UP (OUTPATIENT)
Dept: MEDICAL GROUP | Facility: PHYSICIAN GROUP | Age: 85
End: 2025-06-05

## 2025-06-05 VITALS
DIASTOLIC BLOOD PRESSURE: 66 MMHG | HEIGHT: 72 IN | TEMPERATURE: 96.7 F | WEIGHT: 229.9 LBS | OXYGEN SATURATION: 96 % | SYSTOLIC BLOOD PRESSURE: 128 MMHG | HEART RATE: 97 BPM | BODY MASS INDEX: 31.14 KG/M2 | RESPIRATION RATE: 16 BRPM

## 2025-06-05 DIAGNOSIS — E11.3213 BOTH EYES AFFECTED BY MILD NONPROLIFERATIVE DIABETIC RETINOPATHY WITH MACULAR EDEMA, ASSOCIATED WITH TYPE 2 DIABETES MELLITUS (HCC): ICD-10-CM

## 2025-06-05 DIAGNOSIS — Z79.4 LONG-TERM INSULIN USE (HCC): ICD-10-CM

## 2025-06-05 DIAGNOSIS — E11.42 TYPE 2 DIABETES MELLITUS WITH DIABETIC POLYNEUROPATHY, WITH LONG-TERM CURRENT USE OF INSULIN (HCC): ICD-10-CM

## 2025-06-05 DIAGNOSIS — N18.32 STAGE 3B CHRONIC KIDNEY DISEASE (CKD): ICD-10-CM

## 2025-06-05 DIAGNOSIS — R60.0 LEG EDEMA: ICD-10-CM

## 2025-06-05 DIAGNOSIS — R60.0 LEG EDEMA: Primary | ICD-10-CM

## 2025-06-05 DIAGNOSIS — G45.9 TIA (TRANSIENT ISCHEMIC ATTACK): ICD-10-CM

## 2025-06-05 DIAGNOSIS — Z79.4 TYPE 2 DIABETES MELLITUS WITH DIABETIC POLYNEUROPATHY, WITH LONG-TERM CURRENT USE OF INSULIN (HCC): ICD-10-CM

## 2025-06-05 PROCEDURE — G2211 COMPLEX E/M VISIT ADD ON: HCPCS | Performed by: INTERNAL MEDICINE

## 2025-06-05 PROCEDURE — 3078F DIAST BP <80 MM HG: CPT | Performed by: INTERNAL MEDICINE

## 2025-06-05 PROCEDURE — 93971 EXTREMITY STUDY: CPT | Mod: 26,RT | Performed by: INTERNAL MEDICINE

## 2025-06-05 PROCEDURE — 3074F SYST BP LT 130 MM HG: CPT | Performed by: INTERNAL MEDICINE

## 2025-06-05 PROCEDURE — 93971 EXTREMITY STUDY: CPT | Mod: RT

## 2025-06-05 PROCEDURE — 99214 OFFICE O/P EST MOD 30 MIN: CPT | Performed by: INTERNAL MEDICINE

## 2025-06-05 RX ORDER — INSULIN GLARGINE 300 U/ML
55 INJECTION, SOLUTION SUBCUTANEOUS DAILY
Qty: 9 ML | Refills: 3
Start: 2025-06-05 | End: 2025-06-16

## 2025-06-05 RX ORDER — TORSEMIDE 10 MG/1
10 TABLET ORAL DAILY
Qty: 30 TABLET | Refills: 3 | Status: SHIPPED | OUTPATIENT
Start: 2025-06-05 | End: 2025-06-16

## 2025-06-05 RX ORDER — TIRZEPATIDE 10 MG/.5ML
10 INJECTION, SOLUTION SUBCUTANEOUS
COMMUNITY
Start: 2025-05-16

## 2025-06-05 ASSESSMENT — FIBROSIS 4 INDEX: FIB4 SCORE: 1.25

## 2025-06-05 NOTE — PROGRESS NOTES
Subjective:   Chief Complaint/History of Present Illness:  Manolo Puri is a 84 y.o. male established patient who presents today to discuss medical problems as listed below. Sharad is accompanied by his 2 daughters.    History of Present Illness  The patient presents for evaluation of diabetes management, leg swelling, and a recent transient ischemic attack (TIA).    He has been experiencing hypoglycemic episodes, with blood glucose levels dropping to the 60s and 70s, as recorded by his Freestyle Ifrah monitor. These episodes are accompanied by symptoms of clamminess and fatigue. Blood glucose levels fluctuate throughout the day, even without the administration of premeal injections or the second dose of metformin. He has been managing these episodes with ice cream, which raises his blood glucose levels from 49 to 75 or 80. His current medication regimen includes Toujeo 75 units, Farxiga, metformin (2 tablets in the morning and 2 at night), and mealtime injections of 10 units twice daily, typically administered at breakfast and dinner. He also takes Mounjaro 10 mg on Fridays. His diet consists of eggs for breakfast, a sandwich or salad for lunch, and a TV meal for dinner. He resides in a single-[place] home and receives assistance with housekeeping and laundry twice a week. He plans to downsize his living space and split his time between [place] and [place], where he will stay with family. His daughter is requesting [facility] paperwork.    He has been experiencing shortness of breath, which has become more pronounced recently. He has not undergone any vein surgeries on his leg and does not recall any injuries to the leg. He reports no pain in his calf. He has not previously taken diuretics. He has noticed increased redness in his leg, which has been swollen for approximately 1.5 weeks. He was prescribed Augmentin by urgent care.    He reports no nosebleeds, rectal bleeding, or bruising while on Plavix. He has a  subconjunctival hemorrhage in his eye, which he attributes to an injection he received last Monday. He has a small port in his eye that is filled with medication, allowing him to receive injections less frequently. The study has concluded, and he anticipates incurring co-pay costs for the injections, which are administered four times a year.    FAMILY HISTORY  His grandfather  of a stroke at the age of 68.  He mentions a family history of random clotting factors.       Current Medications:  Current Medications and Prescriptions Ordered in Epic[1]       Objective:   Physical Exam:    Vitals: /66 (BP Location: Left arm, Patient Position: Sitting, BP Cuff Size: Adult)   Pulse 97   Temp 35.9 °C (96.7 °F) (Temporal)   Resp 16   Ht 1.829 m (6')   Wt 104 kg (229 lb 14.4 oz)   SpO2 96%    BMI: Body mass index is 31.18 kg/m².  Physical Exam  Constitutional:       General: He is not in acute distress.     Appearance: Normal appearance. He is not ill-appearing.   HENT:      Right Ear: External ear normal.      Left Ear: External ear normal.   Eyes:      General: No scleral icterus.     Conjunctiva/sclera: Conjunctivae normal.   Cardiovascular:      Rate and Rhythm: Normal rate and regular rhythm.      Pulses: Normal pulses.   Pulmonary:      Effort: Pulmonary effort is normal. No respiratory distress.      Breath sounds: Normal breath sounds. No wheezing or rhonchi.   Abdominal:      General: Bowel sounds are normal. There is distension.      Palpations: Abdomen is soft.   Musculoskeletal:         General: Swelling present.      Right lower leg: Edema present.      Left lower leg: No edema.   Skin:     General: Skin is warm and dry.      Findings: Erythema, lesion and rash present.      Comments: RLE. Healing abrasion on right toe.   Neurological:      Gait: Gait normal.   Psychiatric:         Mood and Affect: Mood normal.         Behavior: Behavior normal.         Thought Content: Thought content normal.          Judgment: Judgment normal.           Results  Labs   - Blood sugar: 60s-70s mg/dL    Assessment & Plan  1. 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  2. CKD stage 3b   3. Long term insulin use   - His blood glucose levels have been consistently low, likely due to an over-reliance on insulin, which is heavily dependent on renal function for clearance. This could lead to inconsistencies in blood glucose control. The goal is to maintain his A1c around 7, with fasting blood glucose levels between 100 and 130, and postprandial levels not exceeding 250.  - His current regimen includes Farxiga, metformin, Toujeo, and NovoLog, in addition to Mounjaro. He has been experiencing hypoglycemia, with blood glucose levels dropping to the 50s to 70s, as recorded by his Freestyle Ifrah monitor. These episodes are accompanied by symptoms of clamminess and fatigue.  - His blood glucose levels fluctuate throughout the day, even without the administration of premeal injections or the second dose of metformin. He has been managing these episodes with ice cream, which raises his blood glucose levels from 49 to 75 or 80.  - The plan is to reduce his Toujeo dosage from 75 units to 55 units and discontinue NovoLog for the next 1 to 2 weeks. He will continue with Farxiga and metformin. The Mounjaro dosage will be increased to 12.5 mg in 2 weeks, with potential side effects of heartburn and constipation discussed. He will monitor his blood glucose levels and provide updates via [employer].    4. Leg Swelling, right side; rule out DVT.  - The leg swelling could be contributing to his shortness of breath. The swelling is asymmetrical, suggesting a possible venous issue rather than a diabetic foot ulcer.  - An ultrasound will be ordered to rule out a blood clot. A prescription for torsemide will be provided to manage the fluid overload.  - If long-term use of torsemide is required, potassium  supplementation may be necessary.  - He will monitor his response to the water pill and update on the fluid status.  **Addendum, US shows baker's cyst and negative for DVT, orthopedic urgent care recommended for aspiration.**    5. Transient Ischemic Attack (TIA).  - He experienced a neurologic episode suggestive of a TIA or mini-stroke.  - He will continue Plavix for long-term protection against another neurologic event and to maintain blood flow in his legs and feet to prevent diabetic wounds.  - Aspirin will be discontinued at 21 day lewis from neurologic event.  - Monitoring for any bleeding or bruising with Plavix will continue.    Follow-up  The patient will follow up on 06/16/2025 at 3:00 PM.      Assessment and Plan:   Manolo is a 84 y.o. male with the following:  Problem List Items Addressed This Visit       Both eyes affected by mild nonproliferative diabetic retinopathy with macular edema, associated with type 2 diabetes mellitus (HCC)    Relevant Medications    MOUNJARO 10 MG/0.5ML Solution Auto-injector    Insulin Glargine, 2 Unit Dial, (TOUJEO MAX SOLOSTAR) 300 UNIT/ML Solution Pen-injector    Long-term insulin use (HCC)    Relevant Medications    Insulin Glargine, 2 Unit Dial, (TOUJEO MAX SOLOSTAR) 300 UNIT/ML Solution Pen-injector    Stage 3b chronic kidney disease (CKD)    Type 2 diabetes mellitus with diabetic polyneuropathy, with long-term current use of insulin (HCC)    Relevant Medications    MOUNJARO 10 MG/0.5ML Solution Auto-injector    Insulin Glargine, 2 Unit Dial, (TOUJEO MAX SOLOSTAR) 300 UNIT/ML Solution Pen-injector     Other Visit Diagnoses         Leg edema    -  Primary    Relevant Medications    torsemide (DEMADEX) 10 MG tablet    Other Relevant Orders    US-EXTREMITY VENOUS LOWER UNILAT RIGHT (Completed)    EC-ECHOCARDIOGRAM COMPLETE W/O CONT      TIA (transient ischemic attack)        Relevant Medications    torsemide (DEMADEX) 10 MG tablet               RTC: Return in about 2  weeks (around 6/19/2025).    I spent a total of 38 minutes with record review, exam, communication with the patient, communication with other providers, and documentation of this encounter.    Verbal consent was acquired by the patient to use [employer] ambient listening note generation during this visit Yes     Billing : secondary to the complexity of this patient's illnesses and their interactions.  All problems listed were discussed during the office visit, medications were evaluated and complexities were discussed as well as plan for the future      PLEASE NOTE: This dictation was created using voice recognition software. I have made every reasonable attempt to correct obvious errors, but I expect that there are errors of grammar and possibly content that I did not discover before finalizing the note.      Shirley Ozuna, DO  Geriatric and Internal Medicine  [facility]            [1]   Current Outpatient Medications Ordered in Epic   Medication Sig Dispense Refill    MOUNJARO 10 MG/0.5ML Solution Auto-injector 10 mg by Subconjunctival route every 7 days.      Insulin Glargine, 2 Unit Dial, (TOUJEO MISSY SOLOSTAR) 300 UNIT/ML Solution Pen-injector Inject 55 Units under the skin every day. Indications: Type 2 Diabetes 9 mL 3    torsemide (DEMADEX) 10 MG tablet Take 1 Tablet by mouth every day. 30 Tablet 3    amoxicillin-clavulanate (AUGMENTIN) 875-125 MG Tab Take 1 Tablet by mouth 2 times a day for 10 days. 20 Tablet 0    metFORMIN ER (GLUCOPHAGE XR) 500 MG TABLET SR 24 HR Take 2 Tablets by mouth 2 times a day. 400 Tablet 3    aspirin 81 MG EC tablet Take 81 mg by mouth every day.      clopidogrel (PLAVIX) 75 MG Tab Take 1 Tablet by mouth every day for 21 days. 21 Tablet 0    lisinopril (PRINIVIL) 10 MG Tab Take 1 Tablet by mouth every day. 100 Tablet 3    Tirzepatide 12.5 MG/0.5ML Solution Auto-injector Inject 12.5 mg under the skin every 7 days for 30 days. 2 mL 0    [START ON 6/23/2025]  Tirzepatide 15 MG/0.5ML Solution Auto-injector Inject 15 mg under the skin every 7 days for 30 days. 2 mL 3    Continuous Glucose Sensor (FREESTYLE CONNIE 3 PLUS SENSOR) Misc 1 Each every 15 days. 6 Each 3    FARXIGA 10 MG Tab Take 1 tablet by mouth once daily 100 Tablet 3    atorvastatin (LIPITOR) 40 MG Tab Take 1 Tablet by mouth every day. 100 Tablet 3    glucose blood strip 1 Each by Other route as needed. DEXCOM 7 Sensor      senna-docusate (PERICOLACE OR SENOKOT S) 8.6-50 MG Tab Take 1 Tablet by mouth 1 time a day as needed for Constipation. Indications: Constipation 100 Tablet 3    acetaminophen (TYLENOL) 500 MG Tab Take 2 Tablets by mouth every 6 hours as needed for Moderate Pain. Take as directed on the bottle. Take as needed for pain       No current Epic-ordered facility-administered medications on file.

## 2025-06-06 ENCOUNTER — OFFICE VISIT (OUTPATIENT)
Dept: URGENT CARE | Facility: CLINIC | Age: 85
End: 2025-06-06
Payer: MEDICARE

## 2025-06-06 VITALS
HEART RATE: 102 BPM | TEMPERATURE: 98.1 F | WEIGHT: 229 LBS | HEIGHT: 72 IN | OXYGEN SATURATION: 99 % | SYSTOLIC BLOOD PRESSURE: 126 MMHG | BODY MASS INDEX: 31.02 KG/M2 | DIASTOLIC BLOOD PRESSURE: 62 MMHG

## 2025-06-06 DIAGNOSIS — M71.21 SYNOVIAL CYST OF RIGHT POPLITEAL SPACE: Primary | ICD-10-CM

## 2025-06-06 DIAGNOSIS — Z79.01 CHRONIC ANTICOAGULATION: ICD-10-CM

## 2025-06-06 PROCEDURE — 3078F DIAST BP <80 MM HG: CPT | Performed by: PHYSICIAN ASSISTANT

## 2025-06-06 PROCEDURE — 3074F SYST BP LT 130 MM HG: CPT | Performed by: PHYSICIAN ASSISTANT

## 2025-06-06 PROCEDURE — 99214 OFFICE O/P EST MOD 30 MIN: CPT | Performed by: PHYSICIAN ASSISTANT

## 2025-06-06 ASSESSMENT — FIBROSIS 4 INDEX: FIB4 SCORE: 1.25

## 2025-06-06 NOTE — PROGRESS NOTES
Subjective:     Verbal consent was acquired by the patient to use [employer] ambient listening note generation during this visit     Manolo Puri is a 84 y.o. male who presents for Cyst (Cyst behind right knee with swelling on right leg)       History of Present Illness    This very pleasant 84-year-old male who presents for drainage of Baker's cyst.  Patient was seen by his primary care physician with right lower extremity swelling, ultrasound ordered demonstrating no DVT however presence of a Baker's cyst.  Patient was under the assumption that we could drain the cyst here.  His pain is not located in the popliteal fossa but is manifest more and swelling to the distal one third of the shin and leg.  He has had ongoing redness and some scabbing for which he is being treated with Augmentin.  He has no known history of venous insufficiency.  He unfortunately just lost his wife.  Has multiple medical comorbidities including type 2 diabetes with long-term insulin use, hypertension, DALY, anticoagulated.  He does not have swelling on the left side.          Medications:  acetaminophen Tabs  amoxicillin-clavulanate Tabs  aspirin  atorvastatin Tabs  clopidogrel Tabs  Farxiga Tabs  FreeStyle Ifrah 3 Plus Sensor Misc  glucose blood  lisinopril Tabs  metFORMIN ER Tb24  Mounjaro Soaj  senna-docusate Tabs  Tirzepatide Soaj  torsemide  Toujeo Max SoloStar Sopn    Allergies:             Patient has no known allergies.    Past Social Hx:  Manolo Puri  reports that he has never smoked. He has never used smokeless tobacco. He reports that he does not drink alcohol and does not use drugs.           Problem list, medications, and allergies reviewed by myself today in Epic.     Objective:     /62 (BP Location: Right arm, Patient Position: Sitting)   Pulse (!) 102   Temp 36.7 °C (98.1 °F) (Temporal)   Ht 1.829 m (6')   Wt 104 kg (229 lb)   SpO2 99%   BMI 31.06 kg/m²     Physical Exam  Vitals and  nursing note reviewed.   Constitutional:       General: He is not in acute distress.     Appearance: Normal appearance. He is not ill-appearing.   HENT:      Head: Normocephalic.   Eyes:      Extraocular Movements: Extraocular movements intact.      Pupils: Pupils are equal, round, and reactive to light.   Cardiovascular:      Rate and Rhythm: Normal rate.   Pulmonary:      Effort: Pulmonary effort is normal.   Musculoskeletal:        Legs:       Comments: 3+ pitting edema noted to distal right lower extremity.  Healing rash noted to medial side of left shin.  No palpable tenderness to the popliteal fossa with deep palpation or deep flexion at the knee.  Dorsalis pedis and posterior tibialis pulse intact.  No obvious pigmentation changes or varicose veins.   Skin:     General: Skin is warm.      Findings: No rash.   Neurological:      Mental Status: He is alert and oriented to person, place, and time.   Psychiatric:         Thought Content: Thought content normal.         Judgment: Judgment normal.         Physical Exam        Ultrasound findings 6/5/2025: Right lower extremity-   The peroneal and posterior tibial veins are difficult to assess for    compressibility, but flow response to augmentation is demonstrated.    All other veins demonstrate complete color filling and compressibility with    normal venous flow dynamics including spontaneous flow and respiratory    phasicity.    No deep venous thrombosis.    Interstitial fluid consistent with edema is observed below the knee.    Hypoechoic, non-vascularized structure at the right popliteal fossa    consistent with Baker's cyst measuring 2.78 x 2.79 cm.     Assessment/Plan:     Diagnosis and Associated Orders:     1. Synovial cyst of right popliteal space  - Referral to Orthopedics        Medical Decision Making:    Jeane 84 y.o. presents to clinic with:    Assessment & Plan  Long discussion ensued with patient and his daughter who accompanies him today.   They were hoping for drainage of the cyst.  I spoke with one of my orthopedic colleagues who indicated that it is not likely that this cyst is causing the lower extremity edema if he is not having any pain at the site of the cyst or pain with hyperflexion.  I did explain that we cannot drain the cyst here in the urgent care setting without image guidance.  I did place a referral for him to be seen early next week with Ortho.  We did discuss possibility of other potential etiologies including venous insufficiency.  His primary did mention possible diuretics, encouraged him to talk with her more.  May benefit from compression stocking, elevation and did discuss possible referral to [facility] for more investigation regarding venous insufficiency.  He is not a great candidate for steroids with his diabetes.  He is chronically anticoagulated.       I personally reviewed prior external notes and test results pertinent to today's visit. Patient is clinically stable at today's urgent care visit.  Patient appears nontoxic with no acute distress noted. Appropriate for outpatient care at this time.  Return to clinic or go to ED if symptoms worsen or persist.  Red flag symptoms discussed.  Patient/Parent/Guardian voices understanding.   All side effects of medication discussed including allergic response, GI upset, tendon injury, rash, sedation etc    Please note that this dictation was created using voice recognition software. I have made a reasonable attempt to correct obvious errors, but I expect that there are errors of grammar and possibly content that I did not discover before finalizing the note.    This note was electronically signed by Roxana Alonso PA-C

## 2025-06-06 NOTE — Clinical Note
REFERRAL APPROVAL NOTICE         Sent on June 6, 2025                   Manolo uPri  [address]  [place] [address]                   Dear Mr. Puri,    After a careful review of the medical information and benefit coverage, [facility] has processed your referral. See below for additional details.    If applicable, you must be actively enrolled with your insurance for coverage of the authorized service. If you have any questions regarding your coverage, please contact your insurance directly.    REFERRAL INFORMATION   Referral #:  [id number]  Referred-To Provider    Referred-By Provider:  Orthopedic Surgery    TERESA Stanley BRUCE D      [address]  [address]  [place] [address] [phone] [address]  [address]  [place] [id number] [phone]    Referral Start Date:  06/06/2025  Referral End Date:   06/06/2026             SCHEDULING  If you do not already have an appointment, please call [phone] to make an appointment.     MORE INFORMATION  If you do not already have a [employer] account, sign up at: [email or web address]  You can access your medical information, make appointments, see lab results, billing information, and more.  If you have questions regarding this referral, please contact  the [facility] Referrals department at:             [phone]. Monday - Friday 8:00AM - 5:00PM.     Sincerely,    [facility]

## 2025-06-10 DIAGNOSIS — F43.21 GRIEF: ICD-10-CM

## 2025-06-10 DIAGNOSIS — Z63.4 BEREAVEMENT: Primary | ICD-10-CM

## 2025-06-10 SDOH — SOCIAL STABILITY - SOCIAL INSECURITY: DISSAPEARANCE AND DEATH OF FAMILY MEMBER: Z63.4

## 2025-06-13 NOTE — Clinical Note
[facility]  [address] [place] [address]    [id number]    Manolo Puri  [address]  [place] [address]    June 13, 2025    Member Name: Manolo Puri   Member Number: [id number]   Reference Number: [id number]   Approved Services: Echos and EKG   Approved Service Dates: 06/13/2025 - 10/11/2025   Requesting Provider: Shirley Ozuna   Requested Provider: [facility]     Dear Manolohi Cantu Puri:    The following medical service(s) requested by Shirley Ozuna have been approved:    Procedure Code Procedure Code Name Requested Quantity Approved Quantity Status   09006 (CPT®) RI ECHO HEART XTHORACIC,COMPLETE W DOPPLER 1 1 Authorized       Approved Quantity means the number of visits approved for medication treatments and/or medical services.    The services should be provided by [facility] no later than 10/11/2025. Please contact the provider listed below with any questions.     Provider Information:  [facility]  [phone]    Your plan benefit may require a deductible, co-payment or coinsurance for these services. This authorization does not guarantee [facility] will pay the claim for services that you receive. Payment by [facility] for these services is subject to the terms of your Evidence of Coverage, your eligibility at the time of service, and confirmation of benefit coverage.    For any questions or additional information, please contact Customer Service:    [facility] Toll Free: [phone]  TTY users dial: [phone]   Call Center Hours:  Oct 1 - Mar 31, Mon - Fri 7 AM to 8 PM PST  Oct 1 - Mar 31, Sat - Sun 8 AM to 8 PM PST  Apr 1 - Sep 30, Mon - Fri 7 AM to 8 PM PST   Office Hours: Mon - Fri 8 AM to 5 PM PST   E-mail: [email or web address]   Website:  [email or web address]      This information is available for free in other languages.  Please contact Customer Service at the phone number above for more information. [facility] complies with applicable Federal civil rights laws and does not discriminate on the basis of race, color, national origin, age, disability or sex.    Sincerely,     Healthcare Utilization Management Department     Cc: [facility]   Shirley Thyjacklilliam    Multi-Language Insert  Multi- Services  English: We have free  services to answer any questions you may have about our health or drug plan.  To get an , just call us at [phone].  Someone who speaks English/Language can help you.  This is a free service.  British: Tenemos servicios de intérprete sin costo alguno  para responder cualquier pregunta que pueda tener sobre nuestro plan de mervat o medicamentos. Para hablar con un intérprete, por favor llame al [phone]. Alguien que hable español le podrá ayudar. Julia es un servicio gratuito.  Chinese Mandarin: ?????????????????????????????? ???????????????? [phone]????????????????? ?????????  Chinese Cantonese: ?????????????????????????????? ????????????? [phone]???????????????????? ????????  Tagalog:  Mayroon kaming libreng serbisystephen powellsapradeep-kahlil lillygmorelia o panggamot.  julissa Fraser  [phone]. Jacky Urena.  uW william.  Romansh:  Nous proposons frank services gratuits d'interprétation pour répondre à toutes rene questions relatives à notre régime de santé ou d'assurance-médicaments. Pour accéder au service d'interprétation, il vous suffit de nous appeler au [phone]. Un interlocuteur parProHealth Waukesha Memorial Hospitalneno Françs pourra vous aider. Ce service est gratuit.  Somali:  Patrizia frye có d?ch v? thông d?ch mi?n phí ð? tr? l?i các câu h?i v? chýõng s?c kh?e chandni apodaca  trình thu?c men. N?u quí v? c?n thông d?ch viên annabel g?i [phone] s? có nhân viên nói ti?ng Vi?t giúp ð? quí v?. Ðây là d?ch v? mi?n phí .  Icelandic:  Unser kostenloser Dolmetscherservice beantwortet Ihren Fragen zu unserem Gesundheits- und Arzneimittelplan. Unsere Dolmetscher erreichen Sie [phone]. Man wird Ihnen preston [facility]Pow[facility]. Dieser Service ist israelM[facility] West Medical Center.  Mohawk:  ??? ?? ?? ?? ?? ??? ?? ??? ?? ???? ?? ?? ???? ???? ????. ?? ???? ????? ?? [phone] ??? ??? ????.  ???? ?? ???? ?? ?? ????. ? ???? ??? ?????.   Polish: Åñëè ó âàñ âîçíèêíóò âîïðîñû îòíîñèòåëüíî ñòðàõîâîãî èëè ìåäèêàìåíòíîãî ïëàíà, âû ìîæåòå âîñïîëüçîâàòüñÿ íàøèìè áåñïëàòíûìè óñëóãàìè ïåðåâîä÷èêîâ. ×òîáû âîñïîëüçîâàòüñÿ óñëóãàìè ïåðåâîä÷èêà, ïîçâîíèòå íàì ïî òåëåôîíó [phone]. Âàì îêàæåò ïîìîùü ñîòðóäíèê, êîòîðûé ãîâîðèò ïî-póññêè. Äàííàÿ óñëóãà áåñïëàòíàÿ.  Icelandic: ÅääÇ äÞÏã ÎÏãÇÊ ÇáãÊÑÌã ÇáÝæÑí ÇáãÌÇäíÉ ááÅÌÇÈÉ Úä Ãí ÃÓÆáÉ ÊÊÚáÞ ÈÇáÕÍÉ Ãæ ÌÏæá ÇáÃÏæíÉ áÏíäÇ. ááÍÕæá Úáì ãÊÑÌã ÝæÑí¡ áíÓ Úáíß Óæì ÇáÇÊÕÇá ÈäÇ Úáì [phone] . ÓíÞæã ÔÎÕ ãÇ íÊÍÏË ÇáÚÑÈíÉ ÈãÓÇÚÏÊß. åÐå ÎÏãÉ ãÌÇäíÉ.  Noe: ????? ????????? ?? ??? ?? ????? ?? ???? ??? ???? ???? ?? ?????? ?? ???? ???? ?? ??? ????? ??? ????? ???????? ?????? ?????? ???. ?? ???????? ??????? ???? ?? ???, ?? ???? [phone] ?? ??? ????. ??? ??????? ?? ?????? ????? ?? ???? ??? ?? ???? ??. ?? ?? ????? ???? ??.   Azerbaijani:  È disponibile un servizio di interpretariato gratuito per rispondere a eventuali domande sul nostro piano sanitario e farmaceutico. Per un interprete, contattare il flo [phone]. Un nostro incaricato kevin parla Italianovi fornirà l'assistenza necessaria. È un servizio gratuito.  Portugués:  Dispomos de serviços de interpretação gratuitos para responder a qualquer questão que tenha acerca do nosso plano de saúde ou de medicação. Para obter um intérprete, contacte-nos através do número [phone]. Irá encontrar alguém que fale o idioma   Português para o ajudar. Julia serviço é gratuito.  Turkish Creole:  Nou genyen sèvis entèprèt gratis monster reponn tout kesyon ou ta genyen konsènan plan medikal oswa dwòg nou an.  Monster jwenn yon entèprèt, jis rele nou nan [phone]. Yon moun ki pale Kreyòl kapab ryan w.  Sa a se yon sèvis ki gratis.  Polish:  Umo¿liwiamy bezp³atne skorzystanie z us³ug t³umacza ustnego, który pomo¿e w uzyskaniu odpowiedzi na temat planu zdrowotnego lub dawkowania leków. Amy skorzystaæ z pomocy t³umacza znaj¹cego jeanna salcedo¿y zadzwoniæ pod numer [phone]. Ta us³uga jest bezp³atna.  Kinyarwanda: ????? ??????? ????????????????????? ??????????????????????????????????[phone] ???????????????? ? ????????????????? ?????

## 2025-06-16 ENCOUNTER — OFFICE VISIT (OUTPATIENT)
Dept: MEDICAL GROUP | Facility: PHYSICIAN GROUP | Age: 85
End: 2025-06-16
Payer: MEDICARE

## 2025-06-16 VITALS
DIASTOLIC BLOOD PRESSURE: 68 MMHG | SYSTOLIC BLOOD PRESSURE: 116 MMHG | WEIGHT: 220.2 LBS | BODY MASS INDEX: 29.82 KG/M2 | HEIGHT: 72 IN | TEMPERATURE: 96.3 F | OXYGEN SATURATION: 93 % | HEART RATE: 88 BPM

## 2025-06-16 DIAGNOSIS — M19.071 ARTHRITIS OF RIGHT ANKLE: ICD-10-CM

## 2025-06-16 DIAGNOSIS — Z79.4 TYPE 2 DIABETES MELLITUS WITH DIABETIC POLYNEUROPATHY, WITH LONG-TERM CURRENT USE OF INSULIN (HCC): ICD-10-CM

## 2025-06-16 DIAGNOSIS — Z79.4 LONG-TERM INSULIN USE (HCC): ICD-10-CM

## 2025-06-16 DIAGNOSIS — N18.32 STAGE 3B CHRONIC KIDNEY DISEASE (CKD): ICD-10-CM

## 2025-06-16 DIAGNOSIS — E11.42 TYPE 2 DIABETES MELLITUS WITH DIABETIC POLYNEUROPATHY, WITH LONG-TERM CURRENT USE OF INSULIN (HCC): ICD-10-CM

## 2025-06-16 DIAGNOSIS — M25.552 LEFT HIP PAIN: Primary | ICD-10-CM

## 2025-06-16 DIAGNOSIS — R60.0 LEG EDEMA: ICD-10-CM

## 2025-06-16 DIAGNOSIS — Z63.4 BEREAVEMENT: ICD-10-CM

## 2025-06-16 DIAGNOSIS — M71.21 SYNOVIAL CYST OF RIGHT POPLITEAL SPACE: ICD-10-CM

## 2025-06-16 PROCEDURE — 3078F DIAST BP <80 MM HG: CPT | Performed by: INTERNAL MEDICINE

## 2025-06-16 PROCEDURE — 99214 OFFICE O/P EST MOD 30 MIN: CPT | Performed by: INTERNAL MEDICINE

## 2025-06-16 PROCEDURE — 3074F SYST BP LT 130 MM HG: CPT | Performed by: INTERNAL MEDICINE

## 2025-06-16 PROCEDURE — G2211 COMPLEX E/M VISIT ADD ON: HCPCS | Performed by: INTERNAL MEDICINE

## 2025-06-16 RX ORDER — INSULIN ASPART 100 [IU]/ML
10 INJECTION, SOLUTION INTRAVENOUS; SUBCUTANEOUS
COMMUNITY

## 2025-06-16 RX ORDER — INSULIN GLARGINE 300 U/ML
70 INJECTION, SOLUTION SUBCUTANEOUS DAILY
Qty: 9 ML | Refills: 3
Start: 2025-06-16

## 2025-06-16 RX ORDER — TORSEMIDE 10 MG/1
10 TABLET ORAL
Qty: 30 TABLET | Refills: 3
Start: 2025-06-16

## 2025-06-16 SDOH — SOCIAL STABILITY - SOCIAL INSECURITY: DISSAPEARANCE AND DEATH OF FAMILY MEMBER: Z63.4

## 2025-06-16 ASSESSMENT — FIBROSIS 4 INDEX: FIB4 SCORE: 1.25

## 2025-06-16 NOTE — PATIENT INSTRUCTIONS
- Check that you are taking atorvastatin/Lipitor for cholesterol, 40 mg daily  - Continue aspirin 81 mg daily due to prior TIA and history of diabetes, no longer need Plavix  - Try restarting a slow reduction of Toujeo, can go to 70 units daily for the next week. This medicine is adjusted based on the morning/fasting blood sugar readings. Can continue novolog 10 units three times daily with meals but hope is that we will be able to slowly reduce moving forward.   - Keep Mounjaro/tirzepatide at 10 mg weekly since this can reduce your appetite  - Continue Prozac/fluoxetine 20 mg daily, we make dose adjustments every 4-6 weeks based on how you feel like it is helping your mood.  - Change torsemide (Water pill) to take it only as needed for fluid retention and swollen ankles.

## 2025-06-17 NOTE — PROGRESS NOTES
Subjective:   Chief Complaint/History of Present Illness:  Manolo Puri is a 84 y.o. male established patient who presents today to discuss medical problems as listed below. Sharad is joined by his daughter, Meghan.    History of Present Illness  The patient presents for evaluation of diabetes, left hip pain, and ankle arthritis. He is accompanied by his daughter.    He has been experiencing a loss of appetite since the passing of his mother, which has resulted in a weight loss of 9 pounds over the past 11 days. His current medication regimen includes Mounjaro 10 mg once weekly, torsemide, Prozac 20 mg, Plavix, baby aspirin, and Lipitor. He reports no noticeable difference in his condition since starting the water pill. He has initiated treatment with Prozac but has not yet observed any changes. He is considering discontinuing Plavix and baby aspirin as it has been over 3 weeks since his minor TIA. He is uncertain about his current use of Lipitor for cholesterol management.    He has been diagnosed with severe arthritis in his ankle, characterized by bone-on-bone contact and absence of cartilage. He received a steroid injection for this condition. He was initially surprised by the diagnosis, suspecting an infection due to the swelling. He also reports a Baker's cyst and was advised against draining it. He completed a course of antibiotics for a suspected infection. He was provided with a brace and a steroid injection, which he found beneficial. He was informed that additional steroid injections could be administered in the future if necessary.    He has a known issue with his left hip and suspects it may also be affected by arthritis. He experiences difficulty standing up from a seated position but finds relief after taking a few steps.    His blood sugar levels have been fluctuating, with occasional low readings over the past 2 weeks. His long-acting insulin dosage was increased to 75 units following the  steroid injection. He has experienced elevated blood sugar levels post-injection, which have been challenging to manage. He is currently on NovoLog 10 units before meals.       Current Medications:  Current Medications and Prescriptions Ordered in Epic[1]       Objective:   Physical Exam:    Vitals: /68 (BP Location: Right arm, Patient Position: Sitting, BP Cuff Size: Adult)   Pulse 88   Temp (!) 35.7 °C (96.3 °F) (Temporal)   Ht 1.829 m (6')   Wt 99.9 kg (220 lb 3.2 oz)   SpO2 93%    BMI: Body mass index is 29.86 kg/m².  Physical Exam  Constitutional:       General: He is not in acute distress.     Appearance: Normal appearance. He is not ill-appearing.   HENT:      Right Ear: Ear canal normal. There is no impacted cerumen.      Left Ear: Ear canal normal. There is no impacted cerumen.   Eyes:      General: No scleral icterus.     Conjunctiva/sclera: Conjunctivae normal.   Cardiovascular:      Rate and Rhythm: Normal rate and regular rhythm.      Pulses: Normal pulses.      Heart sounds: No murmur heard.  Pulmonary:      Effort: Pulmonary effort is normal. No respiratory distress.      Breath sounds: Normal breath sounds. No wheezing, rhonchi or rales.   Abdominal:      General: Bowel sounds are normal. There is no distension.      Palpations: Abdomen is soft.      Tenderness: There is no abdominal tenderness.   Musculoskeletal:      Right lower leg: No edema.      Left lower leg: No edema.   Lymphadenopathy:      Cervical: No cervical adenopathy.   Skin:     General: Skin is warm and dry.      Findings: No rash.   Neurological:      Gait: Gait normal.   Psychiatric:         Mood and Affect: Mood normal.         Behavior: Behavior normal.         Thought Content: Thought content normal.         Judgment: Judgment normal.           Results  Labs   - Blood sugar: 7.2   - Blood sugar (14-day average): 6.9    Assessment & Plan  1. 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  2. CKD stage 3b  - Blood glucose levels have been well-managed, with 68% of readings within the target range over the past week. Elevated levels post-steroid injection account for 28% of the time, with only 4% of readings exceeding 250 and no readings below 70 or 54.  - BMI is currently under 30, placing him in the overweight category.  - The possibility of reducing NovoLog dosage from 10 units to 5 units at mealtimes was considered, with the ultimate goal of discontinuing it entirely.  - Torsemide prescription will be adjusted to an as-needed basis for fluid retention. Mounjaro dosage will remain at 10 mg. He will continue with baby aspirin and discontinue Plavix now that we are 21 days out from potential TIA event. Lipitor prescription will be continued at 40 mg daily. Toujeo dosage will be reduced to 70 units for now, with the possibility of further reduction in the future. Continue metformin and Farxiga unchanged.   - Recheck GFR before follow up now that blood sugars are more optimized.     3. Chronic right ankle arthritis.  - Diagnosed with severe arthritis in the ankle, described as bone-on-bone with no cartilage.  - Received a steroid shot, causing a temporary increase in blood sugar levels.  - Advised to continue using the brace and consider future steroid shots if needed, but no more frequently than every 3 months.  - Referral to Dr. Hoffman, a podiatrist, will be made for further evaluation and management. If Dr. Hoffman recommends it, a referral to a foot and ankle orthopedist will be considered.    4. Right Baker's cyst  5. Right lower extremity edema  - Ortho felt cyst not cause of leg edema, more likely to be ankle arthritis  - Patient did not feel torsemide made a huge different in regards to fluid retention so we will plan to hold now and use only as needed for recurrence of worsening of leg edema.    6. Left Hip Pain.  - Reports pain in the left hip, which may be due to arthritis.  -  Imaging studies of the hip and lower back will be ordered to assess the degree of impingement and arthritis.  - If imaging confirms arthritis, further management options will be discussed.    7. Bereavement   - Will continue taking Prozac 20 mg daily. Potential side effects and the timeline for observing benefits were discussed.  - Will continue taking Lipitor (atorvastatin) as it is recommended for anyone with diabetes to prevent vascular complications.    Follow-up  The patient will follow up in 6 weeks.      Assessment and Plan:   Manolo is a 84 y.o. male with the following:  Problem List Items Addressed This Visit       Long-term insulin use (HCC)    Relevant Medications    Insulin Glargine, 2 Unit Dial, (TOUJEO MAX SOLOSTAR) 300 UNIT/ML Solution Pen-injector    Stage 3b chronic kidney disease (CKD)    Relevant Orders    Basic Metabolic Panel    Type 2 diabetes mellitus with diabetic polyneuropathy, with long-term current use of insulin (HCC)    Relevant Medications    Insulin Glargine, 2 Unit Dial, (TOUJEO MAX SOLOSTAR) 300 UNIT/ML Solution Pen-injector    insulin aspart (NOVOLOG) 100 UNIT/ML Solution     Other Visit Diagnoses         Left hip pain    -  Primary    Relevant Orders    DX-HIP-BILATERAL-WITH PELVIS-2 VIEWS      Leg edema        Relevant Medications    torsemide (DEMADEX) 10 MG tablet      Arthritis of right ankle          Synovial cyst of right popliteal space          Bereavement                   RTC: Return in about 6 weeks (around 7/28/2025).    I spent a total of 36 minutes with record review, exam, communication with the patient, communication with other providers, and documentation of this encounter.    Verbal consent was acquired by the patient to use Information Gateway Copilot ambient listening note generation during this visit Yes     Billing : secondary to the complexity of this patient's illnesses and their interactions.  See assessment and plan above for the comprehensive evaluation and  management of the patient's acute and chronic concerns.  As the patient's PCP, I am the continued focal point for all health care services for the patient's needs and ongoing subsequent medical care.  All problems listed were discussed during the office visit, medications were evaluated and complexities were discussed, as well as plan for the future.    PLEASE NOTE: This dictation was created using voice recognition software. I have made every reasonable attempt to correct obvious errors, but I expect that there are errors of grammar and possibly content that I did not discover before finalizing the note.      Shirley Ozuna, DO  Geriatric and Internal Medicine  [facility]          [1]   Current Outpatient Medications Ordered in Epic   Medication Sig Dispense Refill    torsemide (DEMADEX) 10 MG tablet Take 1 Tablet by mouth 1 time a day as needed (leg and ankle swelling). 30 Tablet 3    Insulin Glargine, 2 Unit Dial, (TOUJEO MAX SOLOSTAR) 300 UNIT/ML Solution Pen-injector Inject 70 Units under the skin every day. Indications: Type 2 Diabetes 9 mL 3    insulin aspart (NOVOLOG) 100 UNIT/ML Solution Inject 10 Units under the skin 3 times a day before meals.      FLUoxetine (PROZAC) 20 MG Cap Take 1 Capsule by mouth every day. 100 Capsule 3    MOUNJARO 10 MG/0.5ML Solution Auto-injector 10 mg by Subconjunctival route every 7 days.      metFORMIN ER (GLUCOPHAGE XR) 500 MG TABLET SR 24 HR Take 2 Tablets by mouth 2 times a day. 400 Tablet 3    aspirin 81 MG EC tablet Take 81 mg by mouth every day.      lisinopril (PRINIVIL) 10 MG Tab Take 1 Tablet by mouth every day. 100 Tablet 3    Continuous Glucose Sensor (FREESTYLE CONNIE 3 PLUS SENSOR) Misc 1 Each every 15 days. 6 Each 3    FARXIGA 10 MG Tab Take 1 tablet by mouth once daily 100 Tablet 3    atorvastatin (LIPITOR) 40 MG Tab Take 1 Tablet by mouth every day. 100 Tablet 3    glucose blood strip 1 Each by Other route as needed. DEXCOM 7 Sensor       acetaminophen (TYLENOL) 500 MG Tab Take 2 Tablets by mouth every 6 hours as needed for Moderate Pain. Take as directed on the bottle. Take as needed for pain      senna-docusate (PERICOLACE OR SENOKOT S) 8.6-50 MG Tab Take 1 Tablet by mouth 1 time a day as needed for Constipation. Indications: Constipation 100 Tablet 3     No current Epic-ordered facility-administered medications on file.

## 2025-06-23 ENCOUNTER — TELEPHONE (OUTPATIENT)
Dept: MEDICAL GROUP | Facility: PHYSICIAN GROUP | Age: 85
End: 2025-06-23
Payer: MEDICARE

## 2025-06-24 NOTE — TELEPHONE ENCOUNTER
Please call daughter and find out specifics, I do not have it on my desk that I see but was also out of town last week. Was it dropped off or faxed in? Which daughter is it for?

## 2025-06-24 NOTE — TELEPHONE ENCOUNTER
Name: Manolo Puri  Phone number: [phone]    Message: Patient's daughter called and left a voicemail, daughter stated that she was calling regarding the [facility] paperwork you guys talked about before. Daughter wanted to make sure you have received the papers.

## 2025-06-26 ENCOUNTER — RESULTS FOLLOW-UP (OUTPATIENT)
Dept: MEDICAL GROUP | Facility: PHYSICIAN GROUP | Age: 85
End: 2025-06-26

## 2025-06-26 ENCOUNTER — APPOINTMENT (OUTPATIENT)
Dept: RADIOLOGY | Facility: MEDICAL CENTER | Age: 85
End: 2025-06-26
Attending: INTERNAL MEDICINE
Payer: MEDICARE

## 2025-06-26 DIAGNOSIS — M25.552 LEFT HIP PAIN: ICD-10-CM

## 2025-06-26 PROCEDURE — 73521 X-RAY EXAM HIPS BI 2 VIEWS: CPT

## 2025-06-30 ENCOUNTER — ANCILLARY PROCEDURE (OUTPATIENT)
Dept: CARDIOLOGY | Facility: MEDICAL CENTER | Age: 85
End: 2025-06-30
Attending: INTERNAL MEDICINE
Payer: MEDICARE

## 2025-06-30 DIAGNOSIS — M19.071 ARTHRITIS OF ANKLE, RIGHT: Primary | ICD-10-CM

## 2025-06-30 DIAGNOSIS — M16.12 PRIMARY OSTEOARTHRITIS OF LEFT HIP: ICD-10-CM

## 2025-06-30 DIAGNOSIS — R60.0 LEG EDEMA: ICD-10-CM

## 2025-06-30 PROCEDURE — 93306 TTE W/DOPPLER COMPLETE: CPT

## 2025-06-30 PROCEDURE — 93306 TTE W/DOPPLER COMPLETE: CPT | Mod: 26 | Performed by: INTERNAL MEDICINE

## 2025-07-08 ENCOUNTER — HOSPITAL ENCOUNTER (OUTPATIENT)
Dept: RADIOLOGY | Facility: MEDICAL CENTER | Age: 85
End: 2025-07-08
Attending: ORTHOPAEDIC SURGERY
Payer: MEDICARE

## 2025-07-08 DIAGNOSIS — Q66.71 CAVUS DEFORMITY OF RIGHT FOOT: ICD-10-CM

## 2025-07-08 DIAGNOSIS — M19.071 ARTHRITIS OF RIGHT ANKLE: ICD-10-CM

## 2025-07-08 DIAGNOSIS — S93.04XA ANKLE DISLOCATION, RIGHT, INITIAL ENCOUNTER: ICD-10-CM

## 2025-07-08 PROCEDURE — 73700 CT LOWER EXTREMITY W/O DYE: CPT | Mod: RT

## 2025-07-08 NOTE — Clinical Note
REFERRAL APPROVAL NOTICE         Sent on July 8, 2025                   Manolo Puri  [address] [place] [address]                   Dear Mr. Puri,    After a careful review of the medical information and benefit coverage, [facility] has processed your referral. See below for additional details.    If applicable, you must be actively enrolled with your insurance for coverage of the authorized service. If you have any questions regarding your coverage, please contact your insurance directly.    REFERRAL INFORMATION   Referral #:  [id number]  Referred-To Department    Referred-By Provider:  Orthopedics    Shirley Ozuna D.O.   [facility] (joint)      [address] [facility] [address]  [place] [address]  [phone] [address] [place] [address] [place] [address]  [phone]    Referral Start Date:  06/30/2025  Referral End Date:   06/30/2026             SCHEDULING  If you do not already have an appointment, please call [phone] to make an appointment.     MORE INFORMATION  If you do not already have a Catawiki account, sign up at: [email or web address]  You can access your medical information, make appointments, see lab results, billing information, and more.  If you have questions regarding this referral, please contact  the [facility] Referrals department at:             [phone]. Monday - Friday 8:00AM - 5:00PM.     Sincerely,    [facility]

## 2025-07-08 NOTE — Clinical Note
REFERRAL APPROVAL NOTICE         Sent on July 8, 2025                   Manolo Puri  [address]  [place] [address]                   Dear Mr. Puri,    After a careful review of the medical information and benefit coverage, [facility] has processed your referral. See below for additional details.    If applicable, you must be actively enrolled with your insurance for coverage of the authorized service. If you have any questions regarding your coverage, please contact your insurance directly.    REFERRAL INFORMATION   Referral #:  [id number]  Referred-To Department    Referred-By Provider:  Orthopedics    Shirley Ozuna D.O.   [facility]      [address]  [place] [address]  [phone] [address] [place] [address]  [place] [address]  [phone]    Referral Start Date:  06/30/2025  Referral End Date:   06/30/2026             SCHEDULING  If you do not already have an appointment, please call [phone] to make an appointment.     MORE INFORMATION  If you do not already have a SceneChat account, sign up at: [email or web address]  You can access your medical information, make appointments, see lab results, billing information, and more.  If you have questions regarding this referral, please contact  the [facility] Referrals department at:             [phone]. Monday - Friday 8:00AM - 5:00PM.     Sincerely,    [facility]

## 2025-07-09 PROBLEM — S93.04XD: Status: ACTIVE | Noted: 2025-07-09

## 2025-07-14 DIAGNOSIS — R60.0 LEG EDEMA: ICD-10-CM

## 2025-07-16 ENCOUNTER — PATIENT MESSAGE (OUTPATIENT)
Dept: MEDICAL GROUP | Facility: PHYSICIAN GROUP | Age: 85
End: 2025-07-16
Payer: MEDICARE

## 2025-07-16 DIAGNOSIS — R60.0 LEG EDEMA: ICD-10-CM

## 2025-07-16 RX ORDER — TORSEMIDE 10 MG/1
10 TABLET ORAL
Qty: 30 TABLET | Refills: 1 | Status: SHIPPED | OUTPATIENT
Start: 2025-07-16 | End: 2025-07-16 | Stop reason: SDUPTHER

## 2025-07-16 RX ORDER — TORSEMIDE 10 MG/1
10 TABLET ORAL
Qty: 30 TABLET | Refills: 1 | Status: SHIPPED | OUTPATIENT
Start: 2025-07-16 | End: 2025-07-22 | Stop reason: SDUPTHER

## 2025-07-16 NOTE — PATIENT COMMUNICATION
Was the patient seen in the last year in this department? Yes   Does patient have an active prescription for medications requested? No   Received Request Via: Patient  Pt needs rx sent to [facility]

## 2025-07-22 DIAGNOSIS — R60.0 LEG EDEMA: ICD-10-CM

## 2025-07-22 RX ORDER — TORSEMIDE 10 MG/1
10 TABLET ORAL
Qty: 30 TABLET | Refills: 1 | Status: SHIPPED | OUTPATIENT
Start: 2025-07-22

## 2025-07-23 ENCOUNTER — APPOINTMENT (OUTPATIENT)
Dept: ADMISSIONS | Facility: MEDICAL CENTER | Age: 85
End: 2025-07-23
Attending: ORTHOPAEDIC SURGERY
Payer: MEDICARE

## 2025-07-28 ENCOUNTER — PRE-ADMISSION TESTING (OUTPATIENT)
Dept: ADMISSIONS | Facility: MEDICAL CENTER | Age: 85
End: 2025-07-28
Attending: ORTHOPAEDIC SURGERY
Payer: MEDICARE

## 2025-07-31 ENCOUNTER — OFFICE VISIT (OUTPATIENT)
Dept: MEDICAL GROUP | Facility: PHYSICIAN GROUP | Age: 85
End: 2025-07-31
Payer: MEDICARE

## 2025-07-31 VITALS
SYSTOLIC BLOOD PRESSURE: 100 MMHG | RESPIRATION RATE: 18 BRPM | BODY MASS INDEX: 28.33 KG/M2 | DIASTOLIC BLOOD PRESSURE: 60 MMHG | WEIGHT: 214.7 LBS | TEMPERATURE: 98.4 F | OXYGEN SATURATION: 97 % | HEART RATE: 106 BPM

## 2025-07-31 DIAGNOSIS — F43.21 GRIEF: ICD-10-CM

## 2025-07-31 DIAGNOSIS — Z79.4 LONG-TERM INSULIN USE (HCC): ICD-10-CM

## 2025-07-31 DIAGNOSIS — E11.21 MACROALBUMINURIC DIABETIC NEPHROPATHY (HCC): ICD-10-CM

## 2025-07-31 DIAGNOSIS — E55.9 VITAMIN D DEFICIENCY: Primary | ICD-10-CM

## 2025-07-31 DIAGNOSIS — Z63.4 BEREAVEMENT: ICD-10-CM

## 2025-07-31 DIAGNOSIS — Z01.818 PRE-OP EXAM: ICD-10-CM

## 2025-07-31 DIAGNOSIS — Z79.4 TYPE 2 DIABETES MELLITUS WITH DIABETIC POLYNEUROPATHY, WITH LONG-TERM CURRENT USE OF INSULIN (HCC): ICD-10-CM

## 2025-07-31 DIAGNOSIS — N18.32 STAGE 3B CHRONIC KIDNEY DISEASE (CKD): ICD-10-CM

## 2025-07-31 DIAGNOSIS — E11.42 TYPE 2 DIABETES MELLITUS WITH DIABETIC POLYNEUROPATHY, WITH LONG-TERM CURRENT USE OF INSULIN (HCC): ICD-10-CM

## 2025-07-31 RX ORDER — FLUOXETINE HYDROCHLORIDE 40 MG/1
40 CAPSULE ORAL DAILY
Qty: 100 CAPSULE | Refills: 3 | Status: SHIPPED | OUTPATIENT
Start: 2025-07-31

## 2025-07-31 RX ORDER — INSULIN GLARGINE 300 U/ML
65 INJECTION, SOLUTION SUBCUTANEOUS DAILY
Qty: 9 ML | Refills: 3
Start: 2025-07-31

## 2025-07-31 RX ORDER — LISINOPRIL 5 MG/1
5 TABLET ORAL DAILY
Qty: 100 TABLET | Refills: 3 | Status: SHIPPED | OUTPATIENT
Start: 2025-07-31 | End: 2026-09-04

## 2025-07-31 SDOH — SOCIAL STABILITY - SOCIAL INSECURITY: DISSAPEARANCE AND DEATH OF FAMILY MEMBER: Z63.4

## 2025-07-31 ASSESSMENT — FIBROSIS 4 INDEX: FIB4 SCORE: 1.25

## 2025-07-31 NOTE — PROGRESS NOTES
Subjective:   Chief Complaint/History of Present Illness:  Manolo Puri is a 84 y.o. male established patient who presents today to discuss medical problems as listed below. Manolo is joined by his daughter, Meghan.    History of Present Illness  The patient presents for preoperative evaluation for diabetes mellitus, hypertension, neuropathy, and left hip pain.    He is scheduled for surgery on Tuesday, 08/05/2025, to be performed by Dr. Ny. His pain level is currently low, rated at 2 or 3 out of 10. He believes the swelling in his foot is due to inflammation from the fracture. He has not filled the prescription for diuretics as he was advised against their use in this situation. He recently had his toenails trimmed and underwent a neuropathy test using various prongs. He was informed that his blood flow is good, but he is curious about the cause of his nerve damage despite this. He will be hospitalized for 3 days post-surgery, followed by a few weeks of rehabilitation. He is concerned about managing his medications during this period. He is also worried about maintaining balance and mobility without putting weight on his foot, especially given his existing left hip issues. He finds Tylenol effective for his hip pain.    His A1c level is 6.9. He experienced a low blood sugar episode on 07/28/2025, which triggered an alert on his monitor. He is currently on Toujeo 70 units and NovoLog 10 units, which he administers in the morning but occasionally misses at lunch and dinner. He does not recall if he took insulin with dinner on Monday night when he experienced a low blood sugar episode around 11:30 PM. He was advised to skip his Mounjaro dose on Friday due to his upcoming surgery on Tuesday. He has noticed weight loss since starting Mounjaro, with a decrease of 11 pounds since his first visit. He recently returned from a family vacation where he consumed three meals a day, unlike his usual sporadic  eating habits at home. He has had a decreased appetite since the passing of his mother.    He is on lisinopril for his blood pressure.    Social History:  Diet: Consumes three meals a day during family vacation; sporadic eating habits at home.       No problems updated.     Current Medications:  Current Medications and Prescriptions Ordered in Epic[1]       Objective:   Physical Exam:    Vitals: /60 (BP Location: Right arm, Patient Position: Sitting, BP Cuff Size: Adult)   Pulse (!) 106   Temp 36.9 °C (98.4 °F) (Temporal)   Resp 18   Wt 97.4 kg (214 lb 11.2 oz)   SpO2 97%    BMI: Body mass index is 28.33 kg/m².  Physical Exam  Constitutional:       General: He is not in acute distress.     Appearance: Normal appearance. He is not ill-appearing.   HENT:      Head:      Comments: Abrasion on left side of face around temple, superficial     Right Ear: External ear normal.      Left Ear: External ear normal.   Eyes:      General: No scleral icterus.  Cardiovascular:      Rate and Rhythm: Normal rate and regular rhythm.      Pulses: Normal pulses.   Pulmonary:      Effort: Pulmonary effort is normal. No respiratory distress.      Breath sounds: Normal breath sounds. No wheezing or rhonchi.   Musculoskeletal:         General: Deformity present.      Right lower leg: Edema present.      Left lower leg: No edema.      Comments: Walking boot in place over right lower extremity with obvious ankle deformity   Skin:     General: Skin is warm and dry.      Findings: Bruising present.      Comments: Abrasion on left face, healing as expected   Neurological:      Gait: Gait abnormal.   Psychiatric:         Mood and Affect: Mood normal.         Behavior: Behavior normal.         Thought Content: Thought content normal.         Judgment: Judgment normal.         Results  Labs   - A1c: 6.9%    Imaging   - X-ray: Shows worsening condition    Assessment & Plan  1. Preoperative evaluation.  2. Right ankle dislocation  -  Scheduled for surgery on 08/05/2025 with Dr. Ny to fuse the joint of the right ankle.  - Blood glucose levels are well-controlled, crucial for postoperative healing.  - Discussed potential risks associated with anesthesia, including constipation and urinary retention.  - Advised to hold off on Mounjaro and inject only 30 units of Toujeo on the day of surgery (50% of usual dose).  - plans to be inpatient for 3 days then to SNF for a few weeks. Advised him to follow up with me within a week of getting home in case we need to arrange home health care team.  - advised that CGM will need to be removed for surgery.    3. 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  - A1c is 6.9, indicating well-controlled diabetes.  - Experienced a recent low blood sugar event, managed appropriately.  - Dosage of Toujeo reduced from 70 units to 65 units to avoid further hypoglycemic episodes.  - continue novolog 10 units TID AC.  - hold mounjaro 10 mg week before surgery (this Friday)  - on day of surgery give 50% of usual basal insulin dose at 30 units. Continue farxiga and metofrmin.  - Advised to continue monitoring blood sugar levels closely and report any significant changes.    4. Hypertension with hypotension.  - Blood pressure readings slightly low, likely due to weight loss.  - Dosage of lisinopril reduced from 10 mg to 5 mg to prevent over-treatment.  - Advised to inform us of any significant changes in blood pressure.    5. Neuropathy.  - Significant neuropathy resulting in loss of sensation in feet.  - Likely due to microvascular damage from diabetes.  - Emphasized the importance of maintaining good blood sugar control to prevent further nerve damage.  - Discussed the challenges of maintaining balance and mobility without putting weight on the foot post-surgery.    6. Bereavement  - continue to grieve the loss of his wife, currently using prozac 40 mg  daily.          Assessment and Plan:   Manolo is a 84 y.o. male with the following:  Problem List Items Addressed This Visit       Long-term insulin use (HCC)    Relevant Medications    Insulin Glargine, 2 Unit Dial, (TOUJEO MAX SOLOSTAR) 300 UNIT/ML Solution Pen-injector    Macroalbuminuric diabetic nephropathy (HCC)    Relevant Medications    lisinopril (PRINIVIL) 5 MG Tab    Insulin Glargine, 2 Unit Dial, (TOUJEO MAX SOLOSTAR) 300 UNIT/ML Solution Pen-injector    Stage 3b chronic kidney disease (CKD)    Relevant Medications    lisinopril (PRINIVIL) 5 MG Tab    Type 2 diabetes mellitus with diabetic polyneuropathy, with long-term current use of insulin (HCC)    Relevant Medications    FLUoxetine (PROZAC) 40 MG capsule    Insulin Glargine, 2 Unit Dial, (TOUJEO MAX SOLOSTAR) 300 UNIT/ML Solution Pen-injector    Other Relevant Orders    HEMOGLOBIN A1C    Micoalbumin Creat Ratio Urine     Other Visit Diagnoses         Vitamin D deficiency    -  Primary    Relevant Orders    VITAMIN D,25 HYDROXY (DEFICIENCY)      Bereavement        Relevant Medications    FLUoxetine (PROZAC) 40 MG capsule      Grief        Relevant Medications    FLUoxetine (PROZAC) 40 MG capsule      Pre-op exam                   RTC: Return in about 2 months (around 9/30/2025).    I spent a total of 34 minutes with record review, exam, communication with the patient, communication with other providers, and documentation of this encounter.    Verbal consent was acquired by the patient to use [employer] ambient listening note generation during this visit Yes       PLEASE NOTE: This dictation was created using voice recognition software. I have made every reasonable attempt to correct obvious errors, but I expect that there are errors of grammar and possibly content that I did not discover before finalizing the note.      Shirley Ozuna, DO  Geriatric and Internal Medicine  [facility]              [1]  Current Outpatient Medications  Ordered in [facility]   Medication Sig Dispense Refill   • FLUoxetine (PROZAC) 40 MG capsule Take 1 Capsule by mouth every day. 100 Capsule 3   • lisinopril (PRINIVIL) 5 MG Tab Take 1 Tablet by mouth every day. 100 Tablet 3   • Insulin Glargine, 2 Unit Dial, (TOUJEO MAX SOLOSTAR) 300 UNIT/ML Solution Pen-injector Inject 65 Units under the skin every day. Indications: Type 2 Diabetes 9 mL 3   • torsemide (DEMADEX) 10 MG tablet Take 1 Tablet by mouth 1 time a day as needed (leg and ankle swelling). 30 Tablet 1   • insulin aspart (NOVOLOG) 100 UNIT/ML Solution Inject 10 Units under the skin 3 times a day before meals.     • MOUNJARO 10 MG/0.5ML Solution Auto-injector 10 mg by Subconjunctival route every 7 days. Takes on Fridays     • metFORMIN ER (GLUCOPHAGE XR) 500 MG TABLET SR 24 HR Take 2 Tablets by mouth 2 times a day. 400 Tablet 3   • aspirin 81 MG EC tablet Take 81 mg by mouth every day.     • Continuous Glucose Sensor (FREESTYLE CONNIE 3 PLUS SENSOR) Misc 1 Each every 15 days. 6 Each 3   • FARXIGA 10 MG Tab Take 1 tablet by mouth once daily 100 Tablet 3   • atorvastatin (LIPITOR) 40 MG Tab Take 1 Tablet by mouth every day. 100 Tablet 3   • glucose blood strip 1 Each by Other route as needed. DEXCOM 7 Sensor     • senna-docusate (PERICOLACE OR SENOKOT S) 8.6-50 MG Tab Take 1 Tablet by mouth 1 time a day as needed for Constipation. Indications: Constipation 100 Tablet 3   • acetaminophen (TYLENOL) 500 MG Tab Take 2 Tablets by mouth every 6 hours as needed for Moderate Pain. Take as directed on the bottle. Take as needed for pain       No current Epic-ordered facility-administered medications on file.

## 2025-08-01 ENCOUNTER — PRE-ADMISSION TESTING (OUTPATIENT)
Dept: ADMISSIONS | Facility: MEDICAL CENTER | Age: 85
End: 2025-08-01
Attending: ORTHOPAEDIC SURGERY
Payer: MEDICARE

## 2025-08-01 ENCOUNTER — HOSPITAL ENCOUNTER (OUTPATIENT)
Dept: LAB | Facility: MEDICAL CENTER | Age: 85
End: 2025-08-01
Attending: INTERNAL MEDICINE
Payer: MEDICARE

## 2025-08-01 DIAGNOSIS — E11.42 TYPE 2 DIABETES MELLITUS WITH DIABETIC POLYNEUROPATHY, WITH LONG-TERM CURRENT USE OF INSULIN (HCC): ICD-10-CM

## 2025-08-01 DIAGNOSIS — Z01.812 PRE-OPERATIVE LABORATORY EXAMINATION: Primary | ICD-10-CM

## 2025-08-01 DIAGNOSIS — Z79.4 TYPE 2 DIABETES MELLITUS WITH DIABETIC POLYNEUROPATHY, WITH LONG-TERM CURRENT USE OF INSULIN (HCC): ICD-10-CM

## 2025-08-01 DIAGNOSIS — E55.9 VITAMIN D DEFICIENCY: ICD-10-CM

## 2025-08-01 LAB
25(OH)D3 SERPL-MCNC: 36 NG/ML (ref 30–100)
ANION GAP SERPL CALC-SCNC: 11 MMOL/L (ref 7–16)
BUN SERPL-MCNC: 56 MG/DL (ref 8–22)
CALCIUM SERPL-MCNC: 9.8 MG/DL (ref 8.5–10.5)
CHLORIDE SERPL-SCNC: 103 MMOL/L (ref 96–112)
CO2 SERPL-SCNC: 23 MMOL/L (ref 20–33)
CREAT SERPL-MCNC: 2 MG/DL (ref 0.5–1.4)
CREAT UR-MCNC: 85.4 MG/DL
ERYTHROCYTE [DISTWIDTH] IN BLOOD BY AUTOMATED COUNT: 46 FL (ref 35.9–50)
EST. AVERAGE GLUCOSE BLD GHB EST-MCNC: 166 MG/DL
GFR SERPLBLD CREATININE-BSD FMLA CKD-EPI: 33 ML/MIN/1.73 M 2
GLUCOSE SERPL-MCNC: 188 MG/DL (ref 65–99)
HBA1C MFR BLD: 7.4 % (ref 4–5.6)
HCT VFR BLD AUTO: 49.5 % (ref 42–52)
HGB BLD-MCNC: 16 G/DL (ref 14–18)
MCH RBC QN AUTO: 28 PG (ref 27–33)
MCHC RBC AUTO-ENTMCNC: 32.3 G/DL (ref 32.3–36.5)
MCV RBC AUTO: 86.5 FL (ref 81.4–97.8)
MICROALBUMIN UR-MCNC: 21.3 MG/DL
MICROALBUMIN/CREAT UR: 249 MG/G (ref 0–30)
PLATELET # BLD AUTO: 302 K/UL (ref 164–446)
PMV BLD AUTO: 9.5 FL (ref 9–12.9)
POTASSIUM SERPL-SCNC: 6 MMOL/L (ref 3.6–5.5)
RBC # BLD AUTO: 5.72 M/UL (ref 4.7–6.1)
SODIUM SERPL-SCNC: 137 MMOL/L (ref 135–145)
WBC # BLD AUTO: 10.8 K/UL (ref 4.8–10.8)

## 2025-08-01 PROCEDURE — 80048 BASIC METABOLIC PNL TOTAL CA: CPT

## 2025-08-01 PROCEDURE — 36415 COLL VENOUS BLD VENIPUNCTURE: CPT

## 2025-08-01 PROCEDURE — 85027 COMPLETE CBC AUTOMATED: CPT

## 2025-08-01 PROCEDURE — 82043 UR ALBUMIN QUANTITATIVE: CPT

## 2025-08-01 PROCEDURE — 82570 ASSAY OF URINE CREATININE: CPT

## 2025-08-01 PROCEDURE — 82306 VITAMIN D 25 HYDROXY: CPT

## 2025-08-01 PROCEDURE — 83036 HEMOGLOBIN GLYCOSYLATED A1C: CPT

## 2025-08-04 ENCOUNTER — TELEPHONE (OUTPATIENT)
Dept: MEDICAL GROUP | Facility: PHYSICIAN GROUP | Age: 85
End: 2025-08-04
Payer: MEDICARE

## 2025-08-05 ENCOUNTER — ANESTHESIA (OUTPATIENT)
Dept: SURGERY | Facility: MEDICAL CENTER | Age: 85
End: 2025-08-05
Payer: MEDICARE

## 2025-08-05 ENCOUNTER — APPOINTMENT (OUTPATIENT)
Dept: RADIOLOGY | Facility: MEDICAL CENTER | Age: 85
End: 2025-08-05
Attending: ORTHOPAEDIC SURGERY
Payer: MEDICARE

## 2025-08-05 ENCOUNTER — HOSPITAL ENCOUNTER (OUTPATIENT)
Facility: MEDICAL CENTER | Age: 85
End: 2025-08-07
Attending: ORTHOPAEDIC SURGERY | Admitting: HOSPITALIST
Payer: MEDICARE

## 2025-08-05 ENCOUNTER — ANESTHESIA EVENT (OUTPATIENT)
Dept: SURGERY | Facility: MEDICAL CENTER | Age: 85
End: 2025-08-05
Payer: MEDICARE

## 2025-08-05 DIAGNOSIS — S93.04XS ANKLE DISLOCATION, RIGHT, SEQUELA: Primary | ICD-10-CM

## 2025-08-05 PROBLEM — N18.9 ACUTE-ON-CHRONIC KIDNEY INJURY (HCC): Status: ACTIVE | Noted: 2025-08-05

## 2025-08-05 PROBLEM — N17.9 ACUTE-ON-CHRONIC KIDNEY INJURY (HCC): Status: ACTIVE | Noted: 2025-08-05

## 2025-08-05 PROBLEM — E87.5 HYPERKALEMIA: Status: ACTIVE | Noted: 2025-08-05

## 2025-08-05 LAB
ANION GAP SERPL CALC-SCNC: 12 MMOL/L (ref 7–16)
BUN SERPL-MCNC: 54 MG/DL (ref 8–22)
CALCIUM SERPL-MCNC: 9.6 MG/DL (ref 8.5–10.5)
CHLORIDE SERPL-SCNC: 105 MMOL/L (ref 96–112)
CO2 SERPL-SCNC: 20 MMOL/L (ref 20–33)
CREAT SERPL-MCNC: 1.9 MG/DL (ref 0.5–1.4)
EKG IMPRESSION: NORMAL
GFR SERPLBLD CREATININE-BSD FMLA CKD-EPI: 35 ML/MIN/1.73 M 2
GLUCOSE BLD STRIP.AUTO-MCNC: 134 MG/DL (ref 65–99)
GLUCOSE BLD STRIP.AUTO-MCNC: 169 MG/DL (ref 65–99)
GLUCOSE BLD STRIP.AUTO-MCNC: 81 MG/DL (ref 65–99)
GLUCOSE SERPL-MCNC: 84 MG/DL (ref 65–99)
POTASSIUM SERPL-SCNC: 5.7 MMOL/L (ref 3.6–5.5)
SODIUM SERPL-SCNC: 137 MMOL/L (ref 135–145)

## 2025-08-05 PROCEDURE — A9270 NON-COVERED ITEM OR SERVICE: HCPCS | Performed by: ANESTHESIOLOGY

## 2025-08-05 PROCEDURE — 99223 1ST HOSP IP/OBS HIGH 75: CPT | Performed by: HOSPITALIST

## 2025-08-05 PROCEDURE — 700101 HCHG RX REV CODE 250: Performed by: ANESTHESIOLOGY

## 2025-08-05 PROCEDURE — 82962 GLUCOSE BLOOD TEST: CPT | Performed by: ORTHOPAEDIC SURGERY

## 2025-08-05 PROCEDURE — 20902 REMOVAL OF BONE FOR GRAFT: CPT | Mod: ASROC,59,RT | Performed by: NURSE PRACTITIONER

## 2025-08-05 PROCEDURE — 27870 FUSION OF ANKLE JOINT OPEN: CPT | Mod: RT | Performed by: ORTHOPAEDIC SURGERY

## 2025-08-05 PROCEDURE — 700105 HCHG RX REV CODE 258: Performed by: HOSPITALIST

## 2025-08-05 PROCEDURE — C1713 ANCHOR/SCREW BN/BN,TIS/BN: HCPCS | Performed by: ORTHOPAEDIC SURGERY

## 2025-08-05 PROCEDURE — 700102 HCHG RX REV CODE 250 W/ 637 OVERRIDE(OP): Performed by: ANESTHESIOLOGY

## 2025-08-05 PROCEDURE — 160193 HCHG PACU STANDARD - 1ST 60 MINS: Performed by: ORTHOPAEDIC SURGERY

## 2025-08-05 PROCEDURE — 27687 REVISION OF CALF TENDON: CPT | Mod: ASROC,RT | Performed by: NURSE PRACTITIONER

## 2025-08-05 PROCEDURE — 96372 THER/PROPH/DIAG INJ SC/IM: CPT | Mod: XU

## 2025-08-05 PROCEDURE — 27870 FUSION OF ANKLE JOINT OPEN: CPT | Mod: ASROC,RT | Performed by: NURSE PRACTITIONER

## 2025-08-05 PROCEDURE — 64447 NJX AA&/STRD FEMORAL NRV IMG: CPT | Performed by: ORTHOPAEDIC SURGERY

## 2025-08-05 PROCEDURE — 160192 HCHG ANESTHESIA COMPLEX: Performed by: ORTHOPAEDIC SURGERY

## 2025-08-05 PROCEDURE — 700111 HCHG RX REV CODE 636 W/ 250 OVERRIDE (IP): Mod: JZ | Performed by: ORTHOPAEDIC SURGERY

## 2025-08-05 PROCEDURE — 27687 REVISION OF CALF TENDON: CPT | Mod: RT | Performed by: ORTHOPAEDIC SURGERY

## 2025-08-05 PROCEDURE — 27846 TREAT ANKLE DISLOCATION: CPT | Mod: RT | Performed by: ORTHOPAEDIC SURGERY

## 2025-08-05 PROCEDURE — 160048 HCHG OR STATISTICAL LEVEL 1-5: Performed by: ORTHOPAEDIC SURGERY

## 2025-08-05 PROCEDURE — 700105 HCHG RX REV CODE 258: Performed by: ORTHOPAEDIC SURGERY

## 2025-08-05 PROCEDURE — 80048 BASIC METABOLIC PNL TOTAL CA: CPT

## 2025-08-05 PROCEDURE — 160029 HCHG SURGERY MINUTES - 1ST 30 MINS LEVEL 4: Performed by: ORTHOPAEDIC SURGERY

## 2025-08-05 PROCEDURE — 700111 HCHG RX REV CODE 636 W/ 250 OVERRIDE (IP): Mod: JZ | Performed by: ANESTHESIOLOGY

## 2025-08-05 PROCEDURE — 82962 GLUCOSE BLOOD TEST: CPT | Performed by: HOSPITALIST

## 2025-08-05 PROCEDURE — 160015 HCHG STAT PREOP MINUTES: Performed by: ORTHOPAEDIC SURGERY

## 2025-08-05 PROCEDURE — 502240 HCHG MISC OR SUPPLY RC 0272: Performed by: ORTHOPAEDIC SURGERY

## 2025-08-05 PROCEDURE — 502000 HCHG MISC OR IMPLANTS RC 0278: Performed by: ORTHOPAEDIC SURGERY

## 2025-08-05 PROCEDURE — 770001 HCHG ROOM/CARE - MED/SURG/GYN PRIV*

## 2025-08-05 PROCEDURE — 93005 ELECTROCARDIOGRAM TRACING: CPT | Mod: TC | Performed by: ORTHOPAEDIC SURGERY

## 2025-08-05 PROCEDURE — 700111 HCHG RX REV CODE 636 W/ 250 OVERRIDE (IP): Performed by: ORTHOPAEDIC SURGERY

## 2025-08-05 PROCEDURE — 73610 X-RAY EXAM OF ANKLE: CPT | Mod: RT

## 2025-08-05 PROCEDURE — A9270 NON-COVERED ITEM OR SERVICE: HCPCS | Performed by: HOSPITALIST

## 2025-08-05 PROCEDURE — 20902 REMOVAL OF BONE FOR GRAFT: CPT | Mod: 59,RT | Performed by: ORTHOPAEDIC SURGERY

## 2025-08-05 PROCEDURE — 27846 TREAT ANKLE DISLOCATION: CPT | Mod: ASROC,RT | Performed by: NURSE PRACTITIONER

## 2025-08-05 PROCEDURE — 700102 HCHG RX REV CODE 250 W/ 637 OVERRIDE(OP): Performed by: HOSPITALIST

## 2025-08-05 PROCEDURE — 160041 HCHG SURGERY MINUTES - EA ADDL 1 MIN LEVEL 4: Performed by: ORTHOPAEDIC SURGERY

## 2025-08-05 PROCEDURE — 160002 HCHG RECOVERY MINUTES (STAT): Performed by: ORTHOPAEDIC SURGERY

## 2025-08-05 DEVICE — GRAFT BONE AUGMENT 3CC (1/EA): Type: IMPLANTABLE DEVICE | Site: ANKLE | Status: FUNCTIONAL

## 2025-08-05 DEVICE — IMPLANTABLE DEVICE: Type: IMPLANTABLE DEVICE | Site: ANKLE | Status: FUNCTIONAL

## 2025-08-05 RX ORDER — DEXTROSE MONOHYDRATE 25 G/50ML
25 INJECTION, SOLUTION INTRAVENOUS
Status: DISCONTINUED | OUTPATIENT
Start: 2025-08-05 | End: 2025-08-07 | Stop reason: HOSPADM

## 2025-08-05 RX ORDER — POLYETHYLENE GLYCOL 3350 17 G/17G
1 POWDER, FOR SOLUTION ORAL
Status: DISCONTINUED | OUTPATIENT
Start: 2025-08-05 | End: 2025-08-07 | Stop reason: HOSPADM

## 2025-08-05 RX ORDER — ACETAMINOPHEN 500 MG
1000 TABLET ORAL ONCE
Status: COMPLETED | OUTPATIENT
Start: 2025-08-05 | End: 2025-08-05

## 2025-08-05 RX ORDER — ATORVASTATIN CALCIUM 40 MG/1
40 TABLET, FILM COATED ORAL DAILY
Status: DISCONTINUED | OUTPATIENT
Start: 2025-08-06 | End: 2025-08-07 | Stop reason: HOSPADM

## 2025-08-05 RX ORDER — AMOXICILLIN 250 MG
2 CAPSULE ORAL EVERY EVENING
Status: DISCONTINUED | OUTPATIENT
Start: 2025-08-05 | End: 2025-08-07 | Stop reason: HOSPADM

## 2025-08-05 RX ORDER — BUPIVACAINE HYDROCHLORIDE 5 MG/ML
INJECTION, SOLUTION EPIDURAL; INTRACAUDAL; PERINEURAL
Status: COMPLETED | OUTPATIENT
Start: 2025-08-05 | End: 2025-08-05

## 2025-08-05 RX ORDER — TRANEXAMIC ACID 100 MG/ML
INJECTION, SOLUTION INTRAVENOUS PRN
Status: DISCONTINUED | OUTPATIENT
Start: 2025-08-05 | End: 2025-08-05 | Stop reason: SURG

## 2025-08-05 RX ORDER — EPHEDRINE SULFATE 50 MG/ML
INJECTION, SOLUTION INTRAVENOUS PRN
Status: DISCONTINUED | OUTPATIENT
Start: 2025-08-05 | End: 2025-08-05 | Stop reason: SURG

## 2025-08-05 RX ORDER — PHENYLEPHRINE HYDROCHLORIDE 10 MG/ML
INJECTION, SOLUTION INTRAMUSCULAR; INTRAVENOUS; SUBCUTANEOUS PRN
Status: DISCONTINUED | OUTPATIENT
Start: 2025-08-05 | End: 2025-08-05 | Stop reason: SURG

## 2025-08-05 RX ORDER — HYDROMORPHONE HYDROCHLORIDE 1 MG/ML
0.2 INJECTION, SOLUTION INTRAMUSCULAR; INTRAVENOUS; SUBCUTANEOUS
Status: DISCONTINUED | OUTPATIENT
Start: 2025-08-05 | End: 2025-08-05 | Stop reason: HOSPADM

## 2025-08-05 RX ORDER — OXYCODONE HCL 5 MG/5 ML
5 SOLUTION, ORAL ORAL
Status: DISCONTINUED | OUTPATIENT
Start: 2025-08-05 | End: 2025-08-05 | Stop reason: HOSPADM

## 2025-08-05 RX ORDER — OXYCODONE HCL 5 MG/5 ML
10 SOLUTION, ORAL ORAL
Status: DISCONTINUED | OUTPATIENT
Start: 2025-08-05 | End: 2025-08-05 | Stop reason: HOSPADM

## 2025-08-05 RX ORDER — DEXAMETHASONE SODIUM PHOSPHATE 4 MG/ML
INJECTION, SOLUTION INTRA-ARTICULAR; INTRALESIONAL; INTRAMUSCULAR; INTRAVENOUS; SOFT TISSUE PRN
Status: DISCONTINUED | OUTPATIENT
Start: 2025-08-05 | End: 2025-08-05 | Stop reason: SURG

## 2025-08-05 RX ORDER — SODIUM CHLORIDE, SODIUM LACTATE, POTASSIUM CHLORIDE, CALCIUM CHLORIDE 600; 310; 30; 20 MG/100ML; MG/100ML; MG/100ML; MG/100ML
INJECTION, SOLUTION INTRAVENOUS CONTINUOUS
Status: ACTIVE | OUTPATIENT
Start: 2025-08-05 | End: 2025-08-06

## 2025-08-05 RX ORDER — VANCOMYCIN HYDROCHLORIDE 500 MG/10ML
INJECTION, POWDER, LYOPHILIZED, FOR SOLUTION INTRAVENOUS
Status: COMPLETED | OUTPATIENT
Start: 2025-08-05 | End: 2025-08-05

## 2025-08-05 RX ORDER — HEPARIN SODIUM 5000 [USP'U]/ML
5000 INJECTION, SOLUTION INTRAVENOUS; SUBCUTANEOUS EVERY 8 HOURS
Status: DISCONTINUED | OUTPATIENT
Start: 2025-08-06 | End: 2025-08-07 | Stop reason: HOSPADM

## 2025-08-05 RX ORDER — HALOPERIDOL 5 MG/ML
1 INJECTION INTRAMUSCULAR
Status: DISCONTINUED | OUTPATIENT
Start: 2025-08-05 | End: 2025-08-05 | Stop reason: HOSPADM

## 2025-08-05 RX ORDER — HYDROMORPHONE HYDROCHLORIDE 1 MG/ML
0.1 INJECTION, SOLUTION INTRAMUSCULAR; INTRAVENOUS; SUBCUTANEOUS
Status: DISCONTINUED | OUTPATIENT
Start: 2025-08-05 | End: 2025-08-05 | Stop reason: HOSPADM

## 2025-08-05 RX ORDER — DIPHENHYDRAMINE HYDROCHLORIDE 50 MG/ML
12.5 INJECTION, SOLUTION INTRAMUSCULAR; INTRAVENOUS
Status: DISCONTINUED | OUTPATIENT
Start: 2025-08-05 | End: 2025-08-05 | Stop reason: HOSPADM

## 2025-08-05 RX ORDER — ONDANSETRON 2 MG/ML
INJECTION INTRAMUSCULAR; INTRAVENOUS PRN
Status: DISCONTINUED | OUTPATIENT
Start: 2025-08-05 | End: 2025-08-05 | Stop reason: HOSPADM

## 2025-08-05 RX ORDER — ALBUTEROL SULFATE 5 MG/ML
2.5 SOLUTION RESPIRATORY (INHALATION)
Status: DISCONTINUED | OUTPATIENT
Start: 2025-08-05 | End: 2025-08-05 | Stop reason: HOSPADM

## 2025-08-05 RX ORDER — SODIUM CHLORIDE, SODIUM LACTATE, POTASSIUM CHLORIDE, CALCIUM CHLORIDE 600; 310; 30; 20 MG/100ML; MG/100ML; MG/100ML; MG/100ML
INJECTION, SOLUTION INTRAVENOUS CONTINUOUS
Status: DISCONTINUED | OUTPATIENT
Start: 2025-08-05 | End: 2025-08-05 | Stop reason: HOSPADM

## 2025-08-05 RX ORDER — ACETAMINOPHEN 325 MG/1
650 TABLET ORAL EVERY 6 HOURS PRN
Status: DISCONTINUED | OUTPATIENT
Start: 2025-08-05 | End: 2025-08-07 | Stop reason: HOSPADM

## 2025-08-05 RX ORDER — HYDROMORPHONE HYDROCHLORIDE 1 MG/ML
0.4 INJECTION, SOLUTION INTRAMUSCULAR; INTRAVENOUS; SUBCUTANEOUS
Status: DISCONTINUED | OUTPATIENT
Start: 2025-08-05 | End: 2025-08-05 | Stop reason: HOSPADM

## 2025-08-05 RX ORDER — CEFAZOLIN SODIUM 1 G/3ML
2 INJECTION, POWDER, FOR SOLUTION INTRAMUSCULAR; INTRAVENOUS ONCE
Status: COMPLETED | OUTPATIENT
Start: 2025-08-05 | End: 2025-08-05

## 2025-08-05 RX ORDER — ONDANSETRON 2 MG/ML
4 INJECTION INTRAMUSCULAR; INTRAVENOUS
Status: DISCONTINUED | OUTPATIENT
Start: 2025-08-05 | End: 2025-08-05 | Stop reason: HOSPADM

## 2025-08-05 RX ORDER — SODIUM CHLORIDE, SODIUM LACTATE, POTASSIUM CHLORIDE, CALCIUM CHLORIDE 600; 310; 30; 20 MG/100ML; MG/100ML; MG/100ML; MG/100ML
INJECTION, SOLUTION INTRAVENOUS CONTINUOUS
Status: ACTIVE | OUTPATIENT
Start: 2025-08-05 | End: 2025-08-05

## 2025-08-05 RX ORDER — INSULIN LISPRO 100 [IU]/ML
2-9 INJECTION, SOLUTION INTRAVENOUS; SUBCUTANEOUS
Status: DISCONTINUED | OUTPATIENT
Start: 2025-08-05 | End: 2025-08-07 | Stop reason: HOSPADM

## 2025-08-05 RX ADMIN — EPHEDRINE SULFATE 15 MG: 50 INJECTION, SOLUTION INTRAVENOUS at 12:55

## 2025-08-05 RX ADMIN — EPHEDRINE SULFATE 15 MG: 50 INJECTION, SOLUTION INTRAVENOUS at 12:52

## 2025-08-05 RX ADMIN — BUPIVACAINE HYDROCHLORIDE 20 ML: 5 INJECTION, SOLUTION EPIDURAL; INTRACAUDAL; PERINEURAL at 12:30

## 2025-08-05 RX ADMIN — PHENYLEPHRINE HYDROCHLORIDE 200 MCG: 10 INJECTION INTRAVENOUS at 12:49

## 2025-08-05 RX ADMIN — BUPIVACAINE HYDROCHLORIDE 10 ML: 5 INJECTION, SOLUTION EPIDURAL; INTRACAUDAL at 12:30

## 2025-08-05 RX ADMIN — FENTANYL CITRATE 50 MCG: 50 INJECTION, SOLUTION INTRAMUSCULAR; INTRAVENOUS at 12:49

## 2025-08-05 RX ADMIN — SODIUM CHLORIDE, POTASSIUM CHLORIDE, SODIUM LACTATE AND CALCIUM CHLORIDE: 600; 310; 30; 20 INJECTION, SOLUTION INTRAVENOUS at 17:53

## 2025-08-05 RX ADMIN — PROPOFOL 200 MG: 10 INJECTION, EMULSION INTRAVENOUS at 12:45

## 2025-08-05 RX ADMIN — SENNOSIDES, DOCUSATE SODIUM 2 TABLET: 50; 8.6 TABLET, FILM COATED ORAL at 17:42

## 2025-08-05 RX ADMIN — INSULIN LISPRO 2 UNITS: 100 INJECTION, SOLUTION INTRAVENOUS; SUBCUTANEOUS at 20:26

## 2025-08-05 RX ADMIN — TRANEXAMIC ACID 1000 MG: 100 INJECTION, SOLUTION INTRAVENOUS at 12:48

## 2025-08-05 RX ADMIN — ONDANSETRON 4 MG: 2 INJECTION INTRAMUSCULAR; INTRAVENOUS at 12:45

## 2025-08-05 RX ADMIN — CEFAZOLIN 2 G: 1 INJECTION, POWDER, FOR SOLUTION INTRAMUSCULAR; INTRAVENOUS at 12:44

## 2025-08-05 RX ADMIN — DEXAMETHASONE SODIUM PHOSPHATE 4 MG: 4 INJECTION INTRA-ARTICULAR; INTRALESIONAL; INTRAMUSCULAR; INTRAVENOUS; SOFT TISSUE at 12:45

## 2025-08-05 RX ADMIN — SODIUM CHLORIDE, POTASSIUM CHLORIDE, SODIUM LACTATE AND CALCIUM CHLORIDE: 600; 310; 30; 20 INJECTION, SOLUTION INTRAVENOUS at 12:00

## 2025-08-05 RX ADMIN — ACETAMINOPHEN 1000 MG: 500 TABLET ORAL at 12:12

## 2025-08-05 ASSESSMENT — PAIN DESCRIPTION - PAIN TYPE
TYPE: SURGICAL PAIN

## 2025-08-05 ASSESSMENT — LIFESTYLE VARIABLES
ALCOHOL_USE: NO
ON A TYPICAL DAY WHEN YOU DRINK ALCOHOL HOW MANY DRINKS DO YOU HAVE: 0
EVER FELT BAD OR GUILTY ABOUT YOUR DRINKING: NO
EVER HAD A DRINK FIRST THING IN THE MORNING TO STEADY YOUR NERVES TO GET RID OF A HANGOVER: NO
HOW MANY TIMES IN THE PAST YEAR HAVE YOU HAD 5 OR MORE DRINKS IN A DAY: 0
HAVE YOU EVER FELT YOU SHOULD CUT DOWN ON YOUR DRINKING: NO
TOTAL SCORE: 0
AVERAGE NUMBER OF DAYS PER WEEK YOU HAVE A DRINK CONTAINING ALCOHOL: 0
DOES PATIENT WANT TO STOP DRINKING: NO
HAVE PEOPLE ANNOYED YOU BY CRITICIZING YOUR DRINKING: NO
CONSUMPTION TOTAL: NEGATIVE

## 2025-08-05 ASSESSMENT — PATIENT HEALTH QUESTIONNAIRE - PHQ9
7. TROUBLE CONCENTRATING ON THINGS, SUCH AS READING THE NEWSPAPER OR WATCHING TELEVISION: NOT AT ALL
3. TROUBLE FALLING OR STAYING ASLEEP OR SLEEPING TOO MUCH: NOT AT ALL
6. FEELING BAD ABOUT YOURSELF - OR THAT YOU ARE A FAILURE OR HAVE LET YOURSELF OR YOUR FAMILY DOWN: NOT AL ALL
8. MOVING OR SPEAKING SO SLOWLY THAT OTHER PEOPLE COULD HAVE NOTICED. OR THE OPPOSITE, BEING SO FIGETY OR RESTLESS THAT YOU HAVE BEEN MOVING AROUND A LOT MORE THAN USUAL: NOT AT ALL
2. FEELING DOWN, DEPRESSED, IRRITABLE, OR HOPELESS: SEVERAL DAYS
5. POOR APPETITE OR OVEREATING: SEVERAL DAYS
1. LITTLE INTEREST OR PLEASURE IN DOING THINGS: NOT AT ALL
SUM OF ALL RESPONSES TO PHQ9 QUESTIONS 1 AND 2: 1
9. THOUGHTS THAT YOU WOULD BE BETTER OFF DEAD, OR OF HURTING YOURSELF: NOT AT ALL
SUM OF ALL RESPONSES TO PHQ QUESTIONS 1-9: 2
4. FEELING TIRED OR HAVING LITTLE ENERGY: NOT AT ALL

## 2025-08-05 ASSESSMENT — FIBROSIS 4 INDEX: FIB4 SCORE: 1.19

## 2025-08-05 ASSESSMENT — ENCOUNTER SYMPTOMS: FALLS: 1

## 2025-08-06 LAB
ANION GAP SERPL CALC-SCNC: 12 MMOL/L (ref 7–16)
BUN SERPL-MCNC: 46 MG/DL (ref 8–22)
CALCIUM SERPL-MCNC: 8.7 MG/DL (ref 8.5–10.5)
CHLORIDE SERPL-SCNC: 103 MMOL/L (ref 96–112)
CO2 SERPL-SCNC: 18 MMOL/L (ref 20–33)
CREAT SERPL-MCNC: 1.84 MG/DL (ref 0.5–1.4)
ERYTHROCYTE [DISTWIDTH] IN BLOOD BY AUTOMATED COUNT: 46.7 FL (ref 35.9–50)
GFR SERPLBLD CREATININE-BSD FMLA CKD-EPI: 36 ML/MIN/1.73 M 2
GLUCOSE BLD STRIP.AUTO-MCNC: 118 MG/DL (ref 65–99)
GLUCOSE BLD STRIP.AUTO-MCNC: 131 MG/DL (ref 65–99)
GLUCOSE BLD STRIP.AUTO-MCNC: 135 MG/DL (ref 65–99)
GLUCOSE BLD STRIP.AUTO-MCNC: 185 MG/DL (ref 65–99)
GLUCOSE SERPL-MCNC: 244 MG/DL (ref 65–99)
HCT VFR BLD AUTO: 41.9 % (ref 42–52)
HGB BLD-MCNC: 13.6 G/DL (ref 14–18)
MCH RBC QN AUTO: 28.3 PG (ref 27–33)
MCHC RBC AUTO-ENTMCNC: 32.5 G/DL (ref 32.3–36.5)
MCV RBC AUTO: 87.1 FL (ref 81.4–97.8)
PLATELET # BLD AUTO: 246 K/UL (ref 164–446)
PMV BLD AUTO: 9.8 FL (ref 9–12.9)
POTASSIUM SERPL-SCNC: 5.4 MMOL/L (ref 3.6–5.5)
RBC # BLD AUTO: 4.81 M/UL (ref 4.7–6.1)
SODIUM SERPL-SCNC: 133 MMOL/L (ref 135–145)
WBC # BLD AUTO: 13.5 K/UL (ref 4.8–10.8)

## 2025-08-06 PROCEDURE — 97165 OT EVAL LOW COMPLEX 30 MIN: CPT

## 2025-08-06 PROCEDURE — 82962 GLUCOSE BLOOD TEST: CPT | Performed by: STUDENT IN AN ORGANIZED HEALTH CARE EDUCATION/TRAINING PROGRAM

## 2025-08-06 PROCEDURE — 97163 PT EVAL HIGH COMPLEX 45 MIN: CPT

## 2025-08-06 PROCEDURE — 85027 COMPLETE CBC AUTOMATED: CPT

## 2025-08-06 PROCEDURE — A9270 NON-COVERED ITEM OR SERVICE: HCPCS | Performed by: HOSPITALIST

## 2025-08-06 PROCEDURE — 700111 HCHG RX REV CODE 636 W/ 250 OVERRIDE (IP): Mod: JZ | Performed by: HOSPITALIST

## 2025-08-06 PROCEDURE — 80048 BASIC METABOLIC PNL TOTAL CA: CPT

## 2025-08-06 PROCEDURE — 99233 SBSQ HOSP IP/OBS HIGH 50: CPT | Performed by: STUDENT IN AN ORGANIZED HEALTH CARE EDUCATION/TRAINING PROGRAM

## 2025-08-06 PROCEDURE — 700102 HCHG RX REV CODE 250 W/ 637 OVERRIDE(OP): Performed by: HOSPITALIST

## 2025-08-06 PROCEDURE — 96372 THER/PROPH/DIAG INJ SC/IM: CPT

## 2025-08-06 PROCEDURE — 36415 COLL VENOUS BLD VENIPUNCTURE: CPT

## 2025-08-06 PROCEDURE — G0378 HOSPITAL OBSERVATION PER HR: HCPCS

## 2025-08-06 RX ADMIN — SENNOSIDES, DOCUSATE SODIUM 2 TABLET: 50; 8.6 TABLET, FILM COATED ORAL at 16:30

## 2025-08-06 RX ADMIN — INSULIN LISPRO 2 UNITS: 100 INJECTION, SOLUTION INTRAVENOUS; SUBCUTANEOUS at 08:23

## 2025-08-06 RX ADMIN — INSULIN GLARGINE-YFGN 50 UNITS: 100 INJECTION, SOLUTION SUBCUTANEOUS at 08:23

## 2025-08-06 RX ADMIN — ATORVASTATIN CALCIUM 40 MG: 40 TABLET, FILM COATED ORAL at 05:06

## 2025-08-06 RX ADMIN — HEPARIN SODIUM 5000 UNITS: 5000 INJECTION, SOLUTION INTRAVENOUS; SUBCUTANEOUS at 05:06

## 2025-08-06 RX ADMIN — HEPARIN SODIUM 5000 UNITS: 5000 INJECTION, SOLUTION INTRAVENOUS; SUBCUTANEOUS at 14:20

## 2025-08-06 RX ADMIN — FLUOXETINE HYDROCHLORIDE 40 MG: 20 CAPSULE ORAL at 05:06

## 2025-08-06 RX ADMIN — HEPARIN SODIUM 5000 UNITS: 5000 INJECTION, SOLUTION INTRAVENOUS; SUBCUTANEOUS at 21:28

## 2025-08-06 ASSESSMENT — GAIT ASSESSMENTS: GAIT LEVEL OF ASSIST: UNABLE TO PARTICIPATE

## 2025-08-06 ASSESSMENT — COGNITIVE AND FUNCTIONAL STATUS - GENERAL
SUGGESTED CMS G CODE MODIFIER DAILY ACTIVITY: CJ
DAILY ACTIVITIY SCORE: 16
WALKING IN HOSPITAL ROOM: A LOT
TOILETING: A LITTLE
SUGGESTED CMS G CODE MODIFIER MOBILITY: CL
WALKING IN HOSPITAL ROOM: TOTAL
DAILY ACTIVITIY SCORE: 21
DRESSING REGULAR LOWER BODY CLOTHING: A LOT
MOBILITY SCORE: 15
MOVING TO AND FROM BED TO CHAIR: A LOT
HELP NEEDED FOR BATHING: A LOT
HELP NEEDED FOR BATHING: A LITTLE
DRESSING REGULAR UPPER BODY CLOTHING: A LITTLE
MOBILITY SCORE: 13
TURNING FROM BACK TO SIDE WHILE IN FLAT BAD: A LITTLE
STANDING UP FROM CHAIR USING ARMS: A LITTLE
SUGGESTED CMS G CODE MODIFIER MOBILITY: CK
DRESSING REGULAR LOWER BODY CLOTHING: A LITTLE
STANDING UP FROM CHAIR USING ARMS: A LOT
PERSONAL GROOMING: A LITTLE
MOVING TO AND FROM BED TO CHAIR: A LITTLE
CLIMB 3 TO 5 STEPS WITH RAILING: A LOT
TURNING FROM BACK TO SIDE WHILE IN FLAT BAD: A LITTLE
SUGGESTED CMS G CODE MODIFIER DAILY ACTIVITY: CK
MOVING FROM LYING ON BACK TO SITTING ON SIDE OF FLAT BED: A LITTLE
MOVING FROM LYING ON BACK TO SITTING ON SIDE OF FLAT BED: A LITTLE
CLIMB 3 TO 5 STEPS WITH RAILING: TOTAL
TOILETING: A LOT

## 2025-08-06 ASSESSMENT — ENCOUNTER SYMPTOMS
CHILLS: 0
VOMITING: 0
NAUSEA: 0
FEVER: 0

## 2025-08-06 ASSESSMENT — ACTIVITIES OF DAILY LIVING (ADL): TOILETING: INDEPENDENT

## 2025-08-06 ASSESSMENT — PAIN DESCRIPTION - PAIN TYPE: TYPE: SURGICAL PAIN

## 2025-08-07 VITALS
DIASTOLIC BLOOD PRESSURE: 85 MMHG | SYSTOLIC BLOOD PRESSURE: 132 MMHG | BODY MASS INDEX: 29.2 KG/M2 | WEIGHT: 215.61 LBS | TEMPERATURE: 98.4 F | HEIGHT: 72 IN | RESPIRATION RATE: 16 BRPM | OXYGEN SATURATION: 92 % | HEART RATE: 97 BPM

## 2025-08-07 LAB
GLUCOSE BLD STRIP.AUTO-MCNC: 116 MG/DL (ref 65–99)
GLUCOSE BLD STRIP.AUTO-MCNC: 237 MG/DL (ref 65–99)

## 2025-08-07 PROCEDURE — 96372 THER/PROPH/DIAG INJ SC/IM: CPT

## 2025-08-07 PROCEDURE — G0378 HOSPITAL OBSERVATION PER HR: HCPCS

## 2025-08-07 PROCEDURE — 82962 GLUCOSE BLOOD TEST: CPT | Performed by: STUDENT IN AN ORGANIZED HEALTH CARE EDUCATION/TRAINING PROGRAM

## 2025-08-07 PROCEDURE — 700102 HCHG RX REV CODE 250 W/ 637 OVERRIDE(OP): Performed by: HOSPITALIST

## 2025-08-07 PROCEDURE — 99239 HOSP IP/OBS DSCHRG MGMT >30: CPT | Performed by: STUDENT IN AN ORGANIZED HEALTH CARE EDUCATION/TRAINING PROGRAM

## 2025-08-07 PROCEDURE — 700111 HCHG RX REV CODE 636 W/ 250 OVERRIDE (IP): Performed by: HOSPITALIST

## 2025-08-07 PROCEDURE — A9270 NON-COVERED ITEM OR SERVICE: HCPCS | Performed by: HOSPITALIST

## 2025-08-07 RX ADMIN — HEPARIN SODIUM 5000 UNITS: 5000 INJECTION, SOLUTION INTRAVENOUS; SUBCUTANEOUS at 04:48

## 2025-08-07 RX ADMIN — ATORVASTATIN CALCIUM 40 MG: 40 TABLET, FILM COATED ORAL at 04:47

## 2025-08-07 RX ADMIN — INSULIN GLARGINE-YFGN 50 UNITS: 100 INJECTION, SOLUTION SUBCUTANEOUS at 08:26

## 2025-08-07 RX ADMIN — FLUOXETINE HYDROCHLORIDE 40 MG: 20 CAPSULE ORAL at 04:47

## 2025-08-07 RX ADMIN — HEPARIN SODIUM 5000 UNITS: 5000 INJECTION, SOLUTION INTRAVENOUS; SUBCUTANEOUS at 13:09

## 2025-08-07 RX ADMIN — INSULIN LISPRO 3 UNITS: 100 INJECTION, SOLUTION INTRAVENOUS; SUBCUTANEOUS at 13:06

## 2025-08-07 ASSESSMENT — PAIN DESCRIPTION - PAIN TYPE: TYPE: SURGICAL PAIN

## 2025-08-20 DIAGNOSIS — E11.42 TYPE 2 DIABETES MELLITUS WITH DIABETIC POLYNEUROPATHY, WITH LONG-TERM CURRENT USE OF INSULIN (HCC): Primary | ICD-10-CM

## 2025-08-20 DIAGNOSIS — Z79.4 TYPE 2 DIABETES MELLITUS WITH DIABETIC POLYNEUROPATHY, WITH LONG-TERM CURRENT USE OF INSULIN (HCC): Primary | ICD-10-CM

## 2025-08-20 RX ORDER — TIRZEPATIDE 10 MG/.5ML
INJECTION, SOLUTION SUBCUTANEOUS
Qty: 6 ML | Refills: 3 | Status: SHIPPED | OUTPATIENT
Start: 2025-08-20

## 2025-08-21 ENCOUNTER — HOME HEALTH ADMISSION (OUTPATIENT)
Dept: HOME HEALTH SERVICES | Facility: HOME HEALTHCARE | Age: 85
End: 2025-08-21
Payer: MEDICARE

## 2025-08-24 ENCOUNTER — TELEPHONE (OUTPATIENT)
Dept: HOME HEALTH SERVICES | Facility: HOME HEALTHCARE | Age: 85
End: 2025-08-24
Payer: MEDICARE

## 2025-08-25 ENCOUNTER — HOME CARE VISIT (OUTPATIENT)
Dept: HOME HEALTH SERVICES | Facility: HOME HEALTHCARE | Age: 85
End: 2025-08-25

## 2025-08-25 ENCOUNTER — TELEPHONE (OUTPATIENT)
Dept: HOME HEALTH SERVICES | Facility: HOME HEALTHCARE | Age: 85
End: 2025-08-25
Payer: MEDICARE

## 2025-08-26 ENCOUNTER — OFF SITE VISIT (OUTPATIENT)
Dept: PALLIATIVE MEDICINE | Facility: HOSPICE | Age: 85
End: 2025-08-26
Payer: MEDICARE

## 2025-08-26 VITALS
DIASTOLIC BLOOD PRESSURE: 60 MMHG | HEART RATE: 92 BPM | OXYGEN SATURATION: 95 % | RESPIRATION RATE: 18 BRPM | TEMPERATURE: 98.2 F | SYSTOLIC BLOOD PRESSURE: 140 MMHG

## 2025-08-26 DIAGNOSIS — I10 HYPERTENSION, UNSPECIFIED TYPE: ICD-10-CM

## 2025-08-26 DIAGNOSIS — Z79.4 TYPE 2 DIABETES MELLITUS WITH DIABETIC POLYNEUROPATHY, WITH LONG-TERM CURRENT USE OF INSULIN (HCC): ICD-10-CM

## 2025-08-26 DIAGNOSIS — E11.42 TYPE 2 DIABETES MELLITUS WITH DIABETIC POLYNEUROPATHY, WITH LONG-TERM CURRENT USE OF INSULIN (HCC): ICD-10-CM

## 2025-08-26 DIAGNOSIS — S93.04XS ANKLE DISLOCATION, RIGHT, SEQUELA: Primary | ICD-10-CM

## 2025-08-26 ASSESSMENT — ENCOUNTER SYMPTOMS
DIARRHEA: 0
VOMITING: 0
DIZZINESS: 0
COUGH: 0
FEVER: 0
CONSTIPATION: 0
SHORTNESS OF BREATH: 0
CHILLS: 0
NAUSEA: 0

## 2025-08-26 ASSESSMENT — PAIN SCALES - GENERAL: PAINLEVEL_OUTOF10: NO PAIN

## (undated) DEVICE — ELECTRODE DUAL RETURN W/ CORD - (50/PK)

## (undated) DEVICE — GLOVE BIOGEL PI ORTHO SZ 7 PF LF (40PR/BX)

## (undated) DEVICE — DRILL

## (undated) DEVICE — WRAP CO-FLEX 4IN X 5YD STERIL - SELF-ADHERENT (18/CA)

## (undated) DEVICE — SUTURE 0 VICRYL PLUS UR-6 - 27 INCH (36/BX)

## (undated) DEVICE — GLOVE BIOGEL INDICATOR SZ 8.5 SURGICAL PF LTX - (50/BX 4BX/CA)

## (undated) DEVICE — SPLINT PLASTER 5 IN X 30 IN - (50EA/BX 6BX/CA)

## (undated) DEVICE — CLIP MED LG INTNL HRZN TI ESCP - (20/BX)

## (undated) DEVICE — SUTURE 3-0 MONOCRYL PLUS PS-1 - 27 INCH (36/BX)

## (undated) DEVICE — SET EXTENSION WITH 2 PORTS (48EA/CA) ***PART #2C8610 IS A SUBSTITUTE*****

## (undated) DEVICE — SENSOR OXIMETER ADULT SPO2 RD SET (20EA/BX)

## (undated) DEVICE — SUCTION INSTRUMENT YANKAUER BULBOUS TIP W/O VENT (50EA/CA)

## (undated) DEVICE — GLOVE BIOGEL PI ORTHO SZ 7.5 PF LF (40PR/BX)

## (undated) DEVICE — CANISTER SUCTION 3000ML MECHANICAL FILTER AUTO SHUTOFF MEDI-VAC NONSTERILE LF DISP (40EA/CA)

## (undated) DEVICE — GLOVE BIOGEL PI INDICATOR SZ 7.5 SURGICAL PF LF -(50/BX 4BX/CA)

## (undated) DEVICE — PAD BABY LAP 4X18 W/O - RINGS PREWASHED 5/PK 40PK/CS

## (undated) DEVICE — STOCKINET TUBULAR 6IN STERILE - 6 X 48YDS (25/CA)

## (undated) DEVICE — SET LEADWIRE 5 LEAD BEDSIDE DISPOSABLE ECG (1SET OF 5/EA)

## (undated) DEVICE — CHLORAPREP 26 ML APPLICATOR - ORANGE TINT(25/CA)

## (undated) DEVICE — HEMOSTAT ABSORBABLE POWDER SURGICEL 3G (5EA/BX)

## (undated) DEVICE — SUTURE 2-0 VICRYL CT-2 8 X 18 INCH (12EA/BX)

## (undated) DEVICE — GLOVE BIOGEL INDICATOR SZ 7.5 SURGICAL PF LTX - (50PR/BX 4BX/CA)

## (undated) DEVICE — NEEDLE W/FACET S TIP ECHOGENIC W/STIMULATION CABLE SONOPLEX II 21G X 4IN (10EA/BX)

## (undated) DEVICE — TROCAR LAPSCP 100MM 12MM NTHRD - (6/BX)

## (undated) DEVICE — SUTURE 1 PDS-2 PLUS CTX - (24/BX)

## (undated) DEVICE — LACTATED RINGERS INJ 1000 ML - (14EA/CA 60CA/PF)

## (undated) DEVICE — STOCKINET BIAS 6 IN STERILE - (20/CA)

## (undated) DEVICE — SUTURE GENERAL

## (undated) DEVICE — PACK MAJOR ORTHO TRAUMA- (3EA/CA)

## (undated) DEVICE — TUBING CLEARLINK DUO-VENT - C-FLO (48EA/CA)

## (undated) DEVICE — DRIVER

## (undated) DEVICE — SLEEVE VASO CALF MED - (10PR/CA)

## (undated) DEVICE — GLOVE SZ 7 BIOGEL PI MICRO - PF LF (50PR/BX 4BX/CA)

## (undated) DEVICE — Device

## (undated) DEVICE — SET SUCTION/IRRIGATION WITH DISPOSABLE TIP (6/CA )PART #0250-070-520 IS A SUB

## (undated) DEVICE — BAG RETRIEVAL 10ML (10EA/BX)

## (undated) DEVICE — PACK LAP CHOLE OR - (2EA/CA)

## (undated) DEVICE — GOWN WARMING STANDARD FLEX - (30/CA)

## (undated) DEVICE — COVER LIGHT HANDLE ALC PLUS DISP (18EA/BX)

## (undated) DEVICE — GLOVE BIOGEL SZ 7.5 SURGICAL PF LTX - (50PR/BX 4BX/CA)

## (undated) DEVICE — DRAPE LOWER EXTREMETY - (6/CA)

## (undated) DEVICE — SUTURE 2-0 ETHILON FS - (36/BX) 18 INCH

## (undated) DEVICE — BLADE SURGICAL #15 - (50/BX 3BX/CA)

## (undated) DEVICE — PIN

## (undated) DEVICE — TROCAR 5X100 NON BLADED Z-TH - READ KII (6/BX)

## (undated) DEVICE — DRAIN JACKSON PRATT 19FR - (10/CS)

## (undated) DEVICE — SUTURE 4-0 MONOCRYL PLUS PS-2 - 27 INCH (36/BX)

## (undated) DEVICE — SUTURE 3-0 ETHILON PS-1 (36PK/BX)

## (undated) DEVICE — RESERVOIR SUCTION 100 CC - SILICONE (20EA/CA)

## (undated) DEVICE — GLOVE BIOGEL SZ 7 SURGICAL PF LTX - (50PR/BX 4BX/CA)

## (undated) DEVICE — PADDING CAST 6 IN STERILE - 6 X 4 YDS (24/CA)

## (undated) DEVICE — GLOVE BIOGEL SZ 8 SURGICAL PF LTX - (50PR/BX 4BX/CA)

## (undated) DEVICE — SLEEVE VASO DVT COMPRESSION CALF MED - (10PR/CA)

## (undated) DEVICE — GLOVE BIOGEL PI INDICATOR SZ 7.0 SURGICAL PF LF - (50/BX 4BX/CA)

## (undated) DEVICE — SODIUM CHL IRRIGATION 0.9% 1000ML (12EA/CA)

## (undated) DEVICE — TROCAR Z THREAD11MM OPTICAL - NON BLADED(6/BX)

## (undated) DEVICE — CANNULA W/SEAL 5X100 Z-THRE - ADED KII (12/BX)

## (undated) DEVICE — HEMOSTAT SURG ABSORBABLE - 4 X 8 IN SURGICEL (24EA/CA)

## (undated) DEVICE — GLOVE SZ 7.5 BIOGEL PI MICRO - PF LF (50PR/BX)

## (undated) DEVICE — GLOVE BIOGEL PI INDICATOR SZ 6.5 SURGICAL PF LF - (50/BX 4BX/CA)

## (undated) DEVICE — DRAPE LARGE 3 QUARTER - (20/CA)

## (undated) DEVICE — CATHETER REDDICK ----MUST ORDER IN MULITPLES OF 10----

## (undated) DEVICE — PAD LAP STERILE 18 X 18 - (5/PK 40PK/CA)

## (undated) DEVICE — SUTURE 0 SILK TIES (36PK/BX)

## (undated) DEVICE — DRAPE 36X28IN RAD CARM BND BG - (25/CA)

## (undated) DEVICE — TOURNIQUET CUFF 34 X 4 ONE PORT DISP - STERILE (10/BX)

## (undated) DEVICE — CANISTER SUCTION 3000ML MECHANICAL FILTER AUTO SHUTOFF MEDI-VAC NONSTERILE LF DISP  (40EA/CA)

## (undated) DEVICE — ASNIS III CANNULATED 6.5MM TWIST DRILL